# Patient Record
Sex: FEMALE | Race: WHITE | NOT HISPANIC OR LATINO | Employment: FULL TIME | URBAN - METROPOLITAN AREA
[De-identification: names, ages, dates, MRNs, and addresses within clinical notes are randomized per-mention and may not be internally consistent; named-entity substitution may affect disease eponyms.]

---

## 2018-03-26 ENCOUNTER — HISTORICAL (OUTPATIENT)
Dept: ADMINISTRATIVE | Facility: HOSPITAL | Age: 33
End: 2018-03-26

## 2018-03-28 LAB — FINAL CULTURE: NORMAL

## 2019-05-20 ENCOUNTER — HISTORICAL (OUTPATIENT)
Dept: ADMINISTRATIVE | Facility: HOSPITAL | Age: 34
End: 2019-05-20

## 2019-05-22 LAB — FINAL CULTURE: NORMAL

## 2019-05-23 LAB — FINAL CULTURE: NORMAL

## 2019-10-25 ENCOUNTER — HISTORICAL (OUTPATIENT)
Dept: ENDOSCOPY | Facility: HOSPITAL | Age: 34
End: 2019-10-25

## 2022-10-27 ENCOUNTER — HOSPITAL ENCOUNTER (INPATIENT)
Facility: HOSPITAL | Age: 37
LOS: 2 days | Discharge: HOME | DRG: 392 | End: 2022-10-29
Attending: STUDENT IN AN ORGANIZED HEALTH CARE EDUCATION/TRAINING PROGRAM | Admitting: INTERNAL MEDICINE
Payer: COMMERCIAL

## 2022-10-27 DIAGNOSIS — R11.10 INTRACTABLE VOMITING: Primary | ICD-10-CM

## 2022-10-27 DIAGNOSIS — K52.9 GASTROENTERITIS: ICD-10-CM

## 2022-10-27 DIAGNOSIS — R07.9 CHEST PAIN: ICD-10-CM

## 2022-10-27 DIAGNOSIS — K44.9 HIATAL HERNIA: ICD-10-CM

## 2022-10-27 DIAGNOSIS — Z98.84 HISTORY OF GASTRIC BYPASS: ICD-10-CM

## 2022-10-27 DIAGNOSIS — K29.70 GASTRITIS, PRESENCE OF BLEEDING UNSPECIFIED, UNSPECIFIED CHRONICITY, UNSPECIFIED GASTRITIS TYPE: ICD-10-CM

## 2022-10-27 DIAGNOSIS — R11.2 NAUSEA AND VOMITING, UNSPECIFIED VOMITING TYPE: ICD-10-CM

## 2022-10-27 DIAGNOSIS — R11.0 NAUSEA: ICD-10-CM

## 2022-10-27 DIAGNOSIS — Z98.84 H/O GASTRIC BYPASS: ICD-10-CM

## 2022-10-27 DIAGNOSIS — R10.9 ABDOMINAL PAIN, UNSPECIFIED ABDOMINAL LOCATION: ICD-10-CM

## 2022-10-27 DIAGNOSIS — Z90.3 H/O GASTRIC SLEEVE: ICD-10-CM

## 2022-10-27 LAB
ALBUMIN SERPL-MCNC: 2.9 GM/DL (ref 3.5–5)
ALBUMIN/GLOB SERPL: 1.3 RATIO (ref 1.1–2)
ALP SERPL-CCNC: 59 UNIT/L (ref 40–150)
ALT SERPL-CCNC: 24 UNIT/L (ref 0–55)
APPEARANCE UR: ABNORMAL
AST SERPL-CCNC: 23 UNIT/L (ref 5–34)
BACTERIA #/AREA URNS AUTO: ABNORMAL /HPF
BASOPHILS # BLD AUTO: 0.05 X10(3)/MCL (ref 0–0.2)
BASOPHILS NFR BLD AUTO: 0.6 %
BILIRUB UR QL STRIP.AUTO: ABNORMAL MG/DL
BILIRUBIN DIRECT+TOT PNL SERPL-MCNC: 0.9 MG/DL
BUN SERPL-MCNC: 6.4 MG/DL (ref 7–18.7)
CALCIUM SERPL-MCNC: 8.8 MG/DL (ref 8.4–10.2)
CAOX CRY URNS QL MICRO: ABNORMAL /HPF
CHLORIDE SERPL-SCNC: 105 MMOL/L (ref 98–107)
CO2 SERPL-SCNC: 25 MMOL/L (ref 22–29)
COLOR UR AUTO: ABNORMAL
CREAT SERPL-MCNC: 0.57 MG/DL (ref 0.55–1.02)
EOSINOPHIL # BLD AUTO: 0.08 X10(3)/MCL (ref 0–0.9)
EOSINOPHIL NFR BLD AUTO: 1 %
ERYTHROCYTE [DISTWIDTH] IN BLOOD BY AUTOMATED COUNT: 13.5 % (ref 11.5–17)
GFR SERPLBLD CREATININE-BSD FMLA CKD-EPI: >60 MLS/MIN/1.73/M2
GLOBULIN SER-MCNC: 2.2 GM/DL (ref 2.4–3.5)
GLUCOSE SERPL-MCNC: 92 MG/DL (ref 74–100)
GLUCOSE UR QL STRIP.AUTO: NEGATIVE MG/DL
HCT VFR BLD AUTO: 38.8 % (ref 37–47)
HGB BLD-MCNC: 12.4 GM/DL (ref 12–16)
IMM GRANULOCYTES # BLD AUTO: 0.06 X10(3)/MCL (ref 0–0.04)
IMM GRANULOCYTES NFR BLD AUTO: 0.7 %
KETONES UR QL STRIP.AUTO: ABNORMAL MG/DL
LEUKOCYTE ESTERASE UR QL STRIP.AUTO: ABNORMAL UNIT/L
LIPASE SERPL-CCNC: 22 U/L
LYMPHOCYTES # BLD AUTO: 1.53 X10(3)/MCL (ref 0.6–4.6)
LYMPHOCYTES NFR BLD AUTO: 19 %
MAGNESIUM SERPL-MCNC: 1.9 MG/DL (ref 1.6–2.6)
MCH RBC QN AUTO: 28.6 PG (ref 27–31)
MCHC RBC AUTO-ENTMCNC: 32 MG/DL (ref 33–36)
MCV RBC AUTO: 89.4 FL (ref 80–94)
MONOCYTES # BLD AUTO: 0.86 X10(3)/MCL (ref 0.1–1.3)
MONOCYTES NFR BLD AUTO: 10.7 %
MUCOUS THREADS URNS QL MICRO: ABNORMAL /LPF
NEUTROPHILS # BLD AUTO: 5.5 X10(3)/MCL (ref 2.1–9.2)
NEUTROPHILS NFR BLD AUTO: 68 %
NITRITE UR QL STRIP.AUTO: NEGATIVE
NRBC BLD AUTO-RTO: 0 %
PH UR STRIP.AUTO: 6 [PH]
PLATELET # BLD AUTO: 300 X10(3)/MCL (ref 130–400)
PMV BLD AUTO: 11.8 FL (ref 7.4–10.4)
POTASSIUM SERPL-SCNC: 3.6 MMOL/L (ref 3.5–5.1)
PROT SERPL-MCNC: 5.1 GM/DL (ref 6.4–8.3)
PROT UR QL STRIP.AUTO: ABNORMAL MG/DL
RBC # BLD AUTO: 4.34 X10(6)/MCL (ref 4.2–5.4)
RBC #/AREA URNS AUTO: ABNORMAL /HPF
RBC UR QL AUTO: NEGATIVE UNIT/L
SODIUM SERPL-SCNC: 138 MMOL/L (ref 136–145)
SP GR UR STRIP.AUTO: 1.03 (ref 1–1.03)
SQUAMOUS #/AREA URNS AUTO: ABNORMAL /HPF
UROBILINOGEN UR STRIP-ACNC: 1 MG/DL
WBC # SPEC AUTO: 8.1 X10(3)/MCL (ref 4.5–11.5)
WBC #/AREA URNS AUTO: ABNORMAL /HPF

## 2022-10-27 PROCEDURE — 81001 URINALYSIS AUTO W/SCOPE: CPT | Performed by: PHYSICIAN ASSISTANT

## 2022-10-27 PROCEDURE — 85025 COMPLETE CBC W/AUTO DIFF WBC: CPT | Performed by: PHYSICIAN ASSISTANT

## 2022-10-27 PROCEDURE — 96374 THER/PROPH/DIAG INJ IV PUSH: CPT

## 2022-10-27 PROCEDURE — 11000001 HC ACUTE MED/SURG PRIVATE ROOM

## 2022-10-27 PROCEDURE — 83690 ASSAY OF LIPASE: CPT | Performed by: PHYSICIAN ASSISTANT

## 2022-10-27 PROCEDURE — 63600175 PHARM REV CODE 636 W HCPCS: Performed by: PHYSICIAN ASSISTANT

## 2022-10-27 PROCEDURE — 96361 HYDRATE IV INFUSION ADD-ON: CPT

## 2022-10-27 PROCEDURE — 80053 COMPREHEN METABOLIC PANEL: CPT | Performed by: PHYSICIAN ASSISTANT

## 2022-10-27 PROCEDURE — 99285 EMERGENCY DEPT VISIT HI MDM: CPT | Mod: 25

## 2022-10-27 PROCEDURE — 83735 ASSAY OF MAGNESIUM: CPT | Performed by: PHYSICIAN ASSISTANT

## 2022-10-27 PROCEDURE — 25000003 PHARM REV CODE 250: Performed by: PHYSICIAN ASSISTANT

## 2022-10-27 PROCEDURE — 63600175 PHARM REV CODE 636 W HCPCS: Performed by: STUDENT IN AN ORGANIZED HEALTH CARE EDUCATION/TRAINING PROGRAM

## 2022-10-27 PROCEDURE — C9113 INJ PANTOPRAZOLE SODIUM, VIA: HCPCS | Performed by: STUDENT IN AN ORGANIZED HEALTH CARE EDUCATION/TRAINING PROGRAM

## 2022-10-27 PROCEDURE — C9113 INJ PANTOPRAZOLE SODIUM, VIA: HCPCS | Performed by: PHYSICIAN ASSISTANT

## 2022-10-27 PROCEDURE — 96376 TX/PRO/DX INJ SAME DRUG ADON: CPT

## 2022-10-27 PROCEDURE — 81003 URINALYSIS AUTO W/O SCOPE: CPT | Performed by: PHYSICIAN ASSISTANT

## 2022-10-27 PROCEDURE — 63600175 PHARM REV CODE 636 W HCPCS

## 2022-10-27 PROCEDURE — 25500020 PHARM REV CODE 255: Performed by: STUDENT IN AN ORGANIZED HEALTH CARE EDUCATION/TRAINING PROGRAM

## 2022-10-27 PROCEDURE — 36415 COLL VENOUS BLD VENIPUNCTURE: CPT | Performed by: PHYSICIAN ASSISTANT

## 2022-10-27 PROCEDURE — 63600175 PHARM REV CODE 636 W HCPCS: Performed by: NURSE PRACTITIONER

## 2022-10-27 PROCEDURE — 96375 TX/PRO/DX INJ NEW DRUG ADDON: CPT

## 2022-10-27 RX ORDER — ACETAMINOPHEN 325 MG/1
650 TABLET ORAL EVERY 6 HOURS PRN
Status: DISCONTINUED | OUTPATIENT
Start: 2022-10-27 | End: 2022-10-29 | Stop reason: HOSPADM

## 2022-10-27 RX ORDER — MAG HYDROX/ALUMINUM HYD/SIMETH 200-200-20
30 SUSPENSION, ORAL (FINAL DOSE FORM) ORAL 4 TIMES DAILY PRN
Status: DISCONTINUED | OUTPATIENT
Start: 2022-10-27 | End: 2022-10-29 | Stop reason: HOSPADM

## 2022-10-27 RX ORDER — POLYETHYLENE GLYCOL 3350 17 G/17G
17 POWDER, FOR SOLUTION ORAL 2 TIMES DAILY PRN
Status: DISCONTINUED | OUTPATIENT
Start: 2022-10-27 | End: 2022-10-29 | Stop reason: HOSPADM

## 2022-10-27 RX ORDER — BUPROPION HYDROCHLORIDE 150 MG/1
TABLET ORAL
COMMUNITY
End: 2023-10-05

## 2022-10-27 RX ORDER — TALC
6 POWDER (GRAM) TOPICAL NIGHTLY PRN
Status: DISCONTINUED | OUTPATIENT
Start: 2022-10-27 | End: 2022-10-29 | Stop reason: HOSPADM

## 2022-10-27 RX ORDER — ERGOCALCIFEROL 1.25 MG/1
50000 CAPSULE ORAL
COMMUNITY
Start: 2022-10-21

## 2022-10-27 RX ORDER — ONDANSETRON 2 MG/ML
INJECTION INTRAMUSCULAR; INTRAVENOUS
Status: COMPLETED
Start: 2022-10-27 | End: 2022-10-27

## 2022-10-27 RX ORDER — SIMETHICONE 80 MG
1 TABLET,CHEWABLE ORAL 4 TIMES DAILY PRN
Status: DISCONTINUED | OUTPATIENT
Start: 2022-10-27 | End: 2022-10-29 | Stop reason: HOSPADM

## 2022-10-27 RX ORDER — PANTOPRAZOLE SODIUM 40 MG/10ML
40 INJECTION, POWDER, LYOPHILIZED, FOR SOLUTION INTRAVENOUS
Status: COMPLETED | OUTPATIENT
Start: 2022-10-27 | End: 2022-10-27

## 2022-10-27 RX ORDER — ONDANSETRON 4 MG/1
4 TABLET, ORALLY DISINTEGRATING ORAL
Status: DISCONTINUED | OUTPATIENT
Start: 2022-10-27 | End: 2022-10-27

## 2022-10-27 RX ORDER — NALOXONE HCL 0.4 MG/ML
0.02 VIAL (ML) INJECTION
Status: DISCONTINUED | OUTPATIENT
Start: 2022-10-27 | End: 2022-10-29 | Stop reason: HOSPADM

## 2022-10-27 RX ORDER — ALPRAZOLAM 1 MG/1
1 TABLET ORAL 3 TIMES DAILY
COMMUNITY
Start: 2022-10-20 | End: 2023-10-05

## 2022-10-27 RX ORDER — SODIUM CHLORIDE, SODIUM LACTATE, POTASSIUM CHLORIDE, CALCIUM CHLORIDE 600; 310; 30; 20 MG/100ML; MG/100ML; MG/100ML; MG/100ML
INJECTION, SOLUTION INTRAVENOUS CONTINUOUS
Status: DISCONTINUED | OUTPATIENT
Start: 2022-10-27 | End: 2022-10-29 | Stop reason: HOSPADM

## 2022-10-27 RX ORDER — ONDANSETRON 2 MG/ML
4 INJECTION INTRAMUSCULAR; INTRAVENOUS
Status: COMPLETED | OUTPATIENT
Start: 2022-10-27 | End: 2022-10-27

## 2022-10-27 RX ORDER — PANTOPRAZOLE SODIUM 40 MG/10ML
40 INJECTION, POWDER, LYOPHILIZED, FOR SOLUTION INTRAVENOUS DAILY
Status: DISCONTINUED | OUTPATIENT
Start: 2022-10-28 | End: 2022-10-28

## 2022-10-27 RX ORDER — ONDANSETRON 2 MG/ML
4 INJECTION INTRAMUSCULAR; INTRAVENOUS EVERY 4 HOURS PRN
Status: DISCONTINUED | OUTPATIENT
Start: 2022-10-27 | End: 2022-10-29 | Stop reason: HOSPADM

## 2022-10-27 RX ORDER — MORPHINE SULFATE 4 MG/ML
4 INJECTION, SOLUTION INTRAMUSCULAR; INTRAVENOUS
Status: COMPLETED | OUTPATIENT
Start: 2022-10-27 | End: 2022-10-27

## 2022-10-27 RX ORDER — PROCHLORPERAZINE EDISYLATE 5 MG/ML
5 INJECTION INTRAMUSCULAR; INTRAVENOUS EVERY 6 HOURS PRN
Status: DISCONTINUED | OUTPATIENT
Start: 2022-10-27 | End: 2022-10-29 | Stop reason: HOSPADM

## 2022-10-27 RX ADMIN — IOPAMIDOL 100 ML: 755 INJECTION, SOLUTION INTRAVENOUS at 05:10

## 2022-10-27 RX ADMIN — SODIUM CHLORIDE, POTASSIUM CHLORIDE, SODIUM LACTATE AND CALCIUM CHLORIDE: 600; 310; 30; 20 INJECTION, SOLUTION INTRAVENOUS at 10:10

## 2022-10-27 RX ADMIN — SODIUM CHLORIDE 1000 ML: 9 INJECTION, SOLUTION INTRAVENOUS at 11:10

## 2022-10-27 RX ADMIN — PANTOPRAZOLE SODIUM 40 MG: 40 INJECTION, POWDER, FOR SOLUTION INTRAVENOUS at 11:10

## 2022-10-27 RX ADMIN — SODIUM CHLORIDE, POTASSIUM CHLORIDE, SODIUM LACTATE AND CALCIUM CHLORIDE 1000 ML: 600; 310; 30; 20 INJECTION, SOLUTION INTRAVENOUS at 06:10

## 2022-10-27 RX ADMIN — ONDANSETRON 4 MG: 2 INJECTION INTRAMUSCULAR; INTRAVENOUS at 04:10

## 2022-10-27 RX ADMIN — PANTOPRAZOLE SODIUM 40 MG: 40 INJECTION, POWDER, FOR SOLUTION INTRAVENOUS at 06:10

## 2022-10-27 RX ADMIN — SODIUM CHLORIDE, POTASSIUM CHLORIDE, SODIUM LACTATE AND CALCIUM CHLORIDE 1000 ML: 600; 310; 30; 20 INJECTION, SOLUTION INTRAVENOUS at 04:10

## 2022-10-27 RX ADMIN — MORPHINE SULFATE 4 MG: 4 INJECTION INTRAVENOUS at 04:10

## 2022-10-27 RX ADMIN — ONDANSETRON 4 MG: 2 INJECTION INTRAMUSCULAR; INTRAVENOUS at 11:10

## 2022-10-27 NOTE — ED PROVIDER NOTES
Encounter Date: 10/27/2022    SCRIBE #1 NOTE: I, Jose Saldivar, am scribing for, and in the presence of,  Joseluis Sultana IV, MD. I have scribed the following portions of the note - Other sections scribed: hpi, ros, pe.     History     Chief Complaint   Patient presents with    Abdominal Pain    Vomiting    Rectal Bleeding     Pt presents c/o abd pain with vomiting and rectal bleeding and fever.. Onset Wednesday.  Admit [facility] / discharged/ PMH gastric sleeve.       36 y.o female with pMHx gastric sleeve 9 years ago, gastric bypass 3 months ago - both in [place] presenting to the ED complaining of abdominal pain, nausea, NBNB vomiting. Patient states she was at work last week when she had to leave early due to constant vomiting. Admitted at OSH last week for bowel obstruction; she was discharged after her symptoms improved but 3 hours after discharge her symptoms returned. She also complains of intermittent brb per rectum. Reports no BM since 10/21.       Abdominal Pain  The current episode started several days ago. The problem has been rapidly worsening. The abdominal pain is generalized. The abdominal pain does not radiate. The abdominal pain is relieved by being still. The abdominal pain is exacerbated by movement. The other symptoms of the illness include nausea and vomiting. The other symptoms of the illness do not include fever, shortness of breath, dysuria, vaginal discharge or vaginal bleeding.   Nausea began more than 1 week ago.   The vomiting began more than 2 days ago. Vomiting occurs more than 10 times per day. The emesis contains stomach contents and undigested food.   The illness is associated with a recent illness. The patient states that she believes she is currently not pregnant. The patient has had a change in bowel habit. Risk factors for an acute abdominal problem include a history of abdominal surgery. Symptoms associated with the illness do not include chills or hematuria.   Review of patient's  allergies indicates:   Allergen Reactions    Penicillin Rash     History reviewed. No pertinent past medical history.  History reviewed. No pertinent surgical history.  History reviewed. No pertinent family history.     Review of Systems   Constitutional:  Positive for appetite change. Negative for chills and fever.   HENT:  Negative for congestion, rhinorrhea and sore throat.    Eyes:  Negative for visual disturbance.   Respiratory:  Negative for cough and shortness of breath.    Cardiovascular:  Negative for chest pain and leg swelling.   Gastrointestinal:  Positive for abdominal pain, blood in stool, nausea and vomiting.   Genitourinary:  Negative for dysuria, hematuria, vaginal bleeding and vaginal discharge.   Musculoskeletal:  Negative for joint swelling.   Skin:  Negative for rash.   Neurological:  Negative for weakness.   Psychiatric/Behavioral:  Negative for confusion.      Physical Exam     Initial Vitals   BP Pulse Resp Temp SpO2   10/27/22 1129 10/27/22 1129 10/27/22 1129 10/27/22 2153 10/27/22 1129   (!) 147/105 94 16 98.4 °F (36.9 °C) 98 %      MAP       --                Physical Exam    Nursing note and vitals reviewed.  Constitutional: She is not diaphoretic. No distress.   HENT:   Head: Normocephalic and atraumatic.   Mouth/Throat: Mucous membranes are dry.   Eyes: EOM are normal. Pupils are equal, round, and reactive to light.   Neck: Neck supple.   Normal range of motion.  Cardiovascular:  Normal rate and regular rhythm.           No murmur heard.  Pulmonary/Chest: Breath sounds normal. No respiratory distress. She has no wheezes. She has no rales.   Abdominal: Abdomen is soft. She exhibits no distension. There is no abdominal tenderness.   Well healed midline surgical scar  Laparoscopic surgical scars   Non tender, non distended abdomen    Musculoskeletal:         General: Normal range of motion.      Cervical back: Normal range of motion and neck supple.     Neurological: She is alert and  oriented to person, place, and time. She has normal strength.   Skin: Skin is warm. No rash noted.   Psychiatric: She has a normal mood and affect.       ED Course   Procedures  Labs Reviewed   COMPREHENSIVE METABOLIC PANEL - Abnormal; Notable for the following components:       Result Value    Blood Urea Nitrogen 6.4 (*)     Protein Total 5.1 (*)     Albumin Level 2.9 (*)     Globulin 2.2 (*)     All other components within normal limits   URINALYSIS, REFLEX TO URINE CULTURE - Abnormal; Notable for the following components:    Appearance, UA Cloudy (*)     Protein, UA Trace (*)     Ketones, UA 1+ (*)     Bilirubin, UA 1+ (*)     Leukocyte Esterase, UA Trace (*)     All other components within normal limits   CBC WITH DIFFERENTIAL - Abnormal; Notable for the following components:    MCHC 32.0 (*)     MPV 11.8 (*)     IG# 0.06 (*)     All other components within normal limits   URINALYSIS, MICROSCOPIC - Abnormal; Notable for the following components:    Mucous, UA Occ (*)     Calcium Oxalate Crystals, UA Many (*)     All other components within normal limits   MAGNESIUM - Normal   LIPASE - Normal   CBC W/ AUTO DIFFERENTIAL    Narrative:     The following orders were created for panel order CBC auto differential.  Procedure                               Abnormality         Status                     ---------                               -----------         ------                     CBC with Differential[[id number]]        Abnormal            Final result                 Please view results for these tests on the individual orders.          Imaging Results              CT Chest Abdomen Pelvis W W/O Contrast (XPD) (Final result)  Result time 10/27/22 17:52:49      Final result by Jaret Clarke MD (10/27/22 17:52:49)                   Narrative:    EXAMINATION  CT CHEST ABDOMEN PELVIS W W/O CONTRAST (XPD)    CLINICAL HISTORY  Weight loss, unintended;  vomiting, history of gastric bypass    TECHNIQUE  Pre- and  post-contrast helical-acquisition CT images were obtained and multiplanar reformats were accomplished by a CT technologist at a separate workstation, pushed to PACS for physician review.      Contrast:    *IV: ISOVUE-370, 100 mL  *Enteric: Gastroview, 10 mL diluted in water    COMPARISON  *Abdominopelvic CT dated 7 March 2014.  *No prior thoracic imaging is available.    FINDINGS  Images were reviewed in soft tissue, lung, and bone windows.    Exam quality: adequate for evaluation    Lines/tubes: none visualized    Cardiac chambers are normal volume.  There is no significant pericardial fluid.  The thoracic and abdominal aorta are normal course, caliber and contour.  The arch, upper abdominal, and pelvic branch vessels are unremarkable for exam protocol.    Central airway patency is widely maintained.  There is no organized airspace consolidation or suspicious focal lung lesion.  No pleural effusion or pneumothorax is identified.    The gallbladder is mildly distended, with layering intraluminal attenuation likely representing sludge.  No calcified stones, mural thickening, or pericholecystic inflammatory changes are evident.  There are no findings to suggest biliary obstruction.  Circumscribed area of decreased attenuation with focal peripheral enhancement at the anterior right hepatic lobe is slightly more prominent than the comparison, may represent of angioma but otherwise of limited characterization.  There is diffuse hypoattenuation of the liver parenchyma, consistent with fatty infiltration.  The liver is mildly enlarged, measuring 19.5 cm craniocaudal dimension at the right midclavicular line.    The remaining upper abdominal solid organs are without evidence of acute or suspicious focal abnormality.  Kidneys demonstrate temporally symmetric enhancement.  No radiodense urolithiasis or evidence of distal obstructive uropathy.  The urinary bladder is unremarkable.  No focal abnormality of the adnexal  structures.  Incidentally, there is a newly visualized linear metallic structure in the region of the posterior left adnexa consistent with tubal ligation clips.  A similar structure is noted along the lateral margin of the spleen (series 3, image 71).    There is faint contrast opacification of the esophageal lumen, with no abnormal dilatation or focal lesion.  Small hiatal hernia is similar to the prior study.  There are redemonstrated changes of gastric bypass, with unremarkable contrast transit across the anastomosis and into the afferent and efferent jejunal loops.  Circumferential thickened, edematous appearance of the distal gastric wall and immediately adjacent to Junel loop is evident, with the remaining small bowel loops demonstrating no evidence of abnormal mural thickening, focal lesion, or abnormal luminal dilatation or discrete transition point to suggest high-grade mechanical obstruction.  The appendix is unremarkable.  No definite mass-like mural irregularity is appreciated through the course of the colon.  Focal area of decreased luminal caliber at the left upper quadrant is favored artifactual due to inadequate luminal distention, otherwise limited assessment.    There is diffuse mesenteric haziness and inflammatory fat stranding adjacent to the above mentioned gastric and jejunal wall thickening.  Scattered reactive-appearing lymph nodes are also present through this region; representative node measures approximately 11 mm short axis (series 3, image 78).  The remaining mesentery is unremarkable and there are no findings to suggest pathologic lymph node enlargement or necrotic adenopathy.    The visualized thyroid gland is unremarkable.  No focal abnormality of the included lower neck.  The thoracic and abdominal wall subcutaneous tissues and regional muscular structures are without acute finding.  There is no acute osseous displacement or destructive skeletal lesion.    IMPRESSION  1. Edematous  mural thickening at the site of gastric bypass anastomosis, with inflammatory adjacent mesenteric fat stranding and reactive-appearing adenopathy.  Findings may represent sequela of gastroenteritis, otherwise limited characterization and endoscopy may prove beneficial.  2. No evidence of anastomotic leak.  3. Suspected anterior right hepatic lobe hemangioma, otherwise limited assessment but there are no CT findings to suggest that this represents an aggressive or acute etiology. Outpatient follow-up utilizing right upper quadrant ultrasound versus hepatic protocol MR would allow additional characterization.  4. Focal area of luminal narrowing at the proximal descending colon favored artifactual secondary to luminal underdistention.  Follow-up with colonoscopy is recommended if there are ongoing concerns for occult pathology.  5. Suspected displacement of right tubal ligation clip, visualized at the lateral left upper quadrant.  6. Additional nonacute secondary details discussed above.    RADIATION DOSE  Automated tube current modulation, weight-based exposure dosing, and/or iterative reconstruction technique utilized to reach lowest reasonably achievable exposure rate.    DLP: 1907 mGy*cm      Electronically signed by: Jaret Clarke  Date:    10/27/2022  Time:    17:52                                     Medications   diatrizoate meglumineand-diatrizoate sodium (GASTROVIEW) solution 10 mL (has no administration in time range)   melatonin tablet 6 mg (has no administration in time range)   ondansetron injection 4 mg (has no administration in time range)   prochlorperazine injection Soln 5 mg (has no administration in time range)   polyethylene glycol packet 17 g (has no administration in time range)   acetaminophen tablet 650 mg (has no administration in time range)   simethicone chewable tablet 80 mg (has no administration in time range)   aluminum-magnesium hydroxide-simethicone 200-200-20 mg/5 mL suspension 30 mL  (has no administration in time range)   naloxone 0.4 mg/mL injection 0.02 mg (has no administration in time range)   lactated ringers infusion ( Intravenous New Bag 10/27/22 2210)   pantoprazole injection 40 mg (has no administration in time range)   sodium chloride 0.9% bolus 1,000 mL (1,000 mLs Intravenous New Bag 10/27/22 1145)   ondansetron injection 4 mg (4 mg Intravenous Given 10/27/22 1145)   pantoprazole injection 40 mg (40 mg Intravenous Given 10/27/22 1145)   lactated ringers bolus 1,000 mL (0 mLs Intravenous Stopped 10/27/22 1730)   morphine injection 4 mg (4 mg Intravenous Given 10/27/22 1630)   ondansetron injection 4 mg (4 mg Intravenous Given 10/27/22 1630)   iopamidoL (ISOVUE-370) injection 100 mL (100 mLs Intravenous Given 10/27/22 1727)   pantoprazole injection 40 mg (40 mg Intravenous Given 10/27/22 1815)   lactated ringers bolus 1,000 mL (0 mLs Intravenous Stopped 10/27/22 1930)     Medical Decision Making:   History:   Old Medical Records: I decided to obtain old medical records.  Initial Assessment:   37 yo with hx multiple bariatric surgeries, reported recent admission for bowel obstruction at OSH - presenting with n/v/abd pain, inability to tolerate PO. Patietn slightly dry but overall well appearing on exam. Will obtain labs, hydrate, treat nausea, obtain CT bariatric protocol, reassess.   Differential Diagnosis:   SBO, gastroeneritis, post bariatric complications (aastomotic leak, stenosis/stricture, ulceration), dehydration, electrolyte derangement, pancreatitis   Clinical Tests:   Lab Tests: Reviewed and Ordered  Radiological Study: Ordered and Reviewed  ED Management:  IVF  IV protonix  IV antiemetics   Other:   I have discussed this case with another health care provider.        Scribe Attestation:   Scribe #1: I performed the above scribed service and the documentation accurately describes the services I performed. I attest to the accuracy of the note.    Attending Attestation:            Physician Attestation for Scribe:  Physician Attestation Statement for Scribe #1: I, reviewed documentation, as scribed by Jose Saldivar in my presence, and it is both accurate and complete.           ED Course as of 10/28/22 0038   Thu Oct 27, 2022   1830 CT with inflammation of gastric bypass anastamoses, no evidence of significant post bariatric complications, will treat with IV protonix, PO challenge.  [AC]   1924 Failed PO challenge. WIll admit  [AC]      ED Course User Index  [AC] Joseluis Sultana IV, MD                 Clinical Impression:   Final diagnoses:  [K29.70] Gastritis, presence of bleeding unspecified, unspecified chronicity, unspecified gastritis type (Primary)  [Z90.3] H/O gastric sleeve  [Z98.84] History of gastric bypass  [R10.9] Abdominal pain, unspecified abdominal location  [R11.2] Nausea and vomiting, unspecified vomiting type      ED Disposition Condition    Admit Stable        I, Joseluis Sultana MD personally performed the history, PE, MDM, and procedures as documented above and agree with the scribe's documentation.           Joseluis Sultana IV, MD  10/28/22 0040       Joseluis Sultana IV, MD  10/28/22 0041

## 2022-10-27 NOTE — FIRST PROVIDER EVALUATION
Medical screening examination initiated.  I have conducted a focused provider triage encounter, findings are as follows:    Chief Complaint   Patient presents with    Abdominal Pain    Vomiting    Rectal Bleeding     Pt presents c/o abd pain with vomiting and rectal bleeding and fever.. Onset Wednesday.  Admit [facility] / discharged/ PMH gastric sleeve.       Brief history of present illness:  36 y.o. female presents to the ED with n/v, lower abdominal pain, and bright red rectal bleeding onset Friday. [facility]. Notes recent gastric bypass surgery in [place] July 2022 with subsequent revision.     Vitals:    10/27/22 1129   BP: (!) 147/105   BP Location: Left arm   Patient Position: Sitting   Pulse: 94   Resp: 16   TempSrc: Oral   SpO2: 98%   Weight: 64.4 kg (142 lb)       Pertinent physical exam:  Awake, alert, ambulatory, non-labored respirations    Brief workup plan:  labs,  UA    Preliminary workup initiated; this workup will be continued and followed by the physician or advanced practice provider that is assigned to the patient when roomed.

## 2022-10-28 PROBLEM — R11.0 NAUSEA: Status: ACTIVE | Noted: 2022-10-28

## 2022-10-28 LAB
ALBUMIN SERPL-MCNC: 2.2 GM/DL (ref 3.5–5)
ALBUMIN/GLOB SERPL: 1.3 RATIO (ref 1.1–2)
ALP SERPL-CCNC: 44 UNIT/L (ref 40–150)
ALT SERPL-CCNC: 17 UNIT/L (ref 0–55)
AST SERPL-CCNC: 17 UNIT/L (ref 5–34)
BASOPHILS # BLD AUTO: 0.04 X10(3)/MCL (ref 0–0.2)
BASOPHILS NFR BLD AUTO: 0.8 %
BILIRUBIN DIRECT+TOT PNL SERPL-MCNC: 1 MG/DL
BUN SERPL-MCNC: 4.3 MG/DL (ref 7–18.7)
CALCIUM SERPL-MCNC: 8 MG/DL (ref 8.4–10.2)
CHLORIDE SERPL-SCNC: 104 MMOL/L (ref 98–107)
CO2 SERPL-SCNC: 26 MMOL/L (ref 22–29)
CREAT SERPL-MCNC: 0.52 MG/DL (ref 0.55–1.02)
EOSINOPHIL # BLD AUTO: 0.11 X10(3)/MCL (ref 0–0.9)
EOSINOPHIL NFR BLD AUTO: 2.2 %
ERYTHROCYTE [DISTWIDTH] IN BLOOD BY AUTOMATED COUNT: 13.7 % (ref 11.5–17)
GFR SERPLBLD CREATININE-BSD FMLA CKD-EPI: >60 MLS/MIN/1.73/M2
GLOBULIN SER-MCNC: 1.7 GM/DL (ref 2.4–3.5)
GLUCOSE SERPL-MCNC: 72 MG/DL (ref 74–100)
HCT VFR BLD AUTO: 32.4 % (ref 37–47)
HGB BLD-MCNC: 10.3 GM/DL (ref 12–16)
IMM GRANULOCYTES # BLD AUTO: 0.04 X10(3)/MCL (ref 0–0.04)
IMM GRANULOCYTES NFR BLD AUTO: 0.8 %
LYMPHOCYTES # BLD AUTO: 1.49 X10(3)/MCL (ref 0.6–4.6)
LYMPHOCYTES NFR BLD AUTO: 29.9 %
MAGNESIUM SERPL-MCNC: 1.7 MG/DL (ref 1.6–2.6)
MCH RBC QN AUTO: 29.4 PG (ref 27–31)
MCHC RBC AUTO-ENTMCNC: 31.8 MG/DL (ref 33–36)
MCV RBC AUTO: 92.6 FL (ref 80–94)
MONOCYTES # BLD AUTO: 0.75 X10(3)/MCL (ref 0.1–1.3)
MONOCYTES NFR BLD AUTO: 15.1 %
NEUTROPHILS # BLD AUTO: 2.6 X10(3)/MCL (ref 2.1–9.2)
NEUTROPHILS NFR BLD AUTO: 51.2 %
NRBC BLD AUTO-RTO: 0 %
PHOSPHATE SERPL-MCNC: 3.5 MG/DL (ref 2.3–4.7)
PLATELET # BLD AUTO: 219 X10(3)/MCL (ref 130–400)
PMV BLD AUTO: 12.1 FL (ref 7.4–10.4)
POTASSIUM SERPL-SCNC: 3.3 MMOL/L (ref 3.5–5.1)
PROT SERPL-MCNC: 3.9 GM/DL (ref 6.4–8.3)
RBC # BLD AUTO: 3.5 X10(6)/MCL (ref 4.2–5.4)
SODIUM SERPL-SCNC: 138 MMOL/L (ref 136–145)
WBC # SPEC AUTO: 5 X10(3)/MCL (ref 4.5–11.5)

## 2022-10-28 PROCEDURE — 25000003 PHARM REV CODE 250: Performed by: NURSE PRACTITIONER

## 2022-10-28 PROCEDURE — 63600175 PHARM REV CODE 636 W HCPCS

## 2022-10-28 PROCEDURE — 85025 COMPLETE CBC W/AUTO DIFF WBC: CPT | Performed by: NURSE PRACTITIONER

## 2022-10-28 PROCEDURE — 84100 ASSAY OF PHOSPHORUS: CPT | Performed by: NURSE PRACTITIONER

## 2022-10-28 PROCEDURE — 36415 COLL VENOUS BLD VENIPUNCTURE: CPT | Performed by: NURSE PRACTITIONER

## 2022-10-28 PROCEDURE — 11000001 HC ACUTE MED/SURG PRIVATE ROOM

## 2022-10-28 PROCEDURE — 80053 COMPREHEN METABOLIC PANEL: CPT | Performed by: NURSE PRACTITIONER

## 2022-10-28 PROCEDURE — C9113 INJ PANTOPRAZOLE SODIUM, VIA: HCPCS | Performed by: NURSE PRACTITIONER

## 2022-10-28 PROCEDURE — 25000003 PHARM REV CODE 250: Performed by: INTERNAL MEDICINE

## 2022-10-28 PROCEDURE — 83735 ASSAY OF MAGNESIUM: CPT | Performed by: NURSE PRACTITIONER

## 2022-10-28 PROCEDURE — 63600175 PHARM REV CODE 636 W HCPCS: Performed by: NURSE PRACTITIONER

## 2022-10-28 PROCEDURE — C9113 INJ PANTOPRAZOLE SODIUM, VIA: HCPCS

## 2022-10-28 RX ORDER — BUPROPION HYDROCHLORIDE 150 MG/1
150 TABLET ORAL DAILY
Status: DISCONTINUED | OUTPATIENT
Start: 2022-10-28 | End: 2022-10-29 | Stop reason: HOSPADM

## 2022-10-28 RX ORDER — ALPRAZOLAM 0.5 MG/1
1 TABLET ORAL 3 TIMES DAILY PRN
Status: DISCONTINUED | OUTPATIENT
Start: 2022-10-28 | End: 2022-10-29 | Stop reason: HOSPADM

## 2022-10-28 RX ORDER — PANTOPRAZOLE SODIUM 40 MG/10ML
40 INJECTION, POWDER, LYOPHILIZED, FOR SOLUTION INTRAVENOUS 2 TIMES DAILY
Status: DISCONTINUED | OUTPATIENT
Start: 2022-10-28 | End: 2022-10-29 | Stop reason: HOSPADM

## 2022-10-28 RX ADMIN — PANTOPRAZOLE SODIUM 40 MG: 40 INJECTION, POWDER, FOR SOLUTION INTRAVENOUS at 09:10

## 2022-10-28 RX ADMIN — POTASSIUM PHOSPHATE, MONOBASIC AND POTASSIUM PHOSPHATE, DIBASIC 30 MMOL: 224; 236 INJECTION, SOLUTION, CONCENTRATE INTRAVENOUS at 11:10

## 2022-10-28 RX ADMIN — BUPROPION HYDROCHLORIDE 150 MG: 150 TABLET, FILM COATED, EXTENDED RELEASE ORAL at 11:10

## 2022-10-28 NOTE — CONSULTS
Gastroenterology Consultation Note    Reason for Consult:  nausea and vomiting    PCP:   Galdino Caceres MD    Referring provider: Deneen Gunn, [facility]       History of Present Illness (HPI):    37 y/o female known to Dr. Stewart Blackwell who has a past medical history of gastric sleeve for weight loss 2013 in [place], gastric bypass for weight loss 3 months ago in [place], anxiety.      Patient presented to the ED 10/27/22 with complaints nausea, vomiting, and bilateral lower abdominal pain x 1 day. She also reported one episode of rectal bleeding, which resolved. Patient's children had a sleep over on Wednesday and everyone became sick. She was unable to keep anything down, so she [facility] and was admitted with a bowel obstruction. This was treated conservatively and she discharged home after tolerating fish. She then presented to the [facility] ED with recurrent nausea, vomiting, and inability to tolerated food (chicken). On arrival here, she was hemodynamically stable and afebrile. Labs were unrevealing. CT abdomen and pelvis with and without contrast suggested gastroenteritis of her gastric bypass without obstruction or leakage, PO contrast transit across the anastomosis into the efferent jejunal loops,  focal luminal narrowing at the proximal descending colon likely secondary to underdistention, and a likely hepatic hemangioma. She was admitted and GI was consulted.    Patient reports she was initially seen by Dr. Woods in 2019 as a consultation for a hiatal hernia repair. She reports needing an upper GI series and possibly any EGD for pre-op w/u. She never had this done due to the COVID pandemic. She did however find herself having difficulty losing weight after her third pregnancy and went to [place] 3 months ago to have her gastric sleeve modified to a gastric bypass. She states the doctors in [place] did not feel comfortable fixing her hiatal hernia. She has had loose oily stools since then, but did  "not have nausea and vomiting until now.    No prior EGDs    Previous records reviewed.    ROS:  Review of Systems   Constitutional:  Negative for fever, malaise/fatigue and weight loss.   Respiratory:  Negative for cough and shortness of breath.    Cardiovascular:  Negative for chest pain, palpitations and leg swelling.   Gastrointestinal:  Positive for abdominal pain, nausea and vomiting. Negative for blood in stool, constipation, diarrhea, heartburn and melena.   Musculoskeletal:  Negative for back pain and myalgias.   Skin:  Negative for rash.   Neurological:  Negative for speech change and focal weakness.   All other systems reviewed and are negative.    Medical History:   History reviewed. No pertinent past medical history.    Surgical History:   History reviewed. No pertinent surgical history.    Family History:   History reviewed. No pertinent family history..     Social History:   Social History     Tobacco Use    Smoking status: Never    Smokeless tobacco: Never   Substance Use Topics    Alcohol use: Not Currently       Allergies:  Review of patient's allergies indicates:   Allergen Reactions    Penicillin Rash       (Not in a hospital admission)        Objective Findings:    Vital Signs:  /76   Pulse 74   Temp 98.4 °F (36.9 °C) (Oral)   Resp 18   Ht 5' 4" (1.626 m)   Wt 64.4 kg (142 lb)   SpO2 96%   Breastfeeding No   BMI 24.37 kg/m²   Body mass index is 24.37 kg/m².    Physical Exam:  Physical Exam  Constitutional:       General: She is not in acute distress.  HENT:      Head: Normocephalic and atraumatic.   Eyes:      General: No scleral icterus.     Conjunctiva/sclera: Conjunctivae normal.   Cardiovascular:      Rate and Rhythm: Normal rate and regular rhythm.   Pulmonary:      Effort: Pulmonary effort is normal. No respiratory distress.   Abdominal:      General: Bowel sounds are normal. There is no distension.      Palpations: Abdomen is soft.      Tenderness: There is no abdominal " tenderness.   Musculoskeletal:         General: No tenderness. Normal range of motion.   Skin:     General: Skin is warm and dry.   Neurological:      Mental Status: She is alert and oriented to person, place, and time.   Psychiatric:         Mood and Affect: Mood normal.         Behavior: Behavior normal.       Labs:  Recent Results (from the past 48 hour(s))   Comprehensive metabolic panel    Collection Time: 10/27/22 11:45 AM   Result Value Ref Range    Sodium Level 138 136 - 145 mmol/L    Potassium Level 3.6 3.5 - 5.1 mmol/L    Chloride 105 98 - 107 mmol/L    Carbon Dioxide 25 22 - 29 mmol/L    Glucose Level 92 74 - 100 mg/dL    Blood Urea Nitrogen 6.4 (L) 7.0 - 18.7 mg/dL    Creatinine 0.57 0.55 - 1.02 mg/dL    Calcium Level Total 8.8 8.4 - 10.2 mg/dL    Protein Total 5.1 (L) 6.4 - 8.3 gm/dL    Albumin Level 2.9 (L) 3.5 - 5.0 gm/dL    Globulin 2.2 (L) 2.4 - 3.5 gm/dL    Albumin/Globulin Ratio 1.3 1.1 - 2.0 ratio    Bilirubin Total 0.9 <=1.5 mg/dL    Alkaline Phosphatase 59 40 - 150 unit/L    Alanine Aminotransferase 24 0 - 55 unit/L    Aspartate Aminotransferase 23 5 - 34 unit/L    eGFR >60 mls/min/1.73/m2   Magnesium    Collection Time: 10/27/22 11:45 AM   Result Value Ref Range    Magnesium Level 1.90 1.60 - 2.60 mg/dL   Lipase    Collection Time: 10/27/22 11:45 AM   Result Value Ref Range    Lipase Level 22 <=60 U/L   CBC with Differential    Collection Time: 10/27/22 11:45 AM   Result Value Ref Range    WBC 8.1 4.5 - 11.5 x10(3)/mcL    RBC 4.34 4.20 - 5.40 x10(6)/mcL    Hgb 12.4 12.0 - 16.0 gm/dL    Hct 38.8 37.0 - 47.0 %    MCV 89.4 80.0 - 94.0 fL    MCH 28.6 27.0 - 31.0 pg    MCHC 32.0 (L) 33.0 - 36.0 mg/dL    RDW 13.5 11.5 - 17.0 %    Platelet 300 130 - 400 x10(3)/mcL    MPV 11.8 (H) 7.4 - 10.4 fL    Neut % 68.0 %    Lymph % 19.0 %    Mono % 10.7 %    Eos % 1.0 %    Basophil % 0.6 %    Lymph # 1.53 0.6 - 4.6 x10(3)/mcL    Neut # 5.5 2.1 - 9.2 x10(3)/mcL    Mono # 0.86 0.1 - 1.3 x10(3)/mcL    Eos # 0.08 0  - 0.9 x10(3)/mcL    Baso # 0.05 0 - 0.2 x10(3)/mcL    IG# 0.06 (H) 0 - 0.04 x10(3)/mcL    IG% 0.7 %    NRBC% 0.0 %   Urinalysis, Reflex to Urine Culture Urine, Clean Catch    Collection Time: 10/27/22 11:53 AM    Specimen: Urine   Result Value Ref Range    Color, UA Dark Yellow Yellow, Light-Yellow, Dark Yellow, Mitzi, Straw    Appearance, UA Cloudy (A) Clear    Specific Gravity, UA 1.028 1.001 - 1.030    pH, UA 6.0 5.0 - 8.5    Protein, UA Trace (A) Negative mg/dL    Glucose, UA Negative Negative, Normal mg/dL    Ketones, UA 1+ (A) Negative mg/dL    Blood, UA Negative Negative unit/L    Bilirubin, UA 1+ (A) Negative mg/dL    Urobilinogen, UA 1.0 0.2, 1.0, Normal mg/dL    Nitrites, UA Negative Negative    Leukocyte Esterase, UA Trace (A) Negative unit/L   Urinalysis, Microscopic    Collection Time: 10/27/22 11:53 AM   Result Value Ref Range    RBC, UA 0-5 None Seen, 0-2, 3-5, 0-5, >100 /HPF    WBC, UA 0-5 None Seen, 0-2, 3-5, 0-5 /HPF    Squamous Epithelial Cells, UA 0-4 0-4, None Seen /HPF    Bacteria, UA None Seen None Seen, Rare, Occasional /HPF    Mucous, UA Occ (A) None Seen /LPF    Calcium Oxalate Crystals, UA Many (A) None Seen /HPF   Comprehensive Metabolic Panel (CMP)    Collection Time: 10/28/22  3:51 AM   Result Value Ref Range    Sodium Level 138 136 - 145 mmol/L    Potassium Level 3.3 (L) 3.5 - 5.1 mmol/L    Chloride 104 98 - 107 mmol/L    Carbon Dioxide 26 22 - 29 mmol/L    Glucose Level 72 (L) 74 - 100 mg/dL    Blood Urea Nitrogen 4.3 (L) 7.0 - 18.7 mg/dL    Creatinine 0.52 (L) 0.55 - 1.02 mg/dL    Calcium Level Total 8.0 (L) 8.4 - 10.2 mg/dL    Protein Total 3.9 (L) 6.4 - 8.3 gm/dL    Albumin Level 2.2 (L) 3.5 - 5.0 gm/dL    Globulin 1.7 (L) 2.4 - 3.5 gm/dL    Albumin/Globulin Ratio 1.3 1.1 - 2.0 ratio    Bilirubin Total 1.0 <=1.5 mg/dL    Alkaline Phosphatase 44 40 - 150 unit/L    Alanine Aminotransferase 17 0 - 55 unit/L    Aspartate Aminotransferase 17 5 - 34 unit/L    eGFR >60 mls/min/1.73/m2    Magnesium    Collection Time: 10/28/22  3:51 AM   Result Value Ref Range    Magnesium Level 1.70 1.60 - 2.60 mg/dL   Phosphorus    Collection Time: 10/28/22  3:51 AM   Result Value Ref Range    Phosphorus Level 3.5 2.3 - 4.7 mg/dL   CBC with Differential    Collection Time: 10/28/22  3:51 AM   Result Value Ref Range    WBC 5.0 4.5 - 11.5 x10(3)/mcL    RBC 3.50 (L) 4.20 - 5.40 x10(6)/mcL    Hgb 10.3 (L) 12.0 - 16.0 gm/dL    Hct 32.4 (L) 37.0 - 47.0 %    MCV 92.6 80.0 - 94.0 fL    MCH 29.4 27.0 - 31.0 pg    MCHC 31.8 (L) 33.0 - 36.0 mg/dL    RDW 13.7 11.5 - 17.0 %    Platelet 219 130 - 400 x10(3)/mcL    MPV 12.1 (H) 7.4 - 10.4 fL    Neut % 51.2 %    Lymph % 29.9 %    Mono % 15.1 %    Eos % 2.2 %    Basophil % 0.8 %    Lymph # 1.49 0.6 - 4.6 x10(3)/mcL    Neut # 2.6 2.1 - 9.2 x10(3)/mcL    Mono # 0.75 0.1 - 1.3 x10(3)/mcL    Eos # 0.11 0 - 0.9 x10(3)/mcL    Baso # 0.04 0 - 0.2 x10(3)/mcL    IG# 0.04 0 - 0.04 x10(3)/mcL    IG% 0.8 %    NRBC% 0.0 %       CT Chest Abdomen Pelvis W W/O Contrast (XPD)   Final Result          Imaging:  Procedure Component Value Units Date/Time   CT Chest Abdomen Pelvis W W/O Contrast (XPD) [[id number]] Resulted: 10/27/22 1752   Order Status: Completed Updated: 10/27/22 1755   Narrative:     EXAMINATION   CT CHEST ABDOMEN PELVIS W W/O CONTRAST (XPD)     CLINICAL HISTORY   Weight loss, unintended;  vomiting, history of gastric bypass     TECHNIQUE   Pre- and post-contrast helical-acquisition CT images were obtained and multiplanar reformats were accomplished by a CT technologist at a separate workstation, pushed to PACS for physician review.       Contrast:     *IV: ISOVUE-370, 100 mL   *Enteric: Gastroview, 10 mL diluted in water     COMPARISON   *Abdominopelvic CT dated 7 March 2014.   *No prior thoracic imaging is available.     FINDINGS   Images were reviewed in soft tissue, lung, and bone windows.     Exam quality: adequate for evaluation     Lines/tubes: none visualized     Cardiac  chambers are normal volume.  There is no significant pericardial fluid.  The thoracic and abdominal aorta are normal course, caliber and contour.  The arch, upper abdominal, and pelvic branch vessels are unremarkable for exam protocol.     Central airway patency is widely maintained.  There is no organized airspace consolidation or suspicious focal lung lesion.  No pleural effusion or pneumothorax is identified.     The gallbladder is mildly distended, with layering intraluminal attenuation likely representing sludge.  No calcified stones, mural thickening, or pericholecystic inflammatory changes are evident.  There are no findings to suggest biliary obstruction.  Circumscribed area of decreased attenuation with focal peripheral enhancement at the anterior right hepatic lobe is slightly more prominent than the comparison, may represent of angioma but otherwise of limited characterization.  There is diffuse hypoattenuation of the liver parenchyma, consistent with fatty infiltration.  The liver is mildly enlarged, measuring 19.5 cm craniocaudal dimension at the right midclavicular line.     The remaining upper abdominal solid organs are without evidence of acute or suspicious focal abnormality.  Kidneys demonstrate temporally symmetric enhancement.  No radiodense urolithiasis or evidence of distal obstructive uropathy.  The urinary bladder is unremarkable.  No focal abnormality of the adnexal structures.  Incidentally, there is a newly visualized linear metallic structure in the region of the posterior left adnexa consistent with tubal ligation clips.  A similar structure is noted along the lateral margin of the spleen (series 3, image 71).     There is faint contrast opacification of the esophageal lumen, with no abnormal dilatation or focal lesion.  Small hiatal hernia is similar to the prior study.  There are redemonstrated changes of gastric bypass, with unremarkable contrast transit across the anastomosis and  into the afferent and efferent jejunal loops.  Circumferential thickened, edematous appearance of the distal gastric wall and immediately adjacent to Junel loop is evident, with the remaining small bowel loops demonstrating no evidence of abnormal mural thickening, focal lesion, or abnormal luminal dilatation or discrete transition point to suggest high-grade mechanical obstruction.  The appendix is unremarkable.  No definite mass-like mural irregularity is appreciated through the course of the colon.  Focal area of decreased luminal caliber at the left upper quadrant is favored artifactual due to inadequate luminal distention, otherwise limited assessment.     There is diffuse mesenteric haziness and inflammatory fat stranding adjacent to the above mentioned gastric and jejunal wall thickening.  Scattered reactive-appearing lymph nodes are also present through this region; representative node measures approximately 11 mm short axis (series 3, image 78).  The remaining mesentery is unremarkable and there are no findings to suggest pathologic lymph node enlargement or necrotic adenopathy.     The visualized thyroid gland is unremarkable.  No focal abnormality of the included lower neck.  The thoracic and abdominal wall subcutaneous tissues and regional muscular structures are without acute finding.  There is no acute osseous displacement or destructive skeletal lesion.     IMPRESSION   1. Edematous mural thickening at the site of gastric bypass anastomosis, with inflammatory adjacent mesenteric fat stranding and reactive-appearing adenopathy.  Findings may represent sequela of gastroenteritis, otherwise limited characterization and endoscopy may prove beneficial.   2. No evidence of anastomotic leak.   3. Suspected anterior right hepatic lobe hemangioma, otherwise limited assessment but there are no CT findings to suggest that this represents an aggressive or acute etiology. Outpatient follow-up utilizing right upper  quadrant ultrasound versus hepatic protocol MR would allow additional characterization.   4. Focal area of luminal narrowing at the proximal descending colon favored artifactual secondary to luminal underdistention.  Follow-up with colonoscopy is recommended if there are ongoing concerns for occult pathology.   5. Suspected displacement of right tubal ligation clip, visualized at the lateral left upper quadrant.   6. Additional nonacute secondary details discussed above.     RADIATION DOSE   Automated tube current modulation, weight-based exposure dosing, and/or iterative reconstruction technique utilized to reach lowest reasonably achievable exposure rate.     DLP: 1907 mGy*cm       Electronically signed by: Jaret Clarke   Date: 10/27/2022   Time: 17:52       Assessment/Plan:  1. Gastritis, presence of bleeding unspecified, unspecified chronicity, unspecified gastritis type    2. H/O gastric sleeve    3. History of gastric bypass    4. Abdominal pain, unspecified abdominal location    5. Chest pain    6. Nausea and vomiting, unspecified vomiting type    7. Hiatal hernia       37 y/o female known to Dr. Stewart Blackwell who has a past medical history of gastric sleeve for weight loss 2013 in [place], gastric bypass for weight loss 3 months ago in [place], anxiety. Here with nausea, vomiting, and inability to keep solid food down.    Nausea and vomiting  Gastroenteritis  -Suspect viral etiology given her clinical history. She is no longer vomiting and states feels fine at rest. She is nervous that solid PO intake may cause recurrent symptoms  -PO PPI daily  -IV hydration  -Supportive care    Hiatal hernia  -Patient is asking if her pre-op w/u for the hiatal hernia repair can be done while inpatient and wants to schedule the surgery. We are going to consult Dr. Woods to clarify things  -Her CT w/ PO contrast may suffice   -IV fluids  -PPI BID  -Will discuss with Dr. Perea    4. Rectal bleeding  -Outpatient  colonoscopy    5. Hepatic hemangioma  -Outpatient US vs MRI, per radiology    Stewart Frank PA-C    Thank you for allowing us to participate in the care of Chelsie Lee.

## 2022-10-28 NOTE — H&P
[facility] Hospital Medicine History & Physical Examination       Patient Name: Chelsie Lee  MRN: [id number]  Patient Class: IP- Inpatient   Admission Date: 10/27/2022 11:32 AM  Length of Stay: 1  Admitting Service: Hospital Medicine   Attending Physician: Jewel Adame MD   Primary Care Provider: Galdino Caceres MD  History source: EMR, patient and/or patient's family    CHIEF COMPLAINT   Abdominal Pain, Vomiting, and Rectal Bleeding (Pt presents c/o abd pain with vomiting and rectal bleeding and fever.. Onset Wednesday.  Admit [facility] / discharged/ PMH gastric sleeve.  )      HISTORY OF PRESENT ILLNESS:   Ms. Lee is a 36-year-old female who presented to the ED with complaints of abdominal pain, nausea, and vomiting, since Wednesday, 10/19/22.  She states she got a gastric bypass in [place] about 3 months ago, and was doing well until then.  She went to [facility] ED and was admitted with a diagnosis of bowel obstruction.  Her symptoms improved and she was discharged because she was able to tolerate solid food.  However, after she returned home, her symptoms returned.  Of note, she tells me that she had a sleep-over for her children, and everyone ended up with a stomach virus, which she states is what started off her symptoms.    Today's ED lab work was unimpressive.  CT chest abdomen pelvis w w/o contrast showed edematous mural thickening at the site of the gastric bypass anastomosis, with inflammatory adjacent mesenteric fat stranding and reactive-appearing adenopathy.  May represent sequela of gastroenteritis.  No evidence of an anastomotic leak.  Suspected anterior right hepatic lobe hemangioma, otherwise limited assessment.  There are no CT findings to suggest that this represents an aggressive or acute etiology.  Focal area of luminal narrowing at the proximal descending colon favored artifactual secondary to luminal understood distension.  Follow-up with colonoscopy is  recommended if there are ongoing concerns for occult pathology.  Suspected displacement of right tubal ligation clip, visualized at the lateral left upper quadrant.  VSS in the ED. She was admitted to hospital medicine for management.    PAST MEDICAL HISTORY:   Anxiety    PAST SURGICAL HISTORY:    x3  Hysterectomy   Gastric sleeve   Gastric bypass   Septoplasty    ALLERGIES:   Penicillin    FAMILY HISTORY:   Reviewed and non-contributory     SOCIAL HISTORY:     Social History     Tobacco Use    Smoking status: Never    Smokeless tobacco: Never   Substance Use Topics    Alcohol use: Not Currently        HOME MEDICATIONS:     Prior to Admission medications    Not on File       REVIEW OF SYSTEMS:   Except as documented, all other systems reviewed and negative     PHYSICAL EXAM:   T 98.4 °F (36.9 °C)   /70   P 80   RR 17   O2 98 %  GENERAL: awake, alert, oriented and in no acute distress, non-toxic appearing   HEENT: normocephalic atraumatic   NECK: supple   LUNGS: Clear bilaterally, no wheezing or rales, no accessory muscle use   CVS: Regular rate and rhythm, normal peripheral perfusion  ABD: Soft, non-tender, non-distended, bowel sounds present  EXTREMITIES: no clubbing or cyanosis  SKIN: Warm, dry.   NEURO: alert and oriented, grossly without focal deficits   PSYCHIATRIC: Cooperative    LABS AND IMAGING:     Recent Labs     10/27/22  1145   WBC 8.1   RBC 4.34   HGB 12.4   HCT 38.8   MCV 89.4   MCH 28.6   MCHC 32.0*   RDW 13.5        No results for input(s): LACTIC in the last 72 hours.  No results for input(s): INR, APTT, D-DIMER in the last 72 hours.  No results for input(s): HGBA1C, CHOL, TRIG, LDL, VLDL, HDL in the last 72 hours.   Recent Labs     10/27/22  1145      K 3.6   CHLORIDE 105   CO2 25   BUN 6.4*   CREATININE 0.57   GLUCOSE 92   CALCIUM 8.8   MG 1.90   ALBUMIN 2.9*   GLOBULIN 2.2*   ALKPHOS 59   ALT 24   AST 23   BILITOT 0.9   LIPASE 22     No results for input(s): BNP, CPK,  TROPONINI in the last 72 hours.       CT Chest Abdomen Pelvis W W/O Contrast (XPD)  EXAMINATION  CT CHEST ABDOMEN PELVIS W W/O CONTRAST (XPD)    CLINICAL HISTORY  Weight loss, unintended;  vomiting, history of gastric bypass    TECHNIQUE  Pre- and post-contrast helical-acquisition CT images were obtained and multiplanar reformats were accomplished by a CT technologist at a separate workstation, pushed to PACS for physician review.    Contrast:    *IV: ISOVUE-370, 100 mL  *Enteric: Gastroview, 10 mL diluted in water    COMPARISON  *Abdominopelvic CT dated 7 March 2014.  *No prior thoracic imaging is available.    FINDINGS  Images were reviewed in soft tissue, lung, and bone windows.    Exam quality: adequate for evaluation    Lines/tubes: none visualized    Cardiac chambers are normal volume.  There is no significant pericardial fluid.  The thoracic and abdominal aorta are normal course, caliber and contour.  The arch, upper abdominal, and pelvic branch vessels are unremarkable for exam protocol.    Central airway patency is widely maintained.  There is no organized airspace consolidation or suspicious focal lung lesion.  No pleural effusion or pneumothorax is identified.    The gallbladder is mildly distended, with layering intraluminal attenuation likely representing sludge.  No calcified stones, mural thickening, or pericholecystic inflammatory changes are evident.  There are no findings to suggest biliary obstruction.  Circumscribed area of decreased attenuation with focal peripheral enhancement at the anterior right hepatic lobe is slightly more prominent than the comparison, may represent of angioma but otherwise of limited characterization.  There is diffuse hypoattenuation of the liver parenchyma, consistent with fatty infiltration.  The liver is mildly enlarged, measuring 19.5 cm craniocaudal dimension at the right midclavicular line.    The remaining upper abdominal solid organs are without evidence of  acute or suspicious focal abnormality.  Kidneys demonstrate temporally symmetric enhancement.  No radiodense urolithiasis or evidence of distal obstructive uropathy.  The urinary bladder is unremarkable.  No focal abnormality of the adnexal structures.  Incidentally, there is a newly visualized linear metallic structure in the region of the posterior left adnexa consistent with tubal ligation clips.  A similar structure is noted along the lateral margin of the spleen (series 3, image 71).    There is faint contrast opacification of the esophageal lumen, with no abnormal dilatation or focal lesion.  Small hiatal hernia is similar to the prior study.  There are redemonstrated changes of gastric bypass, with unremarkable contrast transit across the anastomosis and into the afferent and efferent jejunal loops.  Circumferential thickened, edematous appearance of the distal gastric wall and immediately adjacent to Junel loop is evident, with the remaining small bowel loops demonstrating no evidence of abnormal mural thickening, focal lesion, or abnormal luminal dilatation or discrete transition point to suggest high-grade mechanical obstruction.  The appendix is unremarkable.  No definite mass-like mural irregularity is appreciated through the course of the colon.  Focal area of decreased luminal caliber at the left upper quadrant is favored artifactual due to inadequate luminal distention, otherwise limited assessment.    There is diffuse mesenteric haziness and inflammatory fat stranding adjacent to the above mentioned gastric and jejunal wall thickening.  Scattered reactive-appearing lymph nodes are also present through this region; representative node measures approximately 11 mm short axis (series 3, image 78).  The remaining mesentery is unremarkable and there are no findings to suggest pathologic lymph node enlargement or necrotic adenopathy.    The visualized thyroid gland is unremarkable.  No focal abnormality of  the included lower neck.  The thoracic and abdominal wall subcutaneous tissues and regional muscular structures are without acute finding.  There is no acute osseous displacement or destructive skeletal lesion.    IMPRESSION  1. Edematous mural thickening at the site of gastric bypass anastomosis, with inflammatory adjacent mesenteric fat stranding and reactive-appearing adenopathy.  Findings may represent sequela of gastroenteritis, otherwise limited characterization and endoscopy may prove beneficial.  2. No evidence of anastomotic leak.  3. Suspected anterior right hepatic lobe hemangioma, otherwise limited assessment but there are no CT findings to suggest that this represents an aggressive or acute etiology. Outpatient follow-up utilizing right upper quadrant ultrasound versus hepatic protocol MR would allow additional characterization.  4. Focal area of luminal narrowing at the proximal descending colon favored artifactual secondary to luminal underdistention.  Follow-up with colonoscopy is recommended if there are ongoing concerns for occult pathology.  5. Suspected displacement of right tubal ligation clip, visualized at the lateral left upper quadrant.  6. Additional nonacute secondary details discussed above.    RADIATION DOSE  Automated tube current modulation, weight-based exposure dosing, and/or iterative reconstruction technique utilized to reach lowest reasonably achievable exposure rate.    DLP: 1907 mGy*cm    Electronically signed by: Jaret Clarke  Date:    10/27/2022  Time:    17:52      ASSESSMENT & PLAN:     1.  Gastroenteritis s/p Gastric bypass July 2022  2.  Intractable nausea/vomiting      PLAN:  -GI consult  -NPO for now  -Protonix daily  -LR @ 75 ml/hr  -Antiemetics as needed  -Resume home meds as appropriate  -Labs in AM      I, Deneen Gunn NP have reviewed and discussed this case with Dr. Adame.  Please see addendum for further assessment and plan from attending MD.    DVT  prophylaxis: SCDs  Code status: Full    __________________________________________________________________________  I, Dr. Jewel Adame assumed care of this patient.  For the patient encounter, I performed the substantive portion of the visit, I reviewed the NPPA documentation, treatment plan, and medical decision making.  I had face to face time with this patient.  I have personally reviewed the labs and test results that are presently available. I have reviewed or attempted to review medical records based upon their availability. If patient was admitted under observational status it is with my approval.      36-year-old female with recent gastric bypass surgery July 2022 presents with intractable nausea and vomiting for over a week.  CT of her abdomen tonight shows inflammation at the gastric bypass anastomosis.  Benign abdomen on exam.  Did intractable nausea and vomiting she is being admitted with consultation to GI.  Keep NPO right now in case of upper endoscopy however she will benefit from starting on Carafate, maybe twice daily PPI but will follow up GI recs.    Time seen: 11PM   Jewel Adame MD

## 2022-10-28 NOTE — CONSULTS
[facility] - Emergency Dept  General Surgery  Consult Note    Patient Name: Chelsie Lee  MRN: [id number]  Code Status: Full Code  Admission Date: 10/27/2022  Hospital Length of Stay: 1 days  Attending Physician: Janusz Toure MD  Primary Care Provider: Galdino Caceres MD    Patient information was obtained from patient and ER records.     Inpatient consult to General Surgery  Consult performed by: JOSELINE Daniel  Consult ordered by: SUDHIR Millard        Subjective:     Principal Problem: Nausea    History of Present Illness: 36-year-old female who approximately 9 years ago had a vertical sleeve gastrectomy in [place].  She done quite well after that surgery and had 3 kids in the interim.  She did see Dr. Adames in 2019 for workup of hiatal hernia.  Three months ago she went back to [place] to the same physician and had a mini gastric bypass however the surgeon at that time was not comfortable performing a hiatal hernia repair.  She is done quite well over the last 2 months and approximally 1-2 weeks ago began developing nausea and vomiting.  She did state that she had sick contacts with her children who had developed what sounds like a viral gastroenteritis.  She was seen at outside hospital and worked up and found to have an obstruction that was managed conservatively and she was able to tolerate some food although shortly after being discharged she returned to our hospital with similar symptoms.  The CT scan can be seen in the chart.  She is currently comfortable and not having any vomiting although not yet on a diet.  She is tolerating some ice chips.  Her abdomen is benign and soft and nontender.  She states she is been losing approximally 2 lb per week and taking all of her vitamins and exercising as prescribed.      No current facility-administered medications on file prior to encounter.     Current Outpatient Medications on File Prior to Encounter   Medication Sig     ALPRAZolam (XANAX) 1 MG tablet Take 1 mg by mouth 3 (three) times daily.    buPROPion (WELLBUTRIN XL) 150 MG TB24 tablet 1 tablet in the morning    ergocalciferol (ERGOCALCIFEROL) 50,000 unit Cap Take 50,000 Units by mouth every 7 days.       Review of patient's allergies indicates:   Allergen Reactions    Penicillin Rash       History reviewed. No pertinent past medical history.  History reviewed. No pertinent surgical history.  Family History    None       Tobacco Use    Smoking status: Never    Smokeless tobacco: Never   Substance and Sexual Activity    Alcohol use: Not Currently    Drug use: Never    Sexual activity: Not on file     Review of Systems   Constitutional:  Negative for chills, fatigue and fever.   HENT: Negative.     Eyes: Negative.    Respiratory: Negative.     Cardiovascular:  Negative for chest pain, palpitations and leg swelling.   Gastrointestinal:  Positive for constipation, nausea and vomiting. Negative for abdominal distention, abdominal pain and diarrhea.   Endocrine: Negative.    Genitourinary: Negative.    Musculoskeletal: Negative.    Skin: Negative.    Neurological: Negative.    Psychiatric/Behavioral: Negative.     All other systems reviewed and are negative.  Objective:     Vital Signs (Most Recent):  Temp: 98.4 °F (36.9 °C) (10/27/22 2153)  Pulse: 74 (10/28/22 0529)  Resp: 18 (10/28/22 0359)  BP: 115/76 (10/28/22 0529)  SpO2: 96 % (10/28/22 0529) Vital Signs (24h Range):  Temp:  [98.4 °F (36.9 °C)] 98.4 °F (36.9 °C)  Pulse:  [66-87] 74  Resp:  [17-20] 18  SpO2:  [95 %-100 %] 96 %  BP: (108-161)/() 115/76     Weight: 64.4 kg (142 lb)  Body mass index is 24.37 kg/m².    Physical Exam  Vitals and nursing note reviewed.   Constitutional:       Appearance: Normal appearance.   HENT:      Head: Normocephalic and atraumatic.      Nose: Nose normal.   Eyes:      Pupils: Pupils are equal, round, and reactive to light.   Cardiovascular:      Rate and Rhythm: Normal rate and  regular rhythm.      Pulses: Normal pulses.   Pulmonary:      Effort: Pulmonary effort is normal. No respiratory distress.   Chest:      Chest wall: No tenderness.   Abdominal:      General: Abdomen is flat. There is no distension.      Palpations: Abdomen is soft.      Tenderness: There is no abdominal tenderness.      Hernia: No hernia is present.   Musculoskeletal:         General: No deformity.      Cervical back: Normal range of motion.   Skin:     General: Skin is warm and dry.      Capillary Refill: Capillary refill takes less than 2 seconds.   Neurological:      General: No focal deficit present.      Mental Status: She is alert and oriented to person, place, and time. Mental status is at baseline.   Psychiatric:         Mood and Affect: Mood normal.         Thought Content: Thought content normal.         Judgment: Judgment normal.       Significant Labs:  I have reviewed all pertinent lab results within the past 24 hours.    Significant Diagnostics:  I have reviewed all pertinent imaging results/findings within the past 24 hours.      Assessment/Plan:     * Nausea  Please keep NPO for now.  Dr. Riddle will evaluate the patient later this afternoon and have a more formal plan.  Thank you for allowing us to participate in the care of this patient.  We will continue follow for now.      VTE Risk Mitigation (From admission, onward)         Ordered     IP VTE LOW RISK PATIENT  Once         10/27/22 2157     Place sequential compression device  Until discontinued         10/27/22 2157                Thank you for your consult. I will follow-up with patient. Please contact us if you have any additional questions.    Ron Guan, JOSELINE  General Surgery  [facility] - Emergency Dept

## 2022-10-28 NOTE — ASSESSMENT & PLAN NOTE
Please keep NPO for now.  Dr. Riddle will evaluate the patient later this afternoon and have a more formal plan.  Thank you for allowing us to participate in the care of this patient.  We will continue follow for now.

## 2022-10-28 NOTE — HPI
36-year-old female who approximately 9 years ago had a vertical sleeve gastrectomy in [place].  She done quite well after that surgery and had 3 kids in the interim.  She did see Dr. Adames in 2019 for workup of hiatal hernia.  Three months ago she went back to [place] to the same physician and had a mini gastric bypass however the surgeon at that time was not comfortable performing a hiatal hernia repair.  She is done quite well over the last 2 months and approximally 1-2 weeks ago began developing nausea and vomiting.  She did state that she had sick contacts with her children who had developed what sounds like a viral gastroenteritis.  She was seen at outside hospital and worked up and found to have an obstruction that was managed conservatively and she was able to tolerate some food although shortly after being discharged she returned to our hospital with similar symptoms.  The CT scan can be seen in the chart.  She is currently comfortable and not having any vomiting although not yet on a diet.  She is tolerating some ice chips.  Her abdomen is benign and soft and nontender.  She states she is been losing approximally 2 lb per week and taking all of her vitamins and exercising as prescribed.

## 2022-10-28 NOTE — PROGRESS NOTES
Met with patient to conduct initial cm dc planning assessment. Pt lives at [address] with her  and 3 children (currently with her [place] There is running water and electricity. There is one step to enter the home and no stairs within. PH [phone]. Emergency contact mother Melisa Lee [phone]. Pt is currently employed full time. Pt completes all ADLs independently including driving. Her mother will be transporting her home upon dc. PCP Galdino Caceres. No agency involvement. BP cuff at home. No glasses/  hearing aids. No  hx or VA benefits. She is able to afford food and meds and fills with [facility] in [place]. No living will or POA. She denied known needs prior to dc.

## 2022-10-28 NOTE — PROGRESS NOTES
[facility]  Hospital Medicine Progress Note        Chief Complaint: Inpatient Follow-up for abdominal pain, N/V    HPI: Ms. Lee is a 36-year-old female who presented to the ED with complaints of abdominal pain, nausea, and vomiting, since Wednesday, 10/19/22.  She states she got a gastric bypass in [place] about 3 months ago, and was doing well until then.  She went to [facility] [place] and was admitted with a diagnosis of bowel obstruction.  Her symptoms improved and she was discharged because she was able to tolerate solid food.  However, after she returned home, her symptoms returned.  Of note, she tells me that she had a sleep-over for her children, and everyone ended up with a stomach virus, which she states is what started off her symptoms.  Today's ED lab work was unimpressive.  CT chest abdomen pelvis w w/o contrast showed edematous mural thickening at the site of the gastric bypass anastomosis, with inflammatory adjacent mesenteric fat stranding and reactive-appearing adenopathy.  May represent sequela of gastroenteritis.  No evidence of an anastomotic leak.  Suspected anterior right hepatic lobe hemangioma, otherwise limited assessment.  There are no CT findings to suggest that this represents an aggressive or acute etiology.  Focal area of luminal narrowing at the proximal descending colon favored artifactual secondary to luminal understood distension.  Follow-up with colonoscopy is recommended if there are ongoing concerns for occult pathology. Suspected displacement of right tubal ligation clip, visualized at the lateral left upper quadrant.  VSS in the ED. She was admitted to hospital medicine for management.    Interval Hx:   Sitting in bed reports she feels much better.  No nausea, vomiting.  But she is scared to eat worrying she will start throwing up again.  Informed me she requested gastroenterology for EGD that she needs for her hiatal hernia evaluation and surgery.  She was  under care of Dr. Woods, and was plan to get correction for her hiatal hernia 2 years ago before the pandemic and got delayed ever since.  GI did consulted Dr. Woodsregarding patient's request as patient is scared to go home  Discussed with patient that it is a possibility that she might not get endoscopy when she had viral gastroenteritis with intractable nausea vomiting and diarrhea and they probably will want her to tolerate food before they can take her to surgery.  Verbalized understanding  Awaiting surgery rounds  No family at bedside        Objective/physical exam:  General: In no acute distress, afebrile  Chest: Clear to auscultation bilaterally  Heart: RRR, +S1, S2, no appreciable murmur  Abdomen: Soft, nontender, BS +  MSK: Warm, no lower extremity edema, no clubbing or cyanosis  Neurologic: Alert and oriented x4, Cranial nerve II-XII intact, Strength 5/5 in all 4 extremities    VITAL SIGNS: 24 HRS MIN & MAX LAST   Temp  Min: 98.4 °F (36.9 °C)  Max: 98.4 °F (36.9 °C) 98.4 °F (36.9 °C)   BP  Min: 108/81  Max: 161/110 115/76   Pulse  Min: 66  Max: 94  74   Resp  Min: 16  Max: 20 18   SpO2  Min: 95 %  Max: 100 % 96 %       Recent Labs   Lab 10/27/22  1145 10/28/22  0351   WBC 8.1 5.0   RBC 4.34 3.50*   HGB 12.4 10.3*   HCT 38.8 32.4*   MCV 89.4 92.6   MCH 28.6 29.4   MCHC 32.0* 31.8*   RDW 13.5 13.7    219   MPV 11.8* 12.1*       Recent Labs   Lab 10/27/22  1145 10/28/22  0351    138   K 3.6 3.3*   CO2 25 26   BUN 6.4* 4.3*   CREATININE 0.57 0.52*   CALCIUM 8.8 8.0*   MG 1.90 1.70   ALBUMIN 2.9* 2.2*   ALKPHOS 59 44   ALT 24 17   AST 23 17   BILITOT 0.9 1.0          Microbiology Results (last 7 days)       ** No results found for the last 168 hours. **             See below for Radiology    Scheduled Med:   buPROPion  150 mg Oral Daily    pantoprazole  40 mg Intravenous Daily        Continuous Infusions:   lactated ringers 75 mL/hr at 10/27/22 2210        PRN Meds:  acetaminophen,  ALPRAZolam, aluminum-magnesium hydroxide-simethicone, diatrizoate meglumineand-diatrizoate sodium, melatonin, naloxone, ondansetron, polyethylene glycol, prochlorperazine, simethicone       Assessment/Plan:  Gastroenteritis with intractable nausea vomiting and diarrhea- probably viral  History of gastric sleeve in 2013 followed by Gastric bypass July 2022- both in [place]  Intractable nausea/vomiting- resolved  Hypokalemia       GI consult--> appreciate recommendation, recommended to continue p.o. PPI daily, hydration and supportive care for now.  No plan for upper EGD yet given patient is known to Dr. Woods  General surgery Dr. Woods consulted per patient's request- Dr Riddle evaluated the patient, recommended to start her on bariatric clears and advanced to bariatric soft and discharge once patient is able to tolerate diet.  Discussed with the patient that they would like her to be able to tolerate food for 1 month and that will help with the inflammation to settle before hiatal hernia repair.  Recommended to follow with Dr. Woods post discharge to set surgical date  Continue Protonix po daily  LR @ 75 ml/hr till able to tolerate orals   K 3.3,ordered Kphos 30mmoles IV x 1 this am when pt was NPO  Antiemetics as needed  Resume home meds as appropriate  Morning BMP, Mag ordered    VTE prophylaxis: SCD    Patient condition:  Stable    Anticipated discharge and Disposition:   once able to tolerate orals     Critical care note:  Critical care diagnosis:  Hypokalemia and NPO patient requiring IV K-Phos  Critical care interventions: Hands-on evaluation, review of labs/radiographs/records and discussion with patient and family if present  Critical care time spent: 35 minutes        All diagnosis and differential diagnosis have been reviewed; assessment and plan has been documented; I have personally reviewed the labs and test results that are presently available; I have reviewed the patients medication list; I  have reviewed the consulting providers response and recommendations. I have reviewed or attempted to review medical records based upon their availability    All of the patient's questions have been  addressed and answered. Patient's is agreeable to the above stated plan. I will continue to monitor closely and make adjustments to medical management as needed.  _____________________________________________________________________    Nutrition Status:    Radiology:  CT Chest Abdomen Pelvis W W/O Contrast (XPD)  EXAMINATION  CT CHEST ABDOMEN PELVIS W W/O CONTRAST (XPD)    CLINICAL HISTORY  Weight loss, unintended;  vomiting, history of gastric bypass    TECHNIQUE  Pre- and post-contrast helical-acquisition CT images were obtained and multiplanar reformats were accomplished by a CT technologist at a separate workstation, pushed to PACS for physician review.    Contrast:    *IV: ISOVUE-370, 100 mL  *Enteric: Gastroview, 10 mL diluted in water    COMPARISON  *Abdominopelvic CT dated 7 March 2014.  *No prior thoracic imaging is available.    FINDINGS  Images were reviewed in soft tissue, lung, and bone windows.    Exam quality: adequate for evaluation    Lines/tubes: none visualized    Cardiac chambers are normal volume.  There is no significant pericardial fluid.  The thoracic and abdominal aorta are normal course, caliber and contour.  The arch, upper abdominal, and pelvic branch vessels are unremarkable for exam protocol.    Central airway patency is widely maintained.  There is no organized airspace consolidation or suspicious focal lung lesion.  No pleural effusion or pneumothorax is identified.    The gallbladder is mildly distended, with layering intraluminal attenuation likely representing sludge.  No calcified stones, mural thickening, or pericholecystic inflammatory changes are evident.  There are no findings to suggest biliary obstruction.  Circumscribed area of decreased attenuation with focal peripheral  enhancement at the anterior right hepatic lobe is slightly more prominent than the comparison, may represent of angioma but otherwise of limited characterization.  There is diffuse hypoattenuation of the liver parenchyma, consistent with fatty infiltration.  The liver is mildly enlarged, measuring 19.5 cm craniocaudal dimension at the right midclavicular line.    The remaining upper abdominal solid organs are without evidence of acute or suspicious focal abnormality.  Kidneys demonstrate temporally symmetric enhancement.  No radiodense urolithiasis or evidence of distal obstructive uropathy.  The urinary bladder is unremarkable.  No focal abnormality of the adnexal structures.  Incidentally, there is a newly visualized linear metallic structure in the region of the posterior left adnexa consistent with tubal ligation clips.  A similar structure is noted along the lateral margin of the spleen (series 3, image 71).    There is faint contrast opacification of the esophageal lumen, with no abnormal dilatation or focal lesion.  Small hiatal hernia is similar to the prior study.  There are redemonstrated changes of gastric bypass, with unremarkable contrast transit across the anastomosis and into the afferent and efferent jejunal loops.  Circumferential thickened, edematous appearance of the distal gastric wall and immediately adjacent to Junel loop is evident, with the remaining small bowel loops demonstrating no evidence of abnormal mural thickening, focal lesion, or abnormal luminal dilatation or discrete transition point to suggest high-grade mechanical obstruction.  The appendix is unremarkable.  No definite mass-like mural irregularity is appreciated through the course of the colon.  Focal area of decreased luminal caliber at the left upper quadrant is favored artifactual due to inadequate luminal distention, otherwise limited assessment.    There is diffuse mesenteric haziness and inflammatory fat stranding  adjacent to the above mentioned gastric and jejunal wall thickening.  Scattered reactive-appearing lymph nodes are also present through this region; representative node measures approximately 11 mm short axis (series 3, image 78).  The remaining mesentery is unremarkable and there are no findings to suggest pathologic lymph node enlargement or necrotic adenopathy.    The visualized thyroid gland is unremarkable.  No focal abnormality of the included lower neck.  The thoracic and abdominal wall subcutaneous tissues and regional muscular structures are without acute finding.  There is no acute osseous displacement or destructive skeletal lesion.    IMPRESSION  1. Edematous mural thickening at the site of gastric bypass anastomosis, with inflammatory adjacent mesenteric fat stranding and reactive-appearing adenopathy.  Findings may represent sequela of gastroenteritis, otherwise limited characterization and endoscopy may prove beneficial.  2. No evidence of anastomotic leak.  3. Suspected anterior right hepatic lobe hemangioma, otherwise limited assessment but there are no CT findings to suggest that this represents an aggressive or acute etiology. Outpatient follow-up utilizing right upper quadrant ultrasound versus hepatic protocol MR would allow additional characterization.  4. Focal area of luminal narrowing at the proximal descending colon favored artifactual secondary to luminal underdistention.  Follow-up with colonoscopy is recommended if there are ongoing concerns for occult pathology.  5. Suspected displacement of right tubal ligation clip, visualized at the lateral left upper quadrant.  6. Additional nonacute secondary details discussed above.    RADIATION DOSE  Automated tube current modulation, weight-based exposure dosing, and/or iterative reconstruction technique utilized to reach lowest reasonably achievable exposure rate.    DLP: 1907 mGy*cm    Electronically signed by: Jaret  Tricia  Date:    10/27/2022  Time:    17:52      Janusz Toure MD  Department of Hospital Medicine   [facility]   10/28/2022   7:59 AM

## 2022-10-28 NOTE — NURSING
Nurses Note -- 4 Eyes      10/28/2022   5:38 PM      Skin assessed during: Admit      [x] No Pressure Injuries Present    []Prevention Measures Documented      [] Yes- Altered Skin Integrity Present or Discovered   [] LDA Added if Not in Epic (Describe Wound)   [] New Altered Skin Integrity was Present on Admit and Documented in LDA   [] Wound Image Taken    Wound Care Consulted? No    Attending Nurse:  Verna Forte RN     Second RN/Staff Member:  Benny Hall RN

## 2022-10-28 NOTE — SUBJECTIVE & OBJECTIVE
No current facility-administered medications on file prior to encounter.     Current Outpatient Medications on File Prior to Encounter   Medication Sig    ALPRAZolam (XANAX) 1 MG tablet Take 1 mg by mouth 3 (three) times daily.    buPROPion (WELLBUTRIN XL) 150 MG TB24 tablet 1 tablet in the morning    ergocalciferol (ERGOCALCIFEROL) 50,000 unit Cap Take 50,000 Units by mouth every 7 days.       Review of patient's allergies indicates:   Allergen Reactions    Penicillin Rash       History reviewed. No pertinent past medical history.  History reviewed. No pertinent surgical history.  Family History    None       Tobacco Use    Smoking status: Never    Smokeless tobacco: Never   Substance and Sexual Activity    Alcohol use: Not Currently    Drug use: Never    Sexual activity: Not on file     Review of Systems   Constitutional:  Negative for chills, fatigue and fever.   HENT: Negative.     Eyes: Negative.    Respiratory: Negative.     Cardiovascular:  Negative for chest pain, palpitations and leg swelling.   Gastrointestinal:  Positive for constipation, nausea and vomiting. Negative for abdominal distention, abdominal pain and diarrhea.   Endocrine: Negative.    Genitourinary: Negative.    Musculoskeletal: Negative.    Skin: Negative.    Neurological: Negative.    Psychiatric/Behavioral: Negative.     All other systems reviewed and are negative.  Objective:     Vital Signs (Most Recent):  Temp: 98.4 °F (36.9 °C) (10/27/22 2153)  Pulse: 74 (10/28/22 0529)  Resp: 18 (10/28/22 0359)  BP: 115/76 (10/28/22 0529)  SpO2: 96 % (10/28/22 0529) Vital Signs (24h Range):  Temp:  [98.4 °F (36.9 °C)] 98.4 °F (36.9 °C)  Pulse:  [66-87] 74  Resp:  [17-20] 18  SpO2:  [95 %-100 %] 96 %  BP: (108-161)/() 115/76     Weight: 64.4 kg (142 lb)  Body mass index is 24.37 kg/m².    Physical Exam  Vitals and nursing note reviewed.   Constitutional:       Appearance: Normal appearance.   HENT:      Head: Normocephalic and atraumatic.       Nose: Nose normal.   Eyes:      Pupils: Pupils are equal, round, and reactive to light.   Cardiovascular:      Rate and Rhythm: Normal rate and regular rhythm.      Pulses: Normal pulses.   Pulmonary:      Effort: Pulmonary effort is normal. No respiratory distress.   Chest:      Chest wall: No tenderness.   Abdominal:      General: Abdomen is flat. There is no distension.      Palpations: Abdomen is soft.      Tenderness: There is no abdominal tenderness.      Hernia: No hernia is present.   Musculoskeletal:         General: No deformity.      Cervical back: Normal range of motion.   Skin:     General: Skin is warm and dry.      Capillary Refill: Capillary refill takes less than 2 seconds.   Neurological:      General: No focal deficit present.      Mental Status: She is alert and oriented to person, place, and time. Mental status is at baseline.   Psychiatric:         Mood and Affect: Mood normal.         Thought Content: Thought content normal.         Judgment: Judgment normal.       Significant Labs:  I have reviewed all pertinent lab results within the past 24 hours.    Significant Diagnostics:  I have reviewed all pertinent imaging results/findings within the past 24 hours.

## 2022-10-29 VITALS
DIASTOLIC BLOOD PRESSURE: 96 MMHG | RESPIRATION RATE: 18 BRPM | TEMPERATURE: 98 F | HEART RATE: 90 BPM | HEIGHT: 64 IN | BODY MASS INDEX: 24.24 KG/M2 | WEIGHT: 142 LBS | SYSTOLIC BLOOD PRESSURE: 150 MMHG | OXYGEN SATURATION: 97 %

## 2022-10-29 PROBLEM — Z90.3 H/O GASTRIC SLEEVE: Status: ACTIVE | Noted: 2022-10-29

## 2022-10-29 PROBLEM — R11.0 NAUSEA: Status: RESOLVED | Noted: 2022-10-28 | Resolved: 2022-10-29

## 2022-10-29 PROBLEM — Z98.84 H/O GASTRIC BYPASS: Status: ACTIVE | Noted: 2022-10-29

## 2022-10-29 PROBLEM — K52.9 GASTROENTERITIS: Status: RESOLVED | Noted: 2022-10-29 | Resolved: 2022-10-29

## 2022-10-29 PROBLEM — R11.10 INTRACTABLE VOMITING: Status: ACTIVE | Noted: 2022-10-29

## 2022-10-29 PROBLEM — K52.9 GASTROENTERITIS: Status: ACTIVE | Noted: 2022-10-29

## 2022-10-29 LAB
ANION GAP SERPL CALC-SCNC: 10 MEQ/L
BUN SERPL-MCNC: 3.3 MG/DL (ref 7–18.7)
CALCIUM SERPL-MCNC: 7.9 MG/DL (ref 8.4–10.2)
CHLORIDE SERPL-SCNC: 106 MMOL/L (ref 98–107)
CO2 SERPL-SCNC: 22 MMOL/L (ref 22–29)
CREAT SERPL-MCNC: 0.5 MG/DL (ref 0.55–1.02)
CREAT/UREA NIT SERPL: 7
GFR SERPLBLD CREATININE-BSD FMLA CKD-EPI: >60 MLS/MIN/1.73/M2
GLUCOSE SERPL-MCNC: 72 MG/DL (ref 74–100)
MAGNESIUM SERPL-MCNC: 1.7 MG/DL (ref 1.6–2.6)
POTASSIUM SERPL-SCNC: 3.9 MMOL/L (ref 3.5–5.1)
SODIUM SERPL-SCNC: 138 MMOL/L (ref 136–145)

## 2022-10-29 PROCEDURE — 83735 ASSAY OF MAGNESIUM: CPT | Performed by: INTERNAL MEDICINE

## 2022-10-29 PROCEDURE — 63600175 PHARM REV CODE 636 W HCPCS

## 2022-10-29 PROCEDURE — 25000003 PHARM REV CODE 250: Performed by: NURSE PRACTITIONER

## 2022-10-29 PROCEDURE — 80048 BASIC METABOLIC PNL TOTAL CA: CPT | Performed by: INTERNAL MEDICINE

## 2022-10-29 PROCEDURE — C9113 INJ PANTOPRAZOLE SODIUM, VIA: HCPCS

## 2022-10-29 PROCEDURE — 36415 COLL VENOUS BLD VENIPUNCTURE: CPT | Performed by: INTERNAL MEDICINE

## 2022-10-29 RX ORDER — ONDANSETRON 8 MG/1
8 TABLET, ORALLY DISINTEGRATING ORAL EVERY 8 HOURS PRN
Qty: 9 TABLET | Refills: 0 | Status: SHIPPED | OUTPATIENT
Start: 2022-10-29 | End: 2022-11-01

## 2022-10-29 RX ORDER — POLYETHYLENE GLYCOL 3350 17 G/17G
17 POWDER, FOR SOLUTION ORAL 2 TIMES DAILY PRN
Refills: 0 | COMMUNITY
Start: 2022-10-29 | End: 2023-10-05

## 2022-10-29 RX ORDER — PANTOPRAZOLE SODIUM 40 MG/1
40 TABLET, DELAYED RELEASE ORAL DAILY
Qty: 90 TABLET | Refills: 0 | Status: ON HOLD | OUTPATIENT
Start: 2022-10-29 | End: 2022-11-05 | Stop reason: SDUPTHER

## 2022-10-29 RX ADMIN — PANTOPRAZOLE SODIUM 40 MG: 40 INJECTION, POWDER, FOR SOLUTION INTRAVENOUS at 08:10

## 2022-10-29 RX ADMIN — BUPROPION HYDROCHLORIDE 150 MG: 150 TABLET, FILM COATED, EXTENDED RELEASE ORAL at 08:10

## 2022-10-29 RX ADMIN — ALPRAZOLAM 1 MG: 0.5 TABLET ORAL at 03:10

## 2022-10-29 RX ADMIN — DOCUSATE SODIUM 50 MG: 50 CAPSULE, LIQUID FILLED ORAL at 08:10

## 2022-10-29 NOTE — DISCHARGE SUMMARY
[facility] Hospital Medicine Discharge Summary    Admit Date: 10/27/2022  Discharge Date and Time: 10/29/28135:11 AM  Admitting Physician:  Team  Discharging Physician: Janusz Toure MD.  Primary Care Physician: Galdino Caceres MD  Consults: Gastroenterology and General Surgery    Discharge Diagnoses:  Gastroenteritis with intractable nausea vomiting and diarrhea- probably viral  History of gastric sleeve in 2013 followed by Gastric bypass July 2022- both in [place]  Intractable nausea/vomiting- resolved  Hypokalemia - resolved     Hospital Course:   Ms. Lee is a 36-year-old female who presented to the ED with complaints of abdominal pain, nausea, and vomiting, since Wednesday, 10/19/22.  She states she got a gastric bypass in [place] about 3 months ago, and was doing well until then.  [place] [facility] [place] with a diagnosis of bowel obstruction.  Her symptoms improved and she was discharged because she was able to tolerate solid food.  However, after she returned home, her symptoms returned.  Of note, she tells me that she had a sleep-over for her children, and everyone ended up with a stomach virus, which she states is what started off her symptoms.  Today's ED lab work was unimpressive.  CT chest abdomen pelvis w w/o contrast showed edematous mural thickening at the site of the gastric bypass anastomosis, with inflammatory adjacent mesenteric fat stranding and reactive-appearing adenopathy.  May represent sequela of gastroenteritis.  No evidence of an anastomotic leak.  Suspected anterior right hepatic lobe hemangioma, otherwise limited assessment.  There are no CT findings to suggest that this represents an aggressive or acute etiology.  Focal area of luminal narrowing at the proximal descending colon favored artifactual secondary to luminal understood distension. Follow-up for out pt colonoscopy is recommended if there are ongoing concerns for occult pathology.  Suspected displacement of right tubal ligation clip, visualized at the lateral left upper quadrant.  VSS in the ED. She was admitted to hospital medicine for management.  GI consult--> appreciate recommendation, recommended to continue p.o. PPI daily, hydration and supportive care for now.  No plan for upper EGD yet given patient is known to Dr. Woods  General surgery Dr. Woods consulted per patient's request- Dr Riddle evaluated the patient, recommended to start her on bariatric clears and advanced to bariatric soft and discharge once patient is able to tolerate diet.  Discussed with the patient that they would like her to be able to tolerate food for 1 month and that will help with the inflammation to settle before hiatal hernia repair.  Recommended to follow with Dr. Woods post discharge to set surgical date  Continue Protonix po daily  LR @ 75 ml/hr till able to tolerate orals   K normalized   Antiemetics as needed  Resume home meds as appropriate  Personally discussed with GI, the have cleared the oppt for discharge, this morning eating her breakfast and doing well     Pt was seen and examined on the day of discharge  Vitals:  VITAL SIGNS: 24 HRS MIN & MAX LAST   Temp  Min: 98.2 °F (36.8 °C)  Max: 98.4 °F (36.9 °C) 98.4 °F (36.9 °C)   BP  Min: 97/46  Max: 135/93 126/82   Pulse  Min: 69  Max: 84  82   Resp  Min: 16  Max: 18 16   SpO2  Min: 97 %  Max: 100 % 97 %       Physical Exam:  General: In no acute distress, afebrile  Chest: Clear to auscultation bilaterally  Heart: RRR, +S1, S2, no appreciable murmur  Abdomen: Soft, nontender, BS +  MSK: Warm, no lower extremity edema, no clubbing or cyanosis  Neurologic: Alert and oriented x4, Cranial nerve II-XII intact, Strength 5/5 in all 4 extremities    Procedures Performed: No admission procedures for hospital encounter.     Significant Diagnostic Studies: See Full reports for all details    Recent Labs   Lab 10/27/22  1145 10/28/22  0351   WBC 8.1 5.0    RBC 4.34 3.50*   HGB 12.4 10.3*   HCT 38.8 32.4*   MCV 89.4 92.6   MCH 28.6 29.4   MCHC 32.0* 31.8*   RDW 13.5 13.7    219   MPV 11.8* 12.1*       Recent Labs   Lab 10/27/22  1145 10/28/22  0351 10/29/22  0536    138 138   K 3.6 3.3* 3.9   CO2 25 26 22   BUN 6.4* 4.3* 3.3*   CREATININE 0.57 0.52* 0.50*   CALCIUM 8.8 8.0* 7.9*   MG 1.90 1.70 1.70   ALBUMIN 2.9* 2.2*  --    ALKPHOS 59 44  --    ALT 24 17  --    AST 23 17  --    BILITOT 0.9 1.0  --         Microbiology Results (last 7 days)       ** No results found for the last 168 hours. **             FL Esophagram Complete  Narrative: EXAMINATION:  FL ESOPHAGRAM COMPLETE    CLINICAL HISTORY:  evaluate hiatal hernia;Diaphragmatic hernia without obstruction or gangrene    TECHNIQUE:  Patient performed several barium swallows for this exam.  Multiple radiographs were obtained of the esophagus.    Fluoroscopy time 1 minute and 7 seconds.    Radiation dose 73 mGy    COMPARISON:  None available    FINDINGS:  Timing and emptying of barium from the hypopharynx is unremarkable.  Esophageal primary and secondary peristalsis is preserved and no tertiary waveforms identified.  Esophagus shows unremarkable distensibility without abnormal dilatation, stricture or diverticulum.  Stomach is of small volume consistent with gastric sleeve.  There is focal gastric outpouching from the lesser curvature.  Major portion of the stomach is herniated into the lower posterior mediastinum.  Impression: 1. Herniation of the small volume stomach into the lower posterior mediastinum    2. Status post gastric sleeve.    Electronically signed by: Edgardo Tyler  Date:    10/28/2022  Time:    16:46         Medication List        START taking these medications      docusate sodium 50 MG capsule  Commonly known as: COLACE  Take 1 capsule (50 mg total) by mouth once daily.     ondansetron 8 MG Tbdl  Commonly known as: ZOFRAN-ODT  Take 1 tablet (8 mg total) by mouth every 8 (eight) hours  as needed (nausea vomiting).     pantoprazole 40 MG tablet  Commonly known as: PROTONIX  Take 1 tablet (40 mg total) by mouth once daily.     polyethylene glycol 17 gram Pwpk  Commonly known as: GLYCOLAX  Take 17 g by mouth 2 (two) times daily as needed (constipation).            CONTINUE taking these medications      ALPRAZolam 1 MG tablet  Commonly known as: XANAX     buPROPion 150 MG TB24 tablet  Commonly known as: WELLBUTRIN XL     ergocalciferol 50,000 unit Cap  Commonly known as: ERGOCALCIFEROL               Where to Get Your Medications        These medications were sent to [facility] [id number] [place], [place] - [address] [facility]  [address] [facility] [address]      Phone: [phone]   ondansetron 8 MG Tbdl  pantoprazole 40 MG tablet       You can get these medications from any pharmacy    You don't need a prescription for these medications  docusate sodium 50 MG capsule  polyethylene glycol 17 gram Pwpk          Explained in detail to the patient about the discharge plan, medications, and follow-up visits. Pt understands and agrees with the treatment plan  Discharge Disposition:  home   Discharged Condition: stable  Diet-   Dietary Orders (From admission, onward)       Start     Ordered    10/29/22 0500  Diet Bariatric Soft  Diet effective now        Comments: If able to tolerate bariatric clears overnight    10/28/22 1707                   Medications Per TX med rec  Activities as tolerated   Follow-up Information       John Woods MD. Schedule an appointment as soon as possible for a visit in 2 week(s).    Specialty: General Surgery  Why: post hospital discharge for hiatal hernia  Contact information:  [address]  [facility] [[email or web address] [address]  [facility] [address]  [phone]               Jewel Bridges MD. Schedule an appointment as soon as possible for a visit in 3 week(s).    Specialty: Gastroenterology  Why: post discharge for out pt colonoscopy  Contact information:  [address]  [facility] [address]  [phone]               Galdino Caceres MD. Schedule an appointment as soon as possible for a visit in 2 week(s).    Specialty: Family Medicine  Why: post discharge  Contact information:  [address] [facility] [address]  [phone]                           For further questions contact hospitalist office    Discharge time 34 minutes    For worsening symptoms, chest pain, shortness of breath, increased abdominal pain, high grade fever, stroke or stroke like symptoms, immediately go to the nearest Emergency Room or call 911 as soon as possible.      Janusz Toure MD  Department of Hospital Medicine   [facility]   10/29/2022 8:11 AM

## 2022-10-29 NOTE — PROGRESS NOTES
"Gastroenterology Progress Note    Subjective/Interval History:  10-29-22  Pt with no N/V  Pt eating scrambled eggs  Surgical plan is for her to go home on liquids and have hernia repair o/p per pt statement    ROS:  Review of Systems   Constitutional:  Negative for fever, malaise/fatigue and weight loss.   Respiratory:  Negative for cough and shortness of breath.    Cardiovascular:  Negative for chest pain, palpitations and leg swelling.   Gastrointestinal: neg N/V. Negative for blood in stool, constipation, diarrhea, heartburn and melena.   Musculoskeletal:  Negative for back pain and myalgias.   Skin:  Negative for rash.   Neurological:  Negative for speech change and focal weakness.   All other systems reviewed and are negative.    Vital Signs:  /82   Pulse 82   Temp 98.4 °F (36.9 °C)   Resp 16   Ht 5' 4" (1.626 m)   Wt 64.4 kg (142 lb)   SpO2 97%   Breastfeeding No   BMI 24.37 kg/m²   Body mass index is 24.37 kg/m².    Physical Exam:  Constitutional:       General: She is not in acute distress.  HENT:      Head: Normocephalic and atraumatic.   Eyes:      General: No scleral icterus.     Conjunctiva/sclera: Conjunctivae normal.   Cardiovascular:      Rate and Rhythm: Normal rate and regular rhythm.   Pulmonary:      Effort: Pulmonary effort is normal. No respiratory distress.   Abdominal:      General: Bowel sounds are normal. There is no distension.      Palpations: Abdomen is soft.      Tenderness: There is no abdominal tenderness.   Musculoskeletal:         General: No tenderness. Normal range of motion.   Skin:     General: Skin is warm and dry.   Neurological:      Mental Status: She is alert and oriented to person, place, and time.   Psychiatric:         Mood and Affect: Mood normal.         Behavior: Behavior normal.    Labs:  Recent Results (from the past 48 hour(s))   Comprehensive metabolic panel    Collection Time: 10/27/22 11:45 AM   Result Value Ref Range    Sodium Level 138 136 - 145 " mmol/L    Potassium Level 3.6 3.5 - 5.1 mmol/L    Chloride 105 98 - 107 mmol/L    Carbon Dioxide 25 22 - 29 mmol/L    Glucose Level 92 74 - 100 mg/dL    Blood Urea Nitrogen 6.4 (L) 7.0 - 18.7 mg/dL    Creatinine 0.57 0.55 - 1.02 mg/dL    Calcium Level Total 8.8 8.4 - 10.2 mg/dL    Protein Total 5.1 (L) 6.4 - 8.3 gm/dL    Albumin Level 2.9 (L) 3.5 - 5.0 gm/dL    Globulin 2.2 (L) 2.4 - 3.5 gm/dL    Albumin/Globulin Ratio 1.3 1.1 - 2.0 ratio    Bilirubin Total 0.9 <=1.5 mg/dL    Alkaline Phosphatase 59 40 - 150 unit/L    Alanine Aminotransferase 24 0 - 55 unit/L    Aspartate Aminotransferase 23 5 - 34 unit/L    eGFR >60 mls/min/1.73/m2   Magnesium    Collection Time: 10/27/22 11:45 AM   Result Value Ref Range    Magnesium Level 1.90 1.60 - 2.60 mg/dL   Lipase    Collection Time: 10/27/22 11:45 AM   Result Value Ref Range    Lipase Level 22 <=60 U/L   CBC with Differential    Collection Time: 10/27/22 11:45 AM   Result Value Ref Range    WBC 8.1 4.5 - 11.5 x10(3)/mcL    RBC 4.34 4.20 - 5.40 x10(6)/mcL    Hgb 12.4 12.0 - 16.0 gm/dL    Hct 38.8 37.0 - 47.0 %    MCV 89.4 80.0 - 94.0 fL    MCH 28.6 27.0 - 31.0 pg    MCHC 32.0 (L) 33.0 - 36.0 mg/dL    RDW 13.5 11.5 - 17.0 %    Platelet 300 130 - 400 x10(3)/mcL    MPV 11.8 (H) 7.4 - 10.4 fL    Neut % 68.0 %    Lymph % 19.0 %    Mono % 10.7 %    Eos % 1.0 %    Basophil % 0.6 %    Lymph # 1.53 0.6 - 4.6 x10(3)/mcL    Neut # 5.5 2.1 - 9.2 x10(3)/mcL    Mono # 0.86 0.1 - 1.3 x10(3)/mcL    Eos # 0.08 0 - 0.9 x10(3)/mcL    Baso # 0.05 0 - 0.2 x10(3)/mcL    IG# 0.06 (H) 0 - 0.04 x10(3)/mcL    IG% 0.7 %    NRBC% 0.0 %   Urinalysis, Reflex to Urine Culture Urine, Clean Catch    Collection Time: 10/27/22 11:53 AM    Specimen: Urine   Result Value Ref Range    Color, UA Dark Yellow Yellow, Light-Yellow, Dark Yellow, Mitzi, Straw    Appearance, UA Cloudy (A) Clear    Specific Gravity, UA 1.028 1.001 - 1.030    pH, UA 6.0 5.0 - 8.5    Protein, UA Trace (A) Negative mg/dL    Glucose, UA  Negative Negative, Normal mg/dL    Ketones, UA 1+ (A) Negative mg/dL    Blood, UA Negative Negative unit/L    Bilirubin, UA 1+ (A) Negative mg/dL    Urobilinogen, UA 1.0 0.2, 1.0, Normal mg/dL    Nitrites, UA Negative Negative    Leukocyte Esterase, UA Trace (A) Negative unit/L   Urinalysis, Microscopic    Collection Time: 10/27/22 11:53 AM   Result Value Ref Range    RBC, UA 0-5 None Seen, 0-2, 3-5, 0-5, >100 /HPF    WBC, UA 0-5 None Seen, 0-2, 3-5, 0-5 /HPF    Squamous Epithelial Cells, UA 0-4 0-4, None Seen /HPF    Bacteria, UA None Seen None Seen, Rare, Occasional /HPF    Mucous, UA Occ (A) None Seen /LPF    Calcium Oxalate Crystals, UA Many (A) None Seen /HPF   Comprehensive Metabolic Panel (CMP)    Collection Time: 10/28/22  3:51 AM   Result Value Ref Range    Sodium Level 138 136 - 145 mmol/L    Potassium Level 3.3 (L) 3.5 - 5.1 mmol/L    Chloride 104 98 - 107 mmol/L    Carbon Dioxide 26 22 - 29 mmol/L    Glucose Level 72 (L) 74 - 100 mg/dL    Blood Urea Nitrogen 4.3 (L) 7.0 - 18.7 mg/dL    Creatinine 0.52 (L) 0.55 - 1.02 mg/dL    Calcium Level Total 8.0 (L) 8.4 - 10.2 mg/dL    Protein Total 3.9 (L) 6.4 - 8.3 gm/dL    Albumin Level 2.2 (L) 3.5 - 5.0 gm/dL    Globulin 1.7 (L) 2.4 - 3.5 gm/dL    Albumin/Globulin Ratio 1.3 1.1 - 2.0 ratio    Bilirubin Total 1.0 <=1.5 mg/dL    Alkaline Phosphatase 44 40 - 150 unit/L    Alanine Aminotransferase 17 0 - 55 unit/L    Aspartate Aminotransferase 17 5 - 34 unit/L    eGFR >60 mls/min/1.73/m2   Magnesium    Collection Time: 10/28/22  3:51 AM   Result Value Ref Range    Magnesium Level 1.70 1.60 - 2.60 mg/dL   Phosphorus    Collection Time: 10/28/22  3:51 AM   Result Value Ref Range    Phosphorus Level 3.5 2.3 - 4.7 mg/dL   CBC with Differential    Collection Time: 10/28/22  3:51 AM   Result Value Ref Range    WBC 5.0 4.5 - 11.5 x10(3)/mcL    RBC 3.50 (L) 4.20 - 5.40 x10(6)/mcL    Hgb 10.3 (L) 12.0 - 16.0 gm/dL    Hct 32.4 (L) 37.0 - 47.0 %    MCV 92.6 80.0 - 94.0 fL     MCH 29.4 27.0 - 31.0 pg    MCHC 31.8 (L) 33.0 - 36.0 mg/dL    RDW 13.7 11.5 - 17.0 %    Platelet 219 130 - 400 x10(3)/mcL    MPV 12.1 (H) 7.4 - 10.4 fL    Neut % 51.2 %    Lymph % 29.9 %    Mono % 15.1 %    Eos % 2.2 %    Basophil % 0.8 %    Lymph # 1.49 0.6 - 4.6 x10(3)/mcL    Neut # 2.6 2.1 - 9.2 x10(3)/mcL    Mono # 0.75 0.1 - 1.3 x10(3)/mcL    Eos # 0.11 0 - 0.9 x10(3)/mcL    Baso # 0.04 0 - 0.2 x10(3)/mcL    IG# 0.04 0 - 0.04 x10(3)/mcL    IG% 0.8 %    NRBC% 0.0 %   Basic Metabolic Panel    Collection Time: 10/29/22  5:36 AM   Result Value Ref Range    Sodium Level 138 136 - 145 mmol/L    Potassium Level 3.9 3.5 - 5.1 mmol/L    Chloride 106 98 - 107 mmol/L    Carbon Dioxide 22 22 - 29 mmol/L    Glucose Level 72 (L) 74 - 100 mg/dL    Blood Urea Nitrogen 3.3 (L) 7.0 - 18.7 mg/dL    Creatinine 0.50 (L) 0.55 - 1.02 mg/dL    BUN/Creatinine Ratio 7     Calcium Level Total 7.9 (L) 8.4 - 10.2 mg/dL    Anion Gap 10.0 mEq/L    eGFR >60 mls/min/1.73/m2   Magnesium    Collection Time: 10/29/22  5:36 AM   Result Value Ref Range    Magnesium Level 1.70 1.60 - 2.60 mg/dL         Assessment/Plan:  1. Gastritis, presence of bleeding unspecified, unspecified chronicity, unspecified gastritis type    2. H/O gastric sleeve    3. History of gastric bypass    4. Abdominal pain, unspecified abdominal location    5. Chest pain    6. Nausea and vomiting, unspecified vomiting type    7. Hiatal hernia        35 y/o female known to Dr. Stewart Blackwell who has a past medical history of gastric sleeve for weight loss 2013 in [place], gastric bypass for weight loss 3 months ago in [place], anxiety. Here with nausea, vomiting, and inability to keep solid food down.     Nausea and vomiting  Gastroenteritis  -Suspect viral etiology given her clinical history. She is no longer vomiting and states feels fine at rest. She is nervous that solid PO intake may cause recurrent symptoms  -PO PPI daily  -IV hydration  -Supportive care     Hiatal  hernia  -Patient is asking if her pre-op w/u for the hiatal hernia repair can be done while inpatient and wants to schedule the surgery. We are going to consult Dr. Woods to clarify things  -Her CT w/ PO contrast may suffice   -IV fluids  -PPI BID  -Will discuss with Dr. Perea     4. Rectal bleeding  -Outpatient colonoscopy     5. Hepatic hemangioma  -Outpatient US vs MRI, per radiology    No plans for inpatient endoscopy  Pt to follow up outpatient for HH with surgeons    No further GI recs - please call if needed          Pari Adame Np acting as scribe for Dr. DUARTE Bruce

## 2022-10-29 NOTE — PROGRESS NOTES
Acute Care Surgery  Daily Progress Note    Subjective:  NAOEN; VSS, AF.  Tolerating advancement in diet to soft today, no N/V.   No new complaints.     Objective:    Vitals:  Vitals:    10/29/22 0700   BP: 129/86   Pulse: 80   Resp: 16   Temp: 98.7 °F (37.1 °C)        Physical Exam:  Gen: NAD  Neuro: awake, alert, answering questions appropriately  CV: RRR  Resp: non-labored breathing, SKYLER  Abd: soft, ND, NT  Ext: moves all 4 spontaneously and purposefully  Skin: warm, well perfused      Labs:  K 3.9  BUN/Cr 3.3/0.5  BG 72      Imaging:  None new       Assessment/Plan:  37 yo F wwho presented on 10/27 complainign of N/V/D. Surgery consulted for hiatal hernia.      - HDS, AF  - Continue bariatric soft diet for 1 month  - Will follow up with surgery in outpatient clinic for hiatal hernia  - Remainder of care per primary       Bianca Bradford MD  [employer] General Surgery, PGY2  10/29/2022 10:32 AM

## 2022-10-31 NOTE — CLINICAL REVIEW
36-year-old female admitted on 10/27/2022 and discharged on 10/29/2022 for gastroenteritis.  She was hemodynamically stable, afebrile.  She was treated with overall supportive care.  There was no documentation of food intolerance, medication tolerance, renal failure, hemodynamic instability, acute intra-abdominal findings such as small bowel obstruction, ileus, abscess.  Remains appropriate for observation level of care    Juliocesar Gaspar MD  Utilization Management  Physician Advisor

## 2022-11-03 ENCOUNTER — HOSPITAL ENCOUNTER (OUTPATIENT)
Facility: HOSPITAL | Age: 37
Discharge: HOME | End: 2022-11-06
Attending: EMERGENCY MEDICINE | Admitting: INTERNAL MEDICINE
Payer: COMMERCIAL

## 2022-11-03 DIAGNOSIS — K92.2 UPPER GI BLEED: ICD-10-CM

## 2022-11-03 DIAGNOSIS — Z98.84 HISTORY OF GASTRIC BYPASS: ICD-10-CM

## 2022-11-03 DIAGNOSIS — R10.13 EPIGASTRIC ABDOMINAL PAIN: ICD-10-CM

## 2022-11-03 DIAGNOSIS — K25.9 MULTIPLE GASTRIC ULCERS: Primary | ICD-10-CM

## 2022-11-03 DIAGNOSIS — K92.0 HEMATEMESIS, UNSPECIFIED WHETHER NAUSEA PRESENT: ICD-10-CM

## 2022-11-03 LAB
ALBUMIN SERPL-MCNC: 3 GM/DL (ref 3.5–5)
ALBUMIN/GLOB SERPL: 1 RATIO (ref 1.1–2)
ALP SERPL-CCNC: 58 UNIT/L (ref 40–150)
ALT SERPL-CCNC: 21 UNIT/L (ref 0–55)
APPEARANCE UR: ABNORMAL
APTT PPP: 29.1 SECONDS (ref 23.2–33.7)
AST SERPL-CCNC: 32 UNIT/L (ref 5–34)
BACTERIA #/AREA URNS AUTO: ABNORMAL /HPF
BASOPHILS # BLD AUTO: 0.07 X10(3)/MCL (ref 0–0.2)
BASOPHILS NFR BLD AUTO: 0.6 %
BILIRUB UR QL STRIP.AUTO: NEGATIVE MG/DL
BILIRUBIN DIRECT+TOT PNL SERPL-MCNC: 0.9 MG/DL
BUN SERPL-MCNC: 6.2 MG/DL (ref 7–18.7)
CALCIUM SERPL-MCNC: 9 MG/DL (ref 8.4–10.2)
CAOX CRY URNS QL MICRO: ABNORMAL /HPF
CHLORIDE SERPL-SCNC: 107 MMOL/L (ref 98–107)
CO2 SERPL-SCNC: 24 MMOL/L (ref 22–29)
COLOR UR AUTO: ABNORMAL
CREAT SERPL-MCNC: 0.63 MG/DL (ref 0.55–1.02)
EOSINOPHIL # BLD AUTO: 0.08 X10(3)/MCL (ref 0–0.9)
EOSINOPHIL NFR BLD AUTO: 0.7 %
ERYTHROCYTE [DISTWIDTH] IN BLOOD BY AUTOMATED COUNT: 13.9 % (ref 11.5–17)
GFR SERPLBLD CREATININE-BSD FMLA CKD-EPI: >60 MLS/MIN/1.73/M2
GLOBULIN SER-MCNC: 3 GM/DL (ref 2.4–3.5)
GLUCOSE SERPL-MCNC: 89 MG/DL (ref 74–100)
GLUCOSE UR QL STRIP.AUTO: NEGATIVE MG/DL
HCT VFR BLD AUTO: 40.1 % (ref 37–47)
HGB BLD-MCNC: 12.7 GM/DL (ref 12–16)
IMM GRANULOCYTES # BLD AUTO: 0.05 X10(3)/MCL (ref 0–0.04)
IMM GRANULOCYTES NFR BLD AUTO: 0.5 %
INR BLD: 1.04 (ref 0–1.3)
KETONES UR QL STRIP.AUTO: ABNORMAL MG/DL
LEUKOCYTE ESTERASE UR QL STRIP.AUTO: NEGATIVE UNIT/L
LIPASE SERPL-CCNC: 11 U/L
LYMPHOCYTES # BLD AUTO: 2.08 X10(3)/MCL (ref 0.6–4.6)
LYMPHOCYTES NFR BLD AUTO: 19 %
MAGNESIUM SERPL-MCNC: 1.8 MG/DL (ref 1.6–2.6)
MCH RBC QN AUTO: 28.7 PG (ref 27–31)
MCHC RBC AUTO-ENTMCNC: 31.7 MG/DL (ref 33–36)
MCV RBC AUTO: 90.5 FL (ref 80–94)
MONOCYTES # BLD AUTO: 1.08 X10(3)/MCL (ref 0.1–1.3)
MONOCYTES NFR BLD AUTO: 9.9 %
MUCOUS THREADS URNS QL MICRO: ABNORMAL /LPF
NEUTROPHILS # BLD AUTO: 7.6 X10(3)/MCL (ref 2.1–9.2)
NEUTROPHILS NFR BLD AUTO: 69.3 %
NITRITE UR QL STRIP.AUTO: NEGATIVE
NRBC BLD AUTO-RTO: 0 %
PH UR STRIP.AUTO: 5.5 [PH]
PLATELET # BLD AUTO: 311 X10(3)/MCL (ref 130–400)
PMV BLD AUTO: 13 FL (ref 7.4–10.4)
POTASSIUM SERPL-SCNC: 4.6 MMOL/L (ref 3.5–5.1)
PROT SERPL-MCNC: 6 GM/DL (ref 6.4–8.3)
PROT UR QL STRIP.AUTO: ABNORMAL MG/DL
PROTHROMBIN TIME: 13.5 SECONDS (ref 12.5–14.5)
RBC # BLD AUTO: 4.43 X10(6)/MCL (ref 4.2–5.4)
RBC #/AREA URNS AUTO: ABNORMAL /HPF
RBC UR QL AUTO: NEGATIVE UNIT/L
SODIUM SERPL-SCNC: 138 MMOL/L (ref 136–145)
SP GR UR STRIP.AUTO: 1.03 (ref 1–1.03)
SQUAMOUS #/AREA URNS AUTO: ABNORMAL /HPF
UROBILINOGEN UR STRIP-ACNC: 1 MG/DL
WBC # SPEC AUTO: 10.9 X10(3)/MCL (ref 4.5–11.5)
WBC #/AREA URNS AUTO: 5 /HPF

## 2022-11-03 PROCEDURE — 81001 URINALYSIS AUTO W/SCOPE: CPT | Performed by: NURSE PRACTITIONER

## 2022-11-03 PROCEDURE — 63600175 PHARM REV CODE 636 W HCPCS: Performed by: INTERNAL MEDICINE

## 2022-11-03 PROCEDURE — 63600175 PHARM REV CODE 636 W HCPCS: Performed by: NURSE PRACTITIONER

## 2022-11-03 PROCEDURE — 25000003 PHARM REV CODE 250: Performed by: INTERNAL MEDICINE

## 2022-11-03 PROCEDURE — 85610 PROTHROMBIN TIME: CPT | Performed by: EMERGENCY MEDICINE

## 2022-11-03 PROCEDURE — 25000003 PHARM REV CODE 250: Performed by: NURSE PRACTITIONER

## 2022-11-03 PROCEDURE — 96376 TX/PRO/DX INJ SAME DRUG ADON: CPT

## 2022-11-03 PROCEDURE — 63600175 PHARM REV CODE 636 W HCPCS: Performed by: EMERGENCY MEDICINE

## 2022-11-03 PROCEDURE — 11000001 HC ACUTE MED/SURG PRIVATE ROOM

## 2022-11-03 PROCEDURE — G0378 HOSPITAL OBSERVATION PER HR: HCPCS

## 2022-11-03 PROCEDURE — 21400001 HC TELEMETRY ROOM

## 2022-11-03 PROCEDURE — 96375 TX/PRO/DX INJ NEW DRUG ADDON: CPT

## 2022-11-03 PROCEDURE — 80053 COMPREHEN METABOLIC PANEL: CPT | Performed by: NURSE PRACTITIONER

## 2022-11-03 PROCEDURE — C9113 INJ PANTOPRAZOLE SODIUM, VIA: HCPCS | Performed by: EMERGENCY MEDICINE

## 2022-11-03 PROCEDURE — 25000003 PHARM REV CODE 250: Performed by: EMERGENCY MEDICINE

## 2022-11-03 PROCEDURE — 83735 ASSAY OF MAGNESIUM: CPT | Performed by: NURSE PRACTITIONER

## 2022-11-03 PROCEDURE — 63600175 PHARM REV CODE 636 W HCPCS: Performed by: PHYSICIAN ASSISTANT

## 2022-11-03 PROCEDURE — C9113 INJ PANTOPRAZOLE SODIUM, VIA: HCPCS | Performed by: INTERNAL MEDICINE

## 2022-11-03 PROCEDURE — 85025 COMPLETE CBC W/AUTO DIFF WBC: CPT | Performed by: NURSE PRACTITIONER

## 2022-11-03 PROCEDURE — 83690 ASSAY OF LIPASE: CPT | Performed by: NURSE PRACTITIONER

## 2022-11-03 PROCEDURE — 25500020 PHARM REV CODE 255: Performed by: EMERGENCY MEDICINE

## 2022-11-03 PROCEDURE — 99285 EMERGENCY DEPT VISIT HI MDM: CPT | Mod: 25

## 2022-11-03 PROCEDURE — 85730 THROMBOPLASTIN TIME PARTIAL: CPT | Performed by: EMERGENCY MEDICINE

## 2022-11-03 RX ORDER — ALPRAZOLAM 0.5 MG/1
1 TABLET ORAL 3 TIMES DAILY PRN
Status: DISCONTINUED | OUTPATIENT
Start: 2022-11-03 | End: 2022-11-06 | Stop reason: HOSPADM

## 2022-11-03 RX ORDER — MORPHINE SULFATE 4 MG/ML
4 INJECTION, SOLUTION INTRAMUSCULAR; INTRAVENOUS
Status: COMPLETED | OUTPATIENT
Start: 2022-11-03 | End: 2022-11-03

## 2022-11-03 RX ORDER — ACETAMINOPHEN 325 MG/1
650 TABLET ORAL EVERY 8 HOURS PRN
Status: DISCONTINUED | OUTPATIENT
Start: 2022-11-03 | End: 2022-11-06 | Stop reason: HOSPADM

## 2022-11-03 RX ORDER — METOCLOPRAMIDE HYDROCHLORIDE 5 MG/ML
10 INJECTION INTRAMUSCULAR; INTRAVENOUS
Status: COMPLETED | OUTPATIENT
Start: 2022-11-03 | End: 2022-11-03

## 2022-11-03 RX ORDER — ACETAMINOPHEN 325 MG/1
650 TABLET ORAL EVERY 4 HOURS PRN
Status: DISCONTINUED | OUTPATIENT
Start: 2022-11-03 | End: 2022-11-06 | Stop reason: HOSPADM

## 2022-11-03 RX ORDER — ONDANSETRON 2 MG/ML
4 INJECTION INTRAMUSCULAR; INTRAVENOUS EVERY 8 HOURS PRN
Status: DISCONTINUED | OUTPATIENT
Start: 2022-11-03 | End: 2022-11-06 | Stop reason: HOSPADM

## 2022-11-03 RX ORDER — SODIUM CHLORIDE, SODIUM LACTATE, POTASSIUM CHLORIDE, CALCIUM CHLORIDE 600; 310; 30; 20 MG/100ML; MG/100ML; MG/100ML; MG/100ML
INJECTION, SOLUTION INTRAVENOUS CONTINUOUS
Status: DISCONTINUED | OUTPATIENT
Start: 2022-11-03 | End: 2022-11-06 | Stop reason: HOSPADM

## 2022-11-03 RX ORDER — PANTOPRAZOLE SODIUM 40 MG/10ML
80 INJECTION, POWDER, LYOPHILIZED, FOR SOLUTION INTRAVENOUS
Status: COMPLETED | OUTPATIENT
Start: 2022-11-03 | End: 2022-11-03

## 2022-11-03 RX ORDER — GLUCAGON 1 MG
1 KIT INJECTION
Status: DISCONTINUED | OUTPATIENT
Start: 2022-11-03 | End: 2022-11-06 | Stop reason: HOSPADM

## 2022-11-03 RX ORDER — DEXTROSE MONOHYDRATE 100 MG/ML
12.5 INJECTION, SOLUTION INTRAVENOUS
Status: DISCONTINUED | OUTPATIENT
Start: 2022-11-03 | End: 2022-11-06 | Stop reason: HOSPADM

## 2022-11-03 RX ORDER — SUCRALFATE 1 G/1
1 TABLET ORAL 3 TIMES DAILY
Status: DISCONTINUED | OUTPATIENT
Start: 2022-11-03 | End: 2022-11-06 | Stop reason: HOSPADM

## 2022-11-03 RX ORDER — DEXTROSE MONOHYDRATE 100 MG/ML
25 INJECTION, SOLUTION INTRAVENOUS
Status: DISCONTINUED | OUTPATIENT
Start: 2022-11-03 | End: 2022-11-06 | Stop reason: HOSPADM

## 2022-11-03 RX ORDER — IBUPROFEN 200 MG
16 TABLET ORAL
Status: DISCONTINUED | OUTPATIENT
Start: 2022-11-03 | End: 2022-11-06 | Stop reason: HOSPADM

## 2022-11-03 RX ORDER — PANTOPRAZOLE SODIUM 40 MG/10ML
40 INJECTION, POWDER, LYOPHILIZED, FOR SOLUTION INTRAVENOUS 2 TIMES DAILY
Status: DISCONTINUED | OUTPATIENT
Start: 2022-11-03 | End: 2022-11-03

## 2022-11-03 RX ORDER — IBUPROFEN 200 MG
24 TABLET ORAL
Status: DISCONTINUED | OUTPATIENT
Start: 2022-11-03 | End: 2022-11-06 | Stop reason: HOSPADM

## 2022-11-03 RX ADMIN — PANTOPRAZOLE SODIUM 80 MG: 40 INJECTION, POWDER, FOR SOLUTION INTRAVENOUS at 03:11

## 2022-11-03 RX ADMIN — METOCLOPRAMIDE 10 MG: 5 INJECTION, SOLUTION INTRAMUSCULAR; INTRAVENOUS at 01:11

## 2022-11-03 RX ADMIN — SODIUM CHLORIDE, POTASSIUM CHLORIDE, SODIUM LACTATE AND CALCIUM CHLORIDE: 600; 310; 30; 20 INJECTION, SOLUTION INTRAVENOUS at 09:11

## 2022-11-03 RX ADMIN — ALPRAZOLAM 1 MG: 0.5 TABLET ORAL at 09:11

## 2022-11-03 RX ADMIN — PANTOPRAZOLE SODIUM 8 MG/HR: 40 INJECTION, POWDER, FOR SOLUTION INTRAVENOUS at 03:11

## 2022-11-03 RX ADMIN — SUCRALFATE 1 G: 1 TABLET ORAL at 09:11

## 2022-11-03 RX ADMIN — MORPHINE SULFATE 4 MG: 4 INJECTION INTRAVENOUS at 02:11

## 2022-11-03 RX ADMIN — IOPAMIDOL 100 ML: 755 INJECTION, SOLUTION INTRAVENOUS at 09:11

## 2022-11-03 RX ADMIN — DIATRIZOATE MEGLUMINE AND DIATRIZOATE SODIUM 30 ML: 660; 100 LIQUID ORAL; RECTAL at 09:11

## 2022-11-03 RX ADMIN — PANTOPRAZOLE SODIUM 8 MG/HR: 40 INJECTION, POWDER, FOR SOLUTION INTRAVENOUS at 09:11

## 2022-11-03 RX ADMIN — SODIUM CHLORIDE, POTASSIUM CHLORIDE, SODIUM LACTATE AND CALCIUM CHLORIDE 1000 ML: 600; 310; 30; 20 INJECTION, SOLUTION INTRAVENOUS at 01:11

## 2022-11-03 NOTE — FIRST PROVIDER EVALUATION
Medical screening examination initiated.  I have conducted a focused provider triage encounter, findings are as follows:    Brief history of present illness:  37 y/o female who presents with abdominal pain, nausea/vomiting. Unable to hold anything down. She had gastric bypass in July in [place]. She had upper scope today with Dr. Blackwell - has lots of ulcers and hiatal hernia    There were no vitals filed for this visit.    Pertinent physical exam:  alert, nonlabored, ambulatory     Brief workup plan:  labs, urine    Preliminary workup initiated; this workup will be continued and followed by the physician or advanced practice provider that is assigned to the patient when roomed.

## 2022-11-03 NOTE — ED PROVIDER NOTES
Encounter Date: 11/3/2022    SCRIBE #1 NOTE: I, Brittney Beth, am scribing for, and in the presence of,  Mir Mclaughlin MD. I have scribed the following portions of the note - Other sections scribed: HPI, ROS, PE.     History     Chief Complaint   Patient presents with    Abdominal Pain    Hematemesis     Abdominal pain/hematemesis, unable to keep anything down, scope today in the endoscopy center by MD Blackwell and was told multiple ulcers from previous gastric bypass in [place] last July 2022.      36 year old female w/ hx of gastric bypass sx presents to ED with c/o abdominal pain and hematemesis. Pt states she had a gastric bypass('mini' gastric bypass) in [place] in July '22 and was asymptomatic until 3 weeks ago when she started having abdominal pain, vomiting, and inability to keep any food down. Pt reports she was scoped today by Dr. Stewart Blackwell and multiple gastric ulcers were found. She states post-procedure vomiting bright red blood ~1/2 cup and abdominal pain worsening. She denies any fever.     Her PCP is Dr. Galdino Caceres.    The history is provided by the patient. No  was used.   Abdominal Pain  Illness onset: 3 weeks ago. The problem has been gradually worsening. The abdominal pain is located in the epigastric region. The other symptoms of the illness include nausea, vomiting and hematemesis. The other symptoms of the illness do not include fever, shortness of breath or dysuria.   The initial episode of hematemesis occurred today. The hematemesis is not associated with weakness or dizziness.   Onset: 3 weeks ago. The emesis contains stomach contents.   Risk factors for an acute abdominal problem include a history of abdominal surgery. Symptoms associated with the illness do not include chills, frequency or back pain.   Review of patient's allergies indicates:   Allergen Reactions    Penicillin Rash     No past medical history on file.  No past surgical history on file.  No  family history on file.  Social History     Tobacco Use    Smoking status: Never    Smokeless tobacco: Never   Substance Use Topics    Alcohol use: Not Currently    Drug use: Never     Review of Systems   Constitutional:  Negative for chills and fever.   HENT:  Negative for congestion and ear pain.    Eyes:  Negative for discharge.   Respiratory:  Negative for cough, shortness of breath and wheezing.    Cardiovascular:  Negative for chest pain and leg swelling.   Gastrointestinal:  Positive for abdominal pain, hematemesis, nausea and vomiting.   Genitourinary:  Negative for dysuria, flank pain and frequency.   Musculoskeletal:  Negative for back pain and joint swelling.   Skin:  Negative for rash.   Neurological:  Negative for dizziness, weakness and headaches.   Psychiatric/Behavioral:  Negative for agitation, confusion and hallucinations.      Physical Exam     Initial Vitals [11/03/22 1335]   BP Pulse Resp Temp SpO2   (!) 150/97 101 18 98.7 °F (37.1 °C) 98 %      MAP       --         Physical Exam    Nursing note and vitals reviewed.  Constitutional: She appears well-developed and well-nourished. No distress.   HENT:   Head: Normocephalic and atraumatic.   Eyes: Conjunctivae and EOM are normal. Pupils are equal, round, and reactive to light. Right eye exhibits no discharge. Left eye exhibits no discharge. No scleral icterus.   Neck: No tracheal deviation present.   Cardiovascular:  Normal rate, regular rhythm, normal heart sounds and intact distal pulses.           No murmur heard.  Pulmonary/Chest: Breath sounds normal. No stridor. No respiratory distress. She has no wheezes. She has no rales.   Abdominal: She exhibits no distension. There is abdominal tenderness in the epigastric area.   Mild epigastric tenderness to palpation with voluntary guarding. There is guarding. There is no rebound.   Musculoskeletal:         General: No tenderness or edema. Normal range of motion.     Neurological: She is alert and  oriented to person, place, and time. She has normal strength and normal reflexes. No cranial nerve deficit.   Responds appropriately to questions.   Skin: Skin is warm and dry.   Psychiatric: Her behavior is normal. Judgment and thought content normal. Her mood appears anxious.       ED Course   Procedures  Labs Reviewed   CBC WITH DIFFERENTIAL - Abnormal; Notable for the following components:       Result Value    MCHC 31.7 (*)     MPV 13.0 (*)     IG# 0.05 (*)     All other components within normal limits   COMPREHENSIVE METABOLIC PANEL - Abnormal; Notable for the following components:    Blood Urea Nitrogen 6.2 (*)     Protein Total 6.0 (*)     Albumin Level 3.0 (*)     Albumin/Globulin Ratio 1.0 (*)     All other components within normal limits   URINALYSIS, REFLEX TO URINE CULTURE - Abnormal; Notable for the following components:    Appearance, UA Cloudy (*)     Protein, UA Trace (*)     Ketones, UA 2+ (*)     All other components within normal limits   URINALYSIS, MICROSCOPIC - Abnormal; Notable for the following components:    Squamous Epithelial Cells, UA 5-10 (*)     Bacteria, UA 1+ (*)     Mucous, UA Small (*)     Calcium Oxalate Crystals, UA Moderate (*)     All other components within normal limits   MAGNESIUM - Normal   LIPASE - Normal   APTT - Normal   PROTIME-INR - Normal          Imaging Results    None          Medications   sucralfate tablet 1 g (has no administration in time range)   lactated ringers bolus 1,000 mL (1,000 mLs Intravenous New Bag 11/3/22 1345)   metoclopramide HCl injection 10 mg (10 mg Intravenous Given 11/3/22 1345)   morphine injection 4 mg (4 mg Intravenous Given 11/3/22 1451)   pantoprazole injection 80 mg (80 mg Intravenous Given 11/3/22 1527)   pantoprazole (PROTONIX) 40 mg in sodium chloride 0.9 % 100 mL IVPB (MB+) (8 mg/hr Intravenous New Bag 11/3/22 1528)     Medical Decision Making:   Clinical Tests:   Lab Tests: Ordered and Reviewed  Radiological Study: Ordered and  Reviewed        Scribe Attestation:   Scribe #1: I performed the above scribed service and the documentation accurately describes the services I performed. I attest to the accuracy of the note.    Attending Attestation:           Physician Attestation for Scribe:  Physician Attestation Statement for Scribe #1: I, Mir Mclaughlin MD, reviewed documentation, as scribed by Brittney Beth in my presence, and it is both accurate and complete.           ED Course as of 11/03/22 1706   Thu Nov 03, 2022   1609 Patient is in no apparent distress.  Patient states she is feeling some better.  She denies any abdominal pain at this time.  CT of the abdomen pelvis bariatric protocol is pending. [KG]   1610 Ludy GIORDANO, OK to admit, consult GI [KG]   1621 Dr. Stewart Blackwell paged [KG]   1706 Dr. Stewart Blackwell, will follow in consult [KG]      ED Course User Index  [KG] Mir Mclaughlin MD                 Clinical Impression:   Final diagnoses:  [K25.9] Multiple gastric ulcers (Primary)  [K92.0] Hematemesis, unspecified whether nausea present  [R10.13] Epigastric abdominal pain  [Z98.84] History of gastric bypass  [K92.2] Upper GI bleed      ED Disposition Condition    Admit Stable                Mir Mclaughlin MD  11/03/22 1624       Mir Mclaughlin MD  11/03/22 1706

## 2022-11-03 NOTE — H&P
[facility]  Hospital Medicine History & Physical Examination       Patient Name: Chelsie Lee  MRN: [id number]  Patient Class: IP- Inpatient   Admission Date: 11/3/2022   Admitting Physician: Rebeca Lawrence MD  Length of Stay: 0  Attending Physician: Rebeca Lawrence MD  Primary Care Provider: Galdino Caceres MD  Face-to-Face encounter date: 2022  Code Status: Full code   Chief Complaint: Abdominal Pain and Hematemesis (Abdominal pain/hematemesis, unable to keep anything down, scope today in the endoscopy center by MD Blackwell and was told multiple ulcers from previous gastric bypass in [place] last 2022. )        Patient information was obtained from patient, patient's family, past medical records and ER records.     HISTORY OF PRESENT ILLNESS:   Chelsie Lee is a 36 y.o. female with history of anxiety  presented to [facility] on 11/3/2022 for abdominal pain, nausea, and vomiting.  Patient reports symptoms began 3 weeks ago.  Of note patient reports she had gastric bypass surgery in 2022 in [place].  Patient states she had upper GI scope performed by Dr. Blackwell today with multiple gastric ulcers found.  Patient reports hematemesis after procedure and worsening abdominal pain.  Patient reports subjective fever yesterday.  On exam patient reports generalized abdominal cramping.  Initial vital signs in ED revealed /97, pulse 101, respirations 18, temperature 37.1° C, and SpO2 98%.  Labs revealed RBC 4.43, hemoglobin 12.7, and hematocrit 40.1.  Patient was given LR bolus, carafate 1 g tablet, IV Reglan 10 mg, IV morphine 4 mg, and IV Protonix 80 mg.  Dr. Blackwell was consulted.  Bariatric surgeon was consulted. Patient was admitted to hospital medicine service for further medical management.   PAST MEDICAL HISTORY:   Anxiety     PAST SURGICAL HISTORY:   Gastric sleeve   section x3    ALLERGIES:   Penicillin    FAMILY HISTORY:   Reviewed and negative    SOCIAL  HISTORY:   Denies alcohol, tobacco, and drug use     HOME MEDICATIONS:     Prior to Admission medications    Medication Sig Start Date End Date Taking? Authorizing Provider   ALPRAZolam (XANAX) 1 MG tablet Take 1 mg by mouth 3 (three) times daily. 10/20/22   Historical Provider   buPROPion (WELLBUTRIN XL) 150 MG TB24 tablet 1 tablet in the morning    Historical Provider   docusate sodium (COLACE) 50 MG capsule Take 1 capsule (50 mg total) by mouth once daily. 10/29/22   Janusz Toure MD   ergocalciferol (ERGOCALCIFEROL) 50,000 unit Cap Take 50,000 Units by mouth every 7 days. 10/21/22   Historical Provider   pantoprazole (PROTONIX) 40 MG tablet Take 1 tablet (40 mg total) by mouth once daily. 10/29/22   Janusz Toure MD   polyethylene glycol (GLYCOLAX) 17 gram PwPk Take 17 g by mouth 2 (two) times daily as needed (constipation). 10/29/22   Janusz Toure MD       REVIEW OF SYSTEMS:   Except as documented, all other systems reviewed and negative     PHYSICAL EXAM:     VITAL SIGNS: 24 HRS MIN & MAX LAST   Temp  Min: 98.7 °F (37.1 °C)  Max: 98.7 °F (37.1 °C) 98.7 °F (37.1 °C)   BP  Min: 140/95  Max: 150/97 (!) 140/95     Pulse  Min: 89  Max: 101  89   Resp  Min: 18  Max: 20 18   SpO2  Min: 98 %  Max: 99 % 99 %       General appearance:  Female in no apparent distress.  HEENNT: Atraumatic head. Moist mucous membranes of oral cavity.   Lungs: Clear to auscultation bilaterally.   Heart: Regular rate and rhythm.    Abdomen: Soft, mild upper abdominal tenderness. Bowel sounds are normal.   Extremities: No cyanosis, clubbing, or edema. No deformities.   Skin: No Rash. Warm and dry.   Neuro: Awake, alert, and oriented. Motor and sensory exams grossly intact.   Psych/mental status: Appropriate mood and affect. Responds appropriately to questions.     LABS AND IMAGING:     Recent Labs   Lab 10/28/22  0351 11/03/22  1349   WBC 5.0 10.9   RBC 3.50* 4.43   HGB 10.3* 12.7   HCT 32.4* 40.1   MCV 92.6 90.5   MCH 29.4 28.7   MCHC 31.8* 31.7*    RDW 13.7 13.9    311   MPV 12.1* 13.0*       Recent Labs   Lab 10/28/22  0351 10/29/22  0536 11/03/22  1349    138 138   K 3.3* 3.9 4.6   CO2 26 22 24   BUN 4.3* 3.3* 6.2*   CREATININE 0.52* 0.50* 0.63   CALCIUM 8.0* 7.9* 9.0   MG 1.70 1.70 1.80   ALBUMIN 2.2*  --  3.0*   ALKPHOS 44  --  58   ALT 17  --  21   AST 17  --  32   BILITOT 1.0  --  0.9       Microbiology Results (last 7 days)       ** No results found for the last 168 hours. **             FL Esophagram Complete  Narrative: EXAMINATION:  FL ESOPHAGRAM COMPLETE    CLINICAL HISTORY:  evaluate hiatal hernia;Diaphragmatic hernia without obstruction or gangrene    TECHNIQUE:  Patient performed several barium swallows for this exam.  Multiple radiographs were obtained of the esophagus.    Fluoroscopy time 1 minute and 7 seconds.    Radiation dose 73 mGy    COMPARISON:  None available    FINDINGS:  Timing and emptying of barium from the hypopharynx is unremarkable.  Esophageal primary and secondary peristalsis is preserved and no tertiary waveforms identified.  Esophagus shows unremarkable distensibility without abnormal dilatation, stricture or diverticulum.  Stomach is of small volume consistent with gastric sleeve.  There is focal gastric outpouching from the lesser curvature.  Major portion of the stomach is herniated into the lower posterior mediastinum.  Impression: 1. Herniation of the small volume stomach into the lower posterior mediastinum    2. Status post gastric sleeve.    Electronically signed by: Edgardo Tyler  Date:    10/28/2022  Time:    16:46        ASSESSMENT & PLAN:   Assessment:  Hematemesis   Gastric ulcers  Status post gastric bypass July 2022 in [place]       Plan:  IV fluids  IV Protonix  NPO  PRN antiemetics  GI consulted, appreciate recommendations  Bariatric surgeon consulted,  appreciate recommendations   Continue appropriate home medications   Labs in a.m.        VTE Prophylaxis: will be placed on SCD for DVT  prophylaxis and will be advised to be as mobile as possible and sit in a chair as tolerated      __________________________________________________________________________  INPATIENT LIST OF MEDICATIONS     Scheduled Meds:  Continuous Infusions:  PRN Meds:.      Skip SUAREZ PA-C, have reviewed and discussed the case with Dr. Rebeca Lawrence MD  Please see the following addendum for further assessment and plan from there attending MD.    11/03/2022    ________________________________________________________________________________    MD Addendum:  I, Dr. Rebeca Lawrence MD assumed care of this patient today For the patient encounter, I performed the substantive portion of the visit, I reviewed the PA documentation, treatment plan, and medical decision making.  I had face to face time with this patient     35 y/o female with hist of gastric bypass in July 2022 had ekd done today that showed multiple gatric ulcers presented to ER eith vomiiting almost half a cup of blood after the procedure along with genralised abdominal cramping lab work showed stablle h/h she  s been admitted to hospitalist [facility] for futher management and care     Gen aaox3  Chest cta     Gi consulted  Will start on protonix drip and Carafate  Npo   Bariatric surgeon consulted  LR @100CC/HR   I was not able to find EGD results.  Will check with GI in a.m.   Repeat blood work   Will keep a close watch on patient's H&H for the H&H is stable            Discharge Planning and Disposition: No mobility needs. Ambulating well. Good social support system.   Anticipated discharge    All diagnosis and differential diagnosis have been reviewed; assessment and plan has been documented; I have personally reviewed the labs and test results that are presently available; I have reviewed the patients medication list; I have reviewed the consulting providers response and recommendations. I have reviewed or attempted to review medical records based upon their  availability.    All of the patient and family questions have been addressed and answered. Patient's is agreeable to the above stated plan. I will continue to monitor closely and make adjustments to medical management as needed.      Rebeca Lawrence MD  11/03/2022

## 2022-11-04 LAB
ALBUMIN SERPL-MCNC: 2.3 GM/DL (ref 3.5–5)
ALBUMIN/GLOB SERPL: 1.1 RATIO (ref 1.1–2)
ALP SERPL-CCNC: 44 UNIT/L (ref 40–150)
ALT SERPL-CCNC: 15 UNIT/L (ref 0–55)
AST SERPL-CCNC: 14 UNIT/L (ref 5–34)
BASOPHILS # BLD AUTO: 0.06 X10(3)/MCL (ref 0–0.2)
BASOPHILS NFR BLD AUTO: 1.1 %
BILIRUBIN DIRECT+TOT PNL SERPL-MCNC: 0.9 MG/DL
BUN SERPL-MCNC: 4.8 MG/DL (ref 7–18.7)
CALCIUM SERPL-MCNC: 8.3 MG/DL (ref 8.4–10.2)
CHLORIDE SERPL-SCNC: 108 MMOL/L (ref 98–107)
CO2 SERPL-SCNC: 22 MMOL/L (ref 22–29)
CREAT SERPL-MCNC: 0.61 MG/DL (ref 0.55–1.02)
EOSINOPHIL # BLD AUTO: 0.12 X10(3)/MCL (ref 0–0.9)
EOSINOPHIL NFR BLD AUTO: 2.2 %
ERYTHROCYTE [DISTWIDTH] IN BLOOD BY AUTOMATED COUNT: 13.9 % (ref 11.5–17)
GFR SERPLBLD CREATININE-BSD FMLA CKD-EPI: >60 MLS/MIN/1.73/M2
GLOBULIN SER-MCNC: 2.1 GM/DL (ref 2.4–3.5)
GLUCOSE SERPL-MCNC: 84 MG/DL (ref 74–100)
HCT VFR BLD AUTO: 32.8 % (ref 37–47)
HGB BLD-MCNC: 10.5 GM/DL (ref 12–16)
IMM GRANULOCYTES # BLD AUTO: 0.02 X10(3)/MCL (ref 0–0.04)
IMM GRANULOCYTES NFR BLD AUTO: 0.4 %
LYMPHOCYTES # BLD AUTO: 1.66 X10(3)/MCL (ref 0.6–4.6)
LYMPHOCYTES NFR BLD AUTO: 30.3 %
MCH RBC QN AUTO: 28.8 PG (ref 27–31)
MCHC RBC AUTO-ENTMCNC: 32 MG/DL (ref 33–36)
MCV RBC AUTO: 90.1 FL (ref 80–94)
MONOCYTES # BLD AUTO: 0.77 X10(3)/MCL (ref 0.1–1.3)
MONOCYTES NFR BLD AUTO: 14.1 %
NEUTROPHILS # BLD AUTO: 2.9 X10(3)/MCL (ref 2.1–9.2)
NEUTROPHILS NFR BLD AUTO: 51.9 %
NRBC BLD AUTO-RTO: 0 %
PLATELET # BLD AUTO: 232 X10(3)/MCL (ref 130–400)
PMV BLD AUTO: 12.8 FL (ref 7.4–10.4)
POTASSIUM SERPL-SCNC: 4 MMOL/L (ref 3.5–5.1)
PROT SERPL-MCNC: 4.4 GM/DL (ref 6.4–8.3)
RBC # BLD AUTO: 3.64 X10(6)/MCL (ref 4.2–5.4)
SODIUM SERPL-SCNC: 135 MMOL/L (ref 136–145)
WBC # SPEC AUTO: 5.5 X10(3)/MCL (ref 4.5–11.5)

## 2022-11-04 PROCEDURE — G0378 HOSPITAL OBSERVATION PER HR: HCPCS

## 2022-11-04 PROCEDURE — 36415 COLL VENOUS BLD VENIPUNCTURE: CPT | Performed by: PHYSICIAN ASSISTANT

## 2022-11-04 PROCEDURE — 96375 TX/PRO/DX INJ NEW DRUG ADDON: CPT

## 2022-11-04 PROCEDURE — 96366 THER/PROPH/DIAG IV INF ADDON: CPT

## 2022-11-04 PROCEDURE — 25000003 PHARM REV CODE 250: Performed by: EMERGENCY MEDICINE

## 2022-11-04 PROCEDURE — 25000003 PHARM REV CODE 250: Performed by: PHYSICIAN ASSISTANT

## 2022-11-04 PROCEDURE — 25000003 PHARM REV CODE 250: Performed by: INTERNAL MEDICINE

## 2022-11-04 PROCEDURE — 25000003 PHARM REV CODE 250: Performed by: NURSE PRACTITIONER

## 2022-11-04 PROCEDURE — 85025 COMPLETE CBC W/AUTO DIFF WBC: CPT | Performed by: PHYSICIAN ASSISTANT

## 2022-11-04 PROCEDURE — C9113 INJ PANTOPRAZOLE SODIUM, VIA: HCPCS | Performed by: INTERNAL MEDICINE

## 2022-11-04 PROCEDURE — 80053 COMPREHEN METABOLIC PANEL: CPT | Performed by: PHYSICIAN ASSISTANT

## 2022-11-04 PROCEDURE — 63600175 PHARM REV CODE 636 W HCPCS: Performed by: INTERNAL MEDICINE

## 2022-11-04 PROCEDURE — 63600175 PHARM REV CODE 636 W HCPCS: Performed by: PHYSICIAN ASSISTANT

## 2022-11-04 PROCEDURE — 96365 THER/PROPH/DIAG IV INF INIT: CPT

## 2022-11-04 PROCEDURE — 96361 HYDRATE IV INFUSION ADD-ON: CPT

## 2022-11-04 RX ORDER — HYDROCODONE BITARTRATE AND ACETAMINOPHEN 5; 325 MG/1; MG/1
1 TABLET ORAL EVERY 6 HOURS PRN
Status: DISCONTINUED | OUTPATIENT
Start: 2022-11-04 | End: 2022-11-06 | Stop reason: HOSPADM

## 2022-11-04 RX ADMIN — PANTOPRAZOLE SODIUM 8 MG/HR: 40 INJECTION, POWDER, FOR SOLUTION INTRAVENOUS at 03:11

## 2022-11-04 RX ADMIN — ONDANSETRON 4 MG: 2 INJECTION INTRAMUSCULAR; INTRAVENOUS at 04:11

## 2022-11-04 RX ADMIN — ONDANSETRON 4 MG: 2 INJECTION INTRAMUSCULAR; INTRAVENOUS at 03:11

## 2022-11-04 RX ADMIN — SUCRALFATE 1 G: 1 TABLET ORAL at 08:11

## 2022-11-04 RX ADMIN — HYDROCODONE BITARTRATE AND ACETAMINOPHEN 1 TABLET: 5; 325 TABLET ORAL at 05:11

## 2022-11-04 RX ADMIN — HYDROCODONE BITARTRATE AND ACETAMINOPHEN 1 TABLET: 5; 325 TABLET ORAL at 12:11

## 2022-11-04 RX ADMIN — PANTOPRAZOLE SODIUM 8 MG/HR: 40 INJECTION, POWDER, FOR SOLUTION INTRAVENOUS at 08:11

## 2022-11-04 RX ADMIN — SODIUM CHLORIDE, POTASSIUM CHLORIDE, SODIUM LACTATE AND CALCIUM CHLORIDE: 600; 310; 30; 20 INJECTION, SOLUTION INTRAVENOUS at 05:11

## 2022-11-04 RX ADMIN — SUCRALFATE 1 G: 1 TABLET ORAL at 04:11

## 2022-11-04 RX ADMIN — ACETAMINOPHEN 650 MG: 325 TABLET ORAL at 03:11

## 2022-11-04 RX ADMIN — ALPRAZOLAM 1 MG: 0.5 TABLET ORAL at 08:11

## 2022-11-04 NOTE — PLAN OF CARE
11/04/22 1403   Discharge Assessment   Assessment Type Discharge Planning Assessment   Confirmed/corrected address, phone number and insurance Yes   Confirmed Demographics Correct on Facesheet   Source of Information patient   Communicated [place] with patient/caregiver Date not available/Unable to determine   Lives With child(mahamed), dependent   Do you expect to return to your current living situation? Yes   Do you have help at home or someone to help you manage your care at home? Yes   Prior to hospitilization cognitive status: Alert/Oriented   Current cognitive status: Alert/Oriented   Walking or Climbing Stairs Difficulty none   Dressing/Bathing Difficulty none   Equipment Currently Used at Home none   Readmission within 30 days? No   Patient currently being followed by outpatient case management? No   Do you currently have service(s) that help you manage your care at home? No   Do you take prescription medications? Yes   Do you have prescription coverage? Yes   Do you have any problems affording any of your prescribed medications? No   Is the patient taking medications as prescribed? yes   How do you get to doctors appointments? car, drives self   Are you on dialysis? No   Do you take coumadin? No   Discharge Plan A Home   Discharge Plan B Home with family   DME Needed Upon Discharge  none   Discharge Barriers Identified None

## 2022-11-04 NOTE — PLAN OF CARE
36-year-old female admitted on 11/03 for abdominal pain, hematemesis.  Recent gastric bypass in [place].  Recent hospitalization for the same.  Initially hemodynamically stable, afebrile.  The patient was noted to have a stable H&H.  The patient was started on overall supportive care including isotonic fluids, Carafate, Reglan, morphine, Protonix.  Bariatric surgery as well as GI were consulted.  Previously, per documentation, patient was not a surgical candidate.  GI note states EGD done on 11/03 22 was unremarkable.  Patient remains hemodynamically stable, afebrile.  H&H is decreased, although still above the threshold for transfusion.  Electrolytes relatively stable, no evidence of acute kidney injury.  No evidence of the need for surgical intervention/procedures.  The patient remains appropriate for observation LOC    Juliocesar Gaspar MD  Utilization Management  Physician Advisor

## 2022-11-04 NOTE — CONSULTS
Gastroenterology Consultation Note    Reason for Consult:  hematemesis, gastric ulcers    PCP:   Galdino Caceres MD    Referring MD:  Rebeca Lawrence MD    Hospital Day: 1     Initial History of Present Illness (HPI):  This is a 36 y.o. female known to Dr. Stewart Blackwell who approximately 9 years ago had a vertical sleeve gastrectomy in [place].  She done quite well after that surgery and had 3 kids in the interim.  After the birth of her third child she had trouble losing weight. Three months ago she went back to [place] to the same physician and had a mini gastric bypass however the surgeon at that time was not comfortable performing a hiatal hernia repair. Following this surgery she had loose oily stools since, but no n/v until recently. She has done quite well for the first 2 months and approximally 3 weeks ago began developing nausea and vomiting, believed to be viral gastroenteritis initially due to sick contacts. She was hospitalized at [facility] 10/27/22 and discharged 10/29/22. She was prescribed Protonix PO daily, antiemetics. She was cleared to resume home meds. She was to follow up with Dr. Woods for surgical correction.    Patient had EGD 11/03/22 with Dr. Stewart Blackwell: esophageal HH, grade D esophagitis compatible with ulcerative esophagitis, severe ulceration and inflammation of small bowel, severe gastritis, path pending. Recommended proceeding with surgical evaluation for correction of gastric surgeries.    After her EGD, patient began vomiting bright red blood, approx 1/2 cup, with worsening epigastric pain. She presented to the [facility] ED, H&H upon presentation was 12.7/40.1. She was admitted and surgery was consulted. GI consulted for hematemesis.    Most recent H&H 10.5/32.8    Patient denies vomiting since prior to ED presentation. She has not had a BM since 10/30/22, and she states it was very dark at this time. She states her epigastric pain is better. She is worried and wants to be treated  adequately because she wants to return to her children and her job as a .    Of note, patient has previously seen Dr. Woods in 2019 as a consultation for HH repair.     Last hospitalization: recommended outpatient colonoscopy for rectal bleeding, and outpatient US vs. MRI for hepatic hemangioma    ROS:  Review of Systems   Respiratory:  Negative for cough and shortness of breath.    Cardiovascular:  Negative for chest pain.   Gastrointestinal:  Positive for abdominal pain and constipation. Negative for diarrhea, nausea and vomiting.   Psychiatric/Behavioral:  The patient is nervous/anxious.      Medical History:   No past medical history on file.    Surgical History:   No past surgical history on file.    Family History:   No family history on file..     Social History:   Social History     Tobacco Use    Smoking status: Never    Smokeless tobacco: Never   Substance Use Topics    Alcohol use: Not Currently       Allergies:  Review of patient's allergies indicates:   Allergen Reactions    Penicillin Rash       Medications Prior to Admission   Medication Sig Dispense Refill Last Dose    ALPRAZolam (XANAX) 1 MG tablet Take 1 mg by mouth 3 (three) times daily.   11/2/2022    buPROPion (WELLBUTRIN XL) 150 MG TB24 tablet 1 tablet in the morning   11/2/2022    docusate sodium (COLACE) 50 MG capsule Take 1 capsule (50 mg total) by mouth once daily.  0 11/2/2022    ergocalciferol (ERGOCALCIFEROL) 50,000 unit Cap Take 50,000 Units by mouth every 7 days.   11/2/2022    pantoprazole (PROTONIX) 40 MG tablet Take 1 tablet (40 mg total) by mouth once daily. 90 tablet 0 11/2/2022    polyethylene glycol (GLYCOLAX) 17 gram PwPk Take 17 g by mouth 2 (two) times daily as needed (constipation).  0 11/2/2022         Objective Findings:    Vital Signs:  /78   Pulse 73   Temp 98.2 °F (36.8 °C) (Oral)   Resp 18   SpO2 96%   There is no height or weight on file to calculate BMI.    Physical Exam:  Physical  Exam  Constitutional:       General: She is not in acute distress.     Appearance: Normal appearance. She is normal weight. She is not ill-appearing.   HENT:      Head: Normocephalic and atraumatic.      Right Ear: External ear normal.      Left Ear: External ear normal.      Nose: Nose normal.      Mouth/Throat:      Pharynx: Oropharynx is clear.   Eyes:      Conjunctiva/sclera: Conjunctivae normal.   Cardiovascular:      Rate and Rhythm: Normal rate.   Pulmonary:      Effort: Pulmonary effort is normal. No respiratory distress.   Abdominal:      General: A surgical scar is present. There is no distension.      Palpations: Abdomen is soft.      Tenderness: There is abdominal tenderness in the epigastric area and left upper quadrant. There is guarding.      Comments:  scars, laparascopic scars   Musculoskeletal:         General: Normal range of motion.      Cervical back: Normal range of motion and neck supple.   Skin:     General: Skin is warm and dry.   Neurological:      Mental Status: She is alert and oriented to person, place, and time. Mental status is at baseline.   Psychiatric:         Mood and Affect: Mood normal.         Behavior: Behavior normal.         Thought Content: Thought content normal.       Labs:  Recent Results (from the past 24 hour(s))   Urinalysis, Reflex to Urine Culture Urine, Clean Catch    Collection Time: 22  1:45 PM    Specimen: Urine   Result Value Ref Range    Color, UA Dark Yellow Yellow, Light-Yellow, Dark Yellow, Mitzi, Straw    Appearance, UA Cloudy (A) Clear    Specific Gravity, UA 1.029 1.001 - 1.030    pH, UA 5.5 5.0 - 8.5    Protein, UA Trace (A) Negative mg/dL    Glucose, UA Negative Negative, Normal mg/dL    Ketones, UA 2+ (A) Negative mg/dL    Blood, UA Negative Negative unit/L    Bilirubin, UA Negative Negative mg/dL    Urobilinogen, UA 1.0 0.2, 1.0, Normal mg/dL    Nitrites, UA Negative Negative    Leukocyte Esterase, UA Negative Negative unit/L    Urinalysis, Microscopic    Collection Time: 11/03/22  1:45 PM   Result Value Ref Range    RBC, UA 0-5 None Seen, 0-2, 3-5, 0-5, >100 /HPF    WBC, UA 5 <=5 /HPF    Squamous Epithelial Cells, UA 5-10 (A) 0-4, None Seen /HPF    Bacteria, UA 1+ (A) None Seen, Rare, Occasional /HPF    Mucous, UA Small (A) None Seen /LPF    Calcium Oxalate Crystals, UA Moderate (A) None Seen /HPF   CBC with Differential    Collection Time: 11/03/22  1:49 PM   Result Value Ref Range    WBC 10.9 4.5 - 11.5 x10(3)/mcL    RBC 4.43 4.20 - 5.40 x10(6)/mcL    Hgb 12.7 12.0 - 16.0 gm/dL    Hct 40.1 37.0 - 47.0 %    MCV 90.5 80.0 - 94.0 fL    MCH 28.7 27.0 - 31.0 pg    MCHC 31.7 (L) 33.0 - 36.0 mg/dL    RDW 13.9 11.5 - 17.0 %    Platelet 311 130 - 400 x10(3)/mcL    MPV 13.0 (H) 7.4 - 10.4 fL    Neut % 69.3 %    Lymph % 19.0 %    Mono % 9.9 %    Eos % 0.7 %    Basophil % 0.6 %    Lymph # 2.08 0.6 - 4.6 x10(3)/mcL    Neut # 7.6 2.1 - 9.2 x10(3)/mcL    Mono # 1.08 0.1 - 1.3 x10(3)/mcL    Eos # 0.08 0 - 0.9 x10(3)/mcL    Baso # 0.07 0 - 0.2 x10(3)/mcL    IG# 0.05 (H) 0 - 0.04 x10(3)/mcL    IG% 0.5 %    NRBC% 0.0 %   Comprehensive Metabolic Panel    Collection Time: 11/03/22  1:49 PM   Result Value Ref Range    Sodium Level 138 136 - 145 mmol/L    Potassium Level 4.6 3.5 - 5.1 mmol/L    Chloride 107 98 - 107 mmol/L    Carbon Dioxide 24 22 - 29 mmol/L    Glucose Level 89 74 - 100 mg/dL    Blood Urea Nitrogen 6.2 (L) 7.0 - 18.7 mg/dL    Creatinine 0.63 0.55 - 1.02 mg/dL    Calcium Level Total 9.0 8.4 - 10.2 mg/dL    Protein Total 6.0 (L) 6.4 - 8.3 gm/dL    Albumin Level 3.0 (L) 3.5 - 5.0 gm/dL    Globulin 3.0 2.4 - 3.5 gm/dL    Albumin/Globulin Ratio 1.0 (L) 1.1 - 2.0 ratio    Bilirubin Total 0.9 <=1.5 mg/dL    Alkaline Phosphatase 58 40 - 150 unit/L    Alanine Aminotransferase 21 0 - 55 unit/L    Aspartate Aminotransferase 32 5 - 34 unit/L    eGFR >60 mls/min/1.73/m2   Magnesium    Collection Time: 11/03/22  1:49 PM   Result Value Ref Range     Magnesium Level 1.80 1.60 - 2.60 mg/dL   Lipase    Collection Time: 11/03/22  1:49 PM   Result Value Ref Range    Lipase Level 11 <=60 U/L   APTT    Collection Time: 11/03/22  3:07 PM   Result Value Ref Range    PTT 29.1 23.2 - 33.7 seconds   Protime-INR    Collection Time: 11/03/22  3:07 PM   Result Value Ref Range    PT 13.5 12.5 - 14.5 seconds    INR 1.04 0.00 - 1.30   Comprehensive Metabolic Panel (CMP)    Collection Time: 11/04/22  4:20 AM   Result Value Ref Range    Sodium Level 135 (L) 136 - 145 mmol/L    Potassium Level 4.0 3.5 - 5.1 mmol/L    Chloride 108 (H) 98 - 107 mmol/L    Carbon Dioxide 22 22 - 29 mmol/L    Glucose Level 84 74 - 100 mg/dL    Blood Urea Nitrogen 4.8 (L) 7.0 - 18.7 mg/dL    Creatinine 0.61 0.55 - 1.02 mg/dL    Calcium Level Total 8.3 (L) 8.4 - 10.2 mg/dL    Protein Total 4.4 (L) 6.4 - 8.3 gm/dL    Albumin Level 2.3 (L) 3.5 - 5.0 gm/dL    Globulin 2.1 (L) 2.4 - 3.5 gm/dL    Albumin/Globulin Ratio 1.1 1.1 - 2.0 ratio    Bilirubin Total 0.9 <=1.5 mg/dL    Alkaline Phosphatase 44 40 - 150 unit/L    Alanine Aminotransferase 15 0 - 55 unit/L    Aspartate Aminotransferase 14 5 - 34 unit/L    eGFR >60 mls/min/1.73/m2   CBC with Differential    Collection Time: 11/04/22  4:20 AM   Result Value Ref Range    WBC 5.5 4.5 - 11.5 x10(3)/mcL    RBC 3.64 (L) 4.20 - 5.40 x10(6)/mcL    Hgb 10.5 (L) 12.0 - 16.0 gm/dL    Hct 32.8 (L) 37.0 - 47.0 %    MCV 90.1 80.0 - 94.0 fL    MCH 28.8 27.0 - 31.0 pg    MCHC 32.0 (L) 33.0 - 36.0 mg/dL    RDW 13.9 11.5 - 17.0 %    Platelet 232 130 - 400 x10(3)/mcL    MPV 12.8 (H) 7.4 - 10.4 fL    Neut % 51.9 %    Lymph % 30.3 %    Mono % 14.1 %    Eos % 2.2 %    Basophil % 1.1 %    Lymph # 1.66 0.6 - 4.6 x10(3)/mcL    Neut # 2.9 2.1 - 9.2 x10(3)/mcL    Mono # 0.77 0.1 - 1.3 x10(3)/mcL    Eos # 0.12 0 - 0.9 x10(3)/mcL    Baso # 0.06 0 - 0.2 x10(3)/mcL    IG# 0.02 0 - 0.04 x10(3)/mcL    IG% 0.4 %    NRBC% 0.0 %       CT Abdomen Pelvis With Contrast   Final Result      1. Wall  thickening in the gastric remnant and proximal small bowel anastomosis with surrounding inflammation suggestive of gastroenteritis.  No evidence of gastric leak.         Electronically signed by: Benjamín Bailey MD   Date:    11/03/2022   Time:    21:29          Assessment/Plan:   35 y/o female known to Dr. Stewart Blackwell who has a past medical history of gastric sleeve for weight loss 2013 in [place], gastric bypass for weight loss 3 months ago in [place], anxiety. EGD 11/03/22 with Dr. Blackwell revealed esophageal HH, grade D esophagitis compatible with ulcerative esophagitis, severe ulceration and inflammation of small bowel, severe gastritis, path pending. Recommended proceeding with surgical evaluation for correction of gastric surgeries with Dr. Woods. Patient began experiencing hematemesis and worsening epigastric pain following EGD and was admitted.     Nausea and vomiting  Hematemesis  Hiatal hernia  Grade D esophagitis  Gastritis  H/o gastric sleeve 2013, converted to gastric bypass in July 2022  - PPI drip  - carafate QID  - monitor H&H daily and transfuse as needed to keep Hgb >7  - supportive care  - per patient, deemed not a surgical candidate by both Dr. Woods and Dr. Giron  - will discuss patient and reccs further with Dr. Bruce    - Last hospitalization: recommended outpatient colonoscopy for rectal bleeding, and outpatient US vs. MRI for hepatic hemangioma    Thank you for allowing us to participate in the care of Chelsie Leyla Lee.    Corinna Chen PA-C acting as scribe for Poncho Bruce MD  Gastroenterology  [facility]    Agree with above.  Patient with EGD done yesterday that was uneventful.  Treat symptomatically with PPI, carafate, antiemetics as needed. Ok for diet as tolerated. Avoid Opioids

## 2022-11-04 NOTE — PROGRESS NOTES
[facility]  Hospital Medicine Progress Note        Chief Complaint: Inpatient Follow-up for     HPI:  36 y.o. female with history of anxiety  presented to [facility] on 11/3/2022 for abdominal pain, nausea, and vomiting.  Patient reports symptoms began 3 weeks ago.  Of note patient reports she had gastric bypass surgery in July 2022 in [place].  Patient states she had upper GI scope performed by Dr. Blackwell today with multiple gastric ulcers found.  Patient reports hematemesis after procedure and worsening abdominal pain.  Patient reports subjective fever yesterday.  On exam patient reports generalized abdominal cramping.  Initial vital signs in ED revealed /97, pulse 101, respirations 18, temperature 37.1° C, and SpO2 98%.  Labs revealed RBC 4.43, hemoglobin 12.7, and hematocrit 40.1.  Patient was given LR bolus, carafate 1 g tablet, IV Reglan 10 mg, IV morphine 4 mg, and IV Protonix 80 mg.  Dr. Blackwell was consulted.  Bariatric surgeon was consulted. Patient was admitted to hospital medicine service for further medical management.    Interval Hx:     Patient seen and examined no more episode of vomiting of blood H&H did go down slightly but otherwise symptomatically patient reports she is feeling better  Objective/physical exam:  General: In no acute distress, afebrile  Chest: Clear to auscultation bilaterally  Heart: RRR, +S1, S2, no appreciable murmur  Abdomen: Soft, nontender, BS +  MSK: Warm, no lower extremity edema, no clubbing or cyanosis  Neurologic: Alert and oriented x4,   VITAL SIGNS: 24 HRS MIN & MAX LAST   Temp  Min: 98 °F (36.7 °C)  Max: 98.7 °F (37.1 °C) 98 °F (36.7 °C)   BP  Min: 110/75  Max: 150/97 (!) 136/92     Pulse  Min: 73  Max: 101  75   Resp  Min: 16  Max: 20 18   SpO2  Min: 96 %  Max: 99 % 99 %       Recent Labs   Lab 11/03/22  1349 11/04/22  0420   WBC 10.9 5.5   RBC 4.43 3.64*   HGB 12.7 10.5*   HCT 40.1 32.8*   MCV 90.5 90.1   MCH 28.7 28.8   MCHC 31.7* 32.0*    RDW 13.9 13.9    232   MPV 13.0* 12.8*       Recent Labs   Lab 10/29/22  0536 11/03/22  1349 11/04/22  0420    138 135*   K 3.9 4.6 4.0   CO2 22 24 22   BUN 3.3* 6.2* 4.8*   CREATININE 0.50* 0.63 0.61   CALCIUM 7.9* 9.0 8.3*   MG 1.70 1.80  --    ALBUMIN  --  3.0* 2.3*   ALKPHOS  --  58 44   ALT  --  21 15   AST  --  32 14   BILITOT  --  0.9 0.9          Microbiology Results (last 7 days)       ** No results found for the last 168 hours. **             See below for Radiology    Scheduled Med:   sucralfate  1 g Oral TID        Continuous Infusions:   lactated ringers 100 mL/hr at 11/04/22 0511    pantoprazole (PROTONIX) IV infusion 8 mg/hr (11/04/22 0311)        PRN Meds:  acetaminophen, acetaminophen, ALPRAZolam, dextrose 10 % in water (D10W), dextrose 10 % in water (D10W), glucagon (human recombinant), glucose, glucose, HYDROcodone-acetaminophen, ondansetron       Assessment/Plan:  Hematemesis   Gastric ulcers  status post gastric bypass done in July of this year in [place]  Nausea and vomiting   hiatal hernia  grade D esophagitis          We are continuing with Protonix drip and Carafate b.i.d.  Hemoglobin this morning is 10 will transfuse if hemoglobin is less than 7   Dr. Woods and Dr. Giron apparently patient is not a surgical candidate?  Repeat blood work in a.m.  continue with supportive care  Continue with LR at 100 cc an hour    VTE prophylaxis:     Patient condition:  Stable/Fair/Guarded/ Serious/ Critical    Anticipated discharge and Disposition:         All diagnosis and differential diagnosis have been reviewed; assessment and plan has been documented; I have personally reviewed the labs and test results that are presently available; I have reviewed the patients medication list; I have reviewed the consulting providers response and recommendations. I have reviewed or attempted to review medical records based upon their availability    All of the patient's questions have been   addressed and answered. Patient's is agreeable to the above stated plan. I will continue to monitor closely and make adjustments to medical management as needed.  _____________________________________________________________________    Nutrition Status:    Radiology:  CT Abdomen Pelvis With Contrast  Narrative: EXAMINATION:  CT ABDOMEN AND PELVIS    CLINICAL HISTORY:  Abdominal pain, hematemesis, gastric ulcer, recent gastric bypass    TECHNIQUE:  Axial CT images were obtained through the abdomen and pelvis following IV administration of 100 cc contrast.  Oral contrast also utilized.  Coronal and sagittal reconstructions submitted and interpreted.  Total .  Automated exposure control utilized.    COMPARISON:  None.    FINDINGS:  Visualized lung bases are clear.    Focal fatty infiltration is in the liver near the falciform ligament.  Gallbladder, kidneys, spleen, adrenals and pancreas are normal.    A hiatal hernia is present.  Operative changes are in the stomach.  There is wall thickening in the gastric remnant and in the proximal small bowel anastomosis with surrounding inflammation.  No extravasated contrast is present.  Appendix and colon are normal.  No free fluid or pneumoperitoneum.    No enlarged lymph nodes.  Abdominal aorta is normal in caliber.    Urinary bladder is normal.  Uterus is absent.    No acute osseous findings.  Impression: 1. Wall thickening in the gastric remnant and proximal small bowel anastomosis with surrounding inflammation suggestive of gastroenteritis.  No evidence of gastric leak.    Electronically signed by: Benjamín Bailey MD  Date:    11/03/2022  Time:    21:29      Rebeca Lawrence MD   11/04/2022

## 2022-11-05 PROBLEM — K25.9 MULTIPLE GASTRIC ULCERS: Status: ACTIVE | Noted: 2022-11-05

## 2022-11-05 LAB
ANION GAP SERPL CALC-SCNC: 7 MEQ/L
BASOPHILS # BLD AUTO: 0.04 X10(3)/MCL (ref 0–0.2)
BASOPHILS NFR BLD AUTO: 0.9 %
BUN SERPL-MCNC: 6.5 MG/DL (ref 7–18.7)
CALCIUM SERPL-MCNC: 8 MG/DL (ref 8.4–10.2)
CHLORIDE SERPL-SCNC: 112 MMOL/L (ref 98–107)
CO2 SERPL-SCNC: 19 MMOL/L (ref 22–29)
CREAT SERPL-MCNC: 0.67 MG/DL (ref 0.55–1.02)
CREAT/UREA NIT SERPL: 10
EOSINOPHIL # BLD AUTO: 0.08 X10(3)/MCL (ref 0–0.9)
EOSINOPHIL NFR BLD AUTO: 1.8 %
ERYTHROCYTE [DISTWIDTH] IN BLOOD BY AUTOMATED COUNT: 13.9 % (ref 11.5–17)
GFR SERPLBLD CREATININE-BSD FMLA CKD-EPI: >60 MLS/MIN/1.73/M2
GLUCOSE SERPL-MCNC: 85 MG/DL (ref 74–100)
HCT VFR BLD AUTO: 31.6 % (ref 37–47)
HGB BLD-MCNC: 9.9 GM/DL (ref 12–16)
IMM GRANULOCYTES # BLD AUTO: 0.02 X10(3)/MCL (ref 0–0.04)
IMM GRANULOCYTES NFR BLD AUTO: 0.5 %
LYMPHOCYTES # BLD AUTO: 1.63 X10(3)/MCL (ref 0.6–4.6)
LYMPHOCYTES NFR BLD AUTO: 37 %
MCH RBC QN AUTO: 28.5 PG (ref 27–31)
MCHC RBC AUTO-ENTMCNC: 31.3 MG/DL (ref 33–36)
MCV RBC AUTO: 91.1 FL (ref 80–94)
MONOCYTES # BLD AUTO: 0.56 X10(3)/MCL (ref 0.1–1.3)
MONOCYTES NFR BLD AUTO: 12.7 %
NEUTROPHILS # BLD AUTO: 2.1 X10(3)/MCL (ref 2.1–9.2)
NEUTROPHILS NFR BLD AUTO: 47.1 %
NRBC BLD AUTO-RTO: 0 %
PLATELET # BLD AUTO: 196 X10(3)/MCL (ref 130–400)
PMV BLD AUTO: 12.9 FL (ref 7.4–10.4)
POTASSIUM SERPL-SCNC: 4 MMOL/L (ref 3.5–5.1)
RBC # BLD AUTO: 3.47 X10(6)/MCL (ref 4.2–5.4)
SODIUM SERPL-SCNC: 138 MMOL/L (ref 136–145)
WBC # SPEC AUTO: 4.4 X10(3)/MCL (ref 4.5–11.5)

## 2022-11-05 PROCEDURE — G0378 HOSPITAL OBSERVATION PER HR: HCPCS

## 2022-11-05 PROCEDURE — 36415 COLL VENOUS BLD VENIPUNCTURE: CPT | Performed by: INTERNAL MEDICINE

## 2022-11-05 PROCEDURE — 25000003 PHARM REV CODE 250: Performed by: NURSE PRACTITIONER

## 2022-11-05 PROCEDURE — 80048 BASIC METABOLIC PNL TOTAL CA: CPT | Performed by: INTERNAL MEDICINE

## 2022-11-05 PROCEDURE — 96361 HYDRATE IV INFUSION ADD-ON: CPT

## 2022-11-05 PROCEDURE — 63600175 PHARM REV CODE 636 W HCPCS: Performed by: PHYSICIAN ASSISTANT

## 2022-11-05 PROCEDURE — 85025 COMPLETE CBC W/AUTO DIFF WBC: CPT | Performed by: INTERNAL MEDICINE

## 2022-11-05 PROCEDURE — 96366 THER/PROPH/DIAG IV INF ADDON: CPT

## 2022-11-05 PROCEDURE — 25000003 PHARM REV CODE 250: Performed by: EMERGENCY MEDICINE

## 2022-11-05 RX ORDER — PANTOPRAZOLE SODIUM 40 MG/1
40 TABLET, DELAYED RELEASE ORAL
Status: DISCONTINUED | OUTPATIENT
Start: 2022-11-05 | End: 2022-11-06 | Stop reason: HOSPADM

## 2022-11-05 RX ORDER — SUCRALFATE 1 G/1
1 TABLET ORAL
Qty: 56 TABLET | Refills: 0 | Status: SHIPPED | OUTPATIENT
Start: 2022-11-05 | End: 2022-11-19

## 2022-11-05 RX ORDER — PANTOPRAZOLE SODIUM 40 MG/1
40 TABLET, DELAYED RELEASE ORAL 2 TIMES DAILY
Qty: 60 TABLET | Refills: 0 | Status: SHIPPED | OUTPATIENT
Start: 2022-11-05 | End: 2022-12-05

## 2022-11-05 RX ADMIN — ALPRAZOLAM 1 MG: 0.5 TABLET ORAL at 10:11

## 2022-11-05 RX ADMIN — SODIUM CHLORIDE, POTASSIUM CHLORIDE, SODIUM LACTATE AND CALCIUM CHLORIDE: 600; 310; 30; 20 INJECTION, SOLUTION INTRAVENOUS at 03:11

## 2022-11-05 RX ADMIN — PANTOPRAZOLE SODIUM 40 MG: 40 TABLET, DELAYED RELEASE ORAL at 08:11

## 2022-11-05 RX ADMIN — SUCRALFATE 1 G: 1 TABLET ORAL at 08:11

## 2022-11-05 RX ADMIN — SUCRALFATE 1 G: 1 TABLET ORAL at 03:11

## 2022-11-05 RX ADMIN — PANTOPRAZOLE SODIUM 40 MG: 40 TABLET, DELAYED RELEASE ORAL at 03:11

## 2022-11-05 RX ADMIN — ALPRAZOLAM 1 MG: 0.5 TABLET ORAL at 08:11

## 2022-11-05 NOTE — CONSULTS
[facility]                       [address] [id number]    PATIENT NAME:       MINI DUARTE  YOB: 1985  CSN:                [id number]   MRN:                [id number]  ADMIT DATE:         2022 13:37:00  PHYSICIAN:          Kenneth Grey MD                            CONSULTATION    DATE OF CONSULT:  2022 00:00:00    REASON FOR CONSULTATION:  Revision of bariatric surgery.    HISTORY OF PRESENT ILLNESS:  This is a 36-year-old female, who had a vertical   sleeve gastrectomy approximately 10 years ago, and eventually developed a hiatal   hernia and was told that she needed a revision gastric bypass.  She elected to   go to [place] for the procedure.  She had what is called a mini bypass in that   area, where they bring a small-bowel loop up to the stomach and perform an   anastomosis.  The patient initially did well from surgery; however, developed a   virus 3 weeks ago and with all the nausea and vomiting, developed significant   abdominal pain.  She eventually had a scope yesterday, which showed severe   esophagitis and a severe ulceration at the marginal ulcer site.  She had a   biopsy, which postoperatively caused some bleeding and some pain.  She was   admitted to the hospital for treatment.  At this point, she is not having any   active hematemesis.  Her hematocrit is 32, which has shown some drop, but her   vitals are stable at this time.    PAST MEDICAL HISTORY:  Anxiety and morbid obesity.    PAST SURGICAL HISTORY:  Sleeve gastrectomy, mini gastric bypass, which is   essentially a gastrojejunostomy;  sections x3.    ALLERGIES:  PENICILLIN.     FAMILY HISTORY:  Noncontributory.    SOCIAL HISTORY:  No alcohol, tobacco, or drug use.    MEDICATIONS:  See chart.    REVIEW OF SYSTEMS:  Positive hematemesis.  Positive abdominal pain.    PHYSICAL EXAMINATION:  VITAL SIGNS:   Temperature 98 degrees, blood pressure 136/92, pulse 75, O2   saturation 99% on room air.  NEUROLOGIC:  Alert and oriented x3.  Cranial nerves 2 through 12 intact.  PSYCHIATRIC:  Cooperative, friendly patient, slightly anxious.    HEENT:  Anicteric.    NECK:  Supple.  CHEST:  Clear.  HEART:  Regular rate and rhythm.  ABDOMEN:  Soft, minimally tender in the upper abdomen.  No peritoneal signs.    Healed incisions that were laparoscopic.  No obvious hernias.    EXTREMITIES:  No edema.    SKIN:  Warm, dry.    VASCULAR:  Intact.    LABORATORY DATA:  White count is 5.5, hematocrit 32.  Creatinine 0.61.    IMAGING STUDIES:  CT scan:  Wall thickening of the gastric remnant and proximal   small-bowel anastomosis with surrounding inflammation, suggestive of   gastroenteritis.  No evidence of a gastric leak.    ASSESSMENT AND PLAN:  This is a 36-year-old female, who had a revision of her   gastric sleeve to a mini gastric bypass approximately 4 months ago.  She was   doing well up until recently, where she developed a viral gastroenteritis and   had significant inflammation of her stomach and intestinal area.  She had a   scope yesterday which confirmed this and now she had a little bit of bleeding   that seems to have resolved.  The question is now what is the future treatment.    At this point, she does not need any urgent procedures.  She is probably best   suited to have a revision to a Kel-en-Y gastrojejunostomy.  She would have to   find a bariatric surgeon to perform this.  At this point, no bariatric surgeons   are available for that and it would be an elective procedure.  She can be   followed up as an outpatient, where she could be set up for that.  I had a long   discussion with her about this and she is interested in performing this.  I also   described to her that maybe with medical treatment, some of these areas can   heal and she would not have further issues.  Hopefully that will be what happens   with this  patient as she is having a difficult time finding a surgeon to   perform any future procedures due to financial reasons.  We will be available as   needed for any urgent or emergent issues or questions.  Please call as needed,   but otherwise the patient can be discharged and follow up as long as she has no   further bleeding issues.        ______________________________  MD ROLANDO Garcia/REYNOLD  DD:  11/04/2022  Time:  04:04PM  DT:  11/04/2022  Time:  08:11PM  Job #:  [id number]      CONSULTATION

## 2022-11-05 NOTE — PROGRESS NOTES
"Gastroenterology Progress Note    Subjective/Interval History:  This is a 36 y.o. female known to Dr. Stewart Blackwell who approximately 9 years ago had a vertical sleeve gastrectomy in [place].  She done quite well after that surgery and had 3 kids in the interim.  After the birth of her third child she had trouble losing weight. Three months ago she went back to [place] to the same physician and had a mini gastric bypass however the surgeon at that time was not comfortable performing a hiatal hernia repair. Following this surgery she had loose oily stools since, but no n/v until recently. She has done quite well for the first 2 months and approximally 3 weeks ago began developing nausea and vomiting, believed to be viral gastroenteritis initially due to sick contacts. She was hospitalized at [facility] 10/27/22 and discharged 10/29/22. She was prescribed Protonix PO daily, antiemetics. She was cleared to resume home meds. She was to follow up with Dr. Woods for surgical correction.     Patient had EGD 11/03/22 with Dr. Stewart Blackwell: esophageal HH, grade D esophagitis compatible with ulcerative esophagitis, severe ulceration and inflammation of small bowel, severe gastritis, path pending. Recommended proceeding with surgical evaluation for correction of gastric surgeries.     After her EGD, patient began vomiting bright red blood, approx 1/2 cup, with worsening epigastric pain. She presented to the [facility] ED, H&H upon presentation was 12.7/40.1. She was admitted and surgery was consulted. GI consulted for hematemesis.     Most recent H&H 10.5/32.8     Patient denies vomiting since prior to ED presentation    11-5-22  No episodes of N/V  Wants diet  Discussed o/p revision of Kel  en y as op with a bariatric surgeon once she can find one.     ROS:  12 point system is reviewed and negative except as noted in HPI    Vital Signs:  /80   Pulse 85   Temp 98.9 °F (37.2 °C) (Oral)   Resp 17   Ht 5' 4" (1.626 m)   Wt " I called pt no answer, LVM    64.4 kg (141 lb 15.6 oz)   SpO2 97%   Breastfeeding No   BMI 24.37 kg/m²   Body mass index is 24.37 kg/m².    Physical Exam:  Constitutional:       General: She is not in acute distress.     Appearance: Normal appearance. She is normal weight. She is not ill-appearing.   HENT:      Head: Normocephalic and atraumatic.      Right Ear: External ear normal.      Left Ear: External ear normal.      Nose: Nose normal.      Mouth/Throat:      Pharynx: Oropharynx is clear.   Eyes:      Conjunctiva/sclera: Conjunctivae normal.   Cardiovascular:      Rate and Rhythm: Normal rate.   Pulmonary:      Effort: Pulmonary effort is normal. No respiratory distress.   Abdominal:      General: A surgical scar is present. There is no distension.      Palpations: Abdomen is soft.      Tenderness: soft, [place] Non distended      Comments:  scars, laparascopic scars   Musculoskeletal:         General: Normal range of motion.      Cervical back: Normal range of motion and neck supple.   Skin:     General: Skin is warm and dry.   Neurological:      Mental Status: She is alert and oriented to person, place, and time. Mental status is at baseline.   Psychiatric:         Mood and Affect: Mood normal.         Behavior: Behavior normal.         Thought Content: Thought content normal    Labs:  Recent Results (from the past 48 hour(s))   Urinalysis, Reflex to Urine Culture Urine, Clean Catch    Collection Time: 22  1:45 PM    Specimen: Urine   Result Value Ref Range    Color, UA Dark Yellow Yellow, Light-Yellow, Dark Yellow, Mitzi, Straw    Appearance, UA Cloudy (A) Clear    Specific Gravity, UA 1.029 1.001 - 1.030    pH, UA 5.5 5.0 - 8.5    Protein, UA Trace (A) Negative mg/dL    Glucose, UA Negative Negative, Normal mg/dL    Ketones, UA 2+ (A) Negative mg/dL    Blood, UA Negative Negative unit/L    Bilirubin, UA Negative Negative mg/dL    Urobilinogen, UA 1.0 0.2, 1.0, Normal mg/dL    Nitrites, UA Negative Negative    Leukocyte  Esterase, UA Negative Negative unit/L   Urinalysis, Microscopic    Collection Time: 11/03/22  1:45 PM   Result Value Ref Range    RBC, UA 0-5 None Seen, 0-2, 3-5, 0-5, >100 /HPF    WBC, UA 5 <=5 /HPF    Squamous Epithelial Cells, UA 5-10 (A) 0-4, None Seen /HPF    Bacteria, UA 1+ (A) None Seen, Rare, Occasional /HPF    Mucous, UA Small (A) None Seen /LPF    Calcium Oxalate Crystals, UA Moderate (A) None Seen /HPF   CBC with Differential    Collection Time: 11/03/22  1:49 PM   Result Value Ref Range    WBC 10.9 4.5 - 11.5 x10(3)/mcL    RBC 4.43 4.20 - 5.40 x10(6)/mcL    Hgb 12.7 12.0 - 16.0 gm/dL    Hct 40.1 37.0 - 47.0 %    MCV 90.5 80.0 - 94.0 fL    MCH 28.7 27.0 - 31.0 pg    MCHC 31.7 (L) 33.0 - 36.0 mg/dL    RDW 13.9 11.5 - 17.0 %    Platelet 311 130 - 400 x10(3)/mcL    MPV 13.0 (H) 7.4 - 10.4 fL    Neut % 69.3 %    Lymph % 19.0 %    Mono % 9.9 %    Eos % 0.7 %    Basophil % 0.6 %    Lymph # 2.08 0.6 - 4.6 x10(3)/mcL    Neut # 7.6 2.1 - 9.2 x10(3)/mcL    Mono # 1.08 0.1 - 1.3 x10(3)/mcL    Eos # 0.08 0 - 0.9 x10(3)/mcL    Baso # 0.07 0 - 0.2 x10(3)/mcL    IG# 0.05 (H) 0 - 0.04 x10(3)/mcL    IG% 0.5 %    NRBC% 0.0 %   Comprehensive Metabolic Panel    Collection Time: 11/03/22  1:49 PM   Result Value Ref Range    Sodium Level 138 136 - 145 mmol/L    Potassium Level 4.6 3.5 - 5.1 mmol/L    Chloride 107 98 - 107 mmol/L    Carbon Dioxide 24 22 - 29 mmol/L    Glucose Level 89 74 - 100 mg/dL    Blood Urea Nitrogen 6.2 (L) 7.0 - 18.7 mg/dL    Creatinine 0.63 0.55 - 1.02 mg/dL    Calcium Level Total 9.0 8.4 - 10.2 mg/dL    Protein Total 6.0 (L) 6.4 - 8.3 gm/dL    Albumin Level 3.0 (L) 3.5 - 5.0 gm/dL    Globulin 3.0 2.4 - 3.5 gm/dL    Albumin/Globulin Ratio 1.0 (L) 1.1 - 2.0 ratio    Bilirubin Total 0.9 <=1.5 mg/dL    Alkaline Phosphatase 58 40 - 150 unit/L    Alanine Aminotransferase 21 0 - 55 unit/L    Aspartate Aminotransferase 32 5 - 34 unit/L    eGFR >60 mls/min/1.73/m2   Magnesium    Collection Time: 11/03/22  1:49  PM   Result Value Ref Range    Magnesium Level 1.80 1.60 - 2.60 mg/dL   Lipase    Collection Time: 11/03/22  1:49 PM   Result Value Ref Range    Lipase Level 11 <=60 U/L   APTT    Collection Time: 11/03/22  3:07 PM   Result Value Ref Range    PTT 29.1 23.2 - 33.7 seconds   Protime-INR    Collection Time: 11/03/22  3:07 PM   Result Value Ref Range    PT 13.5 12.5 - 14.5 seconds    INR 1.04 0.00 - 1.30   Comprehensive Metabolic Panel (CMP)    Collection Time: 11/04/22  4:20 AM   Result Value Ref Range    Sodium Level 135 (L) 136 - 145 mmol/L    Potassium Level 4.0 3.5 - 5.1 mmol/L    Chloride 108 (H) 98 - 107 mmol/L    Carbon Dioxide 22 22 - 29 mmol/L    Glucose Level 84 74 - 100 mg/dL    Blood Urea Nitrogen 4.8 (L) 7.0 - 18.7 mg/dL    Creatinine 0.61 0.55 - 1.02 mg/dL    Calcium Level Total 8.3 (L) 8.4 - 10.2 mg/dL    Protein Total 4.4 (L) 6.4 - 8.3 gm/dL    Albumin Level 2.3 (L) 3.5 - 5.0 gm/dL    Globulin 2.1 (L) 2.4 - 3.5 gm/dL    Albumin/Globulin Ratio 1.1 1.1 - 2.0 ratio    Bilirubin Total 0.9 <=1.5 mg/dL    Alkaline Phosphatase 44 40 - 150 unit/L    Alanine Aminotransferase 15 0 - 55 unit/L    Aspartate Aminotransferase 14 5 - 34 unit/L    eGFR >60 mls/min/1.73/m2   CBC with Differential    Collection Time: 11/04/22  4:20 AM   Result Value Ref Range    WBC 5.5 4.5 - 11.5 x10(3)/mcL    RBC 3.64 (L) 4.20 - 5.40 x10(6)/mcL    Hgb 10.5 (L) 12.0 - 16.0 gm/dL    Hct 32.8 (L) 37.0 - 47.0 %    MCV 90.1 80.0 - 94.0 fL    MCH 28.8 27.0 - 31.0 pg    MCHC 32.0 (L) 33.0 - 36.0 mg/dL    RDW 13.9 11.5 - 17.0 %    Platelet 232 130 - 400 x10(3)/mcL    MPV 12.8 (H) 7.4 - 10.4 fL    Neut % 51.9 %    Lymph % 30.3 %    Mono % 14.1 %    Eos % 2.2 %    Basophil % 1.1 %    Lymph # 1.66 0.6 - 4.6 x10(3)/mcL    Neut # 2.9 2.1 - 9.2 x10(3)/mcL    Mono # 0.77 0.1 - 1.3 x10(3)/mcL    Eos # 0.12 0 - 0.9 x10(3)/mcL    Baso # 0.06 0 - 0.2 x10(3)/mcL    IG# 0.02 0 - 0.04 x10(3)/mcL    IG% 0.4 %    NRBC% 0.0 %   CBC with Differential     Collection Time: 11/05/22  3:50 AM   Result Value Ref Range    WBC 4.4 (L) 4.5 - 11.5 x10(3)/mcL    RBC 3.47 (L) 4.20 - 5.40 x10(6)/mcL    Hgb 9.9 (L) 12.0 - 16.0 gm/dL    Hct 31.6 (L) 37.0 - 47.0 %    MCV 91.1 80.0 - 94.0 fL    MCH 28.5 27.0 - 31.0 pg    MCHC 31.3 (L) 33.0 - 36.0 mg/dL    RDW 13.9 11.5 - 17.0 %    Platelet 196 130 - 400 x10(3)/mcL    MPV 12.9 (H) 7.4 - 10.4 fL    Neut % 47.1 %    Lymph % 37.0 %    Mono % 12.7 %    Eos % 1.8 %    Basophil % 0.9 %    Lymph # 1.63 0.6 - 4.6 x10(3)/mcL    Neut # 2.1 2.1 - 9.2 x10(3)/mcL    Mono # 0.56 0.1 - 1.3 x10(3)/mcL    Eos # 0.08 0 - 0.9 x10(3)/mcL    Baso # 0.04 0 - 0.2 x10(3)/mcL    IG# 0.02 0 - 0.04 x10(3)/mcL    IG% 0.5 %    NRBC% 0.0 %         Assessment/Plan:  35 y/o female known to Dr. Stewart Blackwell who has a past medical history of gastric sleeve for weight loss 2013 in [place], gastric bypass for weight loss 3 months ago in [place], anxiety. EGD 11/03/22 with Dr. Blackwell revealed esophageal HH, grade D esophagitis compatible with ulcerative esophagitis, severe ulceration and inflammation of small bowel, severe gastritis, path pending. Recommended proceeding with surgical evaluation for correction of gastric surgeries with Dr. Woods. Patient began experiencing hematemesis and worsening epigastric pain following EGD and was admitted.     Nausea and vomiting  Hematemesis  Hiatal hernia  Grade D esophagitis  Gastritis  H/o gastric sleeve 2013, converted to gastric bypass in July 2022    - PPI po BID   - carafate QID  Diet as tolerated    No further Gi recs, please call if needed        JIMMY Newman as scribe for Dr. Jace Perea

## 2022-11-05 NOTE — PROGRESS NOTES
[facility]  Hospital Medicine Progress Note        Chief Complaint: Inpatient Follow-up for     HPI:  36 y.o. female with history of anxiety  presented to [facility] on 11/3/2022 for abdominal pain, nausea, and vomiting.  Patient reports symptoms began 3 weeks ago.  Of note patient reports she had gastric bypass surgery in July 2022 in [place].  Patient states she had upper GI scope performed by Dr. Blackwell today with multiple gastric ulcers found.  Patient reports hematemesis after procedure and worsening abdominal pain.  Patient reports subjective fever yesterday.  On exam patient reports generalized abdominal cramping.  Initial vital signs in ED revealed /97, pulse 101, respirations 18, temperature 37.1° C, and SpO2 98%.  Labs revealed RBC 4.43, hemoglobin 12.7, and hematocrit 40.1.  Patient was given LR bolus, carafate 1 g tablet, IV Reglan 10 mg, IV morphine 4 mg, and IV Protonix 80 mg.  Dr. Blackwell was consulted.  Bariatric surgeon was consulted. Patient was admitted to hospital medicine service for further medical management.    Interval Hx:     Patient seen and examined no more episode of vomiting of blood .  Patient tried eating low residue diet but after 1 bite she threw up        Objective/physical exam:  General: In no acute distress, afebrile  Chest: Clear to auscultation bilaterally  Heart: RRR, +S1, S2, no appreciable murmur  Abdomen: Soft, nontender, BS +  MSK: Warm, no lower extremity edema, no clubbing or cyanosis  Neurologic: Alert and oriented x4,   VITAL SIGNS: 24 HRS MIN & MAX LAST   Temp  Min: 97.9 °F (36.6 °C)  Max: 98.9 °F (37.2 °C) 98 °F (36.7 °C)   BP  Min: 117/73  Max: 121/80 121/80     Pulse  Min: 78  Max: 85  82   Resp  Min: 17  Max: 18 17   SpO2  Min: 97 %  Max: 98 % 98 %       Recent Labs   Lab 11/03/22  1349 11/04/22  0420 11/05/22  0350   WBC 10.9 5.5 4.4*   RBC 4.43 3.64* 3.47*   HGB 12.7 10.5* 9.9*   HCT 40.1 32.8* 31.6*   MCV 90.5 90.1 91.1   MCH 28.7  28.8 28.5   MCHC 31.7* 32.0* 31.3*   RDW 13.9 13.9 13.9    232 196   MPV 13.0* 12.8* 12.9*       Recent Labs   Lab 11/03/22  1349 11/04/22  0420 11/05/22  0350    135* 138   K 4.6 4.0 4.0   CO2 24 22 19*   BUN 6.2* 4.8* 6.5*   CREATININE 0.63 0.61 0.67   CALCIUM 9.0 8.3* 8.0*   MG 1.80  --   --    ALBUMIN 3.0* 2.3*  --    ALKPHOS 58 44  --    ALT 21 15  --    AST 32 14  --    BILITOT 0.9 0.9  --           Microbiology Results (last 7 days)       ** No results found for the last 168 hours. **             See below for Radiology    Scheduled Med:   pantoprazole  40 mg Oral BID AC    sucralfate  1 g Oral TID        Continuous Infusions:   lactated ringers 100 mL/hr at 11/04/22 0511        PRN Meds:  acetaminophen, acetaminophen, ALPRAZolam, dextrose 10 % in water (D10W), dextrose 10 % in water (D10W), glucagon (human recombinant), glucose, glucose, HYDROcodone-acetaminophen, ondansetron       Assessment/Plan:  Hematemesis   Gastric ulcers  status post gastric bypass done in July of this year in [place]  Nausea and vomiting   hiatal hernia  grade D esophagitis      We will switch her back to full liquid diet see how she does  Discontinue Protonix drip continue with Protonix b.i.d. and Carafate 4 times a day   Hemoglobin is 9.5   We will slowly advance the diet and if tolerating diet then might discharge either later today or tomorrow   Discussed with her that she needs to follow up with [facility] for a bariatric surgeon  Dr. Woods and Dr. Giron apparently patient is not a surgical candidate  Repeat blood work in a.m.  continue with supportive care  Continue with LR at 100 cc an hour    VTE prophylaxis:  Ambulatory    Patient condition:  Stable/Fair/Guarded/ Serious/ Critical    Anticipated discharge and Disposition:         All diagnosis and differential diagnosis have been reviewed; assessment and plan has been documented; I have personally reviewed the labs and test results that are presently  available; I have reviewed the patients medication list; I have reviewed the consulting providers response and recommendations. I have reviewed or attempted to review medical records based upon their availability    All of the patient's questions have been  addressed and answered. Patient's is agreeable to the above stated plan. I will continue to monitor closely and make adjustments to medical management as needed.  _____________________________________________________________________    Nutrition Status:    Radiology:  CT Abdomen Pelvis With Contrast  Narrative: EXAMINATION:  CT ABDOMEN AND PELVIS    CLINICAL HISTORY:  Abdominal pain, hematemesis, gastric ulcer, recent gastric bypass    TECHNIQUE:  Axial CT images were obtained through the abdomen and pelvis following IV administration of 100 cc contrast.  Oral contrast also utilized.  Coronal and sagittal reconstructions submitted and interpreted.  Total .  Automated exposure control utilized.    COMPARISON:  None.    FINDINGS:  Visualized lung bases are clear.    Focal fatty infiltration is in the liver near the falciform ligament.  Gallbladder, kidneys, spleen, adrenals and pancreas are normal.    A hiatal hernia is present.  Operative changes are in the stomach.  There is wall thickening in the gastric remnant and in the proximal small bowel anastomosis with surrounding inflammation.  No extravasated contrast is present.  Appendix and colon are normal.  No free fluid or pneumoperitoneum.    No enlarged lymph nodes.  Abdominal aorta is normal in caliber.    Urinary bladder is normal.  Uterus is absent.    No acute osseous findings.  Impression: 1. Wall thickening in the gastric remnant and proximal small bowel anastomosis with surrounding inflammation suggestive of gastroenteritis.  No evidence of gastric leak.    Electronically signed by: Benjamín Bailey MD  Date:    11/03/2022  Time:    21:29      Rebeca Lawrence MD   11/05/2022

## 2022-11-05 NOTE — DISCHARGE SUMMARY
[facility] Hospital Medicine Discharge Summary    Admit Date: 11/3/2022  Discharge Date and Time: 11/5/20228:14 AM  Admitting Physician:  Team  Discharging Physician: Rebeca Lawrence MD.  Primary Care Physician: Galdino Caceres MD  Consults: Gastroenterology    Discharge Diagnoses:  Hematemesis   Gastric ulcers  status post gastric bypass done in July of this year in [place]  Nausea and vomiting   hiatal hernia  grade D esophagitis    Hospital Course:   36 y.o. female with history of anxiety  presented to [facility] on 11/3/2022 for abdominal pain, nausea, and vomiting.  Patient reports symptoms began 3 weeks ago.  Of note patient reports she had gastric bypass surgery in July 2022 in [place].  Patient states she had upper GI scope performed by Dr. Blackwell today with multiple gastric ulcers found.  Patient reports hematemesis after procedure and worsening abdominal pain.  Patient reports subjective fever yesterday.  On exam patient reports generalized abdominal cramping.  Initial vital signs in ED revealed /97, pulse 101, respirations 18, temperature 37.1° C, and SpO2 98%.  Labs revealed RBC 4.43, hemoglobin 12.7, and hematocrit 40.1.  Patient was given LR bolus, carafate 1 g tablet, IV Reglan 10 mg, IV morphine 4 mg, and IV Protonix 80 mg.  Dr. Blackwell was consulted.  Bariatric surgeon was consulted. Patient was admitted to hospital medicine service for further medical management.  Patient was started on Protonix drip H&H stayed stable we gradually advance the diet.  GI was following we had consulted bariatric surgeon but as per them patient was not a surgical candidate so discussed with the patient to follow-up at [place] patient verbalized understanding we switched her from Protonix drip to p.o. Protonix and Carafate on the day of discharge patient was tolerating diet without any problems so patient was discharged home in stable condition  Pt was seen and examined on the day of  discharge  Vitals:  VITAL SIGNS: 24 HRS MIN & MAX LAST   Temp  Min: 97.9 °F (36.6 °C)  Max: 98.9 °F (37.2 °C) 97.9 °F (36.6 °C)   BP  Min: 117/73  Max: 136/92 117/73   Pulse  Min: 75  Max: 85  78   Resp  Min: 17  Max: 18 17   SpO2  Min: 97 %  Max: 99 % 97 %       Physical Exam:  General: In no acute distress, afebrile  Chest: Clear to auscultation bilaterally  Heart: RRR, +S1, S2, no appreciable murmur  Abdomen: Soft, nontender, BS +  MSK: Warm, no lower extremity edema, no clubbing or cyanosis  Neurologic: Alert and oriented x4    Procedures Performed: No admission procedures for hospital encounter.     Significant Diagnostic Studies: See Full reports for all details    Recent Labs   Lab 11/03/22  1349 11/04/22  0420 11/05/22  0350   WBC 10.9 5.5 4.4*   RBC 4.43 3.64* 3.47*   HGB 12.7 10.5* 9.9*   HCT 40.1 32.8* 31.6*   MCV 90.5 90.1 91.1   MCH 28.7 28.8 28.5   MCHC 31.7* 32.0* 31.3*   RDW 13.9 13.9 13.9    232 196   MPV 13.0* 12.8* 12.9*       Recent Labs   Lab 11/03/22  1349 11/04/22  0420    135*   K 4.6 4.0   CO2 24 22   BUN 6.2* 4.8*   CREATININE 0.63 0.61   CALCIUM 9.0 8.3*   MG 1.80  --    ALBUMIN 3.0* 2.3*   ALKPHOS 58 44   ALT 21 15   AST 32 14   BILITOT 0.9 0.9        Microbiology Results (last 7 days)       ** No results found for the last 168 hours. **             CT Abdomen Pelvis With Contrast  Narrative: EXAMINATION:  CT ABDOMEN AND PELVIS    CLINICAL HISTORY:  Abdominal pain, hematemesis, gastric ulcer, recent gastric bypass    TECHNIQUE:  Axial CT images were obtained through the abdomen and pelvis following IV administration of 100 cc contrast.  Oral contrast also utilized.  Coronal and sagittal reconstructions submitted and interpreted.  Total .  Automated exposure control utilized.    COMPARISON:  None.    FINDINGS:  Visualized lung bases are clear.    Focal fatty infiltration is in the liver near the falciform ligament.  Gallbladder, kidneys, spleen, adrenals and pancreas  are normal.    A hiatal hernia is present.  Operative changes are in the stomach.  There is wall thickening in the gastric remnant and in the proximal small bowel anastomosis with surrounding inflammation.  No extravasated contrast is present.  Appendix and colon are normal.  No free fluid or pneumoperitoneum.    No enlarged lymph nodes.  Abdominal aorta is normal in caliber.    Urinary bladder is normal.  Uterus is absent.    No acute osseous findings.  Impression: 1. Wall thickening in the gastric remnant and proximal small bowel anastomosis with surrounding inflammation suggestive of gastroenteritis.  No evidence of gastric leak.    Electronically signed by: Benjamín Bailey MD  Date:    11/03/2022  Time:    21:29         Medication List        START taking these medications      sucralfate 1 gram tablet  Commonly known as: CARAFATE  Take 1 tablet (1 g total) by mouth 4 (four) times daily before meals and nightly. for 14 days            CHANGE how you take these medications      pantoprazole 40 MG tablet  Commonly known as: PROTONIX  Take 1 tablet (40 mg total) by mouth 2 (two) times daily.  What changed: when to take this            CONTINUE taking these medications      ALPRAZolam 1 MG tablet  Commonly known as: XANAX     buPROPion 150 MG TB24 tablet  Commonly known as: WELLBUTRIN XL     docusate sodium 50 MG capsule  Commonly known as: COLACE  Take 1 capsule (50 mg total) by mouth once daily.     ergocalciferol 50,000 unit Cap  Commonly known as: ERGOCALCIFEROL     polyethylene glycol 17 gram Pwpk  Commonly known as: GLYCOLAX  Take 17 g by mouth 2 (two) times daily as needed (constipation).               Where to Get Your Medications        [facility] [address] [facility] [place] [facility] [address]  [address] [facility] [address]      Phone: [phone]   pantoprazole 40 MG tablet  sucralfate 1 gram tablet          Explained in detail to the patient about the  discharge plan, medications, and follow-up visits. Pt understands and agrees with the treatment plan  Discharge Disposition: Home or Self Care   Discharged Condition: stable  Diet-   Dietary Orders (From admission, onward)       Start     Ordered    11/04/22 1228  Diet Bariatric Soft  Diet effective now         11/04/22 1227                   Medications Per DC med rec  Activities as tolerated   Follow-up Information       Galdino Caceres MD Follow up in 1 week(s).    Specialty: Family Medicine  Contact information:  [address] [facility] [address] [phone]               Stewart Blackwell MD Follow up in 2 week(s).    Specialty: Gastroenterology  Contact information:  [address] [id number] [phone]               [facility] up in 2 week(s).    Contact information:  in [place]                         For further questions contact hospitalist office    Discharge time 33 minutes    For worsening symptoms, chest pain, shortness of breath, increased abdominal pain, high grade fever, stroke or stroke like symptoms, immediately go to the nearest Emergency Room or call 911 as soon as possible.      Rebeca Hardy M.D, on 11/5/2022. at 8:14 AM.

## 2022-11-06 VITALS
OXYGEN SATURATION: 98 % | HEART RATE: 80 BPM | DIASTOLIC BLOOD PRESSURE: 70 MMHG | TEMPERATURE: 98 F | BODY MASS INDEX: 24.24 KG/M2 | SYSTOLIC BLOOD PRESSURE: 109 MMHG | HEIGHT: 64 IN | WEIGHT: 142 LBS | RESPIRATION RATE: 18 BRPM

## 2022-11-06 LAB
BASOPHILS # BLD AUTO: 0.04 X10(3)/MCL (ref 0–0.2)
BASOPHILS NFR BLD AUTO: 0.8 %
EOSINOPHIL # BLD AUTO: 0.11 X10(3)/MCL (ref 0–0.9)
EOSINOPHIL NFR BLD AUTO: 2.2 %
ERYTHROCYTE [DISTWIDTH] IN BLOOD BY AUTOMATED COUNT: 13.7 % (ref 11.5–17)
HCT VFR BLD AUTO: 32.3 % (ref 37–47)
HGB BLD-MCNC: 10.2 GM/DL (ref 12–16)
IMM GRANULOCYTES # BLD AUTO: 0.02 X10(3)/MCL (ref 0–0.04)
IMM GRANULOCYTES NFR BLD AUTO: 0.4 %
LYMPHOCYTES # BLD AUTO: 1.62 X10(3)/MCL (ref 0.6–4.6)
LYMPHOCYTES NFR BLD AUTO: 33.1 %
MCH RBC QN AUTO: 28.5 PG (ref 27–31)
MCHC RBC AUTO-ENTMCNC: 31.6 MG/DL (ref 33–36)
MCV RBC AUTO: 90.2 FL (ref 80–94)
MONOCYTES # BLD AUTO: 0.72 X10(3)/MCL (ref 0.1–1.3)
MONOCYTES NFR BLD AUTO: 14.7 %
NEUTROPHILS # BLD AUTO: 2.4 X10(3)/MCL (ref 2.1–9.2)
NEUTROPHILS NFR BLD AUTO: 48.8 %
NRBC BLD AUTO-RTO: 0 %
PLATELET # BLD AUTO: 213 X10(3)/MCL (ref 130–400)
PMV BLD AUTO: 12.4 FL (ref 7.4–10.4)
RBC # BLD AUTO: 3.58 X10(6)/MCL (ref 4.2–5.4)
WBC # SPEC AUTO: 4.9 X10(3)/MCL (ref 4.5–11.5)

## 2022-11-06 PROCEDURE — 36415 COLL VENOUS BLD VENIPUNCTURE: CPT | Performed by: INTERNAL MEDICINE

## 2022-11-06 PROCEDURE — G0378 HOSPITAL OBSERVATION PER HR: HCPCS

## 2022-11-06 PROCEDURE — 25000003 PHARM REV CODE 250: Performed by: EMERGENCY MEDICINE

## 2022-11-06 PROCEDURE — 25000003 PHARM REV CODE 250: Performed by: NURSE PRACTITIONER

## 2022-11-06 PROCEDURE — 85025 COMPLETE CBC W/AUTO DIFF WBC: CPT | Performed by: INTERNAL MEDICINE

## 2022-11-06 RX ADMIN — SUCRALFATE 1 G: 1 TABLET ORAL at 08:11

## 2022-11-06 RX ADMIN — PANTOPRAZOLE SODIUM 40 MG: 40 TABLET, DELAYED RELEASE ORAL at 06:11

## 2022-12-29 ENCOUNTER — HOSPITAL ENCOUNTER (EMERGENCY)
Facility: HOSPITAL | Age: 37
Discharge: HOME | End: 2022-12-29
Attending: STUDENT IN AN ORGANIZED HEALTH CARE EDUCATION/TRAINING PROGRAM
Payer: COMMERCIAL

## 2022-12-29 VITALS
SYSTOLIC BLOOD PRESSURE: 162 MMHG | RESPIRATION RATE: 16 BRPM | BODY MASS INDEX: 22.31 KG/M2 | DIASTOLIC BLOOD PRESSURE: 108 MMHG | WEIGHT: 130 LBS | OXYGEN SATURATION: 98 % | TEMPERATURE: 98 F | HEART RATE: 84 BPM

## 2022-12-29 DIAGNOSIS — R10.13 EPIGASTRIC PAIN: ICD-10-CM

## 2022-12-29 DIAGNOSIS — R11.2 NAUSEA AND VOMITING, UNSPECIFIED VOMITING TYPE: ICD-10-CM

## 2022-12-29 DIAGNOSIS — K29.70 GASTRITIS, PRESENCE OF BLEEDING UNSPECIFIED, UNSPECIFIED CHRONICITY, UNSPECIFIED GASTRITIS TYPE: Primary | ICD-10-CM

## 2022-12-29 LAB
ALBUMIN SERPL-MCNC: 3.1 G/DL (ref 3.5–5)
ALBUMIN/GLOB SERPL: 1.1 RATIO (ref 1.1–2)
ALP SERPL-CCNC: 89 UNIT/L (ref 40–150)
ALT SERPL-CCNC: 31 UNIT/L (ref 0–55)
APPEARANCE UR: CLEAR
AST SERPL-CCNC: 40 UNIT/L (ref 5–34)
B-HCG SERPL QL: NEGATIVE
BACTERIA #/AREA URNS AUTO: ABNORMAL /HPF
BASOPHILS # BLD AUTO: 0.04 X10(3)/MCL (ref 0–0.2)
BASOPHILS NFR BLD AUTO: 0.5 %
BILIRUB UR QL STRIP.AUTO: ABNORMAL MG/DL
BILIRUBIN DIRECT+TOT PNL SERPL-MCNC: 1.8 MG/DL
BUN SERPL-MCNC: 8.4 MG/DL (ref 7–18.7)
CALCIUM SERPL-MCNC: 8.9 MG/DL (ref 8.4–10.2)
CAOX CRY URNS QL MICRO: ABNORMAL /HPF
CHLORIDE SERPL-SCNC: 106 MMOL/L (ref 98–107)
CO2 SERPL-SCNC: 25 MMOL/L (ref 22–29)
COLOR UR AUTO: ABNORMAL
CREAT SERPL-MCNC: 0.75 MG/DL (ref 0.55–1.02)
EOSINOPHIL # BLD AUTO: 0.1 X10(3)/MCL (ref 0–0.9)
EOSINOPHIL NFR BLD AUTO: 1.2 %
ERYTHROCYTE [DISTWIDTH] IN BLOOD BY AUTOMATED COUNT: 15 % (ref 11–14.5)
GFR SERPLBLD CREATININE-BSD FMLA CKD-EPI: >60 MLS/MIN/1.73/M2
GLOBULIN SER-MCNC: 2.7 GM/DL (ref 2.4–3.5)
GLUCOSE SERPL-MCNC: 102 MG/DL (ref 74–100)
GLUCOSE UR QL STRIP.AUTO: NEGATIVE MG/DL
HCT VFR BLD AUTO: 41.4 % (ref 37–47)
HGB BLD-MCNC: 12.8 GM/DL (ref 12–16)
IMM GRANULOCYTES # BLD AUTO: 0.02 X10(3)/MCL (ref 0–0.04)
IMM GRANULOCYTES NFR BLD AUTO: 0.2 %
KETONES UR QL STRIP.AUTO: ABNORMAL MG/DL
LEUKOCYTE ESTERASE UR QL STRIP.AUTO: ABNORMAL UNIT/L
LIPASE SERPL-CCNC: 25 U/L
LYMPHOCYTES # BLD AUTO: 1.75 X10(3)/MCL (ref 0.6–4.6)
LYMPHOCYTES NFR BLD AUTO: 21.5 %
MAGNESIUM SERPL-MCNC: 1.9 MG/DL (ref 1.6–2.6)
MCH RBC QN AUTO: 26.4 PG
MCHC RBC AUTO-ENTMCNC: 30.9 MG/DL (ref 33–36)
MCV RBC AUTO: 85.4 FL (ref 80–94)
MONOCYTES # BLD AUTO: 0.84 X10(3)/MCL (ref 0.1–1.3)
MONOCYTES NFR BLD AUTO: 10.3 %
MUCOUS THREADS URNS QL MICRO: ABNORMAL /LPF
NEUTROPHILS # BLD AUTO: 5.38 X10(3)/MCL (ref 2.1–9.2)
NEUTROPHILS NFR BLD AUTO: 66.3 %
NITRITE UR QL STRIP.AUTO: POSITIVE
NRBC BLD AUTO-RTO: 0 % (ref 0–1)
PH UR STRIP.AUTO: 5.5 [PH]
PLATELET # BLD AUTO: 245 X10(3)/MCL (ref 140–371)
PMV BLD AUTO: 11.2 FL (ref 9.4–12.4)
POTASSIUM SERPL-SCNC: 4.2 MMOL/L (ref 3.5–5.1)
PROT SERPL-MCNC: 5.8 GM/DL (ref 6.4–8.3)
PROT UR QL STRIP.AUTO: ABNORMAL MG/DL
RBC # BLD AUTO: 4.85 X10(6)/MCL (ref 4.2–5.4)
RBC #/AREA URNS AUTO: <5 /HPF
RBC UR QL AUTO: NEGATIVE UNIT/L
SODIUM SERPL-SCNC: 136 MMOL/L (ref 136–145)
SP GR UR STRIP.AUTO: 1.03 (ref 1–1.03)
SQUAMOUS #/AREA URNS AUTO: <5 /HPF
UROBILINOGEN UR STRIP-ACNC: 1 MG/DL
WBC # SPEC AUTO: 8.1 X10(3)/MCL (ref 4.5–11.5)
WBC #/AREA URNS AUTO: <5 /HPF

## 2022-12-29 PROCEDURE — 80053 COMPREHEN METABOLIC PANEL: CPT | Performed by: PHYSICIAN ASSISTANT

## 2022-12-29 PROCEDURE — 63600175 PHARM REV CODE 636 W HCPCS: Performed by: STUDENT IN AN ORGANIZED HEALTH CARE EDUCATION/TRAINING PROGRAM

## 2022-12-29 PROCEDURE — 81001 URINALYSIS AUTO W/SCOPE: CPT | Performed by: PHYSICIAN ASSISTANT

## 2022-12-29 PROCEDURE — 96374 THER/PROPH/DIAG INJ IV PUSH: CPT | Mod: 59

## 2022-12-29 PROCEDURE — 99285 EMERGENCY DEPT VISIT HI MDM: CPT | Mod: 25

## 2022-12-29 PROCEDURE — 25500020 PHARM REV CODE 255: Performed by: STUDENT IN AN ORGANIZED HEALTH CARE EDUCATION/TRAINING PROGRAM

## 2022-12-29 PROCEDURE — 63600175 PHARM REV CODE 636 W HCPCS: Performed by: PHYSICIAN ASSISTANT

## 2022-12-29 PROCEDURE — 25000003 PHARM REV CODE 250: Performed by: STUDENT IN AN ORGANIZED HEALTH CARE EDUCATION/TRAINING PROGRAM

## 2022-12-29 PROCEDURE — 83690 ASSAY OF LIPASE: CPT | Performed by: PHYSICIAN ASSISTANT

## 2022-12-29 PROCEDURE — 96372 THER/PROPH/DIAG INJ SC/IM: CPT | Performed by: STUDENT IN AN ORGANIZED HEALTH CARE EDUCATION/TRAINING PROGRAM

## 2022-12-29 PROCEDURE — 83735 ASSAY OF MAGNESIUM: CPT | Performed by: PHYSICIAN ASSISTANT

## 2022-12-29 PROCEDURE — 85025 COMPLETE CBC W/AUTO DIFF WBC: CPT | Performed by: PHYSICIAN ASSISTANT

## 2022-12-29 PROCEDURE — 81025 URINE PREGNANCY TEST: CPT | Performed by: PHYSICIAN ASSISTANT

## 2022-12-29 PROCEDURE — 96361 HYDRATE IV INFUSION ADD-ON: CPT

## 2022-12-29 PROCEDURE — 25000003 PHARM REV CODE 250: Performed by: PHYSICIAN ASSISTANT

## 2022-12-29 RX ORDER — ONDANSETRON 4 MG/1
4 TABLET, ORALLY DISINTEGRATING ORAL EVERY 8 HOURS PRN
Qty: 21 TABLET | Refills: 0 | Status: SHIPPED | OUTPATIENT
Start: 2022-12-29 | End: 2023-01-19

## 2022-12-29 RX ORDER — PROMETHAZINE HYDROCHLORIDE 12.5 MG/1
12.5 TABLET ORAL EVERY 12 HOURS PRN
Qty: 14 TABLET | Refills: 0 | Status: SHIPPED | OUTPATIENT
Start: 2022-12-29 | End: 2023-01-05

## 2022-12-29 RX ORDER — ONDANSETRON 2 MG/ML
4 INJECTION INTRAMUSCULAR; INTRAVENOUS
Status: COMPLETED | OUTPATIENT
Start: 2022-12-29 | End: 2022-12-29

## 2022-12-29 RX ORDER — MAG HYDROX/ALUMINUM HYD/SIMETH 200-200-20
30 SUSPENSION, ORAL (FINAL DOSE FORM) ORAL ONCE
Status: COMPLETED | OUTPATIENT
Start: 2022-12-29 | End: 2022-12-29

## 2022-12-29 RX ORDER — PROMETHAZINE HYDROCHLORIDE 25 MG/ML
25 INJECTION, SOLUTION INTRAMUSCULAR; INTRAVENOUS
Status: COMPLETED | OUTPATIENT
Start: 2022-12-29 | End: 2022-12-29

## 2022-12-29 RX ORDER — LIDOCAINE HYDROCHLORIDE 20 MG/ML
15 SOLUTION OROPHARYNGEAL ONCE
Status: COMPLETED | OUTPATIENT
Start: 2022-12-29 | End: 2022-12-29

## 2022-12-29 RX ORDER — HYDROCODONE BITARTRATE AND ACETAMINOPHEN 5; 325 MG/1; MG/1
1 TABLET ORAL EVERY 12 HOURS PRN
Qty: 14 TABLET | Refills: 0 | Status: SHIPPED | OUTPATIENT
Start: 2022-12-29 | End: 2023-01-05

## 2022-12-29 RX ADMIN — PROMETHAZINE HYDROCHLORIDE 25 MG: 25 INJECTION INTRAMUSCULAR; INTRAVENOUS at 04:12

## 2022-12-29 RX ADMIN — SODIUM CHLORIDE 1000 ML: 9 INJECTION, SOLUTION INTRAVENOUS at 12:12

## 2022-12-29 RX ADMIN — SODIUM CHLORIDE, POTASSIUM CHLORIDE, SODIUM LACTATE AND CALCIUM CHLORIDE 1000 ML: 600; 310; 30; 20 INJECTION, SOLUTION INTRAVENOUS at 04:12

## 2022-12-29 RX ADMIN — LIDOCAINE HYDROCHLORIDE 15 ML: 20 SOLUTION ORAL; TOPICAL at 05:12

## 2022-12-29 RX ADMIN — ONDANSETRON 4 MG: 2 INJECTION INTRAMUSCULAR; INTRAVENOUS at 12:12

## 2022-12-29 RX ADMIN — ALUMINUM HYDROXIDE, MAGNESIUM HYDROXIDE, AND SIMETHICONE 30 ML: 200; 200; 20 SUSPENSION ORAL at 05:12

## 2022-12-29 RX ADMIN — IOPAMIDOL 100 ML: 755 INJECTION, SOLUTION INTRAVENOUS at 03:12

## 2022-12-29 NOTE — DISCHARGE INSTRUCTIONS
Follow-up with your primary care physician.      Follow-up with Gastroenterology.      Follow-up with bariatric surgeon.      Return to the emergency department if you have any worsening symptoms, nausea, vomiting, difficulty breathing, fever, headache, or any other symptoms.

## 2022-12-29 NOTE — ED PROVIDER NOTES
Encounter Date: 12/29/2022    SCRIBE #1 NOTE: I, Angel Ram, am scribing for, and in the presence of,  Judd Pineda MD. I have scribed the entire note.     History     Chief Complaint   Patient presents with    Abdominal Pain    Diarrhea    Vomiting    Nausea     Pt presents c/o generalized abd pain, n/v/d. Onset x 2 days.  Denies fever.  PMH gastric bypass in [place] this summer.       37 year old female presents to the ED for abdominal pain. Pt had a gastric bypass done in [place] in July of 2022. Pt states she has been dealing with abdominal pain since then, but it has been manageable. Pt states over the past 24 hours she has experienced worsened abdominal pain and episodes of nausea/vomiting. Pt states she typically uses Zofran for nausea, but it has not helped her today.  Pt had a scope done that shows she has multiple gastric ulcers. Pt is currently prescribed Carafate and Protonix.     The history is provided by the patient. No  was used.   Abdominal Pain  The current episode started yesterday. The onset of the illness was abrupt. The problem has not changed since onset.The abdominal pain is located in the epigastric region. The abdominal pain does not radiate. The abdominal pain is relieved by nothing. The abdominal pain is exacerbated by vomiting. The other symptoms of the illness include nausea and vomiting.   Risk factors for an acute abdominal problem include a history of abdominal surgery.   Review of patient's allergies indicates:   Allergen Reactions    Penicillin Rash     History reviewed. No pertinent past medical history.  History reviewed. No pertinent surgical history.  History reviewed. No pertinent family history.  Social History     Tobacco Use    Smoking status: Never    Smokeless tobacco: Never   Substance Use Topics    Alcohol use: Not Currently    Drug use: Never     Review of Systems   Constitutional: Negative.    HENT: Negative.     Eyes: Negative.    Respiratory:  Negative.     Cardiovascular: Negative.    Gastrointestinal:  Positive for abdominal pain, nausea and vomiting.   Endocrine: Negative.    Genitourinary: Negative.    Musculoskeletal: Negative.    Skin: Negative.    Allergic/Immunologic: Negative.    Neurological: Negative.    Hematological: Negative.    Psychiatric/Behavioral: Negative.     All other systems reviewed and are negative.    Physical Exam     Initial Vitals   BP Pulse Resp Temp SpO2   12/29/22 1151 12/29/22 1149 12/29/22 1149 12/29/22 1149 12/29/22 1149   (!) 145/98 90 16 98.2 °F (36.8 °C) 99 %      MAP       --                Physical Exam    Nursing note and vitals reviewed.  Constitutional: She appears well-developed. She does not appear ill. No distress.   HENT:   Head: Normocephalic and atraumatic.   Mouth/Throat: Oropharynx is clear and moist.   Eyes: Conjunctivae and EOM are normal.   Neck: Neck supple.   Normal range of motion.  Cardiovascular:  Normal rate and regular rhythm.           No murmur heard.  Pulmonary/Chest: Breath sounds normal. No respiratory distress. She exhibits no tenderness.   Abdominal: Abdomen is soft. Bowel sounds are normal. She exhibits no distension. There is abdominal tenderness in the epigastric area.   Abdominal surgical incision scars C/D/I.   No right CVA tenderness.  No left CVA tenderness.   Musculoskeletal:         General: Normal range of motion.      Cervical back: Normal range of motion and neck supple.      Lumbar back: Normal. Normal range of motion.     Neurological: She is alert and oriented to person, place, and time. She has normal strength. No cranial nerve deficit or sensory deficit.   Psychiatric: She has a normal mood and affect. Her mood appears not anxious.       ED Course   Procedures  Labs Reviewed   COMPREHENSIVE METABOLIC PANEL - Abnormal; Notable for the following components:       Result Value    Glucose Level 102 (*)     Protein Total 5.8 (*)     Albumin Level 3.1 (*)     Bilirubin Total  1.8 (*)     Aspartate Aminotransferase 40 (*)     All other components within normal limits   URINALYSIS, REFLEX TO URINE CULTURE - Abnormal; Notable for the following components:    Protein, UA Trace (*)     Ketones, UA Trace (*)     Bilirubin, UA 1+ (*)     Nitrites, UA Positive (*)     Leukocyte Esterase, UA Trace (*)     All other components within normal limits   CBC WITH DIFFERENTIAL - Abnormal; Notable for the following components:    MCHC 30.9 (*)     RDW 15.0 (*)     All other components within normal limits   URINALYSIS, MICROSCOPIC - Abnormal; Notable for the following components:    Mucous, UA Large (*)     Calcium Oxalate Crystals, UA Few (*)     All other components within normal limits   MAGNESIUM - Normal   LIPASE - Normal   PREGNANCY TEST, URINE RAPID - Normal   CBC W/ AUTO DIFFERENTIAL    Narrative:     The following orders were created for panel order CBC auto differential.  Procedure                               Abnormality         Status                     ---------                               -----------         ------                     CBC with Differential[[id number]]        Abnormal            Final result                 Please view results for these tests on the individual orders.          Imaging Results              CT Abdomen Pelvis With Contrast (Final result)  Result time 12/29/22 16:07:58      Final result by Wilmer Willis MD (12/29/22 16:07:58)                   Impression:      Suspect gastritis primarily involving the bypassed distal portion of the stomach.  Otherwise no acute abdominopelvic findings.  Chronic findings above.      Electronically signed by: Wilmer Willis  Date:    12/29/2022  Time:    16:07               Narrative:    EXAMINATION:  CT ABDOMEN PELVIS WITH CONTRAST    CLINICAL HISTORY:  Abdominal pain, acute, nonlocalized;post bariatric surgery in July;    TECHNIQUE:  Helical acquisition through the abdomen and pelvis with IV contrast.  Enteric contrast also  administered three plane reconstructions were provided for review.  mGycm. Automatic exposure control, adjustment of mA/kV or iterative reconstruction technique was used to reduce radiation.    COMPARISON:  3 November 2022    FINDINGS:  The limited imaged lung bases are clear.    There is heterogeneous hepatic steatosis.  Patent portal vein.  No significant abnormality of the gallbladder, spleen, pancreas or adrenals.  No hydronephrosis or suspicious renal lesion.    No bowel obstruction.  There are changes of gastric bypass.  There are some inflammatory changes of the bypassed portion of the distal stomach with wall thickening seen on image 17 series 2.  no enteric contrast is seen within the bypassed stomach.  No significant inflammatory changes of the bowel elsewhere.  No free air.    Urinary bladder is unremarkable. No free fluid. Aorta normal in caliber.    No acute osseous findings.                                       Medications   sodium chloride 0.9% bolus 1,000 mL 1,000 mL (0 mLs Intravenous Stopped 12/29/22 1300)   ondansetron injection 4 mg (4 mg Intravenous Given 12/29/22 1200)   iopamidoL (ISOVUE-370) injection 100 mL (100 mLs Intravenous Given 12/29/22 1559)   lactated ringers bolus 1,000 mL (0 mLs Intravenous Stopped 12/29/22 1745)   promethazine injection 25 mg (25 mg Intramuscular Given 12/29/22 1645)   aluminum-magnesium hydroxide-simethicone 200-200-20 mg/5 mL suspension 30 mL (30 mLs Oral Given 12/29/22 1745)     And   LIDOcaine HCl 2% oral solution 15 mL (15 mLs Oral Given 12/29/22 1745)     Medical Decision Making:   Differential Diagnosis:   AAA, ACS, esophogeal rupture, mesenteric ischemia, intraabdominal abcess, retroperitoneal abcess, appendicitis, biliary disease, diverticulitis, gastritis, gastroenteritis, hepatitis, hernia, pancreatitis, inflammatory bowel disease, PUD, SBP, nephrolithiasis, DKA, consitpation, GERD, IBS    Clinical Tests:   Lab Tests: Ordered and  Reviewed  Radiological Study: Ordered and Reviewed  ED Management:  Patient is a 36 y/o presents for epigastric abdominal pain, had a gastric bypass done, seen previously for similar, was told would need revision.  Has not been able to make follow up appointment with surgery.  Labs/imaging obtained.  Given GI cocktail, antiemetics with improvement.  Given list of gastric surgeons to patient.  Reassessed patient.  Patient is resting comfortably.  Discussed all results.  Discussed need for follow-up.  Discussed return precautions.  Answered all questions at this time.  Hemodynamically stable for continued outpatient management with strict return precautions.  Patient verbalized understanding agreed to plan.          Scribe Attestation:   Scribe #1: I performed the above scribed service and the documentation accurately describes the services I performed. I attest to the accuracy of the note.    Attending Attestation:           Physician Attestation for Scribe:  Physician Attestation Statement for Scribe #1: I, Judd Pineda MD, reviewed documentation, as scribed by Angel Ram in my presence, and it is both accurate and complete.                        Clinical Impression:   Final diagnoses:  [K29.70] Gastritis, presence of bleeding unspecified, unspecified chronicity, unspecified gastritis type (Primary)  [R10.13] Epigastric pain  [R11.2] Nausea and vomiting, unspecified vomiting type        ED Disposition Condition    Discharge Stable          ED Prescriptions       Medication Sig Dispense Start Date End Date Auth. Provider    ondansetron (ZOFRAN-ODT) 4 MG TbDL Take 1 tablet (4 mg total) by mouth every 8 (eight) hours as needed. 21 tablet 2022 Judd Pineda MD    HYDROcodone-acetaminophen (NORCO) 5-325 mg per tablet () Take 1 tablet by mouth every 12 (twelve) hours as needed for Pain. 14 tablet 2022 Judd Pineda MD    promethazine (PHENERGAN) 12.5 MG Tab () Take 1  tablet (12.5 mg total) by mouth every 12 (twelve) hours as needed. 14 tablet 12/29/2022 1/5/2023 Judd Pineda MD          Follow-up Information       Follow up With Specialties Details Why Contact Info    Galdino Caceres MD Family Medicine   [address] [address]  [phone]      [facility]   [address] [facility] [address]  [phone]        [facility]  5.0  (1) · Bariatric surgeon  [address] [facility] [address] · [phone]  Closed · Opens 8AM Fri    Guanako Bradford Jr., MD General Surgery   [address]  [address]  [facility] [address]  [phone]      Craig Andrew MD General Surgery   [address] [facility] [address]  [phone]      John Harrington MD Surgery   [address] [facility] [address]  [phone]               Judd Pineda MD  01/06/23 0807

## 2022-12-29 NOTE — FIRST PROVIDER EVALUATION
Medical screening examination initiated.  I have conducted a focused provider triage encounter, findings are as follows:    Brief history of present illness:  37-year-old female presents to ED for evaluation of generalized abdominal pain nausea vomiting and diarrhea for the past 2 days.  Denies any fever.  History of gastric bypass in [place] in July 2022    Vitals:    12/29/22 1149   Pulse: 90   Resp: 16   Temp: 98.2 °F (36.8 °C)   TempSrc: Oral   SpO2: 99%   Weight: 59 kg (130 lb)       Pertinent physical exam:  Patient is awake and alert and oriented.  Ambulatory to triage.  In no acute distress.      Brief workup plan:  labs, UA, UPT, IVF, Zofran    Preliminary workup initiated; this workup will be continued and followed by the physician or advanced practice provider that is assigned to the patient when roomed.

## 2023-08-08 ENCOUNTER — HOSPITAL ENCOUNTER (INPATIENT)
Facility: HOSPITAL | Age: 38
LOS: 10 days | Discharge: HOME HEALTH | DRG: 377 | End: 2023-08-20
Attending: STUDENT IN AN ORGANIZED HEALTH CARE EDUCATION/TRAINING PROGRAM | Admitting: INTERNAL MEDICINE
Payer: COMMERCIAL

## 2023-08-08 DIAGNOSIS — K52.9 COLITIS: Primary | ICD-10-CM

## 2023-08-08 DIAGNOSIS — R11.2 INTRACTABLE NAUSEA AND VOMITING: ICD-10-CM

## 2023-08-08 DIAGNOSIS — R07.9 CHEST PAIN: ICD-10-CM

## 2023-08-08 LAB
ALBUMIN SERPL-MCNC: 2.2 G/DL (ref 3.5–5)
ALBUMIN/GLOB SERPL: 0.7 RATIO (ref 1.1–2)
ALP SERPL-CCNC: 87 UNIT/L (ref 40–150)
ALT SERPL-CCNC: 14 UNIT/L (ref 0–55)
APPEARANCE UR: ABNORMAL
AST SERPL-CCNC: 13 UNIT/L (ref 5–34)
BACTERIA #/AREA URNS AUTO: ABNORMAL /HPF
BASOPHILS # BLD AUTO: 0.07 X10(3)/MCL
BASOPHILS NFR BLD AUTO: 0.5 %
BILIRUB SERPL-MCNC: 1.5 MG/DL
BILIRUB UR QL STRIP.AUTO: ABNORMAL
BUN SERPL-MCNC: 13.4 MG/DL (ref 7–18.7)
CALCIUM SERPL-MCNC: 8.1 MG/DL (ref 8.4–10.2)
CAOX CRY URNS QL MICRO: ABNORMAL /HPF
CHLORIDE SERPL-SCNC: 104 MMOL/L (ref 98–107)
CO2 SERPL-SCNC: 22 MMOL/L (ref 22–29)
COLOR UR: ABNORMAL
CREAT SERPL-MCNC: 0.69 MG/DL (ref 0.55–1.02)
EOSINOPHIL # BLD AUTO: 0.04 X10(3)/MCL (ref 0–0.9)
EOSINOPHIL NFR BLD AUTO: 0.3 %
ERYTHROCYTE [DISTWIDTH] IN BLOOD BY AUTOMATED COUNT: 16.6 % (ref 11.5–17)
GFR SERPLBLD CREATININE-BSD FMLA CKD-EPI: >60 MLS/MIN/1.73/M2
GLOBULIN SER-MCNC: 3.2 GM/DL (ref 2.4–3.5)
GLUCOSE SERPL-MCNC: 102 MG/DL (ref 74–100)
GLUCOSE UR QL STRIP.AUTO: NEGATIVE
HCT VFR BLD AUTO: 33.6 % (ref 37–47)
HGB BLD-MCNC: 11.3 G/DL (ref 12–16)
IMM GRANULOCYTES # BLD AUTO: 0.12 X10(3)/MCL (ref 0–0.04)
IMM GRANULOCYTES NFR BLD AUTO: 0.9 %
KETONES UR QL STRIP.AUTO: ABNORMAL
LACTATE SERPL-SCNC: 1 MMOL/L (ref 0.5–2.2)
LACTATE SERPL-SCNC: 2.1 MMOL/L (ref 0.5–2.2)
LEUKOCYTE ESTERASE UR QL STRIP.AUTO: NEGATIVE
LIPASE SERPL-CCNC: 4 U/L
LYMPHOCYTES # BLD AUTO: 1.69 X10(3)/MCL (ref 0.6–4.6)
LYMPHOCYTES NFR BLD AUTO: 12.2 %
MAGNESIUM SERPL-MCNC: 1.8 MG/DL (ref 1.6–2.6)
MCH RBC QN AUTO: 29.4 PG (ref 27–31)
MCHC RBC AUTO-ENTMCNC: 33.6 G/DL (ref 33–36)
MCV RBC AUTO: 87.5 FL (ref 80–94)
MONOCYTES # BLD AUTO: 1.09 X10(3)/MCL (ref 0.1–1.3)
MONOCYTES NFR BLD AUTO: 7.9 %
MUCOUS THREADS URNS QL MICRO: ABNORMAL /LPF
NEUTROPHILS # BLD AUTO: 10.81 X10(3)/MCL (ref 2.1–9.2)
NEUTROPHILS NFR BLD AUTO: 78.2 %
NITRITE UR QL STRIP.AUTO: NEGATIVE
NRBC BLD AUTO-RTO: 0 %
PH UR STRIP.AUTO: 5.5 [PH]
PLATELET # BLD AUTO: 422 X10(3)/MCL (ref 130–400)
PMV BLD AUTO: 11 FL (ref 7.4–10.4)
POCT GLUCOSE: 101 MG/DL (ref 70–110)
POTASSIUM SERPL-SCNC: 3.8 MMOL/L (ref 3.5–5.1)
PROT SERPL-MCNC: 5.4 GM/DL (ref 6.4–8.3)
PROT UR QL STRIP.AUTO: NEGATIVE
RBC # BLD AUTO: 3.84 X10(6)/MCL (ref 4.2–5.4)
RBC #/AREA URNS AUTO: ABNORMAL /HPF
RBC UR QL AUTO: NEGATIVE
SODIUM SERPL-SCNC: 132 MMOL/L (ref 136–145)
SP GR UR STRIP.AUTO: >=1.04 (ref 1–1.03)
SQUAMOUS #/AREA URNS AUTO: ABNORMAL /HPF
UROBILINOGEN UR STRIP-ACNC: 1
WBC # SPEC AUTO: 13.82 X10(3)/MCL (ref 4.5–11.5)
WBC #/AREA URNS AUTO: ABNORMAL /HPF

## 2023-08-08 PROCEDURE — 99291 CRITICAL CARE FIRST HOUR: CPT

## 2023-08-08 PROCEDURE — 96375 TX/PRO/DX INJ NEW DRUG ADDON: CPT

## 2023-08-08 PROCEDURE — 80053 COMPREHEN METABOLIC PANEL: CPT | Performed by: PHYSICIAN ASSISTANT

## 2023-08-08 PROCEDURE — 85025 COMPLETE CBC W/AUTO DIFF WBC: CPT | Performed by: PHYSICIAN ASSISTANT

## 2023-08-08 PROCEDURE — G0378 HOSPITAL OBSERVATION PER HR: HCPCS

## 2023-08-08 PROCEDURE — 25000003 PHARM REV CODE 250: Performed by: PHYSICIAN ASSISTANT

## 2023-08-08 PROCEDURE — 81001 URINALYSIS AUTO W/SCOPE: CPT | Performed by: PHYSICIAN ASSISTANT

## 2023-08-08 PROCEDURE — 87040 BLOOD CULTURE FOR BACTERIA: CPT | Performed by: STUDENT IN AN ORGANIZED HEALTH CARE EDUCATION/TRAINING PROGRAM

## 2023-08-08 PROCEDURE — 96374 THER/PROPH/DIAG INJ IV PUSH: CPT

## 2023-08-08 PROCEDURE — 96376 TX/PRO/DX INJ SAME DRUG ADON: CPT

## 2023-08-08 PROCEDURE — 63600175 PHARM REV CODE 636 W HCPCS: Performed by: PHYSICIAN ASSISTANT

## 2023-08-08 PROCEDURE — 96361 HYDRATE IV INFUSION ADD-ON: CPT

## 2023-08-08 PROCEDURE — 25500020 PHARM REV CODE 255: Performed by: STUDENT IN AN ORGANIZED HEALTH CARE EDUCATION/TRAINING PROGRAM

## 2023-08-08 PROCEDURE — 63600175 PHARM REV CODE 636 W HCPCS: Performed by: STUDENT IN AN ORGANIZED HEALTH CARE EDUCATION/TRAINING PROGRAM

## 2023-08-08 PROCEDURE — 83605 ASSAY OF LACTIC ACID: CPT | Mod: 91 | Performed by: STUDENT IN AN ORGANIZED HEALTH CARE EDUCATION/TRAINING PROGRAM

## 2023-08-08 PROCEDURE — 83690 ASSAY OF LIPASE: CPT | Performed by: PHYSICIAN ASSISTANT

## 2023-08-08 PROCEDURE — 82962 GLUCOSE BLOOD TEST: CPT

## 2023-08-08 PROCEDURE — 83735 ASSAY OF MAGNESIUM: CPT | Performed by: PHYSICIAN ASSISTANT

## 2023-08-08 RX ORDER — METOCLOPRAMIDE HYDROCHLORIDE 5 MG/ML
10 INJECTION INTRAMUSCULAR; INTRAVENOUS
Status: COMPLETED | OUTPATIENT
Start: 2023-08-08 | End: 2023-08-08

## 2023-08-08 RX ORDER — CIPROFLOXACIN 2 MG/ML
400 INJECTION, SOLUTION INTRAVENOUS
Status: COMPLETED | OUTPATIENT
Start: 2023-08-08 | End: 2023-08-08

## 2023-08-08 RX ORDER — CYANOCOBALAMIN 1000 UG/ML
1000 INJECTION, SOLUTION INTRAMUSCULAR; SUBCUTANEOUS
COMMUNITY
Start: 2023-08-06

## 2023-08-08 RX ORDER — HYOSCYAMINE SULFATE 0.12 MG/1
TABLET SUBLINGUAL
COMMUNITY
Start: 2023-08-03 | End: 2023-10-26

## 2023-08-08 RX ORDER — ONDANSETRON 2 MG/ML
4 INJECTION INTRAMUSCULAR; INTRAVENOUS
Status: COMPLETED | OUTPATIENT
Start: 2023-08-08 | End: 2023-08-08

## 2023-08-08 RX ORDER — ALPRAZOLAM 1 MG/1
1 TABLET ORAL 3 TIMES DAILY
Status: ON HOLD | COMMUNITY
Start: 2023-04-28 | End: 2023-08-20 | Stop reason: SDUPTHER

## 2023-08-08 RX ORDER — PROMETHAZINE HYDROCHLORIDE 25 MG/ML
INJECTION, SOLUTION INTRAMUSCULAR; INTRAVENOUS
Status: ON HOLD | COMMUNITY
Start: 2023-08-07 | End: 2023-08-20 | Stop reason: HOSPADM

## 2023-08-08 RX ORDER — MEGESTROL ACETATE 125 MG/ML
625 SUSPENSION ORAL
Status: ON HOLD | COMMUNITY
Start: 2023-08-05 | End: 2023-10-17 | Stop reason: HOSPADM

## 2023-08-08 RX ORDER — METRONIDAZOLE 500 MG/100ML
500 INJECTION, SOLUTION INTRAVENOUS
Status: COMPLETED | OUTPATIENT
Start: 2023-08-08 | End: 2023-08-08

## 2023-08-08 RX ORDER — DICYCLOMINE HYDROCHLORIDE 10 MG/1
10 CAPSULE ORAL EVERY 6 HOURS PRN
COMMUNITY
Start: 2023-08-03 | End: 2023-10-26

## 2023-08-08 RX ORDER — OXYCODONE AND ACETAMINOPHEN 7.5; 325 MG/1; MG/1
TABLET ORAL
Status: ON HOLD | COMMUNITY
Start: 2023-08-03 | End: 2023-08-20 | Stop reason: HOSPADM

## 2023-08-08 RX ORDER — HYDROMORPHONE HYDROCHLORIDE 2 MG/ML
1 INJECTION, SOLUTION INTRAMUSCULAR; INTRAVENOUS; SUBCUTANEOUS
Status: COMPLETED | OUTPATIENT
Start: 2023-08-08 | End: 2023-08-08

## 2023-08-08 RX ORDER — MORPHINE SULFATE 4 MG/ML
4 INJECTION, SOLUTION INTRAMUSCULAR; INTRAVENOUS
Status: COMPLETED | OUTPATIENT
Start: 2023-08-08 | End: 2023-08-08

## 2023-08-08 RX ADMIN — SODIUM CHLORIDE 1000 ML: 9 INJECTION, SOLUTION INTRAVENOUS at 04:08

## 2023-08-08 RX ADMIN — IOPAMIDOL 100 ML: 755 INJECTION, SOLUTION INTRAVENOUS at 07:08

## 2023-08-08 RX ADMIN — ONDANSETRON 4 MG: 2 INJECTION INTRAMUSCULAR; INTRAVENOUS at 07:08

## 2023-08-08 RX ADMIN — ONDANSETRON 4 MG: 2 INJECTION INTRAMUSCULAR; INTRAVENOUS at 09:08

## 2023-08-08 RX ADMIN — HYDROMORPHONE HYDROCHLORIDE 1 MG: 2 INJECTION INTRAMUSCULAR; INTRAVENOUS; SUBCUTANEOUS at 05:08

## 2023-08-08 RX ADMIN — METOCLOPRAMIDE 10 MG: 5 INJECTION, SOLUTION INTRAMUSCULAR; INTRAVENOUS at 04:08

## 2023-08-08 RX ADMIN — ONDANSETRON 4 MG: 2 INJECTION INTRAMUSCULAR; INTRAVENOUS at 05:08

## 2023-08-08 RX ADMIN — CIPROFLOXACIN 400 MG: 400 INJECTION, SOLUTION INTRAVENOUS at 09:08

## 2023-08-08 RX ADMIN — MORPHINE SULFATE 4 MG: 4 INJECTION, SOLUTION INTRAMUSCULAR; INTRAVENOUS at 09:08

## 2023-08-08 RX ADMIN — METRONIDAZOLE 500 MG: 500 INJECTION, SOLUTION INTRAVENOUS at 09:08

## 2023-08-08 RX ADMIN — SODIUM CHLORIDE, POTASSIUM CHLORIDE, SODIUM LACTATE AND CALCIUM CHLORIDE 1000 ML: 600; 310; 30; 20 INJECTION, SOLUTION INTRAVENOUS at 07:08

## 2023-08-08 NOTE — ED PROVIDER NOTES
Encounter Date: 8/8/2023    SCRIBE #1 NOTE: I, Ron Hoffmann, am scribing for, and in the presence of,  Phillip Nieves MD. I have scribed the following portions of the note - Other sections scribed: HPI, ROS, PE.       History     Chief Complaint   Patient presents with    Emesis     Pt had gastric sleeve revision in febrauary. States perf bowel and migrated mesh during surgery. Pt severe weight loss, intractable vomiting. Tender abd near j tube. Seizure activity Friday and seen at [facility] and sent home. Last BM yesterday and normal. Cbg 101     36 y/o female presents to the ED with nausea and vomiting. Pt states this has been an ongoing issue for her and reports that she cannot keep solids or liquids down. She reports improvement when given fluids but states the symptoms return when she is discharged home. She is having bowel movements and is able to eat orally. Reports a 60 lb weight loss since February and generalized body aches. She notes that she had a gastric sleeve revision and hernia repair in February, followed by J tube placement after mesh migrated to her chest. She then had an appendectomy and states that she has had the symptoms since then.     The history is provided by the patient. No  was used.     Review of patient's allergies indicates:   Allergen Reactions    Penicillin Rash     History reviewed. No pertinent past medical history.  Past Surgical History:   Procedure Laterality Date    EGD, WITH CLOSED BIOPSY  8/9/2023    Procedure: EGD, WITH CLOSED BIOPSY;  Surgeon: Wilmer Rossi MD;  Location: [facility] ENDOSCOPY;  Service: Gastroenterology;;    ESOPHAGOGASTRODUODENOSCOPY N/A 8/9/2023    Procedure: EGD;  Surgeon: Wilmer Rossi MD;  Location: [facility] ENDOSCOPY;  Service: Gastroenterology;  Laterality: N/A;    HERNIA REPAIR      LAPAROSCOPIC GASTRIC BANDING       History reviewed. No pertinent family history.  Social History     Tobacco Use    Smoking status: Never     Smokeless tobacco: Never   Substance Use Topics    Alcohol use: Never    Drug use: Never     Review of Systems   Constitutional:  Positive for unexpected weight change. Negative for chills and fever.   HENT:  Negative for congestion, drooling and sore throat.    Eyes:  Negative for pain and visual disturbance.   Respiratory:  Negative for chest tightness, shortness of breath and wheezing.    Cardiovascular:  Negative for chest pain, palpitations and leg swelling.   Gastrointestinal:  Positive for nausea and vomiting. Negative for abdominal pain.   Genitourinary:  Negative for dysuria and hematuria.   Musculoskeletal:  Negative for back pain, neck pain and neck stiffness.        Body aches   Skin:  Negative for pallor and rash.   Neurological:  Negative for weakness and numbness.   Hematological:  Does not bruise/bleed easily.       Physical Exam     Initial Vitals [08/08/23 1547]   BP Pulse Resp Temp SpO2   (!) 162/117 106 16 98.2 °F (36.8 °C) (!) 93 %      MAP       --         Physical Exam    Nursing note and vitals reviewed.  Constitutional: She appears well-developed and well-nourished. She is not diaphoretic. She appears ill.   HENT:   Head: Normocephalic and atraumatic.   Nose: Nose normal.   Mouth/Throat: Oropharynx is clear and moist. Mucous membranes are dry.   Eyes: EOM are normal. Pupils are equal, round, and reactive to light.   Neck: Neck supple.   Normal range of motion.  Cardiovascular:  Regular rhythm.   Tachycardia present.         No murmur heard.  Pulmonary/Chest: Breath sounds normal. No respiratory distress. She has no wheezes. She has no rales.   Abdominal: Abdomen is soft. She exhibits no distension. There is no abdominal tenderness.   Multiple abdominal incisions that are well-healed  Gastrostomy tube to LUQ with no bleeding or drainage   Musculoskeletal:      Cervical back: Normal range of motion and neck supple.     Neurological: She is alert and oriented to person, place, and time. She has  normal strength. No cranial nerve deficit or sensory deficit.   Skin: Skin is warm. Capillary refill takes less than 2 seconds. No rash noted. There is pallor.         ED Course   Critical Care    Date/Time: 8/8/2023 7:00 PM    Performed by: Phillip Nieves MD  Authorized by: Phillip Nieves MD  Direct patient critical care time: 35 minutes  Total critical care time (exclusive of procedural time) : 35 minutes  Critical care time was exclusive of separately billable procedures and treating other patients.  Critical care was necessary to treat or prevent imminent or life-threatening deterioration of the following conditions: sepsis.  Critical care was time spent personally by me on the following activities: blood draw for specimens, development of treatment plan with patient or surrogate, discussions with consultants, examination of patient, evaluation of patient's response to treatment, ordering and performing treatments and interventions, pulse oximetry, re-evaluation of patient's condition, review of old charts, obtaining history from patient or surrogate, ordering and review of laboratory studies and ordering and review of radiographic studies.        Labs Reviewed   COMPREHENSIVE METABOLIC PANEL - Abnormal; Notable for the following components:       Result Value    Sodium Level 132 (*)     Glucose Level 102 (*)     Calcium Level Total 8.1 (*)     Protein Total 5.4 (*)     Albumin Level 2.2 (*)     Albumin/Globulin Ratio 0.7 (*)     All other components within normal limits   URINALYSIS, REFLEX TO URINE CULTURE - Abnormal; Notable for the following components:    Appearance, UA Cloudy (*)     Specific Gravity, UA >=1.040 (*)     Ketones, UA 1+ (*)     Bilirubin, UA 1+ (*)     All other components within normal limits   CBC WITH DIFFERENTIAL - Abnormal; Notable for the following components:    WBC 13.82 (*)     RBC 3.84 (*)     Hgb 11.3 (*)     Hct 33.6 (*)     Platelet 422 (*)     MPV 11.0 (*)      Neut # 10.81 (*)     IG# 0.12 (*)     All other components within normal limits   URINALYSIS, MICROSCOPIC - Abnormal; Notable for the following components:    Mucous, UA Moderate (*)     Calcium Oxalate Crystals, UA Moderate (*)     All other components within normal limits   MAGNESIUM - Normal   LIPASE - Normal   LACTIC ACID, PLASMA - Normal   LACTIC ACID, PLASMA - Normal   CBC W/ AUTO DIFFERENTIAL    Narrative:     The following orders were created for panel order CBC auto differential.  Procedure                               Abnormality         Status                     ---------                               -----------         ------                     CBC with Differential[[id number]]        Abnormal            Final result                 Please view results for these tests on the individual orders.   POCT GLUCOSE          Imaging Results              US Pelvis Comp with Transvag NON-OB (xpd (Final result)  Result time 08/09/23 09:03:16   Procedure changed from US Pelvis Complete Non OB     Final result by Thiago Glynn MD (08/09/23 09:03:16)                   Impression:      Large cyst with echogenic material identified in the right ovary with measurements as above.    Status post partial hysterectomy.    No other abnormalities      Electronically signed by: Thiago Gylnn  Date:    08/09/2023  Time:    09:03               Narrative:    EXAMINATION:  US PELVIS COMP WITH TRANSVAG NON-OB (XPD)    CLINICAL HISTORY:  bilateral adnexal cysts, abd pain;    TECHNIQUE:  Transabdominal sonography of the pelvis was performed, followed by transvaginal sonography to better evaluate the uterus and ovaries.    COMPARISON:  None.    FINDINGS:  Uterus:    Patient is status post partial hysterectomy    Right ovary:    Size: 4.5 x 3.6 x 4.9 cm    Appearance: Cyst with echogenic material measures 5.1 by 5 4.6 x 5.1    Vascular flow: Normal.    Left ovary:    Size: 4 x 2.7 x 2.4 cm    Appearance: Normal    Vascular  Flow: Normal.    Free Fluid:    None.                                       CT Abdomen Pelvis W Wo Contrast (Final result)  Result time 08/08/23 20:24:34      Final result by Edgardo Tyler MD (08/08/23 20:24:34)                   Impression:      1. Hiatal hernia and operative changes.    2. Cecum and ascending colon to the hepatic flexure is remarkable for irregular mural thickening which suggest nonspecific colitis.  Please correlate clinically.  Attention to follow-up exams    3. Bilateral adnexa/ovarian cystic structures which is large on the right..      Electronically signed by: Edgardo Tyler  Date:    08/08/2023  Time:    20:24               Narrative:    EXAMINATION:  CT ABDOMEN PELVIS W WO CONTRAST    CLINICAL HISTORY:  Nausea/vomiting;Bowel obstruction suspected;Abdominal pain, acute, nonlocalized;Hx gastric sleeve with revision gastrectomy;    TECHNIQUE:  Multidetector axial images were obtained of the abdomen and pelvis before following the administration of IV contrast. Oral contrast was not administered.    Dose length product of 815 mGycm. Automated exposure control was utilized to minimize radiation dose.    COMPARISON:  None available    FINDINGS:  Included portion of the lungs are without suspicious nodularity, acute air space infiltrates or fluid within the pleural spaces.    Liver is remarkable steatosis without focal space occupying lesion.  There is no intraperitoneal free fluid indicative of ascites.  Gallbladder wall is not thickened and there is no intra luminal calcified calculus. No biliary dilation identified. Pancreatic unremarkable attenuation without acute peripancreatic phlegmons. Main pancreatic duct is not dilated. Spleen is of normal size without focal lesion.    The adrenal glands appear within normal limits. The kidneys are unremarkable in size and contour. No solid or cystic renal lesion identified. There is no nephrolithiasis hydronephrosis.  No perinephric fluid strandings  or collections identified.  Retroperitoneal periaortic lymph nodes are not enlarged in size.  Abdominal aorta is without aneurysmal dilatation or dissection.    There are gastric operative changes and hiatal hernia.  Stomach is decompressed.  There is percutaneous placed enteric catheter.  No significant dilatation of loops of small bowel with left mid abdomen operative changes with sutures.  Appendix is not visualized.  There is irregular mural thickening of the cecum and the ascending colon to the level of the hepatic flexure best seen on image 40 series 7.  The distal colon is decompressed and not adequately assessed.  Moderate fecal content expanding the rectosigmoid.  There is no suggestion of bowel obstruction.  No free air or free fluid.    There is right adnexal large cystic structure which measures 5.8 x 4.8 cm on image 110 series 3.  There is also small left adnexa cystic structure on image 116 series 3.  No pelvic free fluid.  Uterus is not visualized.  Urinary bladder appears within normal limits.    No acute or otherwise osseous abnormality identified.                                       Medications   sodium chloride 0.9% flush 10 mL (has no administration in time range)   ondansetron injection 4 mg (4 mg Intravenous Given 8/11/23 1546)   prochlorperazine injection Soln 5 mg (5 mg Intravenous Given 8/10/23 2101)   acetaminophen tablet 650 mg (0 mg Oral Return to Cabinet 8/9/23 0956)   acetaminophen tablet 1,000 mg (1,000 mg Oral Given 8/12/23 0238)   naloxone 0.4 mg/mL injection 0.02 mg (has no administration in time range)   hydrALAZINE injection 10 mg (10 mg Intravenous Given 8/9/23 2039)   HYDROcodone-acetaminophen  mg per tablet 1 tablet (1 tablet Oral Given 8/10/23 1111)   pantoprazole 40 mg in  mL infusion (ready to mix) (8 mg/hr Intravenous New Bag 8/12/23 0332)   dicyclomine injection 10 mg (10 mg Intramuscular Given 8/11/23 9571)   0.9%  NaCl infusion (for blood administration) (  Intravenous New Bag 8/10/23 2314)   morphine injection 2 mg (2 mg Intravenous Given 8/11/23 1546)   ALPRAZolam tablet 1 mg (1 mg Oral Given 8/11/23 2047)   sodium chloride 0.9% bolus 1,000 mL 1,000 mL (0 mLs Intravenous Stopped 8/8/23 1709)   metoclopramide HCl injection 10 mg (10 mg Intravenous Given 8/8/23 1608)   HYDROmorphone (PF) injection 1 mg (1 mg Intravenous Given 8/8/23 1712)   ondansetron injection 4 mg (4 mg Intravenous Given 8/8/23 1712)   lactated ringers bolus 1,000 mL (0 mLs Intravenous Stopped 8/8/23 2047)   ondansetron injection 4 mg (4 mg Intravenous Given 8/8/23 1947)   iopamidoL (ISOVUE-370) injection 100 mL (100 mLs Intravenous Given 8/8/23 1958)   ciprofloxacin (CIPRO)400mg/200ml D5W IVPB 400 mg (0 mg Intravenous Stopped 8/8/23 2213)   metronidazole IVPB 500 mg (0 mg Intravenous Stopped 8/8/23 2212)   ondansetron injection 4 mg (4 mg Intravenous Given 8/8/23 2113)   morphine injection 4 mg (4 mg Intravenous Given 8/8/23 2113)   pantoprazole injection 80 mg (80 mg Intravenous Given by Other 8/9/23 1848)   furosemide injection 20 mg (20 mg Intravenous Given 8/10/23 1854)   sodium,potassium,mag sulfates 17.5-3.13-1.6 gram SolR 354 mL (354 mLs Oral Given 8/10/23 1904)   propofol (DIPRIVAN) 10 mg/mL IVP injection (  Override pull for Anesthesia 8/11/23 0915)   LIDOcaine (PF) 20 mg/mL (2%) 20 mg/mL (2 %) injection (  Override pull for Anesthesia 8/11/23 0915)   propofol (DIPRIVAN) 10 mg/mL IVP injection (  Override pull for Anesthesia 8/11/23 0930)              Scribe Attestation:   Scribe #1: I performed the above scribed service and the documentation accurately describes the services I performed. I attest to the accuracy of the note.    Attending Attestation:           Physician Attestation for Scribe:  Physician Attestation Statement for Scribe #1: I, Phillip Nieves MD, reviewed documentation, as scribed by Ron Hoffmann in my presence, and it is both accurate and complete.         Medical  "Decision Making  Amount and/or Complexity of Data Reviewed  Labs: ordered.  Radiology: ordered.    Risk  Prescription drug management.        Differential diagnosis (includes but is not limited to):   SBO, ileus, colitis, gastroenteritis, dehydration, kidney injury, protein calorie malnutrition, electrolyte abnormalities    MDM Narrative  37-year-old female presents for intractable nausea and vomiting at home with severe weight loss and persistent pain.  Labs are pending.  IV fluid resuscitation ordered.  Pain and nausea control as needed.  Blood cultures and lactic acid pending.  CT abdomen and pelvis pending.  30 cc/kilos IV fluid bolus given.    Update:  Labs reviewed.  Leukocytosis noted.  CT with evidence of colitis.  Continue antibiotic therapy.  Patient continues to be unable to tolerate oral intake.  Will proceed with hospital admission for further evaluation and management of her symptoms.  Patient agrees with plan for admission.  Hospital Medicine consulted and has accepted the patient.    Dispo:  Admit    My independent radiology interpretation:  As above  Point of care US (independently performed and interpreted):   Decision rules/clinical scoring:     Sepsis Perfusion Assessment: "I attest a sepsis perfusion exam was performed within 6 hours of sepsis, severe sepsis, or septic shock presentation, following fluid resuscitation."    Amount and/or Complexity of Data Reviewed  Independent historian: friend    Summary of history:  History corroborated by family friend who is present at the bedside  External data reviewed: notes from previous admissions, notes from previous ED visits, and notes from clinic visits  Summary of data reviewed:  Prior notes reviewed in the electronic medical record  Risk and benefits of testing: discussed   Labs: ordered and reviewed  Radiology: ordered and independent interpretation performed (see above or ED course)  ECG/medicine tests: ordered and independent interpretation " performed (see above or ED course)  Discussion of management or test interpretation with external provider(s): discussed with hospitalist physician   Summary of discussion:  As above    Risk  Parenteral controlled substances   Drug therapy requiring intense monitoring for toxicity   Decision regarding hospitalization  Shared decision making     Critical Care  30-74 minutes     Data Reviewed/Counseling: I have personally reviewed the patient's vital signs, nursing notes, and other relevant tests, information, and imaging. I had a detailed discussion regarding the historical points, exam findings, and any diagnostic results supporting the discharge diagnosis. I personally performed the history, PE, MDM and procedures as documented above and agree with the scribe's documentation.    Portions of this note were dictated using voice recognition software. Although it was reviewed for accuracy, some inherent voice recognition errors may have occurred and may be present in this document.                    Clinical Impression:   Final diagnoses:  [K52.9] Colitis (Primary)  [R11.2] Intractable nausea and vomiting        ED Disposition Condition    Observation                 Pihllip Nieves MD  08/12/23 1827

## 2023-08-08 NOTE — FIRST PROVIDER EVALUATION
"Medical screening examination initiated.  I have conducted a focused provider triage encounter, findings are as follows:    Chief Complaint   Patient presents with    Emesis     Pt had gastric sleeve revision in febrauary. States perf bowel and migrated mesh during surgery. Pt severe weight loss, intractable vomiting. Tender abd near j tube. Seizure activity Friday and seen at [facility] and sent home. Last BM yesterday and normal. Cbg 101     Brief history of present illness:  37 y.o. female presents to the ED with n/v and abdominal pain near J-tube worsening since procedure in February. Seen on Friday at [facility] for seizure; has not had one since. Hx of gastric bypass revision as well as hernia repair with revision.     Vitals:    08/08/23 1547 08/08/23 1549   BP: (!) 162/117    Pulse: 106    Resp: 16    Temp: 98.2 °F (36.8 °C)    SpO2: (!) 93%    Weight: 40.8 kg (90 lb) 43.8 kg (96 lb 9 oz)   Height: 5' 4" (1.626 m)      Pertinent physical exam:  Awake, alert, ambulatory, non-labored respirations, pallor    Brief workup plan:  labs, UA    Preliminary workup initiated; this workup will be continued and followed by the physician or advanced practice provider that is assigned to the patient when roomed.  "

## 2023-08-09 ENCOUNTER — ANESTHESIA (OUTPATIENT)
Dept: ENDOSCOPY | Facility: HOSPITAL | Age: 38
DRG: 377 | End: 2023-08-09
Payer: COMMERCIAL

## 2023-08-09 ENCOUNTER — ANESTHESIA EVENT (OUTPATIENT)
Dept: ENDOSCOPY | Facility: HOSPITAL | Age: 38
DRG: 377 | End: 2023-08-09
Payer: COMMERCIAL

## 2023-08-09 LAB
ALBUMIN SERPL-MCNC: 1.9 G/DL (ref 3.5–5)
ALBUMIN/GLOB SERPL: 0.7 RATIO (ref 1.1–2)
ALP SERPL-CCNC: 71 UNIT/L (ref 40–150)
ALT SERPL-CCNC: 11 UNIT/L (ref 0–55)
ANION GAP SERPL CALC-SCNC: 6 MEQ/L
AST SERPL-CCNC: 11 UNIT/L (ref 5–34)
BASOPHILS # BLD AUTO: 0.05 X10(3)/MCL
BASOPHILS NFR BLD AUTO: 0.3 %
BILIRUB SERPL-MCNC: 1.1 MG/DL
BUN SERPL-MCNC: 10.6 MG/DL (ref 7–18.7)
BUN SERPL-MCNC: 6.7 MG/DL (ref 7–18.7)
CALCIUM SERPL-MCNC: 7.6 MG/DL (ref 8.4–10.2)
CALCIUM SERPL-MCNC: 7.7 MG/DL (ref 8.4–10.2)
CHLORIDE SERPL-SCNC: 103 MMOL/L (ref 98–107)
CHLORIDE SERPL-SCNC: 103 MMOL/L (ref 98–107)
CO2 SERPL-SCNC: 19 MMOL/L (ref 22–29)
CO2 SERPL-SCNC: 21 MMOL/L (ref 22–29)
CREAT SERPL-MCNC: 0.56 MG/DL (ref 0.55–1.02)
CREAT SERPL-MCNC: 0.63 MG/DL (ref 0.55–1.02)
CREAT/UREA NIT SERPL: 12
EOSINOPHIL # BLD AUTO: 0.08 X10(3)/MCL (ref 0–0.9)
EOSINOPHIL NFR BLD AUTO: 0.6 %
ERYTHROCYTE [DISTWIDTH] IN BLOOD BY AUTOMATED COUNT: 17.2 % (ref 11.5–17)
GFR SERPLBLD CREATININE-BSD FMLA CKD-EPI: >60 MLS/MIN/1.73/M2
GFR SERPLBLD CREATININE-BSD FMLA CKD-EPI: >60 MLS/MIN/1.73/M2
GLOBULIN SER-MCNC: 2.6 GM/DL (ref 2.4–3.5)
GLUCOSE SERPL-MCNC: 78 MG/DL (ref 74–100)
GLUCOSE SERPL-MCNC: 99 MG/DL (ref 74–100)
HCT VFR BLD AUTO: 31.1 % (ref 37–47)
HGB BLD-MCNC: 10.1 G/DL (ref 12–16)
IMM GRANULOCYTES # BLD AUTO: 0.14 X10(3)/MCL (ref 0–0.04)
IMM GRANULOCYTES NFR BLD AUTO: 1 %
LYMPHOCYTES # BLD AUTO: 3.71 X10(3)/MCL (ref 0.6–4.6)
LYMPHOCYTES NFR BLD AUTO: 25.8 %
MCH RBC QN AUTO: 29.1 PG (ref 27–31)
MCHC RBC AUTO-ENTMCNC: 32.5 G/DL (ref 33–36)
MCV RBC AUTO: 89.6 FL (ref 80–94)
MONOCYTES # BLD AUTO: 1.81 X10(3)/MCL (ref 0.1–1.3)
MONOCYTES NFR BLD AUTO: 12.6 %
NEUTROPHILS # BLD AUTO: 8.59 X10(3)/MCL (ref 2.1–9.2)
NEUTROPHILS NFR BLD AUTO: 59.7 %
NRBC BLD AUTO-RTO: 0 %
OSMOLALITY SERPL: 275 MOSM/KG (ref 280–300)
PLATELET # BLD AUTO: 412 X10(3)/MCL (ref 130–400)
PMV BLD AUTO: 11.5 FL (ref 7.4–10.4)
POTASSIUM SERPL-SCNC: 3.8 MMOL/L (ref 3.5–5.1)
POTASSIUM SERPL-SCNC: 4.2 MMOL/L (ref 3.5–5.1)
PROT SERPL-MCNC: 4.5 GM/DL (ref 6.4–8.3)
RBC # BLD AUTO: 3.47 X10(6)/MCL (ref 4.2–5.4)
SODIUM SERPL-SCNC: 130 MMOL/L (ref 136–145)
SODIUM SERPL-SCNC: 130 MMOL/L (ref 136–145)
WBC # SPEC AUTO: 14.38 X10(3)/MCL (ref 4.5–11.5)

## 2023-08-09 PROCEDURE — G0378 HOSPITAL OBSERVATION PER HR: HCPCS

## 2023-08-09 PROCEDURE — 25000003 PHARM REV CODE 250: Performed by: INTERNAL MEDICINE

## 2023-08-09 PROCEDURE — 83930 ASSAY OF BLOOD OSMOLALITY: CPT | Performed by: INTERNAL MEDICINE

## 2023-08-09 PROCEDURE — D9220A PRA ANESTHESIA: Mod: ANES,,, | Performed by: ANESTHESIOLOGY

## 2023-08-09 PROCEDURE — 85025 COMPLETE CBC W/AUTO DIFF WBC: CPT | Performed by: NURSE PRACTITIONER

## 2023-08-09 PROCEDURE — 63600175 PHARM REV CODE 636 W HCPCS: Performed by: PHYSICIAN ASSISTANT

## 2023-08-09 PROCEDURE — 27201423 OPTIME MED/SURG SUP & DEVICES STERILE SUPPLY: Performed by: STUDENT IN AN ORGANIZED HEALTH CARE EDUCATION/TRAINING PROGRAM

## 2023-08-09 PROCEDURE — 37000009 HC ANESTHESIA EA ADD 15 MINS: Performed by: STUDENT IN AN ORGANIZED HEALTH CARE EDUCATION/TRAINING PROGRAM

## 2023-08-09 PROCEDURE — 63600175 PHARM REV CODE 636 W HCPCS: Performed by: REGISTERED NURSE

## 2023-08-09 PROCEDURE — 37000008 HC ANESTHESIA 1ST 15 MINUTES: Performed by: STUDENT IN AN ORGANIZED HEALTH CARE EDUCATION/TRAINING PROGRAM

## 2023-08-09 PROCEDURE — D9220A PRA ANESTHESIA: Mod: CRNA,,, | Performed by: REGISTERED NURSE

## 2023-08-09 PROCEDURE — D9220A PRA ANESTHESIA: ICD-10-PCS | Mod: CRNA,,, | Performed by: REGISTERED NURSE

## 2023-08-09 PROCEDURE — 25000003 PHARM REV CODE 250: Performed by: ANESTHESIOLOGY

## 2023-08-09 PROCEDURE — 80053 COMPREHEN METABOLIC PANEL: CPT | Performed by: NURSE PRACTITIONER

## 2023-08-09 PROCEDURE — C9113 INJ PANTOPRAZOLE SODIUM, VIA: HCPCS | Performed by: PHYSICIAN ASSISTANT

## 2023-08-09 PROCEDURE — 25000003 PHARM REV CODE 250: Performed by: REGISTERED NURSE

## 2023-08-09 PROCEDURE — 25000003 PHARM REV CODE 250: Performed by: PHYSICIAN ASSISTANT

## 2023-08-09 PROCEDURE — 43239 EGD BIOPSY SINGLE/MULTIPLE: CPT | Performed by: STUDENT IN AN ORGANIZED HEALTH CARE EDUCATION/TRAINING PROGRAM

## 2023-08-09 PROCEDURE — 63600175 PHARM REV CODE 636 W HCPCS: Performed by: NURSE PRACTITIONER

## 2023-08-09 PROCEDURE — D9220A PRA ANESTHESIA: ICD-10-PCS | Mod: ANES,,, | Performed by: ANESTHESIOLOGY

## 2023-08-09 RX ORDER — AMLODIPINE BESYLATE 2.5 MG/1
2.5 TABLET ORAL DAILY
Status: DISCONTINUED | OUTPATIENT
Start: 2023-08-09 | End: 2023-08-10

## 2023-08-09 RX ORDER — HYDRALAZINE HYDROCHLORIDE 20 MG/ML
10 INJECTION INTRAMUSCULAR; INTRAVENOUS
Status: DISCONTINUED | OUTPATIENT
Start: 2023-08-09 | End: 2023-08-20 | Stop reason: HOSPADM

## 2023-08-09 RX ORDER — SODIUM CHLORIDE, SODIUM GLUCONATE, SODIUM ACETATE, POTASSIUM CHLORIDE AND MAGNESIUM CHLORIDE 30; 37; 368; 526; 502 MG/100ML; MG/100ML; MG/100ML; MG/100ML; MG/100ML
INJECTION, SOLUTION INTRAVENOUS CONTINUOUS
Status: DISCONTINUED | OUTPATIENT
Start: 2023-08-09 | End: 2023-08-11

## 2023-08-09 RX ORDER — FENTANYL CITRATE 50 UG/ML
INJECTION, SOLUTION INTRAMUSCULAR; INTRAVENOUS
Status: DISCONTINUED | OUTPATIENT
Start: 2023-08-09 | End: 2023-08-09

## 2023-08-09 RX ORDER — SODIUM CHLORIDE, SODIUM GLUCONATE, SODIUM ACETATE, POTASSIUM CHLORIDE AND MAGNESIUM CHLORIDE 30; 37; 368; 526; 502 MG/100ML; MG/100ML; MG/100ML; MG/100ML; MG/100ML
INJECTION, SOLUTION INTRAVENOUS CONTINUOUS
Status: CANCELLED | OUTPATIENT
Start: 2023-08-09 | End: 2023-09-08

## 2023-08-09 RX ORDER — NALOXONE HCL 0.4 MG/ML
0.02 VIAL (ML) INJECTION
Status: DISCONTINUED | OUTPATIENT
Start: 2023-08-09 | End: 2023-08-20 | Stop reason: HOSPADM

## 2023-08-09 RX ORDER — ACETAMINOPHEN 500 MG
1000 TABLET ORAL EVERY 6 HOURS PRN
Status: DISCONTINUED | OUTPATIENT
Start: 2023-08-09 | End: 2023-08-20 | Stop reason: HOSPADM

## 2023-08-09 RX ORDER — PANTOPRAZOLE SODIUM 40 MG/10ML
80 INJECTION, POWDER, LYOPHILIZED, FOR SOLUTION INTRAVENOUS ONCE
Status: COMPLETED | OUTPATIENT
Start: 2023-08-09 | End: 2023-08-09

## 2023-08-09 RX ORDER — SODIUM CHLORIDE 0.9 % (FLUSH) 0.9 %
10 SYRINGE (ML) INJECTION
Status: DISCONTINUED | OUTPATIENT
Start: 2023-08-09 | End: 2023-08-20 | Stop reason: HOSPADM

## 2023-08-09 RX ORDER — SODIUM CHLORIDE, SODIUM LACTATE, POTASSIUM CHLORIDE, CALCIUM CHLORIDE 600; 310; 30; 20 MG/100ML; MG/100ML; MG/100ML; MG/100ML
INJECTION, SOLUTION INTRAVENOUS CONTINUOUS
Status: CANCELLED | OUTPATIENT
Start: 2023-08-09

## 2023-08-09 RX ORDER — ONDANSETRON 2 MG/ML
4 INJECTION INTRAMUSCULAR; INTRAVENOUS DAILY PRN
Status: CANCELLED | OUTPATIENT
Start: 2023-08-09

## 2023-08-09 RX ORDER — PROCHLORPERAZINE EDISYLATE 5 MG/ML
5 INJECTION INTRAMUSCULAR; INTRAVENOUS EVERY 6 HOURS PRN
Status: DISCONTINUED | OUTPATIENT
Start: 2023-08-09 | End: 2023-08-20 | Stop reason: HOSPADM

## 2023-08-09 RX ORDER — ACETAMINOPHEN 325 MG/1
650 TABLET ORAL EVERY 4 HOURS PRN
Status: DISCONTINUED | OUTPATIENT
Start: 2023-08-09 | End: 2023-08-20 | Stop reason: HOSPADM

## 2023-08-09 RX ORDER — LIDOCAINE HYDROCHLORIDE 20 MG/ML
INJECTION, SOLUTION EPIDURAL; INFILTRATION; INTRACAUDAL; PERINEURAL
Status: DISCONTINUED | OUTPATIENT
Start: 2023-08-09 | End: 2023-08-09

## 2023-08-09 RX ORDER — ONDANSETRON HYDROCHLORIDE 2 MG/ML
INJECTION, SOLUTION INTRAMUSCULAR; INTRAVENOUS
Status: DISCONTINUED | OUTPATIENT
Start: 2023-08-09 | End: 2023-08-09

## 2023-08-09 RX ORDER — HYDROCODONE BITARTRATE AND ACETAMINOPHEN 10; 325 MG/1; MG/1
1 TABLET ORAL EVERY 6 HOURS PRN
Status: DISCONTINUED | OUTPATIENT
Start: 2023-08-09 | End: 2023-08-20 | Stop reason: HOSPADM

## 2023-08-09 RX ORDER — PROPOFOL 10 MG/ML
VIAL (ML) INTRAVENOUS
Status: DISCONTINUED | OUTPATIENT
Start: 2023-08-09 | End: 2023-08-09

## 2023-08-09 RX ORDER — ROCURONIUM BROMIDE 10 MG/ML
INJECTION, SOLUTION INTRAVENOUS
Status: DISCONTINUED | OUTPATIENT
Start: 2023-08-09 | End: 2023-08-09

## 2023-08-09 RX ORDER — METRONIDAZOLE 500 MG/100ML
500 INJECTION, SOLUTION INTRAVENOUS
Status: DISCONTINUED | OUTPATIENT
Start: 2023-08-09 | End: 2023-08-09

## 2023-08-09 RX ORDER — ALPRAZOLAM 0.25 MG/1
0.25 TABLET ORAL 2 TIMES DAILY PRN
Status: DISCONTINUED | OUTPATIENT
Start: 2023-08-09 | End: 2023-08-10

## 2023-08-09 RX ORDER — DEXAMETHASONE SODIUM PHOSPHATE 4 MG/ML
INJECTION, SOLUTION INTRA-ARTICULAR; INTRALESIONAL; INTRAMUSCULAR; INTRAVENOUS; SOFT TISSUE
Status: DISCONTINUED | OUTPATIENT
Start: 2023-08-09 | End: 2023-08-09

## 2023-08-09 RX ORDER — ONDANSETRON 2 MG/ML
4 INJECTION INTRAMUSCULAR; INTRAVENOUS EVERY 4 HOURS PRN
Status: DISCONTINUED | OUTPATIENT
Start: 2023-08-09 | End: 2023-08-19

## 2023-08-09 RX ORDER — CIPROFLOXACIN 2 MG/ML
400 INJECTION, SOLUTION INTRAVENOUS
Status: DISCONTINUED | OUTPATIENT
Start: 2023-08-09 | End: 2023-08-09

## 2023-08-09 RX ORDER — SODIUM CHLORIDE, SODIUM LACTATE, POTASSIUM CHLORIDE, CALCIUM CHLORIDE 600; 310; 30; 20 MG/100ML; MG/100ML; MG/100ML; MG/100ML
INJECTION, SOLUTION INTRAVENOUS CONTINUOUS
Status: DISCONTINUED | OUTPATIENT
Start: 2023-08-09 | End: 2023-08-09

## 2023-08-09 RX ORDER — SUCCINYLCHOLINE CHLORIDE 20 MG/ML
INJECTION INTRAMUSCULAR; INTRAVENOUS
Status: DISCONTINUED | OUTPATIENT
Start: 2023-08-09 | End: 2023-08-09

## 2023-08-09 RX ORDER — MORPHINE SULFATE 4 MG/ML
2 INJECTION, SOLUTION INTRAMUSCULAR; INTRAVENOUS EVERY 4 HOURS PRN
Status: DISCONTINUED | OUTPATIENT
Start: 2023-08-09 | End: 2023-08-10

## 2023-08-09 RX ADMIN — AMLODIPINE BESYLATE 2.5 MG: 2.5 TABLET ORAL at 06:08

## 2023-08-09 RX ADMIN — MORPHINE SULFATE 2 MG: 4 INJECTION INTRAVENOUS at 08:08

## 2023-08-09 RX ADMIN — FENTANYL CITRATE 25 MCG: 50 INJECTION, SOLUTION INTRAMUSCULAR; INTRAVENOUS at 12:08

## 2023-08-09 RX ADMIN — PROPOFOL 50 MG: 10 INJECTION, EMULSION INTRAVENOUS at 11:08

## 2023-08-09 RX ADMIN — PANTOPRAZOLE SODIUM 80 MG: 40 INJECTION, POWDER, LYOPHILIZED, FOR SOLUTION INTRAVENOUS at 06:08

## 2023-08-09 RX ADMIN — PROPOFOL 50 MG: 10 INJECTION, EMULSION INTRAVENOUS at 12:08

## 2023-08-09 RX ADMIN — ONDANSETRON 4 MG: 2 INJECTION INTRAMUSCULAR; INTRAVENOUS at 08:08

## 2023-08-09 RX ADMIN — PANTOPRAZOLE SODIUM 8 MG/HR: 40 INJECTION, POWDER, FOR SOLUTION INTRAVENOUS at 06:08

## 2023-08-09 RX ADMIN — ONDANSETRON 4 MG: 2 INJECTION INTRAMUSCULAR; INTRAVENOUS at 01:08

## 2023-08-09 RX ADMIN — SODIUM CHLORIDE, SODIUM GLUCONATE, SODIUM ACETATE, POTASSIUM CHLORIDE AND MAGNESIUM CHLORIDE 450 ML: 526; 502; 368; 37; 30 INJECTION, SOLUTION INTRAVENOUS at 12:08

## 2023-08-09 RX ADMIN — ONDANSETRON 4 MG: 2 INJECTION INTRAMUSCULAR; INTRAVENOUS at 03:08

## 2023-08-09 RX ADMIN — ROCURONIUM BROMIDE 10 MG: 10 SOLUTION INTRAVENOUS at 11:08

## 2023-08-09 RX ADMIN — FENTANYL CITRATE 50 MCG: 50 INJECTION, SOLUTION INTRAMUSCULAR; INTRAVENOUS at 11:08

## 2023-08-09 RX ADMIN — SODIUM CHLORIDE, POTASSIUM CHLORIDE, SODIUM LACTATE AND CALCIUM CHLORIDE: 600; 310; 30; 20 INJECTION, SOLUTION INTRAVENOUS at 01:08

## 2023-08-09 RX ADMIN — LIDOCAINE HYDROCHLORIDE 100 MG: 20 INJECTION, SOLUTION INTRAVENOUS at 11:08

## 2023-08-09 RX ADMIN — DEXAMETHASONE SODIUM PHOSPHATE 4 MG: 4 INJECTION, SOLUTION INTRA-ARTICULAR; INTRALESIONAL; INTRAMUSCULAR; INTRAVENOUS; SOFT TISSUE at 11:08

## 2023-08-09 RX ADMIN — MORPHINE SULFATE 2 MG: 4 INJECTION INTRAVENOUS at 01:08

## 2023-08-09 RX ADMIN — PROCHLORPERAZINE EDISYLATE 5 MG: 5 INJECTION INTRAMUSCULAR; INTRAVENOUS at 05:08

## 2023-08-09 RX ADMIN — ALPRAZOLAM 0.25 MG: 0.25 TABLET ORAL at 08:08

## 2023-08-09 RX ADMIN — PROPOFOL 20 MG: 10 INJECTION, EMULSION INTRAVENOUS at 12:08

## 2023-08-09 RX ADMIN — PROPOFOL 100 MG: 10 INJECTION, EMULSION INTRAVENOUS at 11:08

## 2023-08-09 RX ADMIN — HYDRALAZINE HYDROCHLORIDE 10 MG: 20 INJECTION INTRAMUSCULAR; INTRAVENOUS at 08:08

## 2023-08-09 RX ADMIN — HYDRALAZINE HYDROCHLORIDE 10 MG: 20 INJECTION INTRAMUSCULAR; INTRAVENOUS at 01:08

## 2023-08-09 RX ADMIN — ONDANSETRON 4 MG: 2 INJECTION INTRAMUSCULAR; INTRAVENOUS at 11:08

## 2023-08-09 RX ADMIN — MORPHINE SULFATE 2 MG: 4 INJECTION INTRAVENOUS at 03:08

## 2023-08-09 RX ADMIN — SUCCINYLCHOLINE CHLORIDE 100 MG: 20 INJECTION, SOLUTION INTRAMUSCULAR; INTRAVENOUS at 11:08

## 2023-08-09 RX ADMIN — SODIUM CHLORIDE, SODIUM GLUCONATE, SODIUM ACETATE, POTASSIUM CHLORIDE AND MAGNESIUM CHLORIDE: 526; 502; 368; 37; 30 INJECTION, SOLUTION INTRAVENOUS at 11:08

## 2023-08-09 NOTE — CONSULTS
Called pt to notify that recent lab result is still pending, that I was calling to update him, and that he will be notified when results are available.  Verbalized understanding.   Consult Note    Reason for Consult:      We were consulted by Dr. Adame to evaluate this patient for nausea and vomiting.    HPI:     37-year-old  female known to Dr. Stewart Blackwell who had a vertical sleeve gastrectomy for weight loss in 2013.  After the birth of her 3rd child she would trouble losing weight and went back to [place] and had a mini gastric bypass in summer 2022.  She had a known hiatal hernia, but the surgeon was not comfortable performing the repair.  She developed intractable nausea and vomiting with hematemesis.  EGD 11/03/2022 found esophageal hiatal hernia, grade D esophagitis compatible with ulcerative esophagitis, severe ulceration and inflammation of the small bowel, severe gastritis.  Patient underwent incarcerated hiatal hernia repair with mesh with Dr. Lopez 01/17/2023.  Postoperatively she developed sudden onset abdominal pain and CT was concerning for recurrence of hiatal hernia with questionable leak in the diaphragm.  Patient was taken back to surgery on 01/27/2023 for exploration and placement of gastrostomy tube.  There was no stigmata of leak found in the hernia remained intact but the stomach did slide up into the hiatus.  Patient had persistent abdominal pain and started leaking ingested blue dye from Penrose drain which suggested a gastric fistula.  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  Due to the size of the fistula, patient was taken back for lap on 02/07/2023 in order to repair the fistula.  She was found to have ischemic changes to the stomach resulting in a large 2 cm area of necrosis in the stomach.  Gastrojejunostomy was performed and gastrostomy tube was placed in the antrum.  Patient presented back to the ED was an acute abdomen in March.  CT abdomen did not reveal any obvious etiology.  She was taken back to surgery for ex lap on 03/23/2023 findings:  Thin yellowish exudate fluid in  "the lower abdomen, minimal contamination in the upper abdomen, G-tube site adherent to abdominal wall, colon looked normal, appendix involved in the pelvic complex and appeared irritated.  Appendectomy was performed and jejunostomy tube was placed.    These procedures have been complicated by severe malnutrition and weakness due to persistent abdominal pain, nausea, vomiting, inability to tolerate p.o. intake or J-tube feedings.  Patient states that at times she is good days where she has minimal pain in his able to eat without vomiting and tolerate her J-tube feedings continuously at 50 cc/hour, however other days she is unable to tolerate p.o. intake without nausea or vomiting and is unable to even tolerate her J-tube feedings as they make her severely nauseated and dry heave.  Most day she vomits at least once daily.  She has abdominal pain around the J-tube site; it was not constant but it is severe when it occurs.  She has lost 60 lb in the last 7 months and currently weighs around 90 lbs.  She is hungry and dreams about food but just cannot tolerate it. Due to the weakness, she feels she is unable to care for her children or complete certain ADLs  (such as driving or grocery shopping) in fear that she will "pass out."  Having good BMs, once daily.     Presented to ED here last night with above complaints and wanting a "second opinion." In ED, patient afebrile and VSS.  Labs notable for leukocytosis 13,800, normocytic anemia with hemoglobin 11.3, thrombocytosis 422k, normal potassium and renal indices, albumin 2.2, lactic acid normal.  CT abdomen/pelvis with IV contrast noted irregular mural thickening of the cecum and the ascending colon to the level of the hepatic flexure and Bilateral adnexa/ovarian cystic structures which is large on the right.  Pelvic US noted large cyst with echogenic material identified in the right ovary.  Patient was admitted an GI consulted.  She states she feels better today after " getting IVFs -- that typically makes her feel much better.     Previous records reviewed in detail.     PCP:  No, Primary Doctor    Review of patient's allergies indicates:   Allergen Reactions    Penicillin Rash        Current Facility-Administered Medications   Medication Dose Route Frequency Provider Last Rate Last Admin    acetaminophen tablet 1,000 mg  1,000 mg Oral Q6H PRN Zaida Polk FNP        acetaminophen tablet 650 mg  650 mg Oral Q4H PRN Zaida Polk FNP        hydrALAZINE injection 10 mg  10 mg Intravenous Q2H PRN Zaida Polk FNP   10 mg at 08/09/23 0108    HYDROcodone-acetaminophen  mg per tablet 1 tablet  1 tablet Oral Q6H PRN Zaida Polk FNP        lactated ringers infusion   Intravenous Continuous Zaida Polk  mL/hr at 08/09/23 0130 New Bag at 08/09/23 0130    morphine injection 2 mg  2 mg Intravenous Q4H PRN Zaida Polk FNP   2 mg at 08/09/23 0813    naloxone 0.4 mg/mL injection 0.02 mg  0.02 mg Intravenous PRN Zaida Polk FNP        ondansetron injection 4 mg  4 mg Intravenous Q4H PRN Zaida Polk FNP   4 mg at 08/09/23 0813    prochlorperazine injection Soln 5 mg  5 mg Intravenous Q6H PRN Zaida Polk FNP   5 mg at 08/09/23 0502    sodium chloride 0.9% flush 10 mL  10 mL Intravenous PRN Zaida Polk FNP         Medications Prior to Admission   Medication Sig Dispense Refill Last Dose    ALPRAZolam (XANAX) 1 MG tablet Take 1 mg by mouth 3 (three) times daily.   8/8/2023    cyanocobalamin 1,000 mcg/mL injection Inject 1,000 mcg into the muscle every 30 days.   Past Month    dicyclomine (BENTYL) 10 MG capsule Take 10 mg by mouth every 6 (six) hours as needed.   Past Week    hyoscyamine (LEVSIN/SL) 0.125 mg Subl Place under the tongue.   Past Week    megestroL (MEGACE ES) 625 mg/5 mL (125 mg/mL) Susp Take 625 mg by mouth.   8/8/2023    oxyCODONE-acetaminophen (PERCOCET)  7.5-325 mg per tablet Take by mouth.   Past Week    promethazine (PHENERGAN) 25 mg/mL injection SMARTSI Milliliter(s) IM Every 6 Hours PRN   2023       Past Medical History:  No past medical history on file.   Past Surgical History:  No past surgical history on file.   Family History:  No family history on file.  Social History:  Social History     Tobacco Use    Smoking status: Not on file    Smokeless tobacco: Not on file   Substance Use Topics    Alcohol use: Not on file       Review of Systems:     Review of Systems   Constitutional:  Positive for unexpected weight change. Negative for appetite change, chills, diaphoresis, fatigue and fever.   HENT:  Negative for trouble swallowing.    Respiratory:  Negative for cough, chest tightness and shortness of breath.    Cardiovascular:  Negative for chest pain, palpitations and leg swelling.   Gastrointestinal:  Positive for abdominal pain, nausea and vomiting. Negative for abdominal distention, blood in stool, constipation, diarrhea and rectal pain.   Skin:  Negative for color change and pallor.   Neurological:  Positive for dizziness, weakness and light-headedness.   Psychiatric/Behavioral:  Negative for confusion.        Objective:     VITAL SIGNS: 24 HR MIN & MAX LAST    Temp  Min: 98 °F (36.7 °C)  Max: 98.8 °F (37.1 °C)  98.4 °F (36.9 °C)        BP  Min: 146/99  Max: 165/112  (!) 159/109     Pulse  Min: 94  Max: 120  109     Resp  Min: 16  Max: 19  16    SpO2  Min: 93 %  Max: 100 %  96 %        Intake/Output Summary (Last 24 hours) at 2023 1009  Last data filed at 2023 0400  Gross per 24 hour   Intake --   Output 2 ml   Net -2 ml       Physical Exam  Constitutional:       General: She is not in acute distress.     Appearance: She is cachectic. She is ill-appearing (chornically).   HENT:      Head: Normocephalic and atraumatic.      Comments: Temporal wasting  Eyes:      General: No scleral icterus.     Extraocular Movements: Extraocular movements  intact.   Cardiovascular:      Rate and Rhythm: Normal rate and regular rhythm.   Pulmonary:      Effort: Pulmonary effort is normal. No respiratory distress.   Abdominal:      General: Bowel sounds are normal. There is no distension.      Palpations: Abdomen is soft. There is no mass.      Tenderness: There is abdominal tenderness (mild in left abdomen). There is no guarding or rebound.      Comments: Several abdominal surgical scars noted, well healed.  J tube in left abdomen.    Musculoskeletal:         General: Normal range of motion.      Right lower leg: No edema.      Left lower leg: No edema.   Skin:     General: Skin is warm and dry.      Coloration: Skin is pale.   Neurological:      Mental Status: She is alert and oriented to person, place, and time.   Psychiatric:         Mood and Affect: Mood and affect normal.           Recent Results (from the past 48 hour(s))   POCT glucose    Collection Time: 08/08/23  3:51 PM   Result Value Ref Range    POCT Glucose 101 70 - 110 mg/dL   Comprehensive metabolic panel    Collection Time: 08/08/23  3:58 PM   Result Value Ref Range    Sodium Level 132 (L) 136 - 145 mmol/L    Potassium Level 3.8 3.5 - 5.1 mmol/L    Chloride 104 98 - 107 mmol/L    Carbon Dioxide 22 22 - 29 mmol/L    Glucose Level 102 (H) 74 - 100 mg/dL    Blood Urea Nitrogen 13.4 7.0 - 18.7 mg/dL    Creatinine 0.69 0.55 - 1.02 mg/dL    Calcium Level Total 8.1 (L) 8.4 - 10.2 mg/dL    Protein Total 5.4 (L) 6.4 - 8.3 gm/dL    Albumin Level 2.2 (L) 3.5 - 5.0 g/dL    Globulin 3.2 2.4 - 3.5 gm/dL    Albumin/Globulin Ratio 0.7 (L) 1.1 - 2.0 ratio    Bilirubin Total 1.5 <=1.5 mg/dL    Alkaline Phosphatase 87 40 - 150 unit/L    Alanine Aminotransferase 14 0 - 55 unit/L    Aspartate Aminotransferase 13 5 - 34 unit/L    eGFR >60 mls/min/1.73/m2   Magnesium    Collection Time: 08/08/23  3:58 PM   Result Value Ref Range    Magnesium Level 1.80 1.60 - 2.60 mg/dL   Lipase    Collection Time: 08/08/23  3:58 PM   Result  Value Ref Range    Lipase Level 4 <=60 U/L   CBC with Differential    Collection Time: 08/08/23  3:58 PM   Result Value Ref Range    WBC 13.82 (H) 4.50 - 11.50 x10(3)/mcL    RBC 3.84 (L) 4.20 - 5.40 x10(6)/mcL    Hgb 11.3 (L) 12.0 - 16.0 g/dL    Hct 33.6 (L) 37.0 - 47.0 %    MCV 87.5 80.0 - 94.0 fL    MCH 29.4 27.0 - 31.0 pg    MCHC 33.6 33.0 - 36.0 g/dL    RDW 16.6 11.5 - 17.0 %    Platelet 422 (H) 130 - 400 x10(3)/mcL    MPV 11.0 (H) 7.4 - 10.4 fL    Neut % 78.2 %    Lymph % 12.2 %    Mono % 7.9 %    Eos % 0.3 %    Basophil % 0.5 %    Lymph # 1.69 0.6 - 4.6 x10(3)/mcL    Neut # 10.81 (H) 2.1 - 9.2 x10(3)/mcL    Mono # 1.09 0.1 - 1.3 x10(3)/mcL    Eos # 0.04 0 - 0.9 x10(3)/mcL    Baso # 0.07 <=0.2 x10(3)/mcL    IG# 0.12 (H) 0 - 0.04 x10(3)/mcL    IG% 0.9 %    NRBC% 0.0 %   Lactic acid, plasma    Collection Time: 08/08/23  5:33 PM   Result Value Ref Range    Lactic Acid Level 2.1 0.5 - 2.2 mmol/L   Lactic Acid, Plasma    Collection Time: 08/08/23  7:36 PM   Result Value Ref Range    Lactic Acid Level 1.0 0.5 - 2.2 mmol/L   Urinalysis, Reflex to Urine Culture    Collection Time: 08/08/23  8:20 PM    Specimen: Urine   Result Value Ref Range    Color, UA Dark Yellow Yellow, Light-Yellow, Dark Yellow, Mitzi, Straw    Appearance, UA Cloudy (A) Clear    Specific Gravity, UA >=1.040 (H) 1.005 - 1.030    pH, UA 5.5 5.0 - 8.5    Protein, UA Negative Negative    Glucose, UA Negative Negative, Normal    Ketones, UA 1+ (A) Negative    Blood, UA Negative Negative    Bilirubin, UA 1+ (A) Negative    Urobilinogen, UA 1.0 0.2, 1.0, Normal    Nitrites, UA Negative Negative    Leukocyte Esterase, UA Negative Negative   Urinalysis, Microscopic    Collection Time: 08/08/23  8:20 PM   Result Value Ref Range    RBC, UA 0-2 None Seen, 0-2, 3-5, 0-5 /HPF    WBC, UA 0-2 None Seen, 0-2, 3-5, 0-5 /HPF    Squamous Epithelial Cells, UA 0-4 0-4, None Seen /HPF    Bacteria, UA Rare None Seen, Rare, Occasional /HPF    Mucous, UA Moderate (A) None  Seen /LPF    Calcium Oxalate Crystals, UA Moderate (A) None Seen /HPF   Comprehensive Metabolic Panel (CMP)    Collection Time: 08/09/23  3:58 AM   Result Value Ref Range    Sodium Level 130 (L) 136 - 145 mmol/L    Potassium Level 3.8 3.5 - 5.1 mmol/L    Chloride 103 98 - 107 mmol/L    Carbon Dioxide 19 (L) 22 - 29 mmol/L    Glucose Level 78 74 - 100 mg/dL    Blood Urea Nitrogen 10.6 7.0 - 18.7 mg/dL    Creatinine 0.63 0.55 - 1.02 mg/dL    Calcium Level Total 7.7 (L) 8.4 - 10.2 mg/dL    Protein Total 4.5 (L) 6.4 - 8.3 gm/dL    Albumin Level 1.9 (L) 3.5 - 5.0 g/dL    Globulin 2.6 2.4 - 3.5 gm/dL    Albumin/Globulin Ratio 0.7 (L) 1.1 - 2.0 ratio    Bilirubin Total 1.1 <=1.5 mg/dL    Alkaline Phosphatase 71 40 - 150 unit/L    Alanine Aminotransferase 11 0 - 55 unit/L    Aspartate Aminotransferase 11 5 - 34 unit/L    eGFR >60 mls/min/1.73/m2   CBC with Differential    Collection Time: 08/09/23  3:58 AM   Result Value Ref Range    WBC 14.38 (H) 4.50 - 11.50 x10(3)/mcL    RBC 3.47 (L) 4.20 - 5.40 x10(6)/mcL    Hgb 10.1 (L) 12.0 - 16.0 g/dL    Hct 31.1 (L) 37.0 - 47.0 %    MCV 89.6 80.0 - 94.0 fL    MCH 29.1 27.0 - 31.0 pg    MCHC 32.5 (L) 33.0 - 36.0 g/dL    RDW 17.2 (H) 11.5 - 17.0 %    Platelet 412 (H) 130 - 400 x10(3)/mcL    MPV 11.5 (H) 7.4 - 10.4 fL    Neut % 59.7 %    Lymph % 25.8 %    Mono % 12.6 %    Eos % 0.6 %    Basophil % 0.3 %    Lymph # 3.71 0.6 - 4.6 x10(3)/mcL    Neut # 8.59 2.1 - 9.2 x10(3)/mcL    Mono # 1.81 (H) 0.1 - 1.3 x10(3)/mcL    Eos # 0.08 0 - 0.9 x10(3)/mcL    Baso # 0.05 <=0.2 x10(3)/mcL    IG# 0.14 (H) 0 - 0.04 x10(3)/mcL    IG% 1.0 %    NRBC% 0.0 %       US Pelvis Comp with Transvag NON-OB (xpd    Result Date: 8/9/2023  EXAMINATION: US PELVIS COMP WITH TRANSVAG NON-OB (XPD) CLINICAL HISTORY: bilateral adnexal cysts, abd pain; TECHNIQUE: Transabdominal sonography of the pelvis was performed, followed by transvaginal sonography to better evaluate the uterus and ovaries. COMPARISON: None.  FINDINGS: Uterus: Patient is status post partial hysterectomy Right ovary: Size: 4.5 x 3.6 x 4.9 cm Appearance: Cyst with echogenic material measures 5.1 by 5 4.6 x 5.1 Vascular flow: Normal. Left ovary: Size: 4 x 2.7 x 2.4 cm Appearance: Normal Vascular Flow: Normal. Free Fluid: None.     Large cyst with echogenic material identified in the right ovary with measurements as above. Status post partial hysterectomy. No other abnormalities Electronically signed by: Thiago Glynn Date:    08/09/2023 Time:    09:03    CT Abdomen Pelvis W Wo Contrast    Result Date: 8/8/2023  EXAMINATION: CT ABDOMEN PELVIS W WO CONTRAST CLINICAL HISTORY: Nausea/vomiting;Bowel obstruction suspected;Abdominal pain, acute, nonlocalized;Hx gastric sleeve with revision gastrectomy; TECHNIQUE: Multidetector axial images were obtained of the abdomen and pelvis before following the administration of IV contrast. Oral contrast was not administered. Dose length product of 815 mGycm. Automated exposure control was utilized to minimize radiation dose. COMPARISON: None available FINDINGS: Included portion of the lungs are without suspicious nodularity, acute air space infiltrates or fluid within the pleural spaces. Liver is remarkable steatosis without focal space occupying lesion.  There is no intraperitoneal free fluid indicative of ascites.  Gallbladder wall is not thickened and there is no intra luminal calcified calculus. No biliary dilation identified. Pancreatic unremarkable attenuation without acute peripancreatic phlegmons. Main pancreatic duct is not dilated. Spleen is of normal size without focal lesion. The adrenal glands appear within normal limits. The kidneys are unremarkable in size and contour. No solid or cystic renal lesion identified. There is no nephrolithiasis hydronephrosis.  No perinephric fluid strandings or collections identified.  Retroperitoneal periaortic lymph nodes are not enlarged in size.  Abdominal aorta is  without aneurysmal dilatation or dissection. There are gastric operative changes and hiatal hernia.  Stomach is decompressed.  There is percutaneous placed enteric catheter.  No significant dilatation of loops of small bowel with left mid abdomen operative changes with sutures.  Appendix is not visualized.  There is irregular mural thickening of the cecum and the ascending colon to the level of the hepatic flexure best seen on image 40 series 7.  The distal colon is decompressed and not adequately assessed.  Moderate fecal content expanding the rectosigmoid.  There is no suggestion of bowel obstruction.  No free air or free fluid. There is right adnexal large cystic structure which measures 5.8 x 4.8 cm on image 110 series 3.  There is also small left adnexa cystic structure on image 116 series 3.  No pelvic free fluid.  Uterus is not visualized.  Urinary bladder appears within normal limits. No acute or otherwise osseous abnormality identified.     1. Hiatal hernia and operative changes. 2. Cecum and ascending colon to the hepatic flexure is remarkable for irregular mural thickening which suggest nonspecific colitis.  Please correlate clinically.  Attention to follow-up exams 3. Bilateral adnexa/ovarian cystic structures which is large on the right.. Electronically signed by: Edgardo Tyler Date:    08/08/2023 Time:    20:24    Assessment / Plan:     38 yo CF known to Dr. 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  Complicated by severe malnutrition and persistent abdominal pain, n/v.    - keep NPO for EGD today with further recs to follow.     Thank you for allowing us to participate in this  patient's care.

## 2023-08-09 NOTE — NURSING
Nurses Note -- 4 Eyes      8/9/2023   5:45 AM      Skin assessed during: Admit      [] No Altered Skin Integrity Present    []Prevention Measures Documented      [x] Yes- Altered Skin Integrity Present or Discovered   [x] LDA Added if Not in Epic (Describe Wound)   [] New Altered Skin Integrity was Present on Admit and Documented in LDA   [] Wound Image Taken    Wound Care Consulted? No    Attending Nurse:  Loli Birmingham RN/Staff Member:   Malcolm

## 2023-08-09 NOTE — ANESTHESIA POSTPROCEDURE EVALUATION
Anesthesia Post Evaluation    Patient: Chelsie Lee    Procedure(s) Performed: Procedure(s) (LRB):  EGD (N/A)  EGD, WITH CLOSED BIOPSY    Final Anesthesia Type: general      Patient location during evaluation: PACU  Patient participation: Yes- Able to Participate  Level of consciousness: awake and alert  Post-procedure vital signs: reviewed and stable  Pain management: adequate  Airway patency: patent    PONV status at discharge: No PONV  Anesthetic complications: no      Cardiovascular status: hemodynamically stable  Respiratory status: unassisted  Hydration status: euvolemic  Follow-up not needed.          Vitals Value Taken Time   /106 08/09/23 1255   Temp 36.5 °C (97.7 °F) 08/09/23 1229   Pulse 93 08/09/23 1255   Resp 17 08/09/23 1255   SpO2 100 % 08/09/23 1255         Event Time   Out of Recovery 08/09/2023 12:32:02         Pain/Eran Score: Pain Rating Prior to Med Admin: 10 (8/9/2023  8:13 AM)  Pain Rating Post Med Admin: 0 (8/9/2023  8:43 AM)  Eran Score: 10 (8/9/2023 12:56 PM)

## 2023-08-09 NOTE — ANESTHESIA PROCEDURE NOTES
Intubation    Date/Time: 8/9/2023 11:50 AM    Performed by: Brenda Delgado CRNA  Authorized by: Alyse Bañuelos MD    Intubation:     Induction:  Intravenous    Intubated:  Postinduction    Mask Ventilation:  Not attempted    Attempts:  1    Attempted By:  CRNA    Method of Intubation:  Direct    Blade:  [place] 3    Laryngeal View Grade: Grade I - full view of cords      Difficult Airway Encountered?: No      Complications:  None    Airway Device:  Oral endotracheal tube    Airway Device Size:  7.0    Style/Cuff Inflation:  Cuffed (inflated to minimal occlusive pressure)    Inflation Amount (mL):  7    Tube secured:  21    Secured at:  The lips    Placement Verified By:  Capnometry    Complicating Factors:  None    Findings Post-Intubation:  BS equal bilateral and atraumatic/condition of teeth unchanged

## 2023-08-09 NOTE — TRANSFER OF CARE
"Anesthesia Transfer of Care Note    Patient: Chelsie Lee    Procedure(s) Performed: Procedure(s) (LRB):  EGD (N/A)  EGD, WITH CLOSED BIOPSY    Patient location: GI    Anesthesia Type: general    Transport from OR: Transported from OR on room air with adequate spontaneous ventilation    Post pain: adequate analgesia    Post assessment: no apparent anesthetic complications and tolerated procedure well    Post vital signs: stable    Level of consciousness: awake, alert, oriented and responds to stimulation    Nausea/Vomiting: no nausea/vomiting    Complications: none    Transfer of care protocol was followed      Last vitals:   Visit Vitals  BP (!) 137/96 (BP Location: Left arm, Patient Position: Sitting)   Pulse (!) 115   Temp 36.5 °C (97.7 °F) (Skin)   Resp 19   Ht 5' 4" (1.626 m)   Wt 44 kg (97 lb)   SpO2 99%   Breastfeeding No   BMI 16.65 kg/m²     "

## 2023-08-09 NOTE — ANESTHESIA PREPROCEDURE EVALUATION
08/09/2023  Chelsie Lee is a 37 y.o., female with intractable nausea and vomiting all s/p complications from lap sleeve gastrectomy in distant past    Pre-op Assessment    I have reviewed the NPO Status.      Review of Systems     Latest Reference Range & Units 08/09/23 03:58   WBC 4.50 - 11.50 x10(3)/mcL 14.38 (H)   Hemoglobin 12.0 - 16.0 g/dL 10.1 (L)   Hematocrit 37.0 - 47.0 % 31.1 (L)   Platelets 130 - 400 x10(3)/mcL 412 (H)   Sodium 136 - 145 mmol/L 130 (L)   Potassium 3.5 - 5.1 mmol/L 3.8   Chloride 98 - 107 mmol/L 103   CO2 22 - 29 mmol/L 19 (L)   BUN 7.0 - 18.7 mg/dL 10.6   Creatinine 0.55 - 1.02 mg/dL 0.63   eGFR mls/min/1.73/m2 >60   Glucose 74 - 100 mg/dL 78   (H): Data is abnormally high  (L): Data is abnormally low    Physical Exam  General: Cooperative, Alert, Oriented and Cachexia  Appears thin and acutely ill  Airway:  Mallampati: II   Mouth Opening: Normal  TM Distance: Normal  Tongue: Normal  Neck ROM: Normal ROM    Dental:  Intact    Chest/Lungs:  Clear to auscultation, Normal Respiratory Rate    Heart:  Rate: Normal  Rhythm: Regular Rhythm       Latest Reference Range & Units 08/09/23 03:58   WBC 4.50 - 11.50 x10(3)/mcL 14.38 (H)   Hemoglobin 12.0 - 16.0 g/dL 10.1 (L)   Hematocrit 37.0 - 47.0 % 31.1 (L)   Platelets 130 - 400 x10(3)/mcL 412 (H)   Sodium 136 - 145 mmol/L 130 (L)   Potassium 3.5 - 5.1 mmol/L 3.8   Chloride 98 - 107 mmol/L 103   CO2 22 - 29 mmol/L 19 (L)   BUN 7.0 - 18.7 mg/dL 10.6   Creatinine 0.55 - 1.02 mg/dL 0.63   eGFR mls/min/1.73/m2 >60   Glucose 74 - 100 mg/dL 78   (H): Data is abnormally high  (L): Data is abnormally low    Anesthesia Plan  Type of Anesthesia, risks & benefits discussed:    Anesthesia Type: Gen ETT  Intra-op Monitoring Plan: Standard ASA Monitors  Post Op Pain Control Plan: IV/PO Opioids PRN  Induction:  IV and rapid sequence  Airway Plan:  Direct  Informed Consent: Informed consent signed with the Patient and all parties understand the risks and agree with anesthesia plan.  All questions answered. Patient consented to blood products? No  ASA Score: 3  Day of Surgery Review of History & Physical: H&P Update referred to the surgeon/provider.  Anesthesia Plan Notes: Nasal cannula vs facemask supplemental oxygenation   For patients with RAMIN/obesity, may consider SuperNoval Nasal CPAP      Ready For Surgery From Anesthesia Perspective.     .

## 2023-08-09 NOTE — PROVATION PATIENT INSTRUCTIONS
Discharge Summary/Instructions after an Endoscopic Procedure  Patient Name: Chelsie Lee  Patient MRN: [id number]  Patient YOB: 1985 Wednesday, August 9, 2023  Wilmer Rossi MD  Dear patient,  As a result of recent federal legislation (The Federal Cures Act), you may   receive lab or pathology results from your procedure in your [facility]   account before your physician is able to contact you. Your physician or   their representative will relay the results to you with their   recommendations at their soonest availability.  Thank you,  RESTRICTIONS:  During your procedure today, you received medications for sedation.  These   medications may affect your judgment, balance and coordination.  Therefore,   for 24 hours, you have the following restrictions:   - DO NOT drive a car, operate machinery, make legal/financial decisions,   sign important papers or drink alcohol.    ACTIVITY:  Today: no heavy lifting, straining or running due to procedural   sedation/anesthesia.  The following day: return to full activity including work.  DIET:  Eat and drink normally unless instructed otherwise.     TREATMENT FOR COMMON SIDE EFFECTS:  - Mild abdominal pain, nausea, belching, bloating or excessive gas:  rest,   eat lightly and use a heating pad.  - Sore Throat: treat with throat lozenges and/or gargle with warm salt   water.  - Because air was used during the procedure, expelling large amounts of air   from your rectum or belching is normal.  - If a bowel prep was taken, you may not have a bowel movement for 1-3 days.    This is normal.  SYMPTOMS TO WATCH FOR AND REPORT TO YOUR PHYSICIAN:  1. Abdominal pain or bloating, other than gas cramps.  2. Chest pain.  3. Back pain.  4. Signs of infection such as: chills or fever occurring within 24 hours   after the procedure.  5. Rectal bleeding, which would show as bright red, maroon, or black stools.   (A tablespoon of blood from the rectum is not serious, especially if    hemorrhoids are present.)  6. Vomiting.  7. Weakness or dizziness.  GO DIRECTLY TO THE NEAREST EMERGENCY ROOM IF YOU HAVE ANY OF THE FOLLOWING:      Difficulty breathing              Chills and/or fever over 101 F   Persistent vomiting and/or vomiting blood   Severe abdominal pain   Severe chest pain   Black, tarry stools   Bleeding- more than one tablespoon   Any other symptom or condition that you feel may need urgent attention  Your doctor recommends these additional instructions:  If any biopsies were taken, your doctors clinic will contact you in 1 to 2   weeks with any results.  - Return patient to hospital [facility] for ongoing care.   - Resume previous diet.   - Continue present medications.   - Await pathology results.   - Give Protonix (pantoprazole): initiate therapy with 80 mg IV bolus, then 8   mg/hr IV by continuous infusion for 3 days.  For questions, problems or results please call your physician - Wilmer Rossi MD at Work:  [phone].  [facility], EMERGENCY ROOM PHONE NUMBER: [phone]  IF A COMPLICATION OR EMERGENCY SITUATION ARISES AND YOU ARE UNABLE TO REACH   YOUR PHYSICIAN - GO DIRECTLY TO THE EMERGENCY ROOM.  MD Wilmer Bird MD  8/9/2023 12:46:57 PM  This report has been verified and signed electronically.  Dear patient,  As a result of recent federal legislation (The Federal Cures Act), you may   receive lab or pathology results from your procedure in your [facility]   account before your physician is able to contact you. Your physician or   their representative will relay the results to you with their   recommendations at their soonest availability.  Thank you,  PROVATION

## 2023-08-09 NOTE — H&P
[facility] Hospital Medicine History & Physical Examination       Patient Name: hCelsie Lee  MRN: [id number]  Patient Class: OP- Observation   Admission Date: 8/8/2023   Admitting Physician: ABIMAEL Service   Length of Stay: 0  Attending Physician: Dr. Gustavo Adame  Primary Care Provider: non-staff  Face-to-Face encounter date: 08/09/2023  Code Status:Full code  Chief Complaint: Emesis (Pt had gastric sleeve revision in febrauary. States perf bowel and migrated mesh during surgery. Pt severe weight loss, intractable vomiting. Tender abd near j tube. Seizure activity Friday and seen at [facility] and sent home. Last BM yesterday and normal. Cbg 101)        Patient information was obtained from patient, patient's family, past medical records and ER records.     HISTORY OF PRESENT ILLNESS:   Chelsie Lee is a 37 y.o. female who PMH includes gastric sleeve revision s/p bowel perforation; anxiety, GERD; presents to the ED at [facility] on 8/8/2023 with a primary complaint of nausea and vomiting with associated abdominal pain.  Patient reports she has had a gastric sleeve revision in February 2023 secondary to bowel perforation and migrated mesh.  She also had a J-tube placed during that time for nutritional support.  Patient reports decreased appetite since then with significant weight loss.  She denies any new foods or medications.  Patient reports she was seen in the ED at [facility] for seizure like activity; she denies any history of seizures in the past.  Patient denies any seizure activity since then.  Lab work done revealed WBCs 13.82, H&H 11.3/33.6, sodium 132, glucose 102, other indices unremarkable.  CT of the abdomen and pelvis with and without contrast impression reviewed demonstrated hiatal hernia and operative changes, cecum and ascending colon to the hepatic flexure is remarkable for irregular mural thickening suggests nonspecific colitis, bilateral adnexa ovarian cystic structures which is  large on the right.  Initial vital signs reviewed BP 2/117 pulse 106 respirations 16 temperature 98.2° F O2 saturation 93% on room air.  Patient received hydration antiemetic therapy and pain medication in the ED, her blood pressure trended down and saturations improved.  Patient is admitted to hospital medicine services for further management.    PAST MEDICAL HISTORY:   Gastric sleeve revision   Anxiety  Nausea with vomiting    PAST SURGICAL HISTORY:   Gastric sleeve  Gastric sleeve revision  Hernia repair    ALLERGIES:   Penicillin    FAMILY HISTORY:   Reviewed and negative    SOCIAL HISTORY:   Denies any tobacco use  Denies any illicit drug use  Denies any alcohol use  Lives with family     HOME MEDICATIONS:   As documented  Prior to Admission medications    Medication Sig Start Date End Date Taking? Authorizing Provider   ALPRAZolam (XANAX) 1 MG tablet Take 1 mg by mouth 3 (three) times daily. 23  Yes Provider, Historical   cyanocobalamin 1,000 mcg/mL injection Inject 1,000 mcg into the muscle every 30 days. 23  Yes Provider, Historical   dicyclomine (BENTYL) 10 MG capsule Take 10 mg by mouth every 6 (six) hours as needed. 8/3/23  Yes Provider, Historical   hyoscyamine (LEVSIN/SL) 0.125 mg Subl Place under the tongue. 8/3/23  Yes Provider, Historical   megestroL (MEGACE ES) 625 mg/5 mL (125 mg/mL) Susp Take 625 mg by mouth. 23  Yes Provider, Historical   oxyCODONE-acetaminophen (PERCOCET) 7.5-325 mg per tablet Take by mouth. 8/3/23  Yes Provider, Historical   promethazine (PHENERGAN) 25 mg/mL injection SMARTSI Milliliter(s) IM Every 6 Hours PRN 23  Yes Provider, Historical       REVIEW OF SYSTEMS:   Except as documented, all other systems reviewed and negative     PHYSICAL EXAM:     VITAL SIGNS: 24 HRS MIN & MAX LAST   Temp  Min: 98 °F (36.7 °C)  Max: 98.2 °F (36.8 °C) 98 °F (36.7 °C)   BP  Min: 156/102  Max: 165/112 (!) 165/112   Pulse  Min: 98  Max: 106  101   Resp  Min: 16  Max: 19 18    SpO2  Min: 93 %  Max: 100 % 100 %       General appearance: Well-developed, well-nourished female complaints of abdominal pain, non-toxic, in no apparent distress.  HENT: Atraumatic head. Moist mucous membranes of oral cavity.  Eyes: PERRL  Neck: Supple.   Lungs: Clear to auscultation bilaterally. No wheezing present.   Heart: Regular rate and rhythm. S1 and S2 present cap refill brisk  Abdomen: Soft, non-distended, generalized TTP; Bowel sounds are normal, J tube in place   Extremities: No cyanosis, clubbing,GALEAS  Neuro: Oriented x 3, no acute focal deficits  Psych/mental status: Appropriate mood and affect. Responds appropriately to questions.     LABS AND IMAGING:     Recent Labs   Lab 08/08/23  1558   WBC 13.82*   RBC 3.84*   HGB 11.3*   HCT 33.6*   MCV 87.5   MCH 29.4   MCHC 33.6   RDW 16.6   *   MPV 11.0*       Recent Labs   Lab 08/08/23  1558   *   K 3.8   CO2 22   BUN 13.4   CREATININE 0.69   CALCIUM 8.1*   MG 1.80   ALBUMIN 2.2*   ALKPHOS 87   ALT 14   AST 13   BILITOT 1.5       Microbiology Results (last 7 days)       Procedure Component Value Units Date/Time    Blood Culture #2 **CANNOT BE ORDERED STAT** [[id number]] Collected: 08/08/23 1733    Order Status: Resulted Specimen: Blood from Arm, Left Updated: 08/08/23 1924    Blood Culture #1 **CANNOT BE ORDERED STAT** [[id number]] Collected: 08/08/23 1733    Order Status: Resulted Specimen: Blood from Arm, Right Updated: 08/08/23 1923             CT Abdomen Pelvis W Wo Contrast  Narrative: EXAMINATION:  CT ABDOMEN PELVIS W WO CONTRAST    CLINICAL HISTORY:  Nausea/vomiting;Bowel obstruction suspected;Abdominal pain, acute, nonlocalized;Hx gastric sleeve with revision gastrectomy;    TECHNIQUE:  Multidetector axial images were obtained of the abdomen and pelvis before following the administration of IV contrast. Oral contrast was not administered.    Dose length product of 815 mGycm. Automated exposure control was utilized to minimize radiation  dose.    COMPARISON:  None available    FINDINGS:  Included portion of the lungs are without suspicious nodularity, acute air space infiltrates or fluid within the pleural spaces.    Liver is remarkable steatosis without focal space occupying lesion.  There is no intraperitoneal free fluid indicative of ascites.  Gallbladder wall is not thickened and there is no intra luminal calcified calculus. No biliary dilation identified. Pancreatic unremarkable attenuation without acute peripancreatic phlegmons. Main pancreatic duct is not dilated. Spleen is of normal size without focal lesion.    The adrenal glands appear within normal limits. The kidneys are unremarkable in size and contour. No solid or cystic renal lesion identified. There is no nephrolithiasis hydronephrosis.  No perinephric fluid strandings or collections identified.  Retroperitoneal periaortic lymph nodes are not enlarged in size.  Abdominal aorta is without aneurysmal dilatation or dissection.    There are gastric operative changes and hiatal hernia.  Stomach is decompressed.  There is percutaneous placed enteric catheter.  No significant dilatation of loops of small bowel with left mid abdomen operative changes with sutures.  Appendix is not visualized.  There is irregular mural thickening of the cecum and the ascending colon to the level of the hepatic flexure best seen on image 40 series 7.  The distal colon is decompressed and not adequately assessed.  Moderate fecal content expanding the rectosigmoid.  There is no suggestion of bowel obstruction.  No free air or free fluid.    There is right adnexal large cystic structure which measures 5.8 x 4.8 cm on image 110 series 3.  There is also small left adnexa cystic structure on image 116 series 3.  No pelvic free fluid.  Uterus is not visualized.  Urinary bladder appears within normal limits.    No acute or otherwise osseous abnormality identified.  Impression: 1. Hiatal hernia and operative  changes.    2. Cecum and ascending colon to the hepatic flexure is remarkable for irregular mural thickening which suggest nonspecific colitis.  Please correlate clinically.  Attention to follow-up exams    3. Bilateral adnexa/ovarian cystic structures which is large on the right..    Electronically signed by: Edgardo Tyler  Date:    08/08/2023  Time:    20:24        ASSESSMENT & PLAN:   ASSESSMENT:  Acute cecum and ascending colon colitis-POA   Nausea with vomiting-  intractable- POA  Abdominal pain- generalized- POA  HTN- urgency- POA  Leukocytosis-suspect secondary to colitis-POA  Adnexal cyst- right- POA  S/P gastric sleeve with revision- secondary to mesh migration- POA  Weakness- POA  Seizures- recent- POA    PLAN:  Consult GI Services appreciate assistance and recommendations   Transvaginal ultrasound  IV hydration  Bowel rest  PRN antihypertensive per parameters   PRN pain management  Resume home medication as deemed necessary  Repeat lab work in a.m.       VTE Prophylaxis:  SCD for DVT prophylaxis and will be advised to be as mobile as possible     Patient condition:  Stable    I, JOSELINE Casper have reviewed and discussed the case with Dr. Gustavo Adame. Please see the following addendum for further assessment and plan from there attending MD.  JOSELINE Jolly   08/09/2023      ________________________________________________________________________  I, Dr. Jewel Adame assumed care of this patient.  For the patient encounter, I performed the substantive portion of the visit, I reviewed the NPPA documentation, treatment plan, and medical decision making.  I had face to face time with this patient.  I have personally reviewed the labs and test results that are presently available. I have reviewed or attempted to review medical records based upon their availability. If patient was admitted under observational status it is with my approval.      Pleasant 37-year-old female with prior  gastric sleeve surgery reportedly in February with a doctor John Cristina which was complicated by gastric wall injury requiring a near total gastrectomy and placement of a J-tube.  She eats both orally and supplements feedings through J-tube.  She is been having persistent nausea and vomiting and was even admitted as recently as a week ago to Dr. Lopez for intractable nausea and vomiting.   She has also a scheduled appointment at the end of this month with Dr. Hola Blackwell for a 2nd opinion of her ongoing GI issues.  She presents tonight with intractable nausea and vomiting, at the time of my assessment states she has had remarkable improvement with IV fluids and IV nausea medicine.  She states this will not last very long however.  On exam she is nontoxic appearing, thin, abdomen benign.  Recommend GI consultation for ongoing nausea and vomiting.  Can consider consulting surgery as well although she has an appointment scheduled in late August, there is actually no acute surgical issue currently.    Time seen: 11PM 8/9/23  Jewel Adame MD

## 2023-08-09 NOTE — PROGRESS NOTES
Procedure results reported to floor nurse. Informed of medications given, IVF administered, diet, vital signs, and patient's stability. Nurse verbalized understanding.

## 2023-08-09 NOTE — PLAN OF CARE
Attempted to get discharge planning  assessment on patient but patient is off unit having procedure done,

## 2023-08-10 LAB
ABO + RH BLD: NORMAL
ABO + RH BLD: NORMAL
ABORH RETYPE: NORMAL
ANION GAP SERPL CALC-SCNC: 4 MEQ/L
BASOPHILS # BLD AUTO: 0.03 X10(3)/MCL
BASOPHILS NFR BLD AUTO: 0.4 %
BLD PROD TYP BPU: NORMAL
BLD PROD TYP BPU: NORMAL
BLOOD UNIT EXPIRATION DATE: NORMAL
BLOOD UNIT EXPIRATION DATE: NORMAL
BLOOD UNIT TYPE CODE: 5100
BLOOD UNIT TYPE CODE: 9500
BUN SERPL-MCNC: 9 MG/DL (ref 7–18.7)
CALCIUM SERPL-MCNC: 7.3 MG/DL (ref 8.4–10.2)
CHLORIDE SERPL-SCNC: 106 MMOL/L (ref 98–107)
CO2 SERPL-SCNC: 22 MMOL/L (ref 22–29)
CREAT SERPL-MCNC: 0.61 MG/DL (ref 0.55–1.02)
CREAT/UREA NIT SERPL: 15
CROSSMATCH INTERPRETATION: NORMAL
CROSSMATCH INTERPRETATION: NORMAL
DISPENSE STATUS: NORMAL
DISPENSE STATUS: NORMAL
EOSINOPHIL # BLD AUTO: 0.1 X10(3)/MCL (ref 0–0.9)
EOSINOPHIL NFR BLD AUTO: 1.2 %
ERYTHROCYTE [DISTWIDTH] IN BLOOD BY AUTOMATED COUNT: 17.2 % (ref 11.5–17)
GFR SERPLBLD CREATININE-BSD FMLA CKD-EPI: >60 MLS/MIN/1.73/M2
GLUCOSE SERPL-MCNC: 87 MG/DL (ref 74–100)
GROUP & RH: NORMAL
HCT VFR BLD AUTO: 22.1 % (ref 37–47)
HGB BLD-MCNC: 7.2 G/DL (ref 12–16)
IMM GRANULOCYTES # BLD AUTO: 0.07 X10(3)/MCL (ref 0–0.04)
IMM GRANULOCYTES NFR BLD AUTO: 0.9 %
INDIRECT COOMBS GEL: NORMAL
LYMPHOCYTES # BLD AUTO: 2.64 X10(3)/MCL (ref 0.6–4.6)
LYMPHOCYTES NFR BLD AUTO: 32.9 %
MCH RBC QN AUTO: 29 PG (ref 27–31)
MCHC RBC AUTO-ENTMCNC: 32.6 G/DL (ref 33–36)
MCV RBC AUTO: 89.1 FL (ref 80–94)
MONOCYTES # BLD AUTO: 1.05 X10(3)/MCL (ref 0.1–1.3)
MONOCYTES NFR BLD AUTO: 13.1 %
NEUTROPHILS # BLD AUTO: 4.13 X10(3)/MCL (ref 2.1–9.2)
NEUTROPHILS NFR BLD AUTO: 51.5 %
NRBC BLD AUTO-RTO: 0 %
PLATELET # BLD AUTO: 322 X10(3)/MCL (ref 130–400)
PMV BLD AUTO: 10.7 FL (ref 7.4–10.4)
POTASSIUM SERPL-SCNC: 4.4 MMOL/L (ref 3.5–5.1)
PSYCHE PATHOLOGY RESULT: NORMAL
RBC # BLD AUTO: 2.48 X10(6)/MCL (ref 4.2–5.4)
SODIUM SERPL-SCNC: 132 MMOL/L (ref 136–145)
SPECIMEN OUTDATE: NORMAL
UNIT NUMBER: NORMAL
UNIT NUMBER: NORMAL
WBC # SPEC AUTO: 8.02 X10(3)/MCL (ref 4.5–11.5)

## 2023-08-10 PROCEDURE — 63600175 PHARM REV CODE 636 W HCPCS: Performed by: INTERNAL MEDICINE

## 2023-08-10 PROCEDURE — 25000003 PHARM REV CODE 250: Performed by: PHYSICIAN ASSISTANT

## 2023-08-10 PROCEDURE — 85025 COMPLETE CBC W/AUTO DIFF WBC: CPT | Performed by: INTERNAL MEDICINE

## 2023-08-10 PROCEDURE — 25000003 PHARM REV CODE 250: Performed by: NURSE PRACTITIONER

## 2023-08-10 PROCEDURE — C9113 INJ PANTOPRAZOLE SODIUM, VIA: HCPCS | Performed by: PHYSICIAN ASSISTANT

## 2023-08-10 PROCEDURE — 84300 ASSAY OF URINE SODIUM: CPT | Performed by: INTERNAL MEDICINE

## 2023-08-10 PROCEDURE — P9016 RBC LEUKOCYTES REDUCED: HCPCS | Performed by: INTERNAL MEDICINE

## 2023-08-10 PROCEDURE — 63600175 PHARM REV CODE 636 W HCPCS: Performed by: PHYSICIAN ASSISTANT

## 2023-08-10 PROCEDURE — 82570 ASSAY OF URINE CREATININE: CPT | Performed by: INTERNAL MEDICINE

## 2023-08-10 PROCEDURE — 83935 ASSAY OF URINE OSMOLALITY: CPT | Performed by: INTERNAL MEDICINE

## 2023-08-10 PROCEDURE — 36430 TRANSFUSION BLD/BLD COMPNT: CPT

## 2023-08-10 PROCEDURE — 80048 BASIC METABOLIC PNL TOTAL CA: CPT | Performed by: INTERNAL MEDICINE

## 2023-08-10 PROCEDURE — 86901 BLOOD TYPING SEROLOGIC RH(D): CPT | Performed by: INTERNAL MEDICINE

## 2023-08-10 PROCEDURE — 96372 THER/PROPH/DIAG INJ SC/IM: CPT | Performed by: INTERNAL MEDICINE

## 2023-08-10 PROCEDURE — 21400001 HC TELEMETRY ROOM

## 2023-08-10 PROCEDURE — 25000003 PHARM REV CODE 250: Performed by: INTERNAL MEDICINE

## 2023-08-10 PROCEDURE — 63600175 PHARM REV CODE 636 W HCPCS: Performed by: NURSE PRACTITIONER

## 2023-08-10 PROCEDURE — 86923 COMPATIBILITY TEST ELECTRIC: CPT | Mod: 91 | Performed by: INTERNAL MEDICINE

## 2023-08-10 RX ORDER — DICYCLOMINE HYDROCHLORIDE 10 MG/ML
10 INJECTION INTRAMUSCULAR 4 TIMES DAILY PRN
Status: DISCONTINUED | OUTPATIENT
Start: 2023-08-10 | End: 2023-08-20 | Stop reason: HOSPADM

## 2023-08-10 RX ORDER — SODIUM, POTASSIUM,MAG SULFATES 17.5-3.13G
1 SOLUTION, RECONSTITUTED, ORAL ORAL ONCE
Status: COMPLETED | OUTPATIENT
Start: 2023-08-10 | End: 2023-08-10

## 2023-08-10 RX ORDER — MORPHINE SULFATE 4 MG/ML
2 INJECTION, SOLUTION INTRAMUSCULAR; INTRAVENOUS EVERY 6 HOURS PRN
Status: DISCONTINUED | OUTPATIENT
Start: 2023-08-10 | End: 2023-08-13

## 2023-08-10 RX ORDER — ALPRAZOLAM 0.5 MG/1
1 TABLET ORAL 3 TIMES DAILY PRN
Status: DISCONTINUED | OUTPATIENT
Start: 2023-08-10 | End: 2023-08-20 | Stop reason: HOSPADM

## 2023-08-10 RX ORDER — FUROSEMIDE 10 MG/ML
20 INJECTION INTRAMUSCULAR; INTRAVENOUS ONCE
Status: COMPLETED | OUTPATIENT
Start: 2023-08-10 | End: 2023-08-10

## 2023-08-10 RX ORDER — MORPHINE SULFATE 4 MG/ML
4 INJECTION, SOLUTION INTRAMUSCULAR; INTRAVENOUS EVERY 6 HOURS PRN
Status: DISCONTINUED | OUTPATIENT
Start: 2023-08-10 | End: 2023-08-10

## 2023-08-10 RX ORDER — HYDROCODONE BITARTRATE AND ACETAMINOPHEN 500; 5 MG/1; MG/1
TABLET ORAL
Status: DISCONTINUED | OUTPATIENT
Start: 2023-08-10 | End: 2023-08-20 | Stop reason: HOSPADM

## 2023-08-10 RX ADMIN — DICYCLOMINE HYDROCHLORIDE 10 MG: 20 INJECTION, SOLUTION INTRAMUSCULAR at 11:08

## 2023-08-10 RX ADMIN — ALPRAZOLAM 0.25 MG: 0.25 TABLET ORAL at 09:08

## 2023-08-10 RX ADMIN — SODIUM CHLORIDE: 9 INJECTION, SOLUTION INTRAVENOUS at 11:08

## 2023-08-10 RX ADMIN — ONDANSETRON 4 MG: 2 INJECTION INTRAMUSCULAR; INTRAVENOUS at 02:08

## 2023-08-10 RX ADMIN — PANTOPRAZOLE SODIUM 8 MG/HR: 40 INJECTION, POWDER, FOR SOLUTION INTRAVENOUS at 07:08

## 2023-08-10 RX ADMIN — HYDROCODONE BITARTRATE AND ACETAMINOPHEN 1 TABLET: 10; 325 TABLET ORAL at 11:08

## 2023-08-10 RX ADMIN — ONDANSETRON 4 MG: 2 INJECTION INTRAMUSCULAR; INTRAVENOUS at 07:08

## 2023-08-10 RX ADMIN — FUROSEMIDE 20 MG: 10 INJECTION, SOLUTION INTRAMUSCULAR; INTRAVENOUS at 06:08

## 2023-08-10 RX ADMIN — ALPRAZOLAM 1 MG: 0.5 TABLET ORAL at 09:08

## 2023-08-10 RX ADMIN — SODIUM SULFATE, POTASSIUM SULFATE, MAGNESIUM SULFATE 354 ML: 17.5; 3.13; 1.6 SOLUTION, CONCENTRATE ORAL at 07:08

## 2023-08-10 RX ADMIN — AMLODIPINE BESYLATE 2.5 MG: 2.5 TABLET ORAL at 09:08

## 2023-08-10 RX ADMIN — MORPHINE SULFATE 2 MG: 4 INJECTION, SOLUTION INTRAMUSCULAR; INTRAVENOUS at 02:08

## 2023-08-10 RX ADMIN — PANTOPRAZOLE SODIUM 8 MG/HR: 40 INJECTION, POWDER, FOR SOLUTION INTRAVENOUS at 11:08

## 2023-08-10 RX ADMIN — PROCHLORPERAZINE EDISYLATE 5 MG: 5 INJECTION INTRAMUSCULAR; INTRAVENOUS at 09:08

## 2023-08-10 RX ADMIN — MORPHINE SULFATE 2 MG: 4 INJECTION, SOLUTION INTRAMUSCULAR; INTRAVENOUS at 07:08

## 2023-08-10 NOTE — AI DETERIORATION ALERT
Artificial Intelligence Notification  [facility]  [address]  [place] [address]  Phone: [phone]    This documentation was triggered by an Artificial Intelligence Notification:    Admit Date: 2023   LOS: 0  Code Status: Full Code  : 1985  Age: 37 y.o.  Weight:   Wt Readings from Last 1 Encounters:   23 44 kg (97 lb)        Sex: female  Bed: 546/546 A  MRN: [id number]  Attending Physician: Loly Tejeda MD     Date of Alert: 08/10/2023  Time AI Alert Received: 815            Vitals:    08/10/23 1428   BP:    Pulse:    Resp: (P) 15   Temp:      SpO2: 100 %      Artificial Intelligence alert discussed with Provider:     Name: NA      Date/Time of Provider Notification: NA      Patient Condition: AI triggered this Am and chart was reviewed with no concern for ICU intervention. Follow up of the patient's chart and 2 PRBCs being transfused for low h/h. No ICU interventions at this time.

## 2023-08-10 NOTE — PROGRESS NOTES
[facility]  Hospital Medicine Progress Note        Chief Complaint: Emesis (Pt had gastric sleeve revision in febrauary. States perf bowel and migrated mesh during surgery. Pt severe weight loss, intractable vomiting. Tender abd near j tube. Seizure activity Friday and seen at [facility] and sent home. Last BM yesterday and normal. Cbg 101)         Patient information was obtained from patient, patient's family, past medical records and ER records.      HISTORY OF PRESENT ILLNESS:   Chelsie Lee is a 37 y.o. female who PMH includes gastric sleeve revision s/p bowel perforation; anxiety, GERD; presents to the ED at [facility] on 8/8/2023 with a primary complaint of nausea and vomiting with associated abdominal pain.  Patient reports she has had a gastric sleeve revision in February 2023 secondary to bowel perforation and migrated mesh.  She also had a J-tube placed during that time for nutritional support.  Patient reports decreased appetite since then with significant weight loss.  She denies any new foods or medications.  Patient reports she was seen in the ED at [facility] for seizure like activity; she denies any history of seizures in the past.  Patient denies any seizure activity since then.  Lab work done revealed WBCs 13.82, H&H 11.3/33.6, sodium 132, glucose 102, other indices unremarkable.  CT of the abdomen and pelvis with and without contrast impression reviewed demonstrated hiatal hernia and operative changes, cecum and ascending colon to the hepatic flexure is remarkable for irregular mural thickening suggests nonspecific colitis, bilateral adnexa ovarian cystic structures which is large on the right.  Initial vital signs reviewed BP 2/117 pulse 106 respirations 16 temperature 98.2° F O2 saturation 93% on room air.  Patient received hydration antiemetic therapy and pain medication in the ED, her blood pressure trended down and saturations improved.  Patient is admitted to hospital  medicine services for further management.       Patient was admitted for intractable nausea and vomiting.  GI was consulted and she underwent an upper GI endoscopy with findings of esophagitis and gastrojejunal erythema and friable lesions.  GI recommended to begin pantoprazole drip for 3 days.      Today's information  Patient seen and examined at bedside patient reports nausea and vomiting has improved she feels way better.  However she did report black tarry stool last night.  Hemoglobin levels trended downward to 7.2 grams/dL today.  White cell count trending down to normal   EGD reports noted with GI recommendations.        Exam  General appearance: Well-developed, well-nourished female complaints of abdominal pain, non-toxic, in no apparent distress.  HENT: Atraumatic head. Moist mucous membranes of oral cavity.  Eyes: PERRL  Neck: Supple.   Lungs: Clear to auscultation bilaterally. No wheezing present.   Heart: Regular rate and rhythm. S1 and S2 present cap refill brisk  Abdomen: Soft, non-distended, generalized TTP; Bowel sounds are normal, J tube in place   Extremities: No cyanosis, clubbing,GALEAS  Neuro: Oriented x 3, no acute focal deficits  Psych/mental status: Appropriate mood and affect. Responds appropriately to questions.          ASSESSMENT:  Intractable nausea and vomiting   Acute esophagitis   Acute GI bleed   Acute on chronic anemia   Leukocytosis, resolved   Severe malnutrition   Right ovarian cysts, stable  History of partial hysterectomy   Abdominal pain- generalized-   HTN- controlled   S/P gastric sleeve with revision- secondary to mesh migration- POA  Weakness- POA  Seizures- recent- POA     PLAN:  Appreciate GI input to continue pantoprazole drip   Transfuse with 2 units of PRBCs, give IV Lasix 20 x 1 in between units   Inform GI of anemia and reports of dark tarry stool  May require colonoscopy   Discontinue amlodipine blood pressure is too low   Consult nutritionist to optimize nutrition    Pain control as needed   Resume home medication as deemed necessary       VTE Prophylaxis:  SCD for DVT prophylaxis and will be advised to be as mobile as possible      Patient condition:  Stable       Critical Care diagnoses acute on chronic anemia requiring transfusion  Critical care time greater than 35 minutes     VITAL SIGNS: 24 HRS MIN & MAX LAST   Temp  Min: 98.1 °F (36.7 °C)  Max: 99.6 °F (37.6 °C) 98.3 °F (36.8 °C)   BP  Min: 75/52  Max: 167/102 136/87   Pulse  Min: 96  Max: 116  100   Resp  Min: 18  Max: 18 18   SpO2  Min: 96 %  Max: 100 % 100 %     I have reviewed the following labs:    Recent Labs   Lab 08/08/23  1558 08/09/23  0358 08/10/23  0348   WBC 13.82* 14.38* 8.02   RBC 3.84* 3.47* 2.48*   HGB 11.3* 10.1* 7.2*   HCT 33.6* 31.1* 22.1*   MCV 87.5 89.6 89.1   MCH 29.4 29.1 29.0   MCHC 33.6 32.5* 32.6*   RDW 16.6 17.2* 17.2*   * 412* 322   MPV 11.0* 11.5* 10.7*       Recent Labs   Lab 08/08/23  1558 08/09/23  0358 08/09/23  1849 08/10/23  0348   * 130* 130* 132*   K 3.8 3.8 4.2 4.4   CO2 22 19* 21* 22   BUN 13.4 10.6 6.7* 9.0   CREATININE 0.69 0.63 0.56 0.61   CALCIUM 8.1* 7.7* 7.6* 7.3*   MG 1.80  --   --   --    ALBUMIN 2.2* 1.9*  --   --    ALKPHOS 87 71  --   --    ALT 14 11  --   --    AST 13 11  --   --    BILITOT 1.5 1.1  --   --           Microbiology Results (last 7 days)       Procedure Component Value Units Date/Time    Blood Culture #1 **CANNOT BE ORDERED STAT** [[id number]]  (Normal) Collected: 08/08/23 6801    Order Status: Completed Specimen: Blood from Arm, Right Updated: 08/09/23 2100     CULTURE, BLOOD ([facility]) No Growth At 24 Hours    Blood Culture #2 **CANNOT BE ORDERED STAT** [[id number]]  (Normal) Collected: 08/08/23 1733    Order Status: Completed Specimen: Blood from Arm, Left Updated: 08/09/23 2100     CULTURE, BLOOD ([facility]) No Growth At 24 Hours             See below for Radiology    Scheduled Med:   furosemide (LASIX) injection  20 mg Intravenous Once     sodium,potassium,mag sulfates  1 kit Oral Once        Continuous Infusions:   electrolyte-A 10 mL/hr at 08/09/23 1119    pantoprazole (PROTONIX) IV infusion 8 mg/hr (08/10/23 1111)        PRN Meds:  0.9%  NaCl infusion (for blood administration), acetaminophen, acetaminophen, ALPRAZolam, dicyclomine, hydrALAZINE, HYDROcodone-acetaminophen, morphine, naloxone, ondansetron, prochlorperazine, sodium chloride 0.9%       Assessment/Plan:      VTE prophylaxis:     Patient condition:  Stable/Fair/Guarded/ Serious/ Critical    Anticipated discharge and Disposition:         All diagnosis and differential diagnosis have been reviewed; assessment and plan has been documented; I have personally reviewed the labs and test results that are presently available; I have reviewed the patients medication list; I have reviewed the consulting providers response and recommendations. I have reviewed or attempted to review medical records based upon their availability    All of the patient's questions have been  addressed and answered. Patient's is agreeable to the above stated plan. I will continue to monitor closely and make adjustments to medical management as needed.  _____________________________________________________________________    Nutrition Status:    Radiology:  I have personally reviewed the following imaging and agree with the radiologist.     US Pelvis Comp with Transvag NON-OB (xpd  Narrative: EXAMINATION:  US PELVIS COMP WITH TRANSVAG NON-OB (XPD)    CLINICAL HISTORY:  bilateral adnexal cysts, abd pain;    TECHNIQUE:  Transabdominal sonography of the pelvis was performed, followed by transvaginal sonography to better evaluate the uterus and ovaries.    COMPARISON:  None.    FINDINGS:  Uterus:    Patient is status post partial hysterectomy    Right ovary:    Size: 4.5 x 3.6 x 4.9 cm    Appearance: Cyst with echogenic material measures 5.1 by 5 4.6 x 5.1    Vascular flow: Normal.    Left ovary:    Size: 4 x 2.7 x 2.4  cm    Appearance: Normal    Vascular Flow: Normal.    Free Fluid:    None.  Impression: Large cyst with echogenic material identified in the right ovary with measurements as above.    Status post partial hysterectomy.    No other abnormalities    Electronically signed by: Thiago Glynn  Date:    08/09/2023  Time:    09:03      Loly Tejeda MD   08/10/2023

## 2023-08-10 NOTE — PLAN OF CARE
08/10/23 1038   Discharge Assessment   Assessment Type Discharge Planning Assessment   Confirmed/corrected address, phone number and insurance Yes   Confirmed Demographics Correct on Facesheet   Source of Information patient   When was your last doctors appointment?   (PCP: Dr Galdino Caceres last seen 1 month)   Reason For Admission Colitis (primary Dx) Intractable Nausea & vomiting, Chest pain   People in Home child(mahamed), dependent;spouse   Do you expect to return to your current living situation? Yes   Do you have help at home or someone to help you manage your care at home?   (family helps her. Her Mother is helping her .)   Prior to hospitilization cognitive status: Alert/Oriented   Current cognitive status: Alert/Oriented   Walking or Climbing Stairs   (ambulates independently)   Dressing/Bathing   (Can perform ADLS)   Do you have any problems with:   (Mother has been helping her)   Home Layout Able to live on 1st floor   Equipment Currently Used at Home none   Readmission within 30 days? No   Patient currently being followed by outpatient case management? No   Do you currently have service(s) that help you manage your care at home?   (patient just started with Home Health Agency but cannot rememeber name)   Do you take prescription medications? Yes   Do you have prescription coverage? Yes   Coverage [facility]   Do you have any problems affording any of your prescribed medications? No   Is the patient taking medications as prescribed? yes   Who is going to help you get home at discharge? family probaby mother  Melisa Lee [phone]   How do you get to doctors appointments? family or friend will provide   Are you on dialysis? No   Do you take coumadin? No   Discharge Plan A Home with family;Home Health   Discharge Plan B Home with family;Home Health   DME Needed Upon Discharge  none   Discharge Plan discussed with: Patient   Transition of Care Barriers None    Physical Activity   On average, how many days per week do you engage in moderate to strenuous exercise (like a brisk walk)? 0 days   On average, how many minutes do you engage in exercise at this level? 0 min   Financial Resource Strain   How hard is it for you to pay for the very basics like food, housing, medical care, and heating? Not hard   Housing Stability   In the last 12 months, was there a time when you were not able to pay the mortgage or rent on time? N   In the last 12 months, how many places have you lived? 1   Transportation Needs   In the past 12 months, has lack of transportation kept you from medical appointments or from getting medications? no   In the past 12 months, has lack of transportation kept you from meetings, work, or from getting things needed for daily living? No   Food Insecurity   Within the past 12 months, you worried that your food would run out before you got the money to buy more. Never true   Within the past 12 months, the food you bought just didn't last and you didn't have money to get more. Never true   Stress   Do you feel stress - tense, restless, nervous, or anxious, or unable to sleep at night because your mind is troubled all the time - these days? Only a littl   Social Connections   In a typical week, how many times do you talk on the phone with family, friends, or neighbors? More than 3   How often do you get together with friends or relatives? More than 3   How often do you attend Yarsani or Episcopal services? Never   Do you belong to any clubs or organizations such as Yarsani groups, unions, fraternal or athletic groups, or school groups? No   How often do you attend meetings of the clubs or organizations you belong to? Never   Are you , , , , never , or living with a partner?    Alcohol Use   Q1: How often do you have a drink containing alcohol? Never   Q2: How many drinks containing alcohol do you have on a typical day  when you are drinking? None   Q3: How often do you have six or more drinks on one occasion? Never   OTHER   Name(s) of People in Home Jewel Mata and 3 children

## 2023-08-10 NOTE — CONSULTS
Inpatient Nutrition Assessment    Admit Date: 8/8/2023   Total duration of encounter: 2 days     Nutrition Recommendation/Prescription     -Advance diet as tolerated per MD. Goal Diet: GI Soft.   -If unable to advance diet in the next 1-2 days, consider PPN. PPN recommendations:  Clinimix E 4.25/5 @ 75 mL/hr with IVPB IL 20% 250 mL 3x/week. This will provide:  830 kcal (54% est needs)  77 gm protein (117% est needs)  1800 mL fluid (136% est needs)  -Monitor K, phos, and Mag with PN initiation as pt may be at risk for refeeding.     Communication of Recommendations: reviewed with nurse    Nutrition Assessment     Malnutrition Assessment/Nutrition-Focused Physical Exam       Malnutrition Level: other (see comments) (unable to evaluate)  Energy Intake (Malnutrition): other (see comments) (unable to evaluate)  Weight Loss (Malnutrition): other (see comments) (unable to evaluate)  Subcutaneous Fat (Malnutrition): other (see comments) (unable to evaluate)           Muscle Mass (Malnutrition): other (see comments) (unable to evaluate)                                   A minimum of two characteristics is recommended for diagnosis of either severe or non-severe malnutrition.    Chart Review    Reason Seen: physician consult for malnutrition, severe wt loss    Malnutrition Screening Tool Results   Have you recently lost weight without trying?: Yes: 34 lbs or more  Have you been eating poorly because of a decreased appetite?: Yes   MST Score: 5     Diagnosis:  Acute cecum and ascending colon colitis  Nausea with vomiting-  intractable  Abdominal pain- generalized  HTN- urgency  Leukocytosis-suspect secondary to colitis  Adnexal cyst- right  S/P gastric sleeve with revision- secondary to mesh migration  Weakness  Seizures- recent    Relevant Medical History:   Gastric sleeve revision   Anxiety  Nausea with vomiting    Nutrition-Related Medications: protonix, electrolyte A infusion   Calorie Containing IV Medications: no  "significant kcals from medications at this time    Nutrition-Related Labs:  8/10/23: Na 132, GFR>60    Diet/PN Order: Diet clear liquid  Oral Supplement Order: none  Tube Feeding Order: none  Appetite/Oral Intake: not applicable/not applicable  Factors Affecting Nutritional Intake: clear liquid diet  Food/Orthodox/Cultural Preferences: none reported  Food Allergies: no known food allergies       Wound(s):   none noted    Comments    8/10/23: Attempted to see pt x 2 and pt was not in the room; noted that pt is on clear liquids; consult for malnutrition and severe wt loss. Will conduct NFPE on f/u.     Anthropometrics    Height: 5' 4" (162.6 cm)    Last Weight: 44 kg (97 lb) (23 0800) Weight Method: Standard Scale  BMI (Calculated): 16.6  BMI Classification: underweight (BMI less than 18.5)     Ideal Body Weight (IBW), Female: 120 lb     % Ideal Body Weight, Female (lb): 80.83 %                             Usual Weight Provided By: unable to obtain usual weight    Wt Readings from Last 5 Encounters:   23 44 kg (97 lb)     Weight Change(s) Since Admission:  Admit Weight: 40.8 kg (90 lb) (23 1547)      Estimated Needs    Weight Used For Calorie Calculations: 44 kg (97 lb)  Energy Calorie Requirements (kcal): 1540 kcal (MSJ x 1.4 SF)  Energy Need Method: McCulloch-St Jeor  Weight Used For Protein Calculations: 44 kg (97 lb)  Protein Requirements: 66 gm (1.5g/kg)  Fluid Requirements (mL): 1320 mL (30mL/kg)  Temp (24hrs), Av.4 °F (36.9 °C), Min:98.1 °F (36.7 °C), Max:99.6 °F (37.6 °C)       Enteral Nutrition    Patient not receiving enteral nutrition at this time.    Parenteral Nutrition    Patient not receiving parenteral nutrition support at this time.    Evaluation of Received Nutrient Intake    Calories: not meeting estimated needs  Protein: not meeting estimated needs    Patient Education    Not applicable.    Nutrition Diagnosis     PES: Inadequate oral intake related to altered GI function as " evidenced by NPO/clear liquid diet since admit. (new)    Interventions/Goals     Intervention(s): modified composition of parenteral nutrition, modified rate of parenteral nutrition, and collaboration with other providers  Goal: Meet greater than 75% of nutritional needs by follow-up. (new)    Monitoring & Evaluation     Dietitian will monitor energy intake and weight.  Nutrition Risk/Follow-Up: high (follow-up in 1-4 days)   Please consult if re-assessment needed sooner.

## 2023-08-10 NOTE — PROGRESS NOTES
"Gastroenterology Progress Note    Subjective/Interval History:  Patient reports that she began having melena after her EGD yesterday.  Black, tarry stool hard to clean. About 4 episodes in total and the last had BRB.  Hgb down from 10.1 yesterday to 7.2 this am. Primary has ordered 2u prbcs.  BP was documented at 75/52 around 7:30 am, but was immediately rechecked and was 138/72.  Stable since.  She feels ok, denies weakness, dizziness, lightheadedness, etc.  Just got the shower.  A bit tachycardiac.  No n/v.  Has odynophagia but is tolerating clear liquids and pudding.  Slept very well last night; woke up to j-tube leaking around the stoma which has been an issue for some time.      Admits to chronic anemia.  Reports compliance with bariatric MVI with iron.  Denies prior melena of h/o GIB.  Had to get transfusion and iron infusion about a year ago.    Reports she is "on the brink" of von willebrands.  She states after her  she got ddavp, but they retested her for it after her 3rd child and she was "clear of it."  States that she did not get it perioperatively with her recent several surgeries.      Vital Signs:  /87   Pulse 100   Temp 98.3 °F (36.8 °C) (Oral)   Resp 18   Ht 5' 4" (1.626 m)   Wt 44 kg (97 lb)   SpO2 100%   Breastfeeding No   BMI 16.65 kg/m²   Body mass index is 16.65 kg/m².    Physical Exam:  Physical Exam  Constitutional:       General: She is not in acute distress.     Appearance: She is cachectic. She is ill-appearing (chronically).   HENT:      Head: Normocephalic and atraumatic.      Comments: prominent cheekbones and sunken eyes  Eyes:      General: No scleral icterus.     Extraocular Movements: Extraocular movements intact.   Cardiovascular:      Rate and Rhythm: Normal rate and regular rhythm.   Pulmonary:      Effort: Pulmonary effort is normal. No respiratory distress.   Abdominal:      General: Bowel sounds are normal. There is no distension.      Palpations: " Abdomen is soft. There is no mass.      Tenderness: There is abdominal tenderness (mild in left abdomen). There is no guarding or rebound.      Comments: Several abdominal surgical scars noted, well healed.  J tube in left abdomen.    Musculoskeletal:         General: Normal range of motion.      Right lower leg: No edema.      Left lower leg: No edema.   Skin:     General: Skin is warm and dry.      Coloration: Skin is pale.   Neurological:      Mental Status: She is alert and oriented to person, place, and time.   Psychiatric:         Mood and Affect: Mood and affect normal.      Labs:  Recent Results (from the past 48 hour(s))   POCT glucose    Collection Time: 08/08/23  3:51 PM   Result Value Ref Range    POCT Glucose 101 70 - 110 mg/dL   Comprehensive metabolic panel    Collection Time: 08/08/23  3:58 PM   Result Value Ref Range    Sodium Level 132 (L) 136 - 145 mmol/L    Potassium Level 3.8 3.5 - 5.1 mmol/L    Chloride 104 98 - 107 mmol/L    Carbon Dioxide 22 22 - 29 mmol/L    Glucose Level 102 (H) 74 - 100 mg/dL    Blood Urea Nitrogen 13.4 7.0 - 18.7 mg/dL    Creatinine 0.69 0.55 - 1.02 mg/dL    Calcium Level Total 8.1 (L) 8.4 - 10.2 mg/dL    Protein Total 5.4 (L) 6.4 - 8.3 gm/dL    Albumin Level 2.2 (L) 3.5 - 5.0 g/dL    Globulin 3.2 2.4 - 3.5 gm/dL    Albumin/Globulin Ratio 0.7 (L) 1.1 - 2.0 ratio    Bilirubin Total 1.5 <=1.5 mg/dL    Alkaline Phosphatase 87 40 - 150 unit/L    Alanine Aminotransferase 14 0 - 55 unit/L    Aspartate Aminotransferase 13 5 - 34 unit/L    eGFR >60 mls/min/1.73/m2   Magnesium    Collection Time: 08/08/23  3:58 PM   Result Value Ref Range    Magnesium Level 1.80 1.60 - 2.60 mg/dL   Lipase    Collection Time: 08/08/23  3:58 PM   Result Value Ref Range    Lipase Level 4 <=60 U/L   CBC with Differential    Collection Time: 08/08/23  3:58 PM   Result Value Ref Range    WBC 13.82 (H) 4.50 - 11.50 x10(3)/mcL    RBC 3.84 (L) 4.20 - 5.40 x10(6)/mcL    Hgb 11.3 (L) 12.0 - 16.0 g/dL    Hct  33.6 (L) 37.0 - 47.0 %    MCV 87.5 80.0 - 94.0 fL    MCH 29.4 27.0 - 31.0 pg    MCHC 33.6 33.0 - 36.0 g/dL    RDW 16.6 11.5 - 17.0 %    Platelet 422 (H) 130 - 400 x10(3)/mcL    MPV 11.0 (H) 7.4 - 10.4 fL    Neut % 78.2 %    Lymph % 12.2 %    Mono % 7.9 %    Eos % 0.3 %    Basophil % 0.5 %    Lymph # 1.69 0.6 - 4.6 x10(3)/mcL    Neut # 10.81 (H) 2.1 - 9.2 x10(3)/mcL    Mono # 1.09 0.1 - 1.3 x10(3)/mcL    Eos # 0.04 0 - 0.9 x10(3)/mcL    Baso # 0.07 <=0.2 x10(3)/mcL    IG# 0.12 (H) 0 - 0.04 x10(3)/mcL    IG% 0.9 %    NRBC% 0.0 %   Blood Culture #1 **CANNOT BE ORDERED STAT**    Collection Time: 08/08/23  5:33 PM    Specimen: Arm, Right; Blood   Result Value Ref Range    CULTURE, BLOOD ([facility]) No Growth At 24 Hours    Blood Culture #2 **CANNOT BE ORDERED STAT**    Collection Time: 08/08/23  5:33 PM    Specimen: Arm, Left; Blood   Result Value Ref Range    CULTURE, BLOOD ([facility]) No Growth At 24 Hours    Lactic acid, plasma    Collection Time: 08/08/23  5:33 PM   Result Value Ref Range    Lactic Acid Level 2.1 0.5 - 2.2 mmol/L   Lactic Acid, Plasma    Collection Time: 08/08/23  7:36 PM   Result Value Ref Range    Lactic Acid Level 1.0 0.5 - 2.2 mmol/L   Urinalysis, Reflex to Urine Culture    Collection Time: 08/08/23  8:20 PM    Specimen: Urine   Result Value Ref Range    Color, UA Dark Yellow Yellow, Light-Yellow, Dark Yellow, Mitzi, Straw    Appearance, UA Cloudy (A) Clear    Specific Gravity, UA >=1.040 (H) 1.005 - 1.030    pH, UA 5.5 5.0 - 8.5    Protein, UA Negative Negative    Glucose, UA Negative Negative, Normal    Ketones, UA 1+ (A) Negative    Blood, UA Negative Negative    Bilirubin, UA 1+ (A) Negative    Urobilinogen, UA 1.0 0.2, 1.0, Normal    Nitrites, UA Negative Negative    Leukocyte Esterase, UA Negative Negative   Urinalysis, Microscopic    Collection Time: 08/08/23  8:20 PM   Result Value Ref Range    RBC, UA 0-2 None Seen, 0-2, 3-5, 0-5 /HPF    WBC, UA 0-2 None Seen, 0-2, 3-5, 0-5 /HPF    Squamous  Epithelial Cells, UA 0-4 0-4, None Seen /HPF    Bacteria, UA Rare None Seen, Rare, Occasional /HPF    Mucous, UA Moderate (A) None Seen /LPF    Calcium Oxalate Crystals, UA Moderate (A) None Seen /HPF   Comprehensive Metabolic Panel (CMP)    Collection Time: 08/09/23  3:58 AM   Result Value Ref Range    Sodium Level 130 (L) 136 - 145 mmol/L    Potassium Level 3.8 3.5 - 5.1 mmol/L    Chloride 103 98 - 107 mmol/L    Carbon Dioxide 19 (L) 22 - 29 mmol/L    Glucose Level 78 74 - 100 mg/dL    Blood Urea Nitrogen 10.6 7.0 - 18.7 mg/dL    Creatinine 0.63 0.55 - 1.02 mg/dL    Calcium Level Total 7.7 (L) 8.4 - 10.2 mg/dL    Protein Total 4.5 (L) 6.4 - 8.3 gm/dL    Albumin Level 1.9 (L) 3.5 - 5.0 g/dL    Globulin 2.6 2.4 - 3.5 gm/dL    Albumin/Globulin Ratio 0.7 (L) 1.1 - 2.0 ratio    Bilirubin Total 1.1 <=1.5 mg/dL    Alkaline Phosphatase 71 40 - 150 unit/L    Alanine Aminotransferase 11 0 - 55 unit/L    Aspartate Aminotransferase 11 5 - 34 unit/L    eGFR >60 mls/min/1.73/m2   CBC with Differential    Collection Time: 08/09/23  3:58 AM   Result Value Ref Range    WBC 14.38 (H) 4.50 - 11.50 x10(3)/mcL    RBC 3.47 (L) 4.20 - 5.40 x10(6)/mcL    Hgb 10.1 (L) 12.0 - 16.0 g/dL    Hct 31.1 (L) 37.0 - 47.0 %    MCV 89.6 80.0 - 94.0 fL    MCH 29.1 27.0 - 31.0 pg    MCHC 32.5 (L) 33.0 - 36.0 g/dL    RDW 17.2 (H) 11.5 - 17.0 %    Platelet 412 (H) 130 - 400 x10(3)/mcL    MPV 11.5 (H) 7.4 - 10.4 fL    Neut % 59.7 %    Lymph % 25.8 %    Mono % 12.6 %    Eos % 0.6 %    Basophil % 0.3 %    Lymph # 3.71 0.6 - 4.6 x10(3)/mcL    Neut # 8.59 2.1 - 9.2 x10(3)/mcL    Mono # 1.81 (H) 0.1 - 1.3 x10(3)/mcL    Eos # 0.08 0 - 0.9 x10(3)/mcL    Baso # 0.05 <=0.2 x10(3)/mcL    IG# 0.14 (H) 0 - 0.04 x10(3)/mcL    IG% 1.0 %    NRBC% 0.0 %   Osmolality, Serum    Collection Time: 08/09/23  6:49 PM   Result Value Ref Range    Osmolality 275 (L) 280 - 300 mOsm/kg   Basic Metabolic Panel    Collection Time: 08/09/23  6:49 PM   Result Value Ref Range    Sodium  Level 130 (L) 136 - 145 mmol/L    Potassium Level 4.2 3.5 - 5.1 mmol/L    Chloride 103 98 - 107 mmol/L    Carbon Dioxide 21 (L) 22 - 29 mmol/L    Glucose Level 99 74 - 100 mg/dL    Blood Urea Nitrogen 6.7 (L) 7.0 - 18.7 mg/dL    Creatinine 0.56 0.55 - 1.02 mg/dL    BUN/Creatinine Ratio 12     Calcium Level Total 7.6 (L) 8.4 - 10.2 mg/dL    Anion Gap 6.0 mEq/L    eGFR >60 mls/min/1.73/m2   Basic Metabolic Panel    Collection Time: 08/10/23  3:48 AM   Result Value Ref Range    Sodium Level 132 (L) 136 - 145 mmol/L    Potassium Level 4.4 3.5 - 5.1 mmol/L    Chloride 106 98 - 107 mmol/L    Carbon Dioxide 22 22 - 29 mmol/L    Glucose Level 87 74 - 100 mg/dL    Blood Urea Nitrogen 9.0 7.0 - 18.7 mg/dL    Creatinine 0.61 0.55 - 1.02 mg/dL    BUN/Creatinine Ratio 15     Calcium Level Total 7.3 (L) 8.4 - 10.2 mg/dL    Anion Gap 4.0 mEq/L    eGFR >60 mls/min/1.73/m2   CBC with Differential    Collection Time: 08/10/23  3:48 AM   Result Value Ref Range    WBC 8.02 4.50 - 11.50 x10(3)/mcL    RBC 2.48 (L) 4.20 - 5.40 x10(6)/mcL    Hgb 7.2 (L) 12.0 - 16.0 g/dL    Hct 22.1 (L) 37.0 - 47.0 %    MCV 89.1 80.0 - 94.0 fL    MCH 29.0 27.0 - 31.0 pg    MCHC 32.6 (L) 33.0 - 36.0 g/dL    RDW 17.2 (H) 11.5 - 17.0 %    Platelet 322 130 - 400 x10(3)/mcL    MPV 10.7 (H) 7.4 - 10.4 fL    Neut % 51.5 %    Lymph % 32.9 %    Mono % 13.1 %    Eos % 1.2 %    Basophil % 0.4 %    Lymph # 2.64 0.6 - 4.6 x10(3)/mcL    Neut # 4.13 2.1 - 9.2 x10(3)/mcL    Mono # 1.05 0.1 - 1.3 x10(3)/mcL    Eos # 0.10 0 - 0.9 x10(3)/mcL    Baso # 0.03 <=0.2 x10(3)/mcL    IG# 0.07 (H) 0 - 0.04 x10(3)/mcL    IG% 0.9 %    NRBC% 0.0 %   Type & Screen    Collection Time: 08/10/23 10:02 AM   Result Value Ref Range    Group & Rh O POS     Indirect Lalit GEL NEG     Specimen Outdate 08/13/2023 23:59    ABORH RETYPE    Collection Time: 08/10/23 10:55 AM   Result Value Ref Range    ABORH Retype O POS        Assessment/Plan:  36 yo CF known to Dr. 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  Complicated by severe malnutrition, weight loss of 60 lbs, and persistent abdominal pain, n/v.    Abdominal pain, N/V - secondary to severe diffuse ulceration  S/p EGD 8/9/23:  LA grade B reflux esophagitis, Kel-en-Y gastrojejunostomy with gastrojejunal anastomosis characterized by congestion, erythema, and ulceration. proximal pouch showed edema and friability with mild stenosis around the prior sleeve site.  Upon entering the distal pouch past the stenosis, 90% of the gastric mucosa is ulcerated, including the GJ anastamosis. At 55cm, the jejunal limb branches into 2 areas of anastamosis, all healthy in apperance. Bxed taken for h.pylori  - PPI gtt x72 hours given severity of ulceration and now melena  - prn antiemetics  - clear liquids   - TFs held, continue to hold    2. GI bleeding: melena with some BRB  Could be upper GI or lower.  CT on admit with right colonic mural thickening   3. Acute on chronic anemia   Hgb 11.3 -- 10.1 -- 7.2   - stat NM GIB scan  - agree with transfusion  - prep for colonoscopy unless NM bleed scan shows another source.  - montior stools      CALVIN DasC

## 2023-08-11 ENCOUNTER — ANESTHESIA (OUTPATIENT)
Dept: ENDOSCOPY | Facility: HOSPITAL | Age: 38
DRG: 377 | End: 2023-08-11
Payer: COMMERCIAL

## 2023-08-11 ENCOUNTER — ANESTHESIA EVENT (OUTPATIENT)
Dept: ENDOSCOPY | Facility: HOSPITAL | Age: 38
DRG: 377 | End: 2023-08-11
Payer: COMMERCIAL

## 2023-08-11 LAB
ANION GAP SERPL CALC-SCNC: 9 MEQ/L
BASOPHILS # BLD AUTO: 0.04 X10(3)/MCL
BASOPHILS NFR BLD AUTO: 0.4 %
BUN SERPL-MCNC: 6.4 MG/DL (ref 7–18.7)
CALCIUM SERPL-MCNC: 7.3 MG/DL (ref 8.4–10.2)
CHLORIDE SERPL-SCNC: 108 MMOL/L (ref 98–107)
CO2 SERPL-SCNC: 20 MMOL/L (ref 22–29)
CREAT SERPL-MCNC: 0.68 MG/DL (ref 0.55–1.02)
CREAT UR-MCNC: 30.3 MG/DL (ref 45–106)
CREAT/UREA NIT SERPL: 9
EOSINOPHIL # BLD AUTO: 0.22 X10(3)/MCL (ref 0–0.9)
EOSINOPHIL NFR BLD AUTO: 2.3 %
ERYTHROCYTE [DISTWIDTH] IN BLOOD BY AUTOMATED COUNT: 17.6 % (ref 11.5–17)
GFR SERPLBLD CREATININE-BSD FMLA CKD-EPI: >60 MLS/MIN/1.73/M2
GLUCOSE SERPL-MCNC: 80 MG/DL (ref 74–100)
HCT VFR BLD AUTO: 33.5 % (ref 37–47)
HGB BLD-MCNC: 11.5 G/DL (ref 12–16)
IMM GRANULOCYTES # BLD AUTO: 0.08 X10(3)/MCL (ref 0–0.04)
IMM GRANULOCYTES NFR BLD AUTO: 0.8 %
LYMPHOCYTES # BLD AUTO: 2.64 X10(3)/MCL (ref 0.6–4.6)
LYMPHOCYTES NFR BLD AUTO: 27 %
MCH RBC QN AUTO: 29.5 PG (ref 27–31)
MCHC RBC AUTO-ENTMCNC: 34.3 G/DL (ref 33–36)
MCV RBC AUTO: 85.9 FL (ref 80–94)
MONOCYTES # BLD AUTO: 1.1 X10(3)/MCL (ref 0.1–1.3)
MONOCYTES NFR BLD AUTO: 11.3 %
NEUTROPHILS # BLD AUTO: 5.68 X10(3)/MCL (ref 2.1–9.2)
NEUTROPHILS NFR BLD AUTO: 58.2 %
NRBC BLD AUTO-RTO: 0 %
OSMOLALITY UR: 384 MOSM/KG (ref 300–1300)
PLATELET # BLD AUTO: 311 X10(3)/MCL (ref 130–400)
PMV BLD AUTO: 10.9 FL (ref 7.4–10.4)
POTASSIUM SERPL-SCNC: 4.3 MMOL/L (ref 3.5–5.1)
RBC # BLD AUTO: 3.9 X10(6)/MCL (ref 4.2–5.4)
SODIUM SERPL-SCNC: 137 MMOL/L (ref 136–145)
SODIUM UR-SCNC: 137 MMOL/L
WBC # SPEC AUTO: 9.76 X10(3)/MCL (ref 4.5–11.5)

## 2023-08-11 PROCEDURE — 25000003 PHARM REV CODE 250: Performed by: NURSE PRACTITIONER

## 2023-08-11 PROCEDURE — 37000008 HC ANESTHESIA 1ST 15 MINUTES: Performed by: STUDENT IN AN ORGANIZED HEALTH CARE EDUCATION/TRAINING PROGRAM

## 2023-08-11 PROCEDURE — D9220A PRA ANESTHESIA: ICD-10-PCS | Mod: CRNA,,, | Performed by: NURSE ANESTHETIST, CERTIFIED REGISTERED

## 2023-08-11 PROCEDURE — 63600175 PHARM REV CODE 636 W HCPCS: Performed by: PHYSICIAN ASSISTANT

## 2023-08-11 PROCEDURE — 63600175 PHARM REV CODE 636 W HCPCS: Performed by: NURSE PRACTITIONER

## 2023-08-11 PROCEDURE — 63600175 PHARM REV CODE 636 W HCPCS: Performed by: NURSE ANESTHETIST, CERTIFIED REGISTERED

## 2023-08-11 PROCEDURE — 45378 DIAGNOSTIC COLONOSCOPY: CPT | Performed by: STUDENT IN AN ORGANIZED HEALTH CARE EDUCATION/TRAINING PROGRAM

## 2023-08-11 PROCEDURE — 37000009 HC ANESTHESIA EA ADD 15 MINS: Performed by: STUDENT IN AN ORGANIZED HEALTH CARE EDUCATION/TRAINING PROGRAM

## 2023-08-11 PROCEDURE — C9113 INJ PANTOPRAZOLE SODIUM, VIA: HCPCS | Performed by: PHYSICIAN ASSISTANT

## 2023-08-11 PROCEDURE — 25000003 PHARM REV CODE 250: Performed by: PHYSICIAN ASSISTANT

## 2023-08-11 PROCEDURE — 80048 BASIC METABOLIC PNL TOTAL CA: CPT | Performed by: INTERNAL MEDICINE

## 2023-08-11 PROCEDURE — 63600175 PHARM REV CODE 636 W HCPCS: Performed by: INTERNAL MEDICINE

## 2023-08-11 PROCEDURE — 21400001 HC TELEMETRY ROOM

## 2023-08-11 PROCEDURE — D9220A PRA ANESTHESIA: Mod: CRNA,,, | Performed by: NURSE ANESTHETIST, CERTIFIED REGISTERED

## 2023-08-11 PROCEDURE — D9220A PRA ANESTHESIA: Mod: ANES,,, | Performed by: ANESTHESIOLOGY

## 2023-08-11 PROCEDURE — 25000003 PHARM REV CODE 250: Performed by: NURSE ANESTHETIST, CERTIFIED REGISTERED

## 2023-08-11 PROCEDURE — 85025 COMPLETE CBC W/AUTO DIFF WBC: CPT | Performed by: INTERNAL MEDICINE

## 2023-08-11 PROCEDURE — D9220A PRA ANESTHESIA: ICD-10-PCS | Mod: ANES,,, | Performed by: ANESTHESIOLOGY

## 2023-08-11 RX ORDER — PROPOFOL 10 MG/ML
VIAL (ML) INTRAVENOUS
Status: COMPLETED
Start: 2023-08-11 | End: 2023-08-11

## 2023-08-11 RX ORDER — LIDOCAINE HYDROCHLORIDE 10 MG/ML
1 INJECTION, SOLUTION EPIDURAL; INFILTRATION; INTRACAUDAL; PERINEURAL ONCE
Status: CANCELLED | OUTPATIENT
Start: 2023-08-11 | End: 2023-08-11

## 2023-08-11 RX ORDER — PROPOFOL 10 MG/ML
VIAL (ML) INTRAVENOUS
Status: DISCONTINUED | OUTPATIENT
Start: 2023-08-11 | End: 2023-08-11

## 2023-08-11 RX ORDER — SODIUM CHLORIDE, SODIUM GLUCONATE, SODIUM ACETATE, POTASSIUM CHLORIDE AND MAGNESIUM CHLORIDE 30; 37; 368; 526; 502 MG/100ML; MG/100ML; MG/100ML; MG/100ML; MG/100ML
INJECTION, SOLUTION INTRAVENOUS CONTINUOUS
Status: DISCONTINUED | OUTPATIENT
Start: 2023-08-11 | End: 2023-08-11

## 2023-08-11 RX ORDER — LIDOCAINE HYDROCHLORIDE 20 MG/ML
INJECTION, SOLUTION EPIDURAL; INFILTRATION; INTRACAUDAL; PERINEURAL
Status: COMPLETED
Start: 2023-08-11 | End: 2023-08-11

## 2023-08-11 RX ORDER — LIDOCAINE HYDROCHLORIDE 20 MG/ML
INJECTION, SOLUTION EPIDURAL; INFILTRATION; INTRACAUDAL; PERINEURAL
Status: DISCONTINUED | OUTPATIENT
Start: 2023-08-11 | End: 2023-08-11

## 2023-08-11 RX ADMIN — PROPOFOL 50 MG: 10 INJECTION, EMULSION INTRAVENOUS at 09:08

## 2023-08-11 RX ADMIN — DICYCLOMINE HYDROCHLORIDE 10 MG: 20 INJECTION, SOLUTION INTRAMUSCULAR at 04:08

## 2023-08-11 RX ADMIN — MORPHINE SULFATE 2 MG: 4 INJECTION, SOLUTION INTRAMUSCULAR; INTRAVENOUS at 04:08

## 2023-08-11 RX ADMIN — ALPRAZOLAM 1 MG: 0.5 TABLET ORAL at 08:08

## 2023-08-11 RX ADMIN — ALPRAZOLAM 1 MG: 0.5 TABLET ORAL at 12:08

## 2023-08-11 RX ADMIN — LIDOCAINE HYDROCHLORIDE 3 MG: 20 INJECTION, SOLUTION INTRAVENOUS at 09:08

## 2023-08-11 RX ADMIN — ONDANSETRON 4 MG: 2 INJECTION INTRAMUSCULAR; INTRAVENOUS at 04:08

## 2023-08-11 RX ADMIN — PANTOPRAZOLE SODIUM 8 MG/HR: 40 INJECTION, POWDER, FOR SOLUTION INTRAVENOUS at 04:08

## 2023-08-11 RX ADMIN — MORPHINE SULFATE 2 MG: 4 INJECTION, SOLUTION INTRAMUSCULAR; INTRAVENOUS at 03:08

## 2023-08-11 RX ADMIN — ONDANSETRON 4 MG: 2 INJECTION INTRAMUSCULAR; INTRAVENOUS at 03:08

## 2023-08-11 RX ADMIN — SODIUM CHLORIDE, SODIUM GLUCONATE, SODIUM ACETATE, POTASSIUM CHLORIDE AND MAGNESIUM CHLORIDE: 526; 502; 368; 37; 30 INJECTION, SOLUTION INTRAVENOUS at 09:08

## 2023-08-11 NOTE — ANESTHESIA PREPROCEDURE EVALUATION
"                                                                                                             08/11/2023  Chelsie Lee is a 37 y.o., female who presents with h/o Colitis.      The pt. comes to [facility] endoscopy for the noted procedure under GA/TIVA.      PMHx:  No medical history recorded     Surgical History:  LAPAROSCOPIC GASTRIC BANDING HERNIA REPAIR   ESOPHAGOGASTRODUODENOSCOPY EGD, WITH CLOSED BIOPSY           Vital signs:  Pre Vitals     Current as of 08/11/23 1115  BP: 141/99 Pulse: 84   Resp: 16 SpO2: 100   Temp:    Height: 5' 4" (1.626 m) (08/09/23) Weight: 44 kg (97 lb) (08/09/23)   BMI: 16.6 IBW: 57.4 kg (126 lb 9.3 oz)   Last edited 08/11/23 1004 by           Lab Data:  N/A    EKG:  Pre-op Assessment    I have reviewed the Patient Summary Reports.     I have reviewed the Nursing Notes. I have reviewed the NPO Status.   I have reviewed the Medications.     Review of Systems  Anesthesia Hx:  No problems with previous Anesthesia    Social:  Non-Smoker    Hematology/Oncology:  Hematology Normal   Oncology Normal     EENT/Dental:EENT/Dental Normal   Cardiovascular:  Cardiovascular Normal Exercise tolerance: good   Functional Capacity good / => 4 METS    Pulmonary:  Pulmonary Normal    Renal/:  Renal/ Normal     Hepatic/GI:  Hepatic/GI Normal    Musculoskeletal:  Musculoskeletal Normal    Neurological:  Neurology Normal    Endocrine:  Endocrine Normal    Dermatological:  Skin Normal    Psych:  Psychiatric Normal           Physical Exam  General: Alert, Oriented, Well nourished and Cooperative    Airway:  Mallampati: II   Mouth Opening: Normal  TM Distance: Normal  Tongue: Normal  Neck ROM: Normal ROM    Dental:  Intact    Chest/Lungs:  Clear to auscultation, Normal Respiratory Rate    Heart:  Rate: Normal  Rhythm: Regular Rhythm        Anesthesia Plan  Type of Anesthesia, risks & benefits discussed:    Anesthesia Type: Gen Natural Airway  Intra-op Monitoring Plan: Standard ASA " Monitors  Induction:  IV  Informed Consent: Informed consent signed with the Patient and all parties understand the risks and agree with anesthesia plan.  All questions answered.   ASA Score: 2  Day of Surgery Review of History & Physical: H&P Update referred to the surgeon/provider.    Ready For Surgery From Anesthesia Perspective.     .

## 2023-08-11 NOTE — ANESTHESIA POSTPROCEDURE EVALUATION
Anesthesia Post Evaluation    Patient: Chelsie Lee    Procedure(s) Performed: Procedure(s) (LRB):  COLON (N/A)    Final Anesthesia Type: general      Patient location during evaluation: PACU  Patient participation: Yes- Able to Participate  Level of consciousness: awake and alert  Post-procedure vital signs: reviewed and stable  Pain management: adequate  Airway patency: patent    PONV status at discharge: No PONV, nausea (controlled) and vomiting (controlled)  Anesthetic complications: no      Cardiovascular status: blood pressure returned to baseline and stable  Respiratory status: unassisted  Hydration status: euvolemic  Follow-up not needed.          Vitals Value Taken Time   /99 08/11/23 1004   Temp 36.8 °C (98.2 °F) 08/11/23 0947   Pulse 84 08/11/23 1004   Resp 16 08/11/23 1004   SpO2 100 % 08/11/23 1004         No case tracking events are documented in the log.      Pain/Eran Score: Pain Rating Prior to Med Admin: 8 (8/11/2023  4:41 AM)  Pain Rating Post Med Admin: 2 (8/11/2023  5:11 AM)  Eran Score: 9 (8/11/2023  9:48 AM)

## 2023-08-11 NOTE — PROGRESS NOTES
[facility]  Hospital Medicine Progress Note        Chief Complaint: Emesis (Pt had gastric sleeve revision in febrauary. States perf bowel and migrated mesh during surgery. Pt severe weight loss, intractable vomiting. Tender abd near j tube. Seizure activity Friday and seen at [facility] and sent home. Last BM yesterday and normal. Cbg 101)         Patient information was obtained from patient, patient's family, past medical records and ER records.      HISTORY OF PRESENT ILLNESS:   Chelsie Lee is a 37 y.o. female who PMH includes gastric sleeve revision s/p bowel perforation; anxiety, GERD; presents to the ED at [facility] on 8/8/2023 with a primary complaint of nausea and vomiting with associated abdominal pain.  Patient reports she has had a gastric sleeve revision in February 2023 secondary to bowel perforation and migrated mesh.  She also had a J-tube placed during that time for nutritional support.  Patient reports decreased appetite since then with significant weight loss.  She denies any new foods or medications.  Patient reports she was seen in the ED at [facility] for seizure like activity; she denies any history of seizures in the past.  Patient denies any seizure activity since then.  Lab work done revealed WBCs 13.82, H&H 11.3/33.6, sodium 132, glucose 102, other indices unremarkable.  CT of the abdomen and pelvis with and without contrast impression reviewed demonstrated hiatal hernia and operative changes, cecum and ascending colon to the hepatic flexure is remarkable for irregular mural thickening suggests nonspecific colitis, bilateral adnexa ovarian cystic structures which is large on the right.  Initial vital signs reviewed BP 2/117 pulse 106 respirations 16 temperature 98.2° F O2 saturation 93% on room air.  Patient received hydration antiemetic therapy and pain medication in the ED, her blood pressure trended down and saturations improved.  Patient is admitted to hospital  medicine services for further management.        Patient was admitted for intractable nausea and vomiting.  GI was consulted and she underwent an upper GI endoscopy with findings of esophagitis and gastrojejunal erythema and friable lesions.  GI recommended to begin pantoprazole drip for 3 days.EGD reports noted with GI recommendations.          Today's information  Patient seen and examined at bedside   GI taking for colonoscopy today   Vitals reviewed and stable on room air   GI bleeding scan was negative   Blood cultures negative at 48 hours   F/up post hgb levels   C/o-  j tube leaking       Exam  General appearance: Well-developed, well-nourished female complaints of abdominal pain, non-toxic, in no apparent distress.  HENT: Atraumatic head. Moist mucous membranes of oral cavity.  Eyes: PERRL  Neck: Supple.   Lungs: Clear to auscultation bilaterally. No wheezing present.   Heart: Regular rate and rhythm. S1 and S2 present cap refill brisk  Abdomen: Soft, non-distended, generalized TTP; Bowel sounds are normal, J tube in place   Extremities: No cyanosis, clubbing,GALEAS  Neuro: Oriented x 3, no acute focal deficits  Psych/mental status: Appropriate mood and affect. Responds appropriately to questions.            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  Weakness- POA  Seizures- recent- POA         PLAN:  GI taking for colonoscopy today   Vitals reviewed and stable on room air   GI bleeding scan was negative   Blood cultures negative at 48 hours   Appreciate GI input to continue pantoprazole drip   F/up post hgb levels   Consult nutritionist to optimize nutrition   Pain control as needed   Resume home  medication as deemed necessary        VTE Prophylaxis:  SCD for DVT prophylaxis and will be advised to be as mobile as possible      Patient condition:  Stable         VITAL SIGNS: 24 HRS MIN & MAX LAST   Temp  Min: 97 °F (36.1 °C)  Max: 98.6 °F (37 °C) 97.9 °F (36.6 °C)   BP  Min: 107/73  Max: 156/92 (!) 155/99   Pulse  Min: 67  Max: 121  96   Resp  Min: 14  Max: 24 16   SpO2  Min: 86 %  Max: 100 % 100 %     I have reviewed the following labs:    Recent Labs   Lab 08/08/23  1558 08/09/23  0358 08/10/23  0348   WBC 13.82* 14.38* 8.02   RBC 3.84* 3.47* 2.48*   HGB 11.3* 10.1* 7.2*   HCT 33.6* 31.1* 22.1*   MCV 87.5 89.6 89.1   MCH 29.4 29.1 29.0   MCHC 33.6 32.5* 32.6*   RDW 16.6 17.2* 17.2*   * 412* 322   MPV 11.0* 11.5* 10.7*       Recent Labs   Lab 08/08/23 1558 08/09/23 0358 08/09/23  1849 08/10/23  0348 08/11/23  0651   * 130* 130* 132* 137   K 3.8 3.8 4.2 4.4 4.3   CO2 22 19* 21* 22 20*   BUN 13.4 10.6 6.7* 9.0 6.4*   CREATININE 0.69 0.63 0.56 0.61 0.68   CALCIUM 8.1* 7.7* 7.6* 7.3* 7.3*   MG 1.80  --   --   --   --    ALBUMIN 2.2* 1.9*  --   --   --    ALKPHOS 87 71  --   --   --    ALT 14 11  --   --   --    AST 13 11  --   --   --    BILITOT 1.5 1.1  --   --   --           Microbiology Results (last 7 days)       Procedure Component Value Units Date/Time    Blood Culture #1 **CANNOT BE ORDERED STAT** [[id number]]  (Normal) Collected: 08/08/23 5470    Order Status: Completed Specimen: Blood from Arm, Right Updated: 08/10/23 2100     CULTURE, BLOOD ([facility]) No Growth At 48 Hours    Blood Culture #2 **CANNOT BE ORDERED STAT** [[id number]]  (Normal) Collected: 08/08/23 1733    Order Status: Completed Specimen: Blood from Arm, Left Updated: 08/10/23 2100     CULTURE, BLOOD ([facility]) No Growth At 48 Hours             See below for Radiology    Scheduled Med:       Continuous Infusions:   pantoprazole (PROTONIX) IV infusion 8 mg/hr (08/11/23 0413)        PRN Meds:  0.9%  NaCl infusion (for blood  administration), acetaminophen, acetaminophen, ALPRAZolam, dicyclomine, hydrALAZINE, HYDROcodone-acetaminophen, morphine, naloxone, ondansetron, prochlorperazine, sodium chloride 0.9%       Assessment/Plan:      VTE prophylaxis:     Patient condition:  Stable/Fair/Guarded/ Serious/ Critical    Anticipated discharge and Disposition:         All diagnosis and differential diagnosis have been reviewed; assessment and plan has been documented; I have personally reviewed the labs and test results that are presently available; I have reviewed the patients medication list; I have reviewed the consulting providers response and recommendations. I have reviewed or attempted to review medical records based upon their availability    All of the patient's questions have been  addressed and answered. Patient's is agreeable to the above stated plan. I will continue to monitor closely and make adjustments to medical management as needed.  _____________________________________________________________________    Nutrition Status:    Radiology:  I have personally reviewed the following imaging and agree with the radiologist.     NM GI Bleed Study  Narrative: EXAMINATION:  NM GI BLEED STUDY    CLINICAL HISTORY:  GI bleed;    TECHNIQUE:  After injection of autologous red blood cells labeled in vitro with 25.6mCi of NUE Tc-99m pertechnetate, sequential dynamic images of the abdomen were obtained for 60minutes.    Delayed static images were acquired in various projections through 3 hours..    COMPARISON:  None.    FINDINGS:  There is normal distribution of the activity within the labeled blood pool with visualization of the heart, liver, spleen, kidneys, bladder, and genitals.    Normal vessels are identified, but no abnormal extraluminal focus of activity is identified.  Impression: No evidence of active GI bleeding over 3 hours.  The sensitivity of this study is  approximately 0.5-1 ml/minute.    Electronically signed by: John  Dorita  Date:    08/10/2023  Time:    18:37      Loly Tejeda MD   08/11/2023

## 2023-08-11 NOTE — PROVATION PATIENT INSTRUCTIONS
Discharge Summary/Instructions after an Endoscopic Procedure  Patient Name: Chelsie Lee  Patient MRN: [id number]  Patient YOB: 1985 Friday, August 11, 2023  Wilmer Rossi MD  Dear patient,  As a result of recent federal legislation (The Federal Cures Act), you may   receive lab or pathology results from your procedure in your [facility]   account before your physician is able to contact you. Your physician or   their representative will relay the results to you with their   recommendations at their soonest availability.  Thank you,  RESTRICTIONS:  During your procedure today, you received medications for sedation.  These   medications may affect your judgment, balance and coordination.  Therefore,   for 24 hours, you have the following restrictions:   - DO NOT drive a car, operate machinery, make legal/financial decisions,   sign important papers or drink alcohol.    ACTIVITY:  Today: no heavy lifting, straining or running due to procedural   sedation/anesthesia.  The following day: return to full activity including work.  DIET:  Eat and drink normally unless instructed otherwise.     TREATMENT FOR COMMON SIDE EFFECTS:  - Mild abdominal pain, nausea, belching, bloating or excessive gas:  rest,   eat lightly and use a heating pad.  - Sore Throat: treat with throat lozenges and/or gargle with warm salt   water.  - Because air was used during the procedure, expelling large amounts of air   from your rectum or belching is normal.  - If a bowel prep was taken, you may not have a bowel movement for 1-3 days.    This is normal.  SYMPTOMS TO WATCH FOR AND REPORT TO YOUR PHYSICIAN:  1. Abdominal pain or bloating, other than gas cramps.  2. Chest pain.  3. Back pain.  4. Signs of infection such as: chills or fever occurring within 24 hours   after the procedure.  5. Rectal bleeding, which would show as bright red, maroon, or black stools.   (A tablespoon of blood from the rectum is not serious, especially if    hemorrhoids are present.)  6. Vomiting.  7. Weakness or dizziness.  GO DIRECTLY TO THE NEAREST EMERGENCY ROOM IF YOU HAVE ANY OF THE FOLLOWING:      Difficulty breathing              Chills and/or fever over 101 F   Persistent vomiting and/or vomiting blood   Severe abdominal pain   Severe chest pain   Black, tarry stools   Bleeding- more than one tablespoon   Any other symptom or condition that you feel may need urgent attention  Your doctor recommends these additional instructions:  If any biopsies were taken, your doctors clinic will contact you in 1 to 2   weeks with any results.  - Return patient to hospital mann for observation.   - Advance diet as tolerated.   - Continue present medications.   - Repeat colonoscopy in 6 months because the bowel preparation was   suboptimal.  For questions, problems or results please call your physician - Wilmer Rossi MD at Work:  [phone].  [facility], EMERGENCY ROOM PHONE NUMBER: [phone]  IF A COMPLICATION OR EMERGENCY SITUATION ARISES AND YOU ARE UNABLE TO REACH   YOUR PHYSICIAN - GO DIRECTLY TO THE EMERGENCY ROOM.  MD Wilmer Bird MD  8/11/2023 9:55:50 AM  This report has been verified and signed electronically.  Dear patient,  As a result of recent federal legislation (The Federal Cures Act), you may   receive lab or pathology results from your procedure in your [facility]   account before your physician is able to contact you. Your physician or   their representative will relay the results to you with their   recommendations at their soonest availability.  Thank you,  PROVATION

## 2023-08-12 LAB
ANION GAP SERPL CALC-SCNC: 6 MEQ/L
BUN SERPL-MCNC: 4.8 MG/DL (ref 7–18.7)
CALCIUM SERPL-MCNC: 7.4 MG/DL (ref 8.4–10.2)
CHLORIDE SERPL-SCNC: 108 MMOL/L (ref 98–107)
CO2 SERPL-SCNC: 21 MMOL/L (ref 22–29)
CREAT SERPL-MCNC: 0.6 MG/DL (ref 0.55–1.02)
CREAT/UREA NIT SERPL: 8
GFR SERPLBLD CREATININE-BSD FMLA CKD-EPI: >60 MLS/MIN/1.73/M2
GLUCOSE SERPL-MCNC: 77 MG/DL (ref 74–100)
HCT VFR BLD AUTO: 31.9 % (ref 37–47)
HGB BLD-MCNC: 10.7 G/DL (ref 12–16)
POTASSIUM SERPL-SCNC: 3.5 MMOL/L (ref 3.5–5.1)
SODIUM SERPL-SCNC: 135 MMOL/L (ref 136–145)

## 2023-08-12 PROCEDURE — 63600175 PHARM REV CODE 636 W HCPCS: Performed by: PHYSICIAN ASSISTANT

## 2023-08-12 PROCEDURE — 21400001 HC TELEMETRY ROOM

## 2023-08-12 PROCEDURE — 63600175 PHARM REV CODE 636 W HCPCS: Performed by: NURSE PRACTITIONER

## 2023-08-12 PROCEDURE — 63600175 PHARM REV CODE 636 W HCPCS: Performed by: INTERNAL MEDICINE

## 2023-08-12 PROCEDURE — 25000003 PHARM REV CODE 250: Performed by: NURSE PRACTITIONER

## 2023-08-12 PROCEDURE — C9113 INJ PANTOPRAZOLE SODIUM, VIA: HCPCS | Performed by: PHYSICIAN ASSISTANT

## 2023-08-12 PROCEDURE — 25000003 PHARM REV CODE 250: Performed by: PHYSICIAN ASSISTANT

## 2023-08-12 PROCEDURE — 85014 HEMATOCRIT: CPT | Performed by: INTERNAL MEDICINE

## 2023-08-12 PROCEDURE — 80048 BASIC METABOLIC PNL TOTAL CA: CPT | Performed by: INTERNAL MEDICINE

## 2023-08-12 RX ORDER — HYDROMORPHONE HYDROCHLORIDE 2 MG/ML
0.5 INJECTION, SOLUTION INTRAMUSCULAR; INTRAVENOUS; SUBCUTANEOUS ONCE
Status: COMPLETED | OUTPATIENT
Start: 2023-08-12 | End: 2023-08-12

## 2023-08-12 RX ADMIN — ALPRAZOLAM 1 MG: 0.5 TABLET ORAL at 11:08

## 2023-08-12 RX ADMIN — ONDANSETRON 4 MG: 2 INJECTION INTRAMUSCULAR; INTRAVENOUS at 11:08

## 2023-08-12 RX ADMIN — HYDROCODONE BITARTRATE AND ACETAMINOPHEN 1 TABLET: 10; 325 TABLET ORAL at 07:08

## 2023-08-12 RX ADMIN — DICYCLOMINE HYDROCHLORIDE 10 MG: 20 INJECTION, SOLUTION INTRAMUSCULAR at 06:08

## 2023-08-12 RX ADMIN — MORPHINE SULFATE 2 MG: 4 INJECTION, SOLUTION INTRAMUSCULAR; INTRAVENOUS at 08:08

## 2023-08-12 RX ADMIN — ALPRAZOLAM 1 MG: 0.5 TABLET ORAL at 07:08

## 2023-08-12 RX ADMIN — MORPHINE SULFATE 2 MG: 4 INJECTION, SOLUTION INTRAMUSCULAR; INTRAVENOUS at 04:08

## 2023-08-12 RX ADMIN — ONDANSETRON 4 MG: 2 INJECTION INTRAMUSCULAR; INTRAVENOUS at 04:08

## 2023-08-12 RX ADMIN — ONDANSETRON 4 MG: 2 INJECTION INTRAMUSCULAR; INTRAVENOUS at 09:08

## 2023-08-12 RX ADMIN — HYDROMORPHONE HYDROCHLORIDE 0.5 MG: 2 INJECTION INTRAMUSCULAR; INTRAVENOUS; SUBCUTANEOUS at 09:08

## 2023-08-12 RX ADMIN — DICYCLOMINE HYDROCHLORIDE 10 MG: 20 INJECTION, SOLUTION INTRAMUSCULAR at 09:08

## 2023-08-12 RX ADMIN — PANTOPRAZOLE SODIUM 8 MG/HR: 40 INJECTION, POWDER, FOR SOLUTION INTRAVENOUS at 11:08

## 2023-08-12 RX ADMIN — HYDRALAZINE HYDROCHLORIDE 10 MG: 20 INJECTION INTRAMUSCULAR; INTRAVENOUS at 08:08

## 2023-08-12 RX ADMIN — PANTOPRAZOLE SODIUM 8 MG/HR: 40 INJECTION, POWDER, FOR SOLUTION INTRAVENOUS at 03:08

## 2023-08-12 RX ADMIN — HYDRALAZINE HYDROCHLORIDE 10 MG: 20 INJECTION INTRAMUSCULAR; INTRAVENOUS at 11:08

## 2023-08-12 RX ADMIN — ACETAMINOPHEN 1000 MG: 500 TABLET, FILM COATED ORAL at 02:08

## 2023-08-12 RX ADMIN — PANTOPRAZOLE SODIUM 8 MG/HR: 40 INJECTION, POWDER, FOR SOLUTION INTRAVENOUS at 06:08

## 2023-08-12 NOTE — PROGRESS NOTES
[facility]  Hospital Medicine Progress Note        Chief Complaint: Emesis (Pt had gastric sleeve revision in febrauary. States perf bowel and migrated mesh during surgery. Pt severe weight loss, intractable vomiting. Tender abd near j tube. Seizure activity Friday and seen at [facility] and sent home. Last BM yesterday and normal. Cbg 101)         Patient information was obtained from patient, patient's family, past medical records and ER records.      HISTORY OF PRESENT ILLNESS:   Chelsie Lee is a 37 y.o. female who PMH includes gastric sleeve revision s/p bowel perforation; anxiety, GERD; presents to the ED at [facility] on 8/8/2023 with a primary complaint of nausea and vomiting with associated abdominal pain.  Patient reports she has had a gastric sleeve revision in February 2023 secondary to bowel perforation and migrated mesh.  She also had a J-tube placed during that time for nutritional support.  Patient reports decreased appetite since then with significant weight loss.  She denies any new foods or medications.  Patient reports she was seen in the ED at [facility] for seizure like activity; she denies any history of seizures in the past.  Patient denies any seizure activity since then.  Lab work done revealed WBCs 13.82, H&H 11.3/33.6, sodium 132, glucose 102, other indices unremarkable.  CT of the abdomen and pelvis with and without contrast impression reviewed demonstrated hiatal hernia and operative changes, cecum and ascending colon to the hepatic flexure is remarkable for irregular mural thickening suggests nonspecific colitis, bilateral adnexa ovarian cystic structures which is large on the right.  Initial vital signs reviewed BP 2/117 pulse 106 respirations 16 temperature 98.2° F O2 saturation 93% on room air.  Patient received hydration antiemetic therapy and pain medication in the ED, her blood pressure trended down and saturations improved.  Patient is admitted to hospital  medicine services for further management.        Patient was admitted for intractable nausea and vomiting.  GI was consulted and she underwent an upper GI endoscopy with findings of esophagitis and gastrojejunal erythema and friable lesions.  GI recommended to begin pantoprazole drip for 3 days.EGD reports noted with GI recommendations.          Today's information  Patient seen and examined at bedside   Vitals reviewed and stable   Hemoglobin levels of 10.7, stable  Patient complaining of abdominal cramps and still once a J-tube change because his leaking onto her clothing and skin   General surgery has been consulted for J-tube problems follow-up recommendations          Exam  General appearance: Well-developed, well-nourished female complaints of abdominal pain, non-toxic, in no apparent distress.  HENT: Atraumatic head. Moist mucous membranes of oral cavity.  Eyes: PERRL  Neck: Supple.   Lungs: Clear to auscultation bilaterally. No wheezing present.   Heart: Regular rate and rhythm. S1 and S2 present cap refill brisk  Abdomen: Soft, non-distended, generalized TTP; Bowel sounds are normal, J tube in place   Extremities: No cyanosis, clubbing,GALEAS  Neuro: Oriented x 3, no acute focal deficits  Psych/mental status: Appropriate mood and affect. Responds appropriately to questions.            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  Weakness- POA  Seizures- recent- POA           PLAN:  Vitals reviewed and stable   Hemoglobin levels of 10.7, stable  Patient complaining of abdominal cramps and still once a J-tube change because his leaking onto her clothing and skin   General surgery has been consulted for  J-tube problems follow-up recommendations   Appreciate GI input to continue pantoprazole drip - day 3/3  Consult nutritionist to optimize nutrition   Pain control as needed   Resume home medication as deemed necessary        VTE Prophylaxis:  SCD for DVT prophylaxis and will be advised to be as mobile as possible      Patient condition:  Stable       Dispo- pending j tube change       VITAL SIGNS: 24 HRS MIN & MAX LAST   Temp  Min: 97.7 °F (36.5 °C)  Max: 98.9 °F (37.2 °C) 98.9 °F (37.2 °C)   BP  Min: 147/88  Max: 160/103 (!) 148/107   Pulse  Min: 85  Max: 96  94   Resp  Min: 16  Max: 18 18   SpO2  Min: 99 %  Max: 100 % 100 %     I have reviewed the following labs:    Recent Labs   Lab 08/09/23  0358 08/10/23  0348 08/11/23  2100 08/12/23  0324   WBC 14.38* 8.02 9.76  --    RBC 3.47* 2.48* 3.90*  --    HGB 10.1* 7.2* 11.5* 10.7*   HCT 31.1* 22.1* 33.5* 31.9*   MCV 89.6 89.1 85.9  --    MCH 29.1 29.0 29.5  --    MCHC 32.5* 32.6* 34.3  --    RDW 17.2* 17.2* 17.6*  --    * 322 311  --    MPV 11.5* 10.7* 10.9*  --        Recent Labs   Lab 08/08/23  1558 08/09/23  0358 08/09/23  1849 08/10/23  0348 08/11/23  0651 08/12/23  0324   * 130*   < > 132* 137 135*   K 3.8 3.8   < > 4.4 4.3 3.5   CO2 22 19*   < > 22 20* 21*   BUN 13.4 10.6   < > 9.0 6.4* 4.8*   CREATININE 0.69 0.63   < > 0.61 0.68 0.60   CALCIUM 8.1* 7.7*   < > 7.3* 7.3* 7.4*   MG 1.80  --   --   --   --   --    ALBUMIN 2.2* 1.9*  --   --   --   --    ALKPHOS 87 71  --   --   --   --    ALT 14 11  --   --   --   --    AST 13 11  --   --   --   --    BILITOT 1.5 1.1  --   --   --   --     < > = values in this interval not displayed.          Microbiology Results (last 7 days)       Procedure Component Value Units Date/Time    Blood Culture #2 **CANNOT BE ORDERED STAT** [[id number]]  (Normal) Collected: 08/08/23 5684    Order Status: Completed Specimen: Blood from Arm, Left Updated: 08/11/23 2100     CULTURE, BLOOD ([facility]) No Growth At 72 Hours    Blood  Culture #1 **CANNOT BE ORDERED STAT** [[id number]]  (Normal) Collected: 08/08/23 1733    Order Status: Completed Specimen: Blood from Arm, Right Updated: 08/11/23 2100     CULTURE, BLOOD ([facility]) No Growth At 72 Hours             See below for Radiology    Scheduled Med:       Continuous Infusions:   pantoprazole (PROTONIX) IV infusion 8 mg/hr (08/12/23 0332)        PRN Meds:  0.9%  NaCl infusion (for blood administration), acetaminophen, acetaminophen, ALPRAZolam, dicyclomine, hydrALAZINE, HYDROcodone-acetaminophen, morphine, naloxone, ondansetron, prochlorperazine, sodium chloride 0.9%       Assessment/Plan:      VTE prophylaxis:     Patient condition:  Stable/Fair/Guarded/ Serious/ Critical    Anticipated discharge and Disposition:         All diagnosis and differential diagnosis have been reviewed; assessment and plan has been documented; I have personally reviewed the labs and test results that are presently available; I have reviewed the patients medication list; I have reviewed the consulting providers response and recommendations. I have reviewed or attempted to review medical records based upon their availability    All of the patient's questions have been  addressed and answered. Patient's is agreeable to the above stated plan. I will continue to monitor closely and make adjustments to medical management as needed.  _____________________________________________________________________    Nutrition Status:    Radiology:  I have personally reviewed the following imaging and agree with the radiologist.     NM GI Bleed Study  Narrative: EXAMINATION:  NM GI BLEED STUDY    CLINICAL HISTORY:  GI bleed;    TECHNIQUE:  After injection of autologous red blood cells labeled in vitro with 25.6mCi of NUE Tc-99m pertechnetate, sequential dynamic images of the abdomen were obtained for 60minutes.    Delayed static images were acquired in various projections through 3 hours..    COMPARISON:  None.    FINDINGS:  There is  normal distribution of the activity within the labeled blood pool with visualization of the heart, liver, spleen, kidneys, bladder, and genitals.    Normal vessels are identified, but no abnormal extraluminal focus of activity is identified.  Impression: No evidence of active GI bleeding over 3 hours.  The sensitivity of this study is  approximately 0.5-1 ml/minute.    Electronically signed by: John Kevin  Date:    08/10/2023  Time:    18:37      Loly Tejeda MD   08/12/2023

## 2023-08-12 NOTE — H&P
Acute Care Surgery   History and Physical Note    Patient Name: Chelsie Lee  YOB: 1985  Date: 08/12/2023 3:21 PM  Date of Admission: 8/8/2023  HD#2  POD#1 Day Post-Op    PRESENTING HISTORY   Chief Complaint/Reason for Admission: leaking J-tube    History of Present Illness:  Patient is a 37-year-old female with an extensive bariatric surgery history including lap sleeve (2013) C/B hiatal hernia s/p hiatal hernia repair with Kel-en-Y gastric bypass and gastric resection requiring G-tube placement who presented on 08/08 with abdominal pain and leaking around J-tube.  The patient states that she has had problems with her G-tube leaking for the past several weeks.  She would a seizure last Friday that was attributed to electrolyte disturbances secondary to poor nutrition.  The patient came to [facility] to be evaluated.    Ms. Katz is afebrile with stable vital signs.  She is tolerating p.o. intake.  When she gets feeds at night through her J-tube, tube leaks and insertion site as well as at catheter end.     Review of Systems:  12 point ROS negative except as stated in HPI    PAST HISTORY:   Past medical history:  History reviewed. No pertinent past medical history.    Past surgical history:  Past Surgical History:   Procedure Laterality Date    EGD, WITH CLOSED BIOPSY  8/9/2023    Procedure: EGD, WITH CLOSED BIOPSY;  Surgeon: Wilmer Rossi MD;  Location: [facility] ENDOSCOPY;  Service: Gastroenterology;;    ESOPHAGOGASTRODUODENOSCOPY N/A 8/9/2023    Procedure: EGD;  Surgeon: Wilmer Rossi MD;  Location: [facility] ENDOSCOPY;  Service: Gastroenterology;  Laterality: N/A;    HERNIA REPAIR      LAPAROSCOPIC GASTRIC BANDING         Family history:  History reviewed. No pertinent family history.    Social history:  Social History     Socioeconomic History    Marital status: Unknown   Tobacco Use    Smoking status: Never    Smokeless tobacco: Never   Substance and Sexual Activity    Alcohol use:  Never    Drug use: Never     Social Determinants of Health     Financial Resource Strain: Low Risk  (8/10/2023)    Overall Financial Resource Strain (CARDIA)     Difficulty of Paying Living Expenses: Not hard at all   Food Insecurity: No Food Insecurity (8/10/2023)    Hunger Vital Sign     Worried About Running Out of Food in the Last Year: Never true     Ran Out of Food in the Last Year: Never true   Transportation Needs: No Transportation Needs (8/10/2023)    PRAPARE - Transportation     Lack of Transportation (Medical): No     Lack of Transportation (Non-Medical): No   Physical Activity: Inactive (8/10/2023)    Exercise Vital Sign     Days of Exercise per Week: 0 days     Minutes of Exercise per Session: 0 min   Stress: No Stress Concern Present (8/10/2023)    [employer] [place] [employer] - Occupational Stress Questionnaire     Feeling of Stress : Only a little   Social Connections: Moderately Isolated (8/10/2023)    Social Connection and Isolation Panel [NHANES]     Frequency of Communication with Friends and Family: More than three times a week     Frequency of Social Gatherings with Friends and Family: More than three times a week     Attends [employer] Services: Never     Active Member of Clubs or Organizations: No     Attends Club or Organization Meetings: Never     Marital Status:    Housing Stability: Unknown (8/10/2023)    Housing Stability Vital Sign     Unable to Pay for Housing in the Last Year: No     Number of Places Lived in the Last Year: 1     Social History     Tobacco Use   Smoking Status Never   Smokeless Tobacco Never      Social History     Substance and Sexual Activity   Alcohol Use Never        MEDICATIONS & ALLERGIES:     No current facility-administered medications on file prior to encounter.     Current Outpatient Medications on File Prior to Encounter   Medication Sig    ALPRAZolam (XANAX) 1 MG tablet Take 1 mg by mouth 3 (three) times daily.    cyanocobalamin 1,000  "mcg/mL injection Inject 1,000 mcg into the muscle every 30 days.    dicyclomine (BENTYL) 10 MG capsule Take 10 mg by mouth every 6 (six) hours as needed.    hyoscyamine (LEVSIN/SL) 0.125 mg Subl Place under the tongue.    megestroL (MEGACE ES) 625 mg/5 mL (125 mg/mL) Susp Take 625 mg by mouth.    oxyCODONE-acetaminophen (PERCOCET) 7.5-325 mg per tablet Take by mouth.    promethazine (PHENERGAN) 25 mg/mL injection SMARTSI Milliliter(s) IM Every 6 Hours PRN       Allergies:   Review of patient's allergies indicates:   Allergen Reactions    Penicillin Rash       Scheduled Meds:    Continuous Infusions:   pantoprazole (PROTONIX) IV infusion 8 mg/hr (23 1143)       PRN Meds:0.9%  NaCl infusion (for blood administration), acetaminophen, acetaminophen, ALPRAZolam, dicyclomine, hydrALAZINE, HYDROcodone-acetaminophen, morphine, naloxone, ondansetron, prochlorperazine, sodium chloride 0.9%    OBJECTIVE:   Vital Signs:  VITAL SIGNS: 24 HR MIN & MAX LAST   Temp  Min: 97.7 °F (36.5 °C)  Max: 99.1 °F (37.3 °C)  99.1 °F (37.3 °C)   BP  Min: 148/107  Max: 160/103  (!) 150/103    Pulse  Min: 85  Max: 96  94    Resp  Min: 16  Max: 18  18    SpO2  Min: 99 %  Max: 100 %  100 %      HT: 5' 4" (162.6 cm)  WT: 44 kg (97 lb)  BMI: 16.6     Intake/output:  Intake/Output - Last 3 Shifts         08/10 07 0659  07 0659  07 0659    P.O.  420     I.V. (mL/kg)  500 (11.4)     Blood 625      IV Piggyback  500     Total Intake(mL/kg) 625 (14.2) 1420 (32.3)     Net +625 +1420            Urine Occurrence 5 x 2 x     Stool Occurrence 6 x              Intake/Output Summary (Last 24 hours) at 2023 1521  Last data filed at 2023 0419  Gross per 24 hour   Intake 420 ml   Output --   Net 420 ml         Physical Exam:  General: no acute distress  HEENT: Normocephalic, atraumatic, PERRL  CV: RR  Resp: NWOB on RA  GI:  Abdomen soft, mildly bloated, mildly TTP around J-tube site  MSK: No muscle atrophy, " "cyanosis, peripheral edema, moving all extremities spontaneously  Skin/wounds:  No rashes, ulcers, erythema  Neuro:  CNII-XII grossly intact, alert and oriented to person, place, and time    Labs:  Troponin:  No results for input(s): "TROPONINI" in the last 72 hours.  CBC:  Recent Labs     08/10/23  0348 08/11/23  2100 08/12/23  0324   WBC 8.02 9.76  --    RBC 2.48* 3.90*  --    HGB 7.2* 11.5* 10.7*   HCT 22.1* 33.5* 31.9*    311  --    MCV 89.1 85.9  --    MCH 29.0 29.5  --    MCHC 32.6* 34.3  --      CMP:  Recent Labs     08/12/23  0324   CALCIUM 7.4*   *   K 3.5   CO2 21*   BUN 4.8*   CREATININE 0.60     Lactic Acid:  No results for input(s): "LACTATE" in the last 72 hours.  ETOH:  No results for input(s): "ETHANOL" in the last 72 hours.   Urine Drug Screen:  No results for input(s): "COCAINE", "OPIATE", "BARBITURATE", "AMPHETAMINE", "FENTANYL", "CANNABINOIDS", "MDMA" in the last 72 hours.    Invalid input(s): "BENZODIAZEPINE", "PHENCYCLIDINE"   ABG:  No results for input(s): "PH", "PCO2", "PO2", "HCO3", "BE", "POCSATURATED" in the last 72 hours.    Diagnostic Results:  NM GI Bleed Study   Final Result      No evidence of active GI bleeding over 3 hours.  The sensitivity of this study is  approximately 0.5-1 ml/minute.         Electronically signed by: John Kevin   Date:    08/10/2023   Time:    18:37      US Pelvis Comp with Transvag NON-OB (xpd   Final Result      Large cyst with echogenic material identified in the right ovary with measurements as above.      Status post partial hysterectomy.      No other abnormalities         Electronically signed by: Thiago Glynn   Date:    08/09/2023   Time:    09:03      CT Abdomen Pelvis W Wo Contrast   Final Result      1. Hiatal hernia and operative changes.      2. Cecum and ascending colon to the hepatic flexure is remarkable for irregular mural thickening which suggest nonspecific colitis.  Please correlate clinically.  Attention to " follow-up exams      3. Bilateral adnexa/ovarian cystic structures which is large on the right..         Electronically signed by: Edgardo Tyler   Date:    08/08/2023   Time:    20:24          ASSESSMENT & PLAN:    37-year-old female with extensive bariatric surgical history presenting with a leaking J-tube.    - patient has 14 [place] J-tube in place.  Flushes easily, no leakage at insertion site with flushing.  Balloon was inflated to 5 cc.   - some leaking around the catheter and of J-tube.   - will replace J-tube at bedside later today, followed by tube study  - remainder of plan of care per primary team    Sofia Dwyer MD   [employer] General Surgery, PGY-2

## 2023-08-12 NOTE — PROGRESS NOTES
"Gastroenterology Progress Note    Subjective/Interval History:  8-12-23  No n/v.   tolerating clear liquids and pudding.  Slept very well last night  complaining j-tube leaking around the stoma which has been an issue for some time. - states was placed by Dr. LOPEZ, however also states that DR. Lopez did not want to change it fr unknown reason,         Vital Signs:  BP (!) 148/107   Pulse 94   Temp 98.9 °F (37.2 °C) (Oral)   Resp 18   Ht 5' 4" (1.626 m)   Wt 44 kg (97 lb)   SpO2 100%   Breastfeeding No   BMI 16.65 kg/m²   Body mass index is 16.65 kg/m².    Physical Exam:  Physical Exam  Constitutional:       General: She is not in acute distress.     Appearance: She is cachectic. She is ill-appearing (chronically).   HENT:      Head: Normocephalic and atraumatic.      Comments: prominent cheekbones and sunken eyes  Eyes:      General: No scleral icterus.     Extraocular Movements: Extraocular movements intact.   Cardiovascular:      Rate and Rhythm: Normal rate and regular rhythm.   Pulmonary:      Effort: Pulmonary effort is normal. No respiratory distress.   Abdominal:      General: Bowel sounds are normal. There is no distension.      Palpations: Abdomen is soft. There is no mass.      Tenderness: There is abdominal tenderness (mild in left abdomen). There is no guarding or rebound.      Comments: Several abdominal surgical scars noted, well healed.  J tube in left abdomen.    Musculoskeletal:         General: Normal range of motion.      Right lower leg: No edema.      Left lower leg: No edema.   Skin:     General: Skin is warm and dry.      Coloration: Skin is pale.   Neurological:      Mental Status: She is alert and oriented to person, place, and time.   Psychiatric:         Mood and Affect: Mood and affect normal.      Labs:  Recent Results (from the past 48 hour(s))   Prepare RBC 2 Units; to give stat    Collection Time: 08/10/23 10:02 AM   Result Value Ref Range    UNIT NUMBER [id number]     UNIT " ABO/RH O NEG     DISPENSE STATUS Transfused     Unit Expiration [id number]     Product Code N0268G69     Unit Blood Type Code 9500     CROSSMATCH INTERPRETATION Compatible     UNIT NUMBER [id number]     UNIT ABO/RH O POS     DISPENSE STATUS Transfused     Unit Expiration [id number]     Product Code I9865Q60     Unit Blood Type Code 5100     CROSSMATCH INTERPRETATION Compatible    Type & Screen    Collection Time: 08/10/23 10:02 AM   Result Value Ref Range    Group & Rh O POS     Indirect Lalit GEL NEG     Specimen Outdate 08/13/2023 23:59    ABORH RETYPE    Collection Time: 08/10/23 10:55 AM   Result Value Ref Range    ABORH Retype O POS    Sodium, Random Urine    Collection Time: 08/10/23 10:14 PM   Result Value Ref Range    Urine Sodium 137.0 mmol/L   Creatinine, Random Urine    Collection Time: 08/10/23 10:14 PM   Result Value Ref Range    Urine Creatinine 30.3 (L) 45.0 - 106.0 mg/dL   Osmolality, Urine    Collection Time: 08/10/23 10:14 PM   Result Value Ref Range    Urine Osmolality 384 300 - 1,300 mOsm/kg   Basic Metabolic Panel    Collection Time: 08/11/23  6:51 AM   Result Value Ref Range    Sodium Level 137 136 - 145 mmol/L    Potassium Level 4.3 3.5 - 5.1 mmol/L    Chloride 108 (H) 98 - 107 mmol/L    Carbon Dioxide 20 (L) 22 - 29 mmol/L    Glucose Level 80 74 - 100 mg/dL    Blood Urea Nitrogen 6.4 (L) 7.0 - 18.7 mg/dL    Creatinine 0.68 0.55 - 1.02 mg/dL    BUN/Creatinine Ratio 9     Calcium Level Total 7.3 (L) 8.4 - 10.2 mg/dL    Anion Gap 9.0 mEq/L    eGFR >60 mls/min/1.73/m2   CBC with Differential    Collection Time: 08/11/23  9:00 PM   Result Value Ref Range    WBC 9.76 4.50 - 11.50 x10(3)/mcL    RBC 3.90 (L) 4.20 - 5.40 x10(6)/mcL    Hgb 11.5 (L) 12.0 - 16.0 g/dL    Hct 33.5 (L) 37.0 - 47.0 %    MCV 85.9 80.0 - 94.0 fL    MCH 29.5 27.0 - 31.0 pg    MCHC 34.3 33.0 - 36.0 g/dL    RDW 17.6 (H) 11.5 - 17.0 %    Platelet 311 130 - 400 x10(3)/mcL    MPV 10.9 (H) 7.4 - 10.4 fL    Neut % 58.2 %     Lymph % 27.0 %    Mono % 11.3 %    Eos % 2.3 %    Basophil % 0.4 %    Lymph # 2.64 0.6 - 4.6 x10(3)/mcL    Neut # 5.68 2.1 - 9.2 x10(3)/mcL    Mono # 1.10 0.1 - 1.3 x10(3)/mcL    Eos # 0.22 0 - 0.9 x10(3)/mcL    Baso # 0.04 <=0.2 x10(3)/mcL    IG# 0.08 (H) 0 - 0.04 x10(3)/mcL    IG% 0.8 %    NRBC% 0.0 %   Hemoglobin and Hematocrit    Collection Time: 08/12/23  3:24 AM   Result Value Ref Range    Hgb 10.7 (L) 12.0 - 16.0 g/dL    Hct 31.9 (L) 37.0 - 47.0 %   Basic Metabolic Panel    Collection Time: 08/12/23  3:24 AM   Result Value Ref Range    Sodium Level 135 (L) 136 - 145 mmol/L    Potassium Level 3.5 3.5 - 5.1 mmol/L    Chloride 108 (H) 98 - 107 mmol/L    Carbon Dioxide 21 (L) 22 - 29 mmol/L    Glucose Level 77 74 - 100 mg/dL    Blood Urea Nitrogen 4.8 (L) 7.0 - 18.7 mg/dL    Creatinine 0.60 0.55 - 1.02 mg/dL    BUN/Creatinine Ratio 8     Calcium Level Total 7.4 (L) 8.4 - 10.2 mg/dL    Anion Gap 6.0 mEq/L    eGFR >60 mls/min/1.73/m2       Assessment/Plan:  38 yo CF known to Dr. 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  Complicated by severe malnutrition, weight loss of 60 lbs, and persistent abdominal pain, n/v.    Abdominal pain, N/V - secondary to severe diffuse ulceration  S/p EGD 8/9/23:  LA grade B reflux esophagitis, Kel-en-Y gastrojejunostomy with gastrojejunal anastomosis characterized by congestion, erythema, and ulceration. proximal pouch showed edema and friability with mild stenosis around the prior sleeve site.  Upon entering the distal pouch past the stenosis, 90% of the gastric mucosa is ulcerated, including the GJ anastamosis. At 55cm, the jejunal limb branches into 2 areas  of anastamosis, all healthy in apperance. Bxed taken for h.pylori  - PPI gtt x72 hours given severity of ulceration and now melena  - prn antiemetics  - clear liquids   - TFs held, continue to hold    2. GI bleeding: melena with some BRB  Could be upper GI or lower.  CT on admit with right colonic mural thickening   3. Acute on chronic anemia   Hgb 11.3 -- 10.1 -- 7.2 --10.7    Labs stable  PUD on EGD  Needs to continue PPI drip until 21:00 tonight, then transition to BID PPI    Pt wants to have her J-tube changed - will need surgical consult - was originally placed by Dr. Lopez    No further Gi recs - please call if needed    Pari Adame NP as scribe for Dr. Stewart Blackwell

## 2023-08-13 LAB
BACTERIA BLD CULT: NORMAL
BACTERIA BLD CULT: NORMAL

## 2023-08-13 PROCEDURE — 25000003 PHARM REV CODE 250: Performed by: NURSE PRACTITIONER

## 2023-08-13 PROCEDURE — 63600175 PHARM REV CODE 636 W HCPCS: Performed by: NURSE PRACTITIONER

## 2023-08-13 PROCEDURE — C9113 INJ PANTOPRAZOLE SODIUM, VIA: HCPCS | Performed by: PHYSICIAN ASSISTANT

## 2023-08-13 PROCEDURE — 25000003 PHARM REV CODE 250: Performed by: PHYSICIAN ASSISTANT

## 2023-08-13 PROCEDURE — 63600175 PHARM REV CODE 636 W HCPCS: Performed by: INTERNAL MEDICINE

## 2023-08-13 PROCEDURE — 63600175 PHARM REV CODE 636 W HCPCS: Performed by: PHYSICIAN ASSISTANT

## 2023-08-13 PROCEDURE — 25000003 PHARM REV CODE 250: Performed by: INTERNAL MEDICINE

## 2023-08-13 PROCEDURE — 21400001 HC TELEMETRY ROOM

## 2023-08-13 RX ORDER — PANTOPRAZOLE SODIUM 40 MG/1
40 TABLET, DELAYED RELEASE ORAL
Status: DISCONTINUED | OUTPATIENT
Start: 2023-08-13 | End: 2023-08-20 | Stop reason: HOSPADM

## 2023-08-13 RX ORDER — AMLODIPINE BESYLATE 5 MG/1
5 TABLET ORAL DAILY
Status: DISCONTINUED | OUTPATIENT
Start: 2023-08-14 | End: 2023-08-19

## 2023-08-13 RX ORDER — HYDROMORPHONE HYDROCHLORIDE 2 MG/ML
1 INJECTION, SOLUTION INTRAMUSCULAR; INTRAVENOUS; SUBCUTANEOUS EVERY 4 HOURS PRN
Status: DISCONTINUED | OUTPATIENT
Start: 2023-08-13 | End: 2023-08-17

## 2023-08-13 RX ADMIN — ALPRAZOLAM 1 MG: 0.5 TABLET ORAL at 11:08

## 2023-08-13 RX ADMIN — ALPRAZOLAM 1 MG: 0.5 TABLET ORAL at 10:08

## 2023-08-13 RX ADMIN — ONDANSETRON 4 MG: 2 INJECTION INTRAMUSCULAR; INTRAVENOUS at 08:08

## 2023-08-13 RX ADMIN — HYDROCODONE BITARTRATE AND ACETAMINOPHEN 1 TABLET: 10; 325 TABLET ORAL at 06:08

## 2023-08-13 RX ADMIN — MORPHINE SULFATE 2 MG: 4 INJECTION, SOLUTION INTRAMUSCULAR; INTRAVENOUS at 08:08

## 2023-08-13 RX ADMIN — ONDANSETRON 4 MG: 2 INJECTION INTRAMUSCULAR; INTRAVENOUS at 11:08

## 2023-08-13 RX ADMIN — HYDROMORPHONE HYDROCHLORIDE 1 MG: 2 INJECTION INTRAMUSCULAR; INTRAVENOUS; SUBCUTANEOUS at 11:08

## 2023-08-13 RX ADMIN — ONDANSETRON 4 MG: 2 INJECTION INTRAMUSCULAR; INTRAVENOUS at 05:08

## 2023-08-13 RX ADMIN — PANTOPRAZOLE SODIUM 40 MG: 40 TABLET, DELAYED RELEASE ORAL at 05:08

## 2023-08-13 RX ADMIN — PANTOPRAZOLE SODIUM 8 MG/HR: 40 INJECTION, POWDER, FOR SOLUTION INTRAVENOUS at 04:08

## 2023-08-13 RX ADMIN — HYDROMORPHONE HYDROCHLORIDE 1 MG: 2 INJECTION INTRAMUSCULAR; INTRAVENOUS; SUBCUTANEOUS at 05:08

## 2023-08-13 RX ADMIN — PANTOPRAZOLE SODIUM 40 MG: 40 TABLET, DELAYED RELEASE ORAL at 10:08

## 2023-08-13 RX ADMIN — HYDROMORPHONE HYDROCHLORIDE 1 MG: 2 INJECTION INTRAMUSCULAR; INTRAVENOUS; SUBCUTANEOUS at 10:08

## 2023-08-13 NOTE — PLAN OF CARE
Plan of Care    J tube successfully replaced at bedside. Tube study shows good position of tube.     Please restart tube feeds. Will see how patient tolerates.    Sofia Dwyer MD   [employer] General Surgery, PGY-2

## 2023-08-13 NOTE — PROGRESS NOTES
[facility]  Hospital Medicine Progress Note        Chief Complaint: Emesis (Pt had gastric sleeve revision in febrauary. States perf bowel and migrated mesh during surgery. Pt severe weight loss, intractable vomiting. Tender abd near j tube. Seizure activity Friday and seen at [facility] and sent home. Last BM yesterday and normal. Cbg 101)         Patient information was obtained from patient, patient's family, past medical records and ER records.      HISTORY OF PRESENT ILLNESS:   Chelsie Lee is a 37 y.o. female who PMH includes gastric sleeve revision s/p bowel perforation; anxiety, GERD; presents to the ED at [facility] on 8/8/2023 with a primary complaint of nausea and vomiting with associated abdominal pain.  Patient reports she has had a gastric sleeve revision in February 2023 secondary to bowel perforation and migrated mesh.  She also had a J-tube placed during that time for nutritional support.  Patient reports decreased appetite since then with significant weight loss.  She denies any new foods or medications.  Patient reports she was seen in the ED at [facility] for seizure like activity; she denies any history of seizures in the past.  Patient denies any seizure activity since then.  Lab work done revealed WBCs 13.82, H&H 11.3/33.6, sodium 132, glucose 102, other indices unremarkable.  CT of the abdomen and pelvis with and without contrast impression reviewed demonstrated hiatal hernia and operative changes, cecum and ascending colon to the hepatic flexure is remarkable for irregular mural thickening suggests nonspecific colitis, bilateral adnexa ovarian cystic structures which is large on the right.  Initial vital signs reviewed BP 2/117 pulse 106 respirations 16 temperature 98.2° F O2 saturation 93% on room air.  Patient received hydration antiemetic therapy and pain medication in the ED, her blood pressure trended down and saturations improved.  Patient is admitted to hospital  medicine services for further management.        Patient was admitted for intractable nausea and vomiting.  GI was consulted and she underwent an upper GI endoscopy with findings of esophagitis and gastrojejunal erythema and friable lesions.  GI recommended to begin pantoprazole drip for 3 days.EGD reports noted with GI recommendations.          Today's information  Patient seen and examined at bedside   Patient complaining of abdominal pain  J-tube was changed on 08/12 however still with much leakage at side   General surgery on board   Vitals reviewed and stable on room air      Exam  General appearance: Well-developed, well-nourished female complaints of abdominal pain, non-toxic, in no apparent distress.  HENT: Atraumatic head. Moist mucous membranes of oral cavity.  Eyes: PERRL  Neck: Supple.   Lungs: Clear to auscultation bilaterally. No wheezing present.   Heart: Regular rate and rhythm. S1 and S2 present cap refill brisk  Abdomen: Soft, non-distended, generalized TTP; Bowel sounds are normal, J tube in place   Extremities: No cyanosis, clubbing,GALEAS  Neuro: Oriented x 3, no acute focal deficits  Psych/mental status: Appropriate mood and affect. Responds appropriately to questions.            ASSESSMENT:  Intractable nausea and vomiting   Acute esophagitis /duodenitis   Acute GI bleed stable   Acute on chronic anemia   J tube leaking s/p changed on 8/12   Leukocytosis, resolved   Severe malnutrition   Right ovarian cysts, stable  History of partial hysterectomy   Abdominal pain- generalized-   HTN- controlled   S/P gastric sleeve with revision- secondary to mesh migration- POA  Weakness- POA  Seizures- recent- POA           PLAN:  J-tube was changed on 08/12 however still with much leakage at side   General surgery on board   GI recommends to transition to p.o. pantoprazole b.i.d.  Consult nutritionist to optimize nutrition   Pain control as needed   Resume home medication as deemed necessary        VTE  Prophylaxis:  SCD for DVT prophylaxis and will be advised to be as mobile as possible      Patient condition:  Stable        Dispo- pending j tube problems resolved          VITAL SIGNS: 24 HRS MIN & MAX LAST   Temp  Min: 97.9 °F (36.6 °C)  Max: 98.1 °F (36.7 °C) 98 °F (36.7 °C)   BP  Min: 135/87  Max: 159/84 (!) 159/84   Pulse  Min: 77  Max: 99  95   Resp  Min: 16  Max: 20 18   SpO2  Min: 98 %  Max: 100 % 98 %     I have reviewed the following labs:    Recent Labs   Lab 08/09/23  0358 08/10/23  0348 08/11/23  2100 08/12/23  0324   WBC 14.38* 8.02 9.76  --    RBC 3.47* 2.48* 3.90*  --    HGB 10.1* 7.2* 11.5* 10.7*   HCT 31.1* 22.1* 33.5* 31.9*   MCV 89.6 89.1 85.9  --    MCH 29.1 29.0 29.5  --    MCHC 32.5* 32.6* 34.3  --    RDW 17.2* 17.2* 17.6*  --    * 322 311  --    MPV 11.5* 10.7* 10.9*  --        Recent Labs   Lab 08/08/23  1558 08/09/23  0358 08/09/23  1849 08/10/23  0348 08/11/23  0651 08/12/23  0324   * 130*   < > 132* 137 135*   K 3.8 3.8   < > 4.4 4.3 3.5   CO2 22 19*   < > 22 20* 21*   BUN 13.4 10.6   < > 9.0 6.4* 4.8*   CREATININE 0.69 0.63   < > 0.61 0.68 0.60   CALCIUM 8.1* 7.7*   < > 7.3* 7.3* 7.4*   MG 1.80  --   --   --   --   --    ALBUMIN 2.2* 1.9*  --   --   --   --    ALKPHOS 87 71  --   --   --   --    ALT 14 11  --   --   --   --    AST 13 11  --   --   --   --    BILITOT 1.5 1.1  --   --   --   --     < > = values in this interval not displayed.          Microbiology Results (last 7 days)       Procedure Component Value Units Date/Time    Blood Culture #1 **CANNOT BE ORDERED STAT** [[id number]]  (Normal) Collected: 08/08/23 1733    Order Status: Completed Specimen: Blood from Arm, Right Updated: 08/12/23 2101     CULTURE, BLOOD ([facility]) No Growth At 96 Hours    Blood Culture #2 **CANNOT BE ORDERED STAT** [[id number]]  (Normal) Collected: 08/08/23 1733    Order Status: Completed Specimen: Blood from Arm, Left Updated: 08/12/23 2101     CULTURE, BLOOD ([facility]) No Growth At 96 Hours              See below for Radiology    Scheduled Med:   pantoprazole  40 mg Oral BID AC        Continuous Infusions:       PRN Meds:  0.9%  NaCl infusion (for blood administration), acetaminophen, acetaminophen, ALPRAZolam, dicyclomine, hydrALAZINE, HYDROcodone-acetaminophen, HYDROmorphone, naloxone, ondansetron, prochlorperazine, sodium chloride 0.9%       Assessment/Plan:      VTE prophylaxis:     Patient condition:  Stable/Fair/Guarded/ Serious/ Critical    Anticipated discharge and Disposition:         All diagnosis and differential diagnosis have been reviewed; assessment and plan has been documented; I have personally reviewed the labs and test results that are presently available; I have reviewed the patients medication list; I have reviewed the consulting providers response and recommendations. I have reviewed or attempted to review medical records based upon their availability    All of the patient's questions have been  addressed and answered. Patient's is agreeable to the above stated plan. I will continue to monitor closely and make adjustments to medical management as needed.  _____________________________________________________________________    Nutrition Status:    Radiology:  I have personally reviewed the following imaging and agree with the radiologist.     XR Gastric tube check, non-radiologist performed  Narrative: EXAMINATION:  XR GASTRIC TUBE CHECK, NON-RADIOLOGIST PERFORMED    CLINICAL HISTORY:  To evaluate J tube placement;    TECHNIQUE:  One view    COMPARISON:  None available    FINDINGS:  Contrast instilled through the tube opacifies the bowel loop without contrast extravasation.  Bowel gas pattern is nonspecific.  Impression: As above    Electronically signed by: Edgardo Tyler  Date:    08/12/2023  Time:    20:10      Loly Tejeda MD   08/13/2023

## 2023-08-14 LAB
POCT GLUCOSE: 82 MG/DL (ref 70–110)
POCT GLUCOSE: 83 MG/DL (ref 70–110)

## 2023-08-14 PROCEDURE — 63600175 PHARM REV CODE 636 W HCPCS: Performed by: INTERNAL MEDICINE

## 2023-08-14 PROCEDURE — 99222 PR INITIAL HOSPITAL CARE,LEVL II: ICD-10-PCS | Mod: ,,, | Performed by: NURSE PRACTITIONER

## 2023-08-14 PROCEDURE — 63600175 PHARM REV CODE 636 W HCPCS: Performed by: NURSE PRACTITIONER

## 2023-08-14 PROCEDURE — 21400001 HC TELEMETRY ROOM

## 2023-08-14 PROCEDURE — 25000003 PHARM REV CODE 250: Performed by: INTERNAL MEDICINE

## 2023-08-14 PROCEDURE — 25000003 PHARM REV CODE 250: Performed by: NURSE PRACTITIONER

## 2023-08-14 PROCEDURE — 99222 1ST HOSP IP/OBS MODERATE 55: CPT | Mod: ,,, | Performed by: NURSE PRACTITIONER

## 2023-08-14 RX ADMIN — ALPRAZOLAM 1 MG: 0.5 TABLET ORAL at 09:08

## 2023-08-14 RX ADMIN — PROCHLORPERAZINE EDISYLATE 5 MG: 5 INJECTION INTRAMUSCULAR; INTRAVENOUS at 06:08

## 2023-08-14 RX ADMIN — HYDROMORPHONE HYDROCHLORIDE 1 MG: 2 INJECTION INTRAMUSCULAR; INTRAVENOUS; SUBCUTANEOUS at 07:08

## 2023-08-14 RX ADMIN — PANTOPRAZOLE SODIUM 40 MG: 40 TABLET, DELAYED RELEASE ORAL at 03:08

## 2023-08-14 RX ADMIN — PANTOPRAZOLE SODIUM 40 MG: 40 TABLET, DELAYED RELEASE ORAL at 06:08

## 2023-08-14 RX ADMIN — AMLODIPINE BESYLATE 5 MG: 5 TABLET ORAL at 10:08

## 2023-08-14 RX ADMIN — ONDANSETRON 4 MG: 2 INJECTION INTRAMUSCULAR; INTRAVENOUS at 10:08

## 2023-08-14 RX ADMIN — ONDANSETRON 4 MG: 2 INJECTION INTRAMUSCULAR; INTRAVENOUS at 03:08

## 2023-08-14 RX ADMIN — HYDROMORPHONE HYDROCHLORIDE 1 MG: 2 INJECTION INTRAMUSCULAR; INTRAVENOUS; SUBCUTANEOUS at 10:08

## 2023-08-14 RX ADMIN — HYDROMORPHONE HYDROCHLORIDE 1 MG: 2 INJECTION INTRAMUSCULAR; INTRAVENOUS; SUBCUTANEOUS at 03:08

## 2023-08-14 NOTE — CONSULTS
Inpatient consult to Palliative Care  Consult performed by: Sushila Quintero FNP  Consult ordered by: Loly Tejeda MD      Patient Name: Chelsie Lee   MRN: [id number]   Admission Date: 8/8/2023   Hospital Length of Stay: 4   Attending Provider: Loly Tejeda MD   Consulting Provider: Sushila TREVIZO  Reason for Consult: Pain  Primary Care Physician: No, Primary Doctor     Principal Problem: <principal problem not specified>     Patient information was obtained from patient and ER records.      Final diagnoses:  [K52.9] Colitis (Primary)  [R11.2] Intractable nausea and vomiting     Assessment/Plan:     I reviewed the patient and family's understanding of the seriousness of the illness and its expected prognosis. We discussed the patient's goals of care and treatment preferences. I clarified current code status. I identified the surrogate decision maker or health care POA. I answered all questions and we formulated a plan including recommendations for symptom management and how to best achieve goals of care.       Advance Care Planning     Date: 08/14/2023    [facility]  I engaged the patient in a voluntary conversation about advance care planning and we specifically addressed what the goals of care would be moving forward, in light of the patient's change in clinical status, specifically current  condition.  We did specifically address the patient's likely prognosis, which is fair .  We explored the patient's values and preferences for future care.  The patient endorses that what is most important right now is to focus on extending life as long as possible, even it it means sacrificing quality and curative/life-prolongation (regardless of treatment burdens)    Accordingly, we have decided that the best plan to meet the patient's goals includes continuing with treatment    Met with patient--introduced service and discussed patient's history and current condition.  Patient states she had her initial surgery  in 2013 from which he fully recovered and was doing well until about 2019 when she began having complications.  Reports that pandemic happened shortly after, so she did not seek medical care.  Discussed that she had surgery with Dr. Lopez for hiatal hernia and then required another surgery in March of this year.  Expressed frustration that she has had repeated hospitalizations without improvement in her condition.  Informed that the current plan is to replace tube and remove if necessary.    Patient tearful and reports that her parents are helping to care for her children while her  is away at work in [place].  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  Reports that Dr. Lopez has informed her there are no more surgical options left for her and that she has no follow-up outside of the hospital. Expressed concern that she will continue to lose weight until her death.      Discussed symptom management and the goal of finding oral medication to help with pain management.  Patient feels that if her stomach problems were addressed, she would not require IV narcotics.  Discussed that I will discuss case with hospitalist for recommendations.             Recommendations:     Pain:  continue dilaudid 1 mg IV q 4 PRN      History of Present Illness:     Patient is a 38 yo female with a PMH of gastric sleeve with revision s/p bowel perforation, anxiety, GERD who presented to the ED at [facility] on 08/08/2023 with primary complaint of nausea and vomiting with associated abdominal pain.  P  er patient report she had a gastric sleeve revision in February of 2023 secondary to bowel perforation and migrated mesh in also had J-tube placed at that time for nutritional support.  Patient reports that she has had decreased appetite with significant weight loss since that time.  She was seen in the ED at [facility] for seizure-like activity and was subsequently sent home with no  report of any seizure-like activity.  CT of the abdomen pelvis with and without contrast demonstrated hiatal hernia and operative changes, cecum and ascending colon to the hepatic flexure remarkable for irregular mural thickening suggesting nonspecific colitis, and bilateral adnexa ovarian cystic structures larger on the right.  General surgery has been consulted for management of the J-tube.  Patient is currently sitting up in bed with no family present. Palliative care consulted for support and symptom management.       Active Ambulatory Problems     Diagnosis Date Noted    No Active Ambulatory Problems     Resolved Ambulatory Problems     Diagnosis Date Noted    No Resolved Ambulatory Problems     No Additional Past Medical History        Past Surgical History:   Procedure Laterality Date    COLONOSCOPY N/A 8/11/2023    Procedure: COLON;  Surgeon: Wilmer Rossi MD;  Location: [facility] ENDOSCOPY;  Service: Gastroenterology;  Laterality: N/A;    EGD, WITH CLOSED BIOPSY  8/9/2023    Procedure: EGD, WITH CLOSED BIOPSY;  Surgeon: Wilmer Rossi MD;  Location: [facility] ENDOSCOPY;  Service: Gastroenterology;;    ESOPHAGOGASTRODUODENOSCOPY N/A 8/9/2023    Procedure: EGD;  Surgeon: Wilmer Rossi MD;  Location: [facility] ENDOSCOPY;  Service: Gastroenterology;  Laterality: N/A;    HERNIA REPAIR      LAPAROSCOPIC GASTRIC BANDING          Review of patient's allergies indicates:   Allergen Reactions    Penicillin Rash          Current Facility-Administered Medications:     0.9%  NaCl infusion (for blood administration), , Intravenous, Q24H PRN, Loly Tejeda MD, New Bag at 08/10/23 2314    acetaminophen tablet 1,000 mg, 1,000 mg, Oral, Q6H PRN, Zaida Polk FNP, 1,000 mg at 08/12/23 0238    acetaminophen tablet 650 mg, 650 mg, Oral, Q4H PRN, Zaida Plok FNP    ALPRAZolam tablet 1 mg, 1 mg, Oral, TID PRN, Chelsy Childers FNP, 1 mg at 08/13/23 2341    amLODIPine tablet 5 mg, 5 mg, Oral,  "Daily, Loly Tejeda MD, 5 mg at 08/14/23 1026    dicyclomine injection 10 mg, 10 mg, Intramuscular, QID PRN, Loly Tejeda MD, 10 mg at 08/12/23 1846    hydrALAZINE injection 10 mg, 10 mg, Intravenous, Q2H PRN, Zaida Polk FNP, 10 mg at 08/12/23 2329    HYDROcodone-acetaminophen  mg per tablet 1 tablet, 1 tablet, Oral, Q6H PRN, Zaida Polk FNP, 1 tablet at 08/13/23 0606    HYDROmorphone (PF) injection 1 mg, 1 mg, Intravenous, Q4H PRN, Loly Tejeda MD, 1 mg at 08/14/23 1026    naloxone 0.4 mg/mL injection 0.02 mg, 0.02 mg, Intravenous, PRN, Zaida Polk FNP    ondansetron injection 4 mg, 4 mg, Intravenous, Q4H PRN, Zaida Polk FNP, 4 mg at 08/14/23 1026    pantoprazole EC tablet 40 mg, 40 mg, Oral, BID AC, Loly Tejeda MD, 40 mg at 08/14/23 0626    prochlorperazine injection Soln 5 mg, 5 mg, Intravenous, Q6H PRN, Zaida Polk FNP, 5 mg at 08/10/23 2101    sodium chloride 0.9% flush 10 mL, 10 mL, Intravenous, PRN, Zaida Polk FNP     0.9%  NaCl infusion (for blood administration), acetaminophen, acetaminophen, ALPRAZolam, dicyclomine, hydrALAZINE, HYDROcodone-acetaminophen, HYDROmorphone, naloxone, ondansetron, prochlorperazine, sodium chloride 0.9%     History reviewed. No pertinent family history.     Review of Systems   Constitutional:  Positive for activity change, appetite change and fatigue.   Gastrointestinal:  Positive for abdominal pain.            Objective:   BP (!) 160/101   Pulse 88   Temp 98.1 °F (36.7 °C) (Oral)   Resp 16   Ht 5' 4" (1.626 m)   Wt 44 kg (97 lb)   SpO2 99%   Breastfeeding No   BMI 16.65 kg/m²      Physical Exam  Constitutional:       Appearance: She is ill-appearing.   HENT:      Head: Normocephalic.   Eyes:      Pupils: Pupils are equal, round, and reactive to light.   Cardiovascular:      Rate and Rhythm: Normal rate.   Pulmonary:      Effort: Pulmonary effort is normal. "   Musculoskeletal:      Comments: sarcopenia   Skin:     Coloration: Skin is pale.   Neurological:      Mental Status: She is oriented to person, place, and time.           FAMILY CONTACTS:     Review of Symptoms  Review of Symptoms      Symptom Assessment (ESAS 0-10 Scale)  Pain:  0  Dyspnea:  0  Anxiety:  0  Nausea:  0  Depression:  0  Anorexia:  0  Fatigue:  0  Insomnia:  0  Restlessness:  0  Agitation:  0         Bowel Management Plan (BMP):  Yes      Performance Status:  40    Living Arrangements:  Lives with family    Psychosocial/Cultural:   See Palliative Psychosocial Note: Yes  Patient is  with 3 young children and is a special .   She is currently on sabbatical.  **Primary  to Follow**  Palliative Care  Consult: No    Spiritual:  F - Sri and Belief:  Congregational      Advance Care Planning   Advance Directives:     Decision Making:  Patient answered questions  Goals of Care: The patient endorses that what is most important right now is to focus on curative/life-prolongation (regardless of treatment burdens)    Accordingly, we have decided that the best plan to meet the patient's goals includes continuing with treatment          PAINAD: NA    Caregiver burden formerly assessed: Yes        > 50% of 60 min of encounter was spent in chart review, face to face discussion of goals of care, symptom assessment, coordination of care and emotional support.         Sushila Quintero FNP, [facility]  Palliative Medicine  [facility]

## 2023-08-14 NOTE — PROGRESS NOTES
[facility]  Hospital Medicine Progress Note        Chief Complaint: Emesis (Pt had gastric sleeve revision in febrauary. States perf bowel and migrated mesh during surgery. Pt severe weight loss, intractable vomiting. Tender abd near j tube. Seizure activity Friday and seen at [facility] and sent home. Last BM yesterday and normal. Cbg 101)         Patient information was obtained from patient, patient's family, past medical records and ER records.      HISTORY OF PRESENT ILLNESS:   Chelsie Lee is a 37 y.o. female who PMH includes gastric sleeve revision s/p bowel perforation; anxiety, GERD; presents to the ED at [facility] on 8/8/2023 with a primary complaint of nausea and vomiting with associated abdominal pain.  Patient reports she has had a gastric sleeve revision in February 2023 secondary to bowel perforation and migrated mesh.  She also had a J-tube placed during that time for nutritional support.  Patient reports decreased appetite since then with significant weight loss.  She denies any new foods or medications.  Patient reports she was seen in the ED at [facility] for seizure like activity; she denies any history of seizures in the past.  Patient denies any seizure activity since then.  Lab work done revealed WBCs 13.82, H&H 11.3/33.6, sodium 132, glucose 102, other indices unremarkable.  CT of the abdomen and pelvis with and without contrast impression reviewed demonstrated hiatal hernia and operative changes, cecum and ascending colon to the hepatic flexure is remarkable for irregular mural thickening suggests nonspecific colitis, bilateral adnexa ovarian cystic structures which is large on the right.  Initial vital signs reviewed BP 2/117 pulse 106 respirations 16 temperature 98.2° F O2 saturation 93% on room air.  Patient received hydration antiemetic therapy and pain medication in the ED, her blood pressure trended down and saturations improved.  Patient is admitted to hospital  medicine services for further management.        Patient was admitted for intractable nausea and vomiting.  GI was consulted and she underwent an upper GI endoscopy with findings of esophagitis and gastrojejunal erythema and friable lesions.  GI recommended to begin pantoprazole drip for 3 days.EGD reports noted with GI recommendations.          Today's information  Patient seen and examined at bedside   Patient complaining of continued abdominal pain, nausea, loss of appetite, J-tube still leaking   General surgery on board trying to sort out the leaking J-tube recommends Will upsized the j tube and if that does not work will need to remove the J-tube to allow for healing.     Exam  General appearance: Well-developed, well-nourished female complaints of abdominal pain, non-toxic, in no apparent distress.  HENT: Atraumatic head. Moist mucous membranes of oral cavity.  Eyes: PERRL  Neck: Supple.   Lungs: Clear to auscultation bilaterally. No wheezing present.   Heart: Regular rate and rhythm. S1 and S2 present cap refill brisk  Abdomen: Soft, non-distended, generalized TTP; Bowel sounds are normal, J tube in place   Extremities: No cyanosis, clubbing,GALEAS  Neuro: Oriented x 3, no acute focal deficits  Psych/mental status: Appropriate mood and affect. Responds appropriately to questions.            ASSESSMENT:  Intractable nausea and vomiting   Acute esophagitis /duodenitis   Acute GI bleed stable   Acute on chronic anemia   J tube leaking s/p changed on 8/12   Leukocytosis, resolved   Severe malnutrition   Right ovarian cysts, stable  History of partial hysterectomy   Abdominal pain- generalized-   HTN- controlled   S/P gastric sleeve with revision- secondary to mesh migration- POA  Weakness- POA  Seizures- recent- POA           PLAN:  General surgery on board trying to sort out the leaking J-tube recommends Will upsized the j tube and if that does not work will need to remove the J-tube to allow for healing.   J-tube  was changed on 08/12 however still with much leakage at side   GI recommends to transition to p.o. pantoprazole b.i.d.  Consult nutritionist to optimize nutrition   Pain control as needed   Consult palliative Care for pain control and palliative care  Resume home medication as deemed necessary        VTE Prophylaxis:  SCD for DVT prophylaxis and will be advised to be as mobile as possible      Patient condition:  Stable        Dispo- pending j tube problems resolved             VITAL SIGNS: 24 HRS MIN & MAX LAST   Temp  Min: 98.1 °F (36.7 °C)  Max: 98.3 °F (36.8 °C) 98.1 °F (36.7 °C)   BP  Min: 133/84  Max: 160/101 (!) 160/101   Pulse  Min: 79  Max: 93  88   Resp  Min: 16  Max: 19 16   SpO2  Min: 98 %  Max: 100 % 99 %     I have reviewed the following labs:    Recent Labs   Lab 08/09/23  0358 08/10/23  0348 08/11/23  2100 08/12/23  0324   WBC 14.38* 8.02 9.76  --    RBC 3.47* 2.48* 3.90*  --    HGB 10.1* 7.2* 11.5* 10.7*   HCT 31.1* 22.1* 33.5* 31.9*   MCV 89.6 89.1 85.9  --    MCH 29.1 29.0 29.5  --    MCHC 32.5* 32.6* 34.3  --    RDW 17.2* 17.2* 17.6*  --    * 322 311  --    MPV 11.5* 10.7* 10.9*  --        Recent Labs   Lab 08/08/23  1558 08/09/23  0358 08/09/23  1849 08/10/23  0348 08/11/23  0651 08/12/23  0324   * 130*   < > 132* 137 135*   K 3.8 3.8   < > 4.4 4.3 3.5   CO2 22 19*   < > 22 20* 21*   BUN 13.4 10.6   < > 9.0 6.4* 4.8*   CREATININE 0.69 0.63   < > 0.61 0.68 0.60   CALCIUM 8.1* 7.7*   < > 7.3* 7.3* 7.4*   MG 1.80  --   --   --   --   --    ALBUMIN 2.2* 1.9*  --   --   --   --    ALKPHOS 87 71  --   --   --   --    ALT 14 11  --   --   --   --    AST 13 11  --   --   --   --    BILITOT 1.5 1.1  --   --   --   --     < > = values in this interval not displayed.          Microbiology Results (last 7 days)       Procedure Component Value Units Date/Time    Blood Culture #2 **CANNOT BE ORDERED STAT** [[id number]]  (Normal) Collected: 08/08/23 0536    Order Status: Completed Specimen: Blood  from Arm, Left Updated: 08/13/23 2100     CULTURE, BLOOD ([facility]) No Growth at 5 days    Blood Culture #1 **CANNOT BE ORDERED STAT** [[id number]]  (Normal) Collected: 08/08/23 1733    Order Status: Completed Specimen: Blood from Arm, Right Updated: 08/13/23 2100     CULTURE, BLOOD ([facility]) No Growth at 5 days             See below for Radiology    Scheduled Med:   amLODIPine  5 mg Oral Daily    pantoprazole  40 mg Oral BID AC        Continuous Infusions:       PRN Meds:  0.9%  NaCl infusion (for blood administration), acetaminophen, acetaminophen, ALPRAZolam, dicyclomine, hydrALAZINE, HYDROcodone-acetaminophen, HYDROmorphone, naloxone, ondansetron, prochlorperazine, sodium chloride 0.9%       Assessment/Plan:      VTE prophylaxis:     Patient condition:  Stable/Fair/Guarded/ Serious/ Critical    Anticipated discharge and Disposition:         All diagnosis and differential diagnosis have been reviewed; assessment and plan has been documented; I have personally reviewed the labs and test results that are presently available; I have reviewed the patients medication list; I have reviewed the consulting providers response and recommendations. I have reviewed or attempted to review medical records based upon their availability    All of the patient's questions have been  addressed and answered. Patient's is agreeable to the above stated plan. I will continue to monitor closely and make adjustments to medical management as needed.  _____________________________________________________________________    Nutrition Status:    Radiology:  I have personally reviewed the following imaging and agree with the radiologist.     XR Gastric tube check, non-radiologist performed  Narrative: EXAMINATION:  XR GASTRIC TUBE CHECK, NON-RADIOLOGIST PERFORMED    CLINICAL HISTORY:  To evaluate J tube placement;    TECHNIQUE:  One view    COMPARISON:  None available    FINDINGS:  Contrast instilled through the tube opacifies the bowel loop without  contrast extravasation.  Bowel gas pattern is nonspecific.  Impression: As above    Electronically signed by: Egdardo Tyler  Date:    08/12/2023  Time:    20:10      Loly Tejeda MD   08/14/2023

## 2023-08-14 NOTE — PROGRESS NOTES
"   Acute Care Surgery   Progress Note  Admit Date: 2023  HD#4  POD#3 Days Post-Op    Subjective:   Interval history:  AFVSS  Tube continues to leak. Discussed with patient's previous surgeon, the tube is not a J tube, but actually a feeding tube within the remnant stomach.    Home Meds:  Current Outpatient Medications   Medication Instructions    ALPRAZolam (XANAX) 1 mg, Oral, 3 times daily    cyanocobalamin 1,000 mcg, Intramuscular, Every 30 days    dicyclomine (BENTYL) 10 mg, Oral, Every 6 hours PRN    hyoscyamine (LEVSIN/SL) 0.125 mg Subl Sublingual    megestroL (MEGACE ES) 625 mg, Oral    oxyCODONE-acetaminophen (PERCOCET) 7.5-325 mg per tablet Oral    promethazine (PHENERGAN) 25 mg/mL injection SMARTSI Milliliter(s) IM Every 6 Hours PRN      Scheduled Meds:   amLODIPine  5 mg Oral Daily    pantoprazole  40 mg Oral BID AC     Continuous Infusions:  PRN Meds:0.9%  NaCl infusion (for blood administration), acetaminophen, acetaminophen, ALPRAZolam, dicyclomine, hydrALAZINE, HYDROcodone-acetaminophen, HYDROmorphone, naloxone, ondansetron, prochlorperazine, sodium chloride 0.9%     Objective:     VITAL SIGNS: 24 HR MIN & MAX LAST   Temp  Min: 98.1 °F (36.7 °C)  Max: 98.3 °F (36.8 °C)  98.1 °F (36.7 °C)   BP  Min: 133/84  Max: 160/101  (!) 160/101    Pulse  Min: 79  Max: 93  88    Resp  Min: 16  Max: 19  16    SpO2  Min: 98 %  Max: 100 %  99 %      HT: 5' 4" (162.6 cm)  WT: 44 kg (97 lb)  BMI: 16.6     Intake/output:  Intake/Output - Last 3 Shifts         08/12 0700  08/13 0659 08/13 0700  08/14 0659 08/14 0700  08/15 0659    P.O. 360 30     I.V. (mL/kg)       IV Piggyback       Total Intake(mL/kg) 360 (8.2) 30 (0.7)     Urine (mL/kg/hr) 300 (0.3)      Total Output 300      Net +60 +30            Urine Occurrence 2 x 1 x             Intake/Output Summary (Last 24 hours) at 2023 1447  Last data filed at 2023 0044  Gross per 24 hour   Intake 30 ml   Output --   Net 30 ml       " "      Lines/drains/airway:       Peripheral IV - Single Lumen 08/12/23 2330 22 G Anterior;Right Forearm (Active)   Site Assessment Clean;Dry;Intact 08/12/23 2346   Line Status Infusing 08/13/23 0400   Dressing Status Intact 08/12/23 2346   Dressing Intervention Integrity maintained 08/13/23 0400   Number of days: 0            Gastrostomy/Enterostomy  midline (Active)   Securement secured to abdomen 08/12/23 1930   Drainage green;other (see comments) 08/12/23 1930   Clamp Status/Tolerance clamped 08/12/23 1930   Dressing moist 08/12/23 1930   Insertion Site other (see comments) 08/12/23 1930   Site Care site cleansed w/ soap and water;secured w/ tape;sterile precut T-slit dressing applied 08/12/23 1930   Number of days:        Physical examination:  Gen: NAD, AAOx3, answering questions appropriately  HEENT: NCAT  CV: RR  Resp: NWOB on RA  Abd: Soft, non-distended, TTP around remnantG-tube site.  Ext: moving all extremities spontaneously and purposefully  Neuro: CN II-XII grossly intact    Labs:  Renal:  Recent Labs     08/12/23  0324   BUN 4.8*   CREATININE 0.60       No results for input(s): "LACTIC" in the last 72 hours.  FENGI:  Recent Labs     08/12/23  0324   *   K 3.5   CO2 21*   CALCIUM 7.4*       Heme:  Recent Labs     08/11/23  2100 08/12/23  0324   HGB 11.5* 10.7*   HCT 33.5* 31.9*     --        ID:  Recent Labs     08/11/23 2100   WBC 9.76       CBG:  Recent Labs     08/12/23  0324   GLUCOSE 77        Cardiovascular:  No results for input(s): "TROPONINI", "CKTOTAL", "CKMB", "BNP" in the last 168 hours.  I have reviewed all pertinent lab results within the past 24 hours.    Imaging:  XR Gastric tube check, non-radiologist performed   Final Result      As above         Electronically signed by: Edgardo Tyler   Date:    08/12/2023   Time:    20:10      NM GI Bleed Study   Final Result      No evidence of active GI bleeding over 3 hours.  The sensitivity of this study is  approximately 0.5-1 " ml/minute.         Electronically signed by: John Kevin   Date:    08/10/2023   Time:    18:37      US Pelvis Comp with Transvag NON-OB (xpd   Final Result      Large cyst with echogenic material identified in the right ovary with measurements as above.      Status post partial hysterectomy.      No other abnormalities         Electronically signed by: Thiago Glynn   Date:    08/09/2023   Time:    09:03      CT Abdomen Pelvis W Wo Contrast   Final Result      1. Hiatal hernia and operative changes.      2. Cecum and ascending colon to the hepatic flexure is remarkable for irregular mural thickening which suggest nonspecific colitis.  Please correlate clinically.  Attention to follow-up exams      3. Bilateral adnexa/ovarian cystic structures which is large on the right..         Electronically signed by: Edgardo Tyler   Date:    08/08/2023   Time:    20:24           I have reviewed all pertinent imaging results/findings within the past 24 hours.    Micro/Path/Other:  Microbiology Results (last 7 days)       Procedure Component Value Units Date/Time    Blood Culture #2 **CANNOT BE ORDERED STAT** [[id number]]  (Normal) Collected: 08/08/23 1733    Order Status: Completed Specimen: Blood from Arm, Left Updated: 08/13/23 2100     CULTURE, BLOOD ([facility]) No Growth at 5 days    Blood Culture #1 **CANNOT BE ORDERED STAT** [[id number]]  (Normal) Collected: 08/08/23 1733    Order Status: Completed Specimen: Blood from Arm, Right Updated: 08/13/23 2100     CULTURE, BLOOD ([facility]) No Growth at 5 days           Specimen (168h ago, onward)       Start     Ordered    08/09/23 1219  Specimen to Pathology  RELEASE UPON ORDERING        References:    Click here for ordering Quick Tip   Question:  Release to patient  Answer:  Immediate    08/09/23 1219                     Assessment & Plan:   37-year-old female with extensive bariatric surgical history presenting with a leaking remnant gastric tube.    - Will upside to 16Fr and  reevaluate.       Stewart Jacob MD  [employer] General Surgery, PGY-2

## 2023-08-14 NOTE — CONSULTS
Inpatient Nutrition Assessment    Admit Date: 8/8/2023   Total duration of encounter: 6 days     Nutrition Recommendation/Prescription     -Continue current diet as tolerated. Encourage small, frequent meals for nausea.   -Once able to use J-tube, recommend night time feedings. TF recs:  Isosource 1.5, goal rate of 65mL/hr x 12 hrs per day (7p-7a). Will provide:  1170 kcal (76% est needs)  53 gm protein (80% est needs)  593 mL free water (45% est needs)  -Provide 60mL free water flushes per hr that TF is running to meet fluid needs.   **start feeds @ 25mL/hr and advance to goal rate as tolerated**  -Monitor K, phos, and Mag with EN initiation as pt may be at risk for refeeding.     Communication of Recommendations: reviewed with nurse    Nutrition Assessment     Malnutrition Assessment/Nutrition-Focused Physical Exam       Malnutrition Level: other (see comments) (unable to evaluate)  Energy Intake (Malnutrition): other (see comments) (unable to evaluate)  Weight Loss (Malnutrition): other (see comments) (unable to evaluate)  Subcutaneous Fat (Malnutrition): other (see comments) (unable to evaluate)           Muscle Mass (Malnutrition): other (see comments) (unable to evaluate)                                   A minimum of two characteristics is recommended for diagnosis of either severe or non-severe malnutrition.    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  Weakness  Seizures- recent    Relevant Medical  "History:   Gastric sleeve revision   Anxiety  Nausea with vomiting    Nutrition-Related Medications: protonix, zofran PRN   Calorie Containing IV Medications: no significant kcals from medications at this time    Nutrition-Related Labs:  8/10/23: Na 132, GFR>60  23: Na 135, Glu 77, GFR>60    Diet/PN Order: Diet low fiber/residue  Oral Supplement Order: none  Tube Feeding Order: none  Appetite/Oral Intake: good/% of meals  Factors Affecting Nutritional Intake: altered gastrointestinal function  Food/Mormon/Cultural Preferences: none reported  Food Allergies: no known food allergies    Skin Integrity: intact  Wound(s):   none noted    Comments    8/10/23: Attempted to see pt x 2 and pt was not in the room; noted that pt is on clear liquids; consult for malnutrition and severe wt loss. Will conduct NFPE on f/u.     23: Consult for J-tube feeds. Pt with J-tube placed on . Noted 100% po intake document per EMR. Attempted to see pt but MD was with pt during rounds.    Anthropometrics    Height: 5' 4" (162.6 cm)    Last Weight: 44 kg (97 lb) (23 0800) Weight Method: Standard Scale  BMI (Calculated): 16.6  BMI Classification: underweight (BMI less than 18.5)     Ideal Body Weight (IBW), Female: 120 lb     % Ideal Body Weight, Female (lb): 80.83 %                             Usual Weight Provided By: unable to obtain usual weight    Wt Readings from Last 5 Encounters:   23 44 kg (97 lb)     Weight Change(s) Since Admission:  Admit Weight: 40.8 kg (90 lb) (23 1547)  23: 44 kg    Estimated Needs    Weight Used For Calorie Calculations: 44 kg (97 lb)  Energy Calorie Requirements (kcal): 1540 kcal (MSJ x 1.4 SF)  Energy Need Method: [place]- Jeor  Weight Used For Protein Calculations: 44 kg (97 lb)  Protein Requirements: 66 gm (1.5g/kg)  Fluid Requirements (mL): 1320 mL (30mL/kg)  Temp (24hrs), Av.2 °F (36.8 °C), Min:98.1 °F (36.7 °C), Max:98.3 °F (36.8 °C)       Enteral " Nutrition    Patient not receiving enteral nutrition at this time.    Parenteral Nutrition    Patient not receiving parenteral nutrition support at this time.    Evaluation of Received Nutrient Intake    Calories: not meeting estimated needs  Protein: not meeting estimated needs    Patient Education    Not applicable.    Nutrition Diagnosis     PES: Inadequate oral intake related to altered GI function as evidenced by NPO/clear liquid diet since admit. (improving)    Interventions/Goals     Intervention(s): modified composition of parenteral nutrition, modified rate of parenteral nutrition, and collaboration with other providers  Goal: Meet greater than 75% of nutritional needs by follow-up. (goal progressing)    Monitoring & Evaluation     Dietitian will monitor energy intake and weight.  Nutrition Risk/Follow-Up: high (follow-up in 1-4 days)   Please consult if re-assessment needed sooner.

## 2023-08-15 PROCEDURE — 21400001 HC TELEMETRY ROOM

## 2023-08-15 PROCEDURE — 63600175 PHARM REV CODE 636 W HCPCS: Performed by: NURSE PRACTITIONER

## 2023-08-15 PROCEDURE — 25500020 PHARM REV CODE 255: Performed by: INTERNAL MEDICINE

## 2023-08-15 PROCEDURE — 25000003 PHARM REV CODE 250: Performed by: INTERNAL MEDICINE

## 2023-08-15 PROCEDURE — 63600175 PHARM REV CODE 636 W HCPCS: Performed by: INTERNAL MEDICINE

## 2023-08-15 PROCEDURE — 25000003 PHARM REV CODE 250: Performed by: NURSE PRACTITIONER

## 2023-08-15 RX ADMIN — ALPRAZOLAM 1 MG: 0.5 TABLET ORAL at 05:08

## 2023-08-15 RX ADMIN — HYDROMORPHONE HYDROCHLORIDE 1 MG: 2 INJECTION INTRAMUSCULAR; INTRAVENOUS; SUBCUTANEOUS at 08:08

## 2023-08-15 RX ADMIN — HYDROMORPHONE HYDROCHLORIDE 1 MG: 2 INJECTION INTRAMUSCULAR; INTRAVENOUS; SUBCUTANEOUS at 07:08

## 2023-08-15 RX ADMIN — AMLODIPINE BESYLATE 5 MG: 5 TABLET ORAL at 08:08

## 2023-08-15 RX ADMIN — ONDANSETRON 4 MG: 2 INJECTION INTRAMUSCULAR; INTRAVENOUS at 08:08

## 2023-08-15 RX ADMIN — HYDRALAZINE HYDROCHLORIDE 10 MG: 20 INJECTION INTRAMUSCULAR; INTRAVENOUS at 07:08

## 2023-08-15 RX ADMIN — PANTOPRAZOLE SODIUM 40 MG: 40 TABLET, DELAYED RELEASE ORAL at 05:08

## 2023-08-15 RX ADMIN — ONDANSETRON 4 MG: 2 INJECTION INTRAMUSCULAR; INTRAVENOUS at 01:08

## 2023-08-15 RX ADMIN — ONDANSETRON 4 MG: 2 INJECTION INTRAMUSCULAR; INTRAVENOUS at 07:08

## 2023-08-15 RX ADMIN — HYDROMORPHONE HYDROCHLORIDE 1 MG: 2 INJECTION INTRAMUSCULAR; INTRAVENOUS; SUBCUTANEOUS at 01:08

## 2023-08-15 RX ADMIN — IOPAMIDOL 100 ML: 755 INJECTION, SOLUTION INTRAVENOUS at 11:08

## 2023-08-15 RX ADMIN — ALPRAZOLAM 1 MG: 0.5 TABLET ORAL at 08:08

## 2023-08-15 RX ADMIN — HYDROMORPHONE HYDROCHLORIDE 1 MG: 2 INJECTION INTRAMUSCULAR; INTRAVENOUS; SUBCUTANEOUS at 03:08

## 2023-08-15 NOTE — PROGRESS NOTES
[facility]  Hospital Medicine Progress Note        Chief Complaint: Emesis (Pt had gastric sleeve revision in febrauary. States perf bowel and migrated mesh during surgery. Pt severe weight loss, intractable vomiting. Tender abd near j tube. Seizure activity Friday and seen at [facility] and sent home. Last BM yesterday and normal. Cbg 101)         Patient information was obtained from patient, patient's family, past medical records and ER records.      HISTORY OF PRESENT ILLNESS:   Chelsie Lee is a 37 y.o. female who PMH includes gastric sleeve revision s/p bowel perforation; anxiety, GERD; presents to the ED at [facility] on 8/8/2023 with a primary complaint of nausea and vomiting with associated abdominal pain.  Patient reports she has had a gastric sleeve revision in February 2023 secondary to bowel perforation and migrated mesh.  She also had a J-tube placed during that time for nutritional support.  Patient reports decreased appetite since then with significant weight loss.  She denies any new foods or medications.  Patient reports she was seen in the ED at [facility] for seizure like activity; she denies any history of seizures in the past.  Patient denies any seizure activity since then.  Lab work done revealed WBCs 13.82, H&H 11.3/33.6, sodium 132, glucose 102, other indices unremarkable.  CT of the abdomen and pelvis with and without contrast impression reviewed demonstrated hiatal hernia and operative changes, cecum and ascending colon to the hepatic flexure is remarkable for irregular mural thickening suggests nonspecific colitis, bilateral adnexa ovarian cystic structures which is large on the right.  Initial vital signs reviewed BP 2/117 pulse 106 respirations 16 temperature 98.2° F O2 saturation 93% on room air.  Patient received hydration antiemetic therapy and pain medication in the ED, her blood pressure trended down and saturations improved.  Patient is admitted to hospital  medicine services for further management.        Patient was admitted for intractable nausea and vomiting.  GI was consulted and she underwent an upper GI endoscopy with findings of esophagitis and gastrojejunal erythema and friable lesions.  GI recommended to begin pantoprazole drip for 3 days.EGD reports noted with GI recommendations.          Today's information  Patient seen and examined at bedside   General surgery on board follow-up recommendations   Appreciate palliative Care input  Discontinue telemetry     Exam  General appearance: Well-developed, well-nourished female complaints of abdominal pain, non-toxic, in no apparent distress.  HENT: Atraumatic head. Moist mucous membranes of oral cavity.  Eyes: PERRL  Neck: Supple.   Lungs: Clear to auscultation bilaterally. No wheezing present.   Heart: Regular rate and rhythm. S1 and S2 present cap refill brisk  Abdomen: Soft, non-distended, generalized TTP; Bowel sounds are normal, G tube in place   Extremities: No cyanosis, clubbing,GALEAS  Neuro: Oriented x 3, no acute focal deficits  Psych/mental status: Appropriate mood and affect. Responds appropriately to questions.            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  GI recommends to transition to p.o. pantoprazole b.i.d.  Consult nutritionist to optimize nutrition   Pain control as needed   Consult palliative Care for pain  control and palliative care  Resume home medication as deemed necessary        VTE Prophylaxis:  SCD for DVT prophylaxis and will be advised to be as mobile as possible      Patient condition:  Stable        Dispo- pending g tube problems resolved             VITAL SIGNS: 24 HRS MIN & MAX LAST   Temp  Min: 97.4 °F (36.3 °C)  Max: 98 °F (36.7 °C) 97.4 °F (36.3 °C)   BP  Min: 109/71  Max: 162/98 (!) 162/98   Pulse  Min: 83  Max: 96  93   Resp  Min: 16  Max: 19 19   SpO2  Min: 96 %  Max: 100 % 100 %     I have reviewed the following labs:    Recent Labs   Lab 08/09/23  0358 08/10/23  0348 08/11/23  2100 08/12/23  0324   WBC 14.38* 8.02 9.76  --    RBC 3.47* 2.48* 3.90*  --    HGB 10.1* 7.2* 11.5* 10.7*   HCT 31.1* 22.1* 33.5* 31.9*   MCV 89.6 89.1 85.9  --    MCH 29.1 29.0 29.5  --    MCHC 32.5* 32.6* 34.3  --    RDW 17.2* 17.2* 17.6*  --    * 322 311  --    MPV 11.5* 10.7* 10.9*  --        Recent Labs   Lab 08/08/23  1558 08/09/23  0358 08/09/23  1849 08/10/23  0348 08/11/23  0651 08/12/23  0324   * 130*   < > 132* 137 135*   K 3.8 3.8   < > 4.4 4.3 3.5   CO2 22 19*   < > 22 20* 21*   BUN 13.4 10.6   < > 9.0 6.4* 4.8*   CREATININE 0.69 0.63   < > 0.61 0.68 0.60   CALCIUM 8.1* 7.7*   < > 7.3* 7.3* 7.4*   MG 1.80  --   --   --   --   --    ALBUMIN 2.2* 1.9*  --   --   --   --    ALKPHOS 87 71  --   --   --   --    ALT 14 11  --   --   --   --    AST 13 11  --   --   --   --    BILITOT 1.5 1.1  --   --   --   --     < > = values in this interval not displayed.          Microbiology Results (last 7 days)       Procedure Component Value Units Date/Time    Blood Culture #2 **CANNOT BE ORDERED STAT** [[id number]]  (Normal) Collected: 08/08/23 1733    Order Status: Completed Specimen: Blood from Arm, Left Updated: 08/13/23 2100     CULTURE, BLOOD ([facility]) No Growth at 5 days    Blood Culture #1 **CANNOT BE ORDERED STAT** [[id number]]  (Normal) Collected: 08/08/23 1733    Order Status: Completed Specimen: Blood from  Arm, Right Updated: 08/13/23 2100     CULTURE, BLOOD ([facility]) No Growth at 5 days             See below for Radiology    Scheduled Med:   amLODIPine  5 mg Oral Daily    pantoprazole  40 mg Oral BID AC        Continuous Infusions:       PRN Meds:  0.9%  NaCl infusion (for blood administration), acetaminophen, acetaminophen, ALPRAZolam, diatrizoate meglumineand-diatrizoate sodium, dicyclomine, hydrALAZINE, HYDROcodone-acetaminophen, HYDROmorphone, naloxone, ondansetron, prochlorperazine, sodium chloride 0.9%       Assessment/Plan:      VTE prophylaxis:     Patient condition:  Stable/Fair/Guarded/ Serious/ Critical    Anticipated discharge and Disposition:         All diagnosis and differential diagnosis have been reviewed; assessment and plan has been documented; I have personally reviewed the labs and test results that are presently available; I have reviewed the patients medication list; I have reviewed the consulting providers response and recommendations. I have reviewed or attempted to review medical records based upon their availability    All of the patient's questions have been  addressed and answered. Patient's is agreeable to the above stated plan. I will continue to monitor closely and make adjustments to medical management as needed.  _____________________________________________________________________    Nutrition Status:    Radiology:  I have personally reviewed the following imaging and agree with the radiologist.     CT Chest Abdomen Pelvis With Contrast (xpd)  Narrative: EXAMINATION:  CT CHEST ABDOMEN PELVIS WITH CONTRAST (XPD)    CLINICAL HISTORY:  Weight loss, unintended;complex gastric anatomy, hx multiple bariatric procedures, hiatal hernia;    TECHNIQUE:  Helical acquisition with axial, sagittal and coronal reformations obtained from the thoracic inlet to the pubic symphysis following the IV administration of contrast.    Automated tube current modulation, weight-based exposure dosing, and/or  iterative reconstruction technique utilized to reach lowest reasonably achievable exposure rate.    DLP: 155 mGy*cm    COMPARISON:  CT abdomen pelvis 08/08/2023    FINDINGS:  BASE OF NECK: No significant abnormality.    HEART: Normal size. No effusion.    THORACIC VASCULATURE: No aortic aneurysm and no significant atherosclerosis.Pulmonary arteries enhance normally.    JESSIE/MEDIASTINUM: No enlarged lymph nodes by size criteria.    AIRWAYS: Patent.    LUNGS/PLEURA: Clear lungs. No pleural effusion or thickening.    THORACIC SOFT TISSUES: Unremarkable.    LIVER: The liver is hypodense.    BILIARY: No calcified gallstones.    PANCREAS: No inflammatory change.    SPLEEN: Normal in size    ADRENALS: No mass.    KIDNEYS/URETERS: The kidneys enhance symmetrically.  No hydronephrosis.    GI TRACT/MESENTERY: There are postsurgical changes of Kel-en-Y gastric bypass.  Very diminutive proximal gastric pouch.  Small sliding hiatal hernia.  No evidence of obstruction.  There is a gastrostomy balloon in the retained distal gastric body.  It is very difficult to confidently delineate the pylorus.  Suspect pylorus is visible on series 3, image 68.  Redundant fold versus potential ulcer at the posterior wall of the gastric antrum (3, 66).  Contrast administered via gastrostomy is seen in the biliopancreatic limb which is not distended and not appreciably obstructed.  No appreciable leak.    PERITONEUM: No free fluid.No free air.    LYMPH NODES: No enlarged lymph nodes by size criteria.    ABDOMINOPELVIC VASCULATURE: No significant atherosclerosis or aneurysm.    BLADDER: Normal appearance given degree of distention.    REPRODUCTIVE ORGANS: 4.6 cm right ovarian cyst; previously 5.6 cm.    ABDOMINAL WALL: Unremarkable.    BONES: No acute osseous abnormality.  Impression: 1. 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  No appreciable leak or obstruction.  2. Redundant fold versus potential ulcer at the  posterior wall of the gastric antrum  3. Small hiatal hernia  4. Hepatic steatosis    Electronically signed by: Ethel Bal  Date:    08/15/2023  Time:    12:13      Loly Tejeda MD   08/15/2023

## 2023-08-15 NOTE — CONSULTS
Reason for consult:  Leaking gastrostomy tube, patient requesting 2nd opinion for complex bariatric surgical history    History of present illness:  This is a 37-year-old female who is currently hospitalized seeking a 2nd opinion for her severe weight loss, complex bariatric history, and leaking gastrostomy tube.  Patient had a laparoscopic vertical sleeve gastrectomy in [place], she later developed a hiatal hernia some years later had a conversion of the sleeve gastrectomy to a mini gastric bypass she developed complications, including a worsening hiatal hernia.  At that point she sought bariatric treatment from a local bariatric surgeon, a laparoscopic repair of hiatal hernia was performed and conversion of the mini gastric bypass to a standard Kel-en-Y gastric bypass configuration.  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  Ultimately patient developed worsening abdominal pain, she would had a feeding jejunostomy placed through the gastric feeding tube in her epigastrium, this essentially was a long gastric tube that went from the gastric remnant into the duodenum.  She is had much leaking around the tube,    The general surgery service was consulted and did upsize the gastrostomy tube and this was only minimally effective in preventing the leaking.  She is currently off of the tube feeds, and I was consulted to address this overall situation.          Patient has relied on narcotic pain medications for least a year.  She reports being compliant with her Protonix and Carafate medications.  She also reports compliance with a multivitamin regimen.      Patient had a upper endoscopy performed by Gastroenterology on the 8th August 2023, this EGD demonstrated a stricture within the gastric pouch, the proximal pouch appeared relatively normal but distal to the stenosis in the gastric pouch, there was  an area of 90% ulceration.      It is unclear whether or not this represents a marginal ulcer, versus severe gastritis at this distal gastric segment.  She also had postprocedural bleeding requiring transfusion but this was self-limited     Patient does have a diet ordered and tries to eat, she was unable to eat large amounts at 1 time.      She reports losing 60 lb within the last several months.    Prior to her 1st bariatric operation 2013, her weight was around 200 lb, she is now 97 lb with a BMI of 16.65         Medications:  Please see med rec     Allergies: Penicillin    Past surgical history:  Laparoscopic vertical sleeve gastrectomy, laparoscopic mini gastric bypass, laparoscopic repair of hiatal hernia with mesh, laparoscopic conversion of many gastric bypass to standard Kel-en-Y gastric bypass, exploratory laparotomy, appendectomy        Review of systems:  Reports fatigue, lack of energy,  No headaches dizziness  No chest pain orthopnea or dyspnea on exertion   No respiratory distress, hemoptysis or coughing wheezing   Does report frequent abdominal pain, inability to tolerate anything but small amounts of food, normally has bowel movements daily     Physical exam:  Normotensive non tachycardic, saturating 100% on room air, afebrile   She does demonstrate temporal wasting, quite gaunt and thin but not cachectic, significant loose skin   Otherwise normocephalic atraumatic pupils equal round reactive to light and accommodation   No cervical goiter, trachea is midline no cervical lymphadenopathy   Regular rate and rhythm no rubs murmurs or gallops   Clear to auscultation bilaterally no wheezes rales or rhonchi   Abdomen is soft nontender nondistended bowel sounds are present, not quite scaphoid abdomen, but thin, there is a gastrostomy tube in her epigastrium, she has a well-healed midline scar  No rebound tenderness or guarding    Assessment plan:  This is a 37-year-old female with complicated bariatric  history, it does seem that she currently has a Kel-en-Y gastric bypass configuration with a gastrostomy tube in her epigastrium which seems to leak.      I have ordered a CT chest abdomen pelvis with bariatric protocol, I have also requested that contrast was given through the gastrostomy tube so that we can determine if there appears to be any kinking of her hepatobiliary limb, Kel limb or other segment.      This may need to be followed up with the ordinary swallow study.      Patient is not in tremendous shape for an operation at this time however if she has uncontrolled leaking through the gastrostomy tube, I will consider taking down the gastrostomy tube which seems to have been leaking for several months and place a ordinary feeding jejunostomy tube.      Revisional surgery is a possibility however this will be a challenge because she has a history of severe or significant hiatal hernia which was repaired with mesh and also to reverse the gastric bypass would likely cause severe reflux, furthermore she does not have much of a gastric remnant and gastric reanastomosis would leave her with a small segment prone to reflux.      She will need daily Protonix 40 mg and Carafate 1 g Q daily every day for any chance to allow her gastric ulceration to heal, I recommend no coffee, no caffeine, not even decaffeinated caffeine RT as these can impair ulcer healing     Patient says she does not smoke, however she was reliant on narcotic pain medications, I do feel that this can impair her progress and cause nausea, may need pain management specialist to help her wean off of these medications, due to the prolonged nature of her narcotic use as prescribed, she may have developed narcotic tolerance which can be difficult to treat when she still seems to have some pain     Is imperative that she avoids nonsteroidal anti-inflammatory drugs    I will review the CT scan was ordered, I may also order a swallow study if sufficient  information is not gathered from the CT scan     I will continue to follow this patient with you     Thank you for the consultation, my cell phone number is [phone], call me anytime   please not hesitate to contact me for care of this patient or any other patient in need of general bariatric surgical care thank you      Patient had a CT scan on 8th August 2023, this did not show any dilated hepatobiliary limb orogastric set segments, there was a visible long gastric tube that is likely referred to as her J-tube that goes a distance down the duodenum, but did not axillary reach her jejunum.    I saw no otherwise abnormalities however this was done without contrast     Patient is hypoalbuminemic and hypo pre-albumin anemic,  She has no leukocytosis, H and H is 10.7 and 31.9, she is acidotic, hypochloremic BUN and creatinine are within normal limits

## 2023-08-16 LAB
ALBUMIN SERPL-MCNC: 1.9 G/DL (ref 3.5–5)
ALBUMIN/GLOB SERPL: 0.9 RATIO (ref 1.1–2)
ALP SERPL-CCNC: 70 UNIT/L (ref 40–150)
ALT SERPL-CCNC: 14 UNIT/L (ref 0–55)
AST SERPL-CCNC: 11 UNIT/L (ref 5–34)
BASOPHILS # BLD AUTO: 0.05 X10(3)/MCL
BASOPHILS NFR BLD AUTO: 0.6 %
BILIRUB SERPL-MCNC: 0.8 MG/DL
BUN SERPL-MCNC: 5.1 MG/DL (ref 7–18.7)
CALCIUM SERPL-MCNC: 7.5 MG/DL (ref 8.4–10.2)
CHLORIDE SERPL-SCNC: 104 MMOL/L (ref 98–107)
CO2 SERPL-SCNC: 23 MMOL/L (ref 22–29)
CREAT SERPL-MCNC: 0.59 MG/DL (ref 0.55–1.02)
EOSINOPHIL # BLD AUTO: 0.1 X10(3)/MCL (ref 0–0.9)
EOSINOPHIL NFR BLD AUTO: 1.3 %
ERYTHROCYTE [DISTWIDTH] IN BLOOD BY AUTOMATED COUNT: 16 % (ref 11.5–17)
GFR SERPLBLD CREATININE-BSD FMLA CKD-EPI: >60 MLS/MIN/1.73/M2
GLOBULIN SER-MCNC: 2.2 GM/DL (ref 2.4–3.5)
GLUCOSE SERPL-MCNC: 101 MG/DL (ref 74–100)
HCT VFR BLD AUTO: 34.1 % (ref 37–47)
HGB BLD-MCNC: 11.3 G/DL (ref 12–16)
IMM GRANULOCYTES # BLD AUTO: 0.05 X10(3)/MCL (ref 0–0.04)
IMM GRANULOCYTES NFR BLD AUTO: 0.6 %
LYMPHOCYTES # BLD AUTO: 1.53 X10(3)/MCL (ref 0.6–4.6)
LYMPHOCYTES NFR BLD AUTO: 19.6 %
MAGNESIUM SERPL-MCNC: 2 MG/DL (ref 1.6–2.6)
MCH RBC QN AUTO: 29 PG (ref 27–31)
MCHC RBC AUTO-ENTMCNC: 33.1 G/DL (ref 33–36)
MCV RBC AUTO: 87.7 FL (ref 80–94)
MONOCYTES # BLD AUTO: 0.83 X10(3)/MCL (ref 0.1–1.3)
MONOCYTES NFR BLD AUTO: 10.7 %
NEUTROPHILS # BLD AUTO: 5.23 X10(3)/MCL (ref 2.1–9.2)
NEUTROPHILS NFR BLD AUTO: 67.2 %
NRBC BLD AUTO-RTO: 0 %
PHOSPHATE SERPL-MCNC: 3 MG/DL (ref 2.3–4.7)
PLATELET # BLD AUTO: 419 X10(3)/MCL (ref 130–400)
PMV BLD AUTO: 10.5 FL (ref 7.4–10.4)
POTASSIUM SERPL-SCNC: 4.2 MMOL/L (ref 3.5–5.1)
PROT SERPL-MCNC: 4.1 GM/DL (ref 6.4–8.3)
RBC # BLD AUTO: 3.89 X10(6)/MCL (ref 4.2–5.4)
SODIUM SERPL-SCNC: 130 MMOL/L (ref 136–145)
WBC # SPEC AUTO: 7.79 X10(3)/MCL (ref 4.5–11.5)

## 2023-08-16 PROCEDURE — 80053 COMPREHEN METABOLIC PANEL: CPT | Performed by: INTERNAL MEDICINE

## 2023-08-16 PROCEDURE — 83735 ASSAY OF MAGNESIUM: CPT | Performed by: INTERNAL MEDICINE

## 2023-08-16 PROCEDURE — 25000003 PHARM REV CODE 250: Performed by: NURSE PRACTITIONER

## 2023-08-16 PROCEDURE — 63600175 PHARM REV CODE 636 W HCPCS: Performed by: NURSE PRACTITIONER

## 2023-08-16 PROCEDURE — 85025 COMPLETE CBC W/AUTO DIFF WBC: CPT | Performed by: INTERNAL MEDICINE

## 2023-08-16 PROCEDURE — 25000003 PHARM REV CODE 250: Performed by: INTERNAL MEDICINE

## 2023-08-16 PROCEDURE — 63600175 PHARM REV CODE 636 W HCPCS: Performed by: INTERNAL MEDICINE

## 2023-08-16 PROCEDURE — 25000003 PHARM REV CODE 250: Performed by: STUDENT IN AN ORGANIZED HEALTH CARE EDUCATION/TRAINING PROGRAM

## 2023-08-16 PROCEDURE — 84100 ASSAY OF PHOSPHORUS: CPT | Performed by: INTERNAL MEDICINE

## 2023-08-16 PROCEDURE — 21400001 HC TELEMETRY ROOM

## 2023-08-16 RX ADMIN — ALPRAZOLAM 1 MG: 0.5 TABLET ORAL at 10:08

## 2023-08-16 RX ADMIN — AMLODIPINE BESYLATE 5 MG: 5 TABLET ORAL at 10:08

## 2023-08-16 RX ADMIN — HYDROMORPHONE HYDROCHLORIDE 1 MG: 2 INJECTION INTRAMUSCULAR; INTRAVENOUS; SUBCUTANEOUS at 07:08

## 2023-08-16 RX ADMIN — THERA TABS 1 TABLET: TAB at 10:08

## 2023-08-16 RX ADMIN — ONDANSETRON 4 MG: 2 INJECTION INTRAMUSCULAR; INTRAVENOUS at 07:08

## 2023-08-16 RX ADMIN — PANTOPRAZOLE SODIUM 40 MG: 40 TABLET, DELAYED RELEASE ORAL at 05:08

## 2023-08-16 RX ADMIN — HYDROCODONE BITARTRATE AND ACETAMINOPHEN 1 TABLET: 10; 325 TABLET ORAL at 03:08

## 2023-08-16 RX ADMIN — ALPRAZOLAM 1 MG: 0.5 TABLET ORAL at 08:08

## 2023-08-16 RX ADMIN — ONDANSETRON 4 MG: 2 INJECTION INTRAMUSCULAR; INTRAVENOUS at 03:08

## 2023-08-16 RX ADMIN — PANTOPRAZOLE SODIUM 40 MG: 40 TABLET, DELAYED RELEASE ORAL at 03:08

## 2023-08-16 NOTE — PROGRESS NOTES
[facility]  Hospital Medicine Progress Note        Chief Complaint: Emesis (Pt had gastric sleeve revision in febrauary. States perf bowel and migrated mesh during surgery. Pt severe weight loss, intractable vomiting. Tender abd near j tube. Seizure activity Friday and seen at [facility] and sent home. Last BM yesterday and normal. Cbg 101)         Patient information was obtained from patient, patient's family, past medical records and ER records.      HISTORY OF PRESENT ILLNESS:   Chelsie Lee is a 37 y.o. female who PMH includes gastric sleeve revision s/p bowel perforation; anxiety, GERD; presents to the ED at [facility] on 8/8/2023 with a primary complaint of nausea and vomiting with associated abdominal pain.  Patient reports she has had a gastric sleeve revision in February 2023 secondary to bowel perforation and migrated mesh.  She also had a J-tube placed during that time for nutritional support.  Patient reports decreased appetite since then with significant weight loss.  She denies any new foods or medications.  Patient reports she was seen in the ED at [facility] for seizure like activity; she denies any history of seizures in the past.  Patient denies any seizure activity since then.  Lab work done revealed WBCs 13.82, H&H 11.3/33.6, sodium 132, glucose 102, other indices unremarkable.  CT of the abdomen and pelvis with and without contrast impression reviewed demonstrated hiatal hernia and operative changes, cecum and ascending colon to the hepatic flexure is remarkable for irregular mural thickening suggests nonspecific colitis, bilateral adnexa ovarian cystic structures which is large on the right.  Initial vital signs reviewed BP 2/117 pulse 106 respirations 16 temperature 98.2° F O2 saturation 93% on room air.  Patient received hydration antiemetic therapy and pain medication in the ED, her blood pressure trended down and saturations improved.  Patient is admitted to hospital  medicine services for further management.        Patient was admitted for intractable nausea and vomiting.  GI was consulted and she underwent an upper GI endoscopy with findings of esophagitis and gastrojejunal erythema and friable lesions.  GI recommended to begin pantoprazole drip for 3 days.EGD reports noted with GI recommendations.          Today's information  Patient seen and examined at bedside   Patient reports she was vomiting early this morning but responded to Zofran   CT abdomen reviewed shows gastric ulcer.  No obstruction  General surgery recommends to begin feedings today through the G-tube          Exam  General appearance: Well-developed, well-nourished female complaints of abdominal pain, non-toxic, in no apparent distress.  HENT: Atraumatic head. Moist mucous membranes of oral cavity.  Eyes: PERRL  Neck: Supple.   Lungs: Clear to auscultation bilaterally. No wheezing present.   Heart: Regular rate and rhythm. S1 and S2 present cap refill brisk  Abdomen: Soft, non-distended, generalized TTP; Bowel sounds are normal, G tube in place   Extremities: No cyanosis, clubbing,GALEAS  Neuro: Oriented x 3, no acute focal deficits  Psych/mental status: Appropriate mood and affect. Responds appropriately to questions.            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  No obstruction  General surgery recommends to begin feedings today through the G-tube   Appreciate palliative Care input  GI recommends to transition to p.o. pantoprazole b.i.d.  Consult nutritionist to  optimize nutrition   Pain control as needed   Consult palliative Care for pain control and palliative care  Resume home medication as deemed necessary        VTE Prophylaxis:  SCD for DVT prophylaxis and will be advised to be as mobile as possible      Patient condition:  Stable        Dispo- pending g tube problems resolved             VITAL SIGNS: 24 HRS MIN & MAX LAST   Temp  Min: 97.6 °F (36.4 °C)  Max: 98.2 °F (36.8 °C) 98.2 °F (36.8 °C)   BP  Min: 117/69  Max: 161/119 126/79   Pulse  Min: 87  Max: 108  108   Resp  Min: 16  Max: 20 18   SpO2  Min: 99 %  Max: 100 % 100 %     I have reviewed the following labs:    Recent Labs   Lab 08/10/23  0348 08/11/23  2100 08/12/23  0324 08/16/23  0944   WBC 8.02 9.76  --  7.79   RBC 2.48* 3.90*  --  3.89*   HGB 7.2* 11.5* 10.7* 11.3*   HCT 22.1* 33.5* 31.9* 34.1*   MCV 89.1 85.9  --  87.7   MCH 29.0 29.5  --  29.0   MCHC 32.6* 34.3  --  33.1   RDW 17.2* 17.6*  --  16.0    311  --  419*   MPV 10.7* 10.9*  --  10.5*       Recent Labs   Lab 08/11/23  0651 08/12/23  0324 08/16/23  0944    135* 130*   K 4.3 3.5 4.2   CO2 20* 21* 23   BUN 6.4* 4.8* 5.1*   CREATININE 0.68 0.60 0.59   CALCIUM 7.3* 7.4* 7.5*   MG  --   --  2.00   ALBUMIN  --   --  1.9*   ALKPHOS  --   --  70   ALT  --   --  14   AST  --   --  11   BILITOT  --   --  0.8          Microbiology Results (last 7 days)       Procedure Component Value Units Date/Time    Blood Culture #2 **CANNOT BE ORDERED STAT** [[id number]]  (Normal) Collected: 08/08/23 2835    Order Status: Completed Specimen: Blood from Arm, Left Updated: 08/13/23 2100     CULTURE, BLOOD ([facility]) No Growth at 5 days    Blood Culture #1 **CANNOT BE ORDERED STAT** [[id number]]  (Normal) Collected: 08/08/23 1733    Order Status: Completed Specimen: Blood from Arm, Right Updated: 08/13/23 2100     CULTURE, BLOOD ([facility]) No Growth at 5 days             See below for Radiology    Scheduled Med:   amLODIPine  5 mg Oral Daily    multivitamin  1 tablet  Oral Daily    pantoprazole  40 mg Oral BID AC        Continuous Infusions:       PRN Meds:  0.9%  NaCl infusion (for blood administration), acetaminophen, acetaminophen, ALPRAZolam, diatrizoate meglumineand-diatrizoate sodium, dicyclomine, hydrALAZINE, HYDROcodone-acetaminophen, HYDROmorphone, naloxone, ondansetron, prochlorperazine, sodium chloride 0.9%       Assessment/Plan:      VTE prophylaxis:     Patient condition:  Stable/Fair/Guarded/ Serious/ Critical    Anticipated discharge and Disposition:         All diagnosis and differential diagnosis have been reviewed; assessment and plan has been documented; I have personally reviewed the labs and test results that are presently available; I have reviewed the patients medication list; I have reviewed the consulting providers response and recommendations. I have reviewed or attempted to review medical records based upon their availability    All of the patient's questions have been  addressed and answered. Patient's is agreeable to the above stated plan. I will continue to monitor closely and make adjustments to medical management as needed.  _____________________________________________________________________    Nutrition Status:    Radiology:  I have personally reviewed the following imaging and agree with the radiologist.     CT Chest Abdomen Pelvis With Contrast (xpd)  Narrative: EXAMINATION:  CT CHEST ABDOMEN PELVIS WITH CONTRAST (XPD)    CLINICAL HISTORY:  Weight loss, unintended;complex gastric anatomy, hx multiple bariatric procedures, hiatal hernia;    TECHNIQUE:  Helical acquisition with axial, sagittal and coronal reformations obtained from the thoracic inlet to the pubic symphysis following the IV administration of contrast.    Automated tube current modulation, weight-based exposure dosing, and/or iterative reconstruction technique utilized to reach lowest reasonably achievable exposure rate.    DLP: 155 mGy*cm    COMPARISON:  CT abdomen pelvis  08/08/2023    FINDINGS:  BASE OF NECK: No significant abnormality.    HEART: Normal size. No effusion.    THORACIC VASCULATURE: No aortic aneurysm and no significant atherosclerosis.Pulmonary arteries enhance normally.    JESSIE/MEDIASTINUM: No enlarged lymph nodes by size criteria.    AIRWAYS: Patent.    LUNGS/PLEURA: Clear lungs. No pleural effusion or thickening.    THORACIC SOFT TISSUES: Unremarkable.    LIVER: The liver is hypodense.    BILIARY: No calcified gallstones.    PANCREAS: No inflammatory change.    SPLEEN: Normal in size    ADRENALS: No mass.    KIDNEYS/URETERS: The kidneys enhance symmetrically.  No hydronephrosis.    GI TRACT/MESENTERY: There are postsurgical changes of Kel-en-Y gastric bypass.  Very diminutive proximal gastric pouch.  Small sliding hiatal hernia.  No evidence of obstruction.  There is a gastrostomy balloon in the retained distal gastric body.  It is very difficult to confidently delineate the pylorus.  Suspect pylorus is visible on series 3, image 68.  Redundant fold versus potential ulcer at the posterior wall of the gastric antrum (3, 66).  Contrast administered via gastrostomy is seen in the biliopancreatic limb which is not distended and not appreciably obstructed.  No appreciable leak.    PERITONEUM: No free fluid.No free air.    LYMPH NODES: No enlarged lymph nodes by size criteria.    ABDOMINOPELVIC VASCULATURE: No significant atherosclerosis or aneurysm.    BLADDER: Normal appearance given degree of distention.    REPRODUCTIVE ORGANS: 4.6 cm right ovarian cyst; previously 5.6 cm.    ABDOMINAL WALL: Unremarkable.    BONES: No acute osseous abnormality.  Impression: 1. 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  No appreciable leak or obstruction.  2. Redundant fold versus potential ulcer at the posterior wall of the gastric antrum  3. Small hiatal hernia  4. Hepatic steatosis    Electronically signed by: Ethel  Valeriano  Date:    08/15/2023  Time:    12:13      Loly Tejeda MD   08/16/2023

## 2023-08-16 NOTE — PROGRESS NOTES
Patient personally seen and examined this morning   CT scan with p.o. and IV contrast was reviewed as well   Patient had no leaking of gastric contents onto her skin overnight however she was not receiving tube feeds  She is tolerating p.o. diet, she would small amounts but is able to tolerate her diet nonetheless     She is in good spirits, she is normotensive, non tachycardic and afebrile   Regular rate and rhythm no rubs murmurs or gallops   Clear to auscultation bilaterally no wheezes rales or rhonchi   Abdomen is soft nontender nondistended bowel sounds present, gastrostomy tube and epigastrium is clean with no drainage     Assessment and plan:  37-year-old female with complex bariatric history, she has no gastric leaking   Fortunately this patient was tolerating a p.o. diet, unfortunately she does have significant gastric ulcer which will likely take quite some time to heal completely  This patient will need months of Carafate to be taken daily, and Protonix  She is not to be given any caffeine of any kind, not even decaffeinated coffee or decaffeinated tea as these still contained some caffeine and decrease the chances of adequate ulceration healing     I would recommend continuing to allow this patient to have a p.o. diet, she is to avoid all NSAIDs and aspirin and avoid nicotine, chocolate, and caffeine    Any gastric reconstruction is problematic for several reasons, 1 if we were to reverse her gastric bypass, we would have very little remaining stomach to work with, she also has hiatal hernia would likely suffer from reflux and currently she does not have enough healthy gastric pouch to perform a safe anastomosis     I recommend restarting her tube feeds, this supplemental enteral nutrition will be helpful, if she continues to have leaks with administration of tube feeds, I will consider taking her to the operating room and taking down her gastrostomy site and giving her a feeding jejunostomy tube,  feeding jejunostomy tubes also can leak which can be bothersome and painful as well.      I believe she is on the current best course of action at present, she will need time to heal, inadequate nutrition.      I recommend nutrition assessment to advise her on the appropriate caloric and commercially available products and supplements to help to achieve this adequate nutrition.      Her BMI is 16, a goal would be to come to a BMI of at least 18.      Another goal is for her to be able to eat foods without significant pain,    No immediate surgical intervention required,     Please start tube feeds slowly, advance to goal with help of nutrition, she may not require as much tube feeds as she is still able to tolerate a p.o. diet    Fortunately her CT scan demonstrates that there is no bowel obstruction of the hepatobiliary limb or the Kel limb, her gastrointestinal tract appears to be open which is good for her

## 2023-08-16 NOTE — PLAN OF CARE
Patient is Active with [facility]. [facility] sent to update on patient.

## 2023-08-17 PROCEDURE — 25000003 PHARM REV CODE 250: Performed by: NURSE PRACTITIONER

## 2023-08-17 PROCEDURE — 25000003 PHARM REV CODE 250: Performed by: INTERNAL MEDICINE

## 2023-08-17 PROCEDURE — 99233 PR SUBSEQUENT HOSPITAL CARE,LEVL III: ICD-10-PCS | Mod: ,,, | Performed by: NURSE PRACTITIONER

## 2023-08-17 PROCEDURE — 25000003 PHARM REV CODE 250: Performed by: STUDENT IN AN ORGANIZED HEALTH CARE EDUCATION/TRAINING PROGRAM

## 2023-08-17 PROCEDURE — 63600175 PHARM REV CODE 636 W HCPCS: Performed by: NURSE PRACTITIONER

## 2023-08-17 PROCEDURE — 21400001 HC TELEMETRY ROOM

## 2023-08-17 PROCEDURE — 99233 SBSQ HOSP IP/OBS HIGH 50: CPT | Mod: ,,, | Performed by: NURSE PRACTITIONER

## 2023-08-17 RX ORDER — DULOXETIN HYDROCHLORIDE 20 MG/1
20 CAPSULE, DELAYED RELEASE ORAL 2 TIMES DAILY
Status: DISCONTINUED | OUTPATIENT
Start: 2023-08-17 | End: 2023-08-20 | Stop reason: HOSPADM

## 2023-08-17 RX ORDER — MIRTAZAPINE 7.5 MG/1
7.5 TABLET, FILM COATED ORAL NIGHTLY
Status: DISCONTINUED | OUTPATIENT
Start: 2023-08-17 | End: 2023-08-20 | Stop reason: HOSPADM

## 2023-08-17 RX ORDER — HYDROMORPHONE HYDROCHLORIDE 2 MG/ML
1 INJECTION, SOLUTION INTRAMUSCULAR; INTRAVENOUS; SUBCUTANEOUS EVERY 8 HOURS PRN
Status: DISCONTINUED | OUTPATIENT
Start: 2023-08-17 | End: 2023-08-20 | Stop reason: HOSPADM

## 2023-08-17 RX ADMIN — ALPRAZOLAM 1 MG: 0.5 TABLET ORAL at 08:08

## 2023-08-17 RX ADMIN — DULOXETINE HYDROCHLORIDE 20 MG: 20 CAPSULE, DELAYED RELEASE ORAL at 08:08

## 2023-08-17 RX ADMIN — ONDANSETRON 4 MG: 2 INJECTION INTRAMUSCULAR; INTRAVENOUS at 06:08

## 2023-08-17 RX ADMIN — AMLODIPINE BESYLATE 5 MG: 5 TABLET ORAL at 08:08

## 2023-08-17 RX ADMIN — PANTOPRAZOLE SODIUM 40 MG: 40 TABLET, DELAYED RELEASE ORAL at 06:08

## 2023-08-17 RX ADMIN — HYDROCODONE BITARTRATE AND ACETAMINOPHEN 1 TABLET: 10; 325 TABLET ORAL at 08:08

## 2023-08-17 RX ADMIN — THERA TABS 1 TABLET: TAB at 08:08

## 2023-08-17 RX ADMIN — MIRTAZAPINE 7.5 MG: 7.5 TABLET ORAL at 08:08

## 2023-08-17 RX ADMIN — ONDANSETRON 4 MG: 2 INJECTION INTRAMUSCULAR; INTRAVENOUS at 08:08

## 2023-08-17 RX ADMIN — ALPRAZOLAM 1 MG: 0.5 TABLET ORAL at 10:08

## 2023-08-17 NOTE — CONSULTS
Consults    Patient Name: Chelsie Lee   MRN: [id number]   Admission Date: 8/8/2023   Hospital Length of Stay: 7   Attending Provider: Marry Jenkins MD   Consulting Provider: Sushila TREVIZO  Reason for Consult: Goals of Care  Primary Care Physician:  No, Primary Doctor     Principal Problem: <principal problem not specified>       Final diagnoses:  [K52.9] Colitis (Primary)  [R11.2] Intractable nausea and vomiting      Assessment/Plan:     I reviewed the patient and family's understanding of the seriousness of the illness and its expected prognosis. We discussed the patient's goals of care and treatment preferences.        Advance Care Planning     Date: 08/17/2023    [facility]  I engaged the patient in a voluntary conversation about advance care planning and we specifically addressed what the goals of care would be moving forward, in light of the patient's change in clinical status, specifically current condition.  We did specifically address the patient's likely prognosis, which is fair .  We explored the patient's values and preferences for future care.  The patient endorses that what is most important right now is to focus on curative/life-prolongation (regardless of treatment burdens)    Accordingly, we have decided that the best plan to meet the patient's goals includes continuing with treatment    Discussed case with staff RN who had extensive discussion with patient concerning narcotic use and recommended psych evaluation.    Met with patient who seems to be in better spirits and is encouraged by being able to tolerate more intake.  Informed that she spoke with dietician and now feels she has better options.  Discussed that she is concerned about her narcotic use and will work to decrease.  Discussed that I will work to adjust medications accordingly.  Discussed with patient that narcotics, aside from chronic or cancer pain, are a bridge to help with mobility while healing.  Discussed that her  surgical issues would most benefit from nutrition and lifestyle adjustments.  She verbalized understanding.  Offered support and informed I would continue to follow.            Recommendations:     Pain: adjust dilaudid to 1 mg Q 8 PRN and will gradually decrease norco 10 to 5 mg.        Interval History:     Patient lying in bed and hemodynamically stable.  CT abdomen revealed gastric ulcer with no obstruction.  Plan is for initiation of feedings.       Active Ambulatory Problems     Diagnosis Date Noted    No Active Ambulatory Problems     Resolved Ambulatory Problems     Diagnosis Date Noted    No Resolved Ambulatory Problems     No Additional Past Medical History        Past Surgical History:   Procedure Laterality Date    COLONOSCOPY N/A 8/11/2023    Procedure: COLON;  Surgeon: Wilmer Rossi MD;  Location: [facility] ENDOSCOPY;  Service: Gastroenterology;  Laterality: N/A;    EGD, WITH CLOSED BIOPSY  8/9/2023    Procedure: EGD, WITH CLOSED BIOPSY;  Surgeon: Wilmer Rossi MD;  Location: [facility] ENDOSCOPY;  Service: Gastroenterology;;    ESOPHAGOGASTRODUODENOSCOPY N/A 8/9/2023    Procedure: EGD;  Surgeon: Wilmer Rossi MD;  Location: [facility] ENDOSCOPY;  Service: Gastroenterology;  Laterality: N/A;    HERNIA REPAIR      LAPAROSCOPIC GASTRIC BANDING          Review of patient's allergies indicates:   Allergen Reactions    Penicillin Rash          Current Facility-Administered Medications:     0.9%  NaCl infusion (for blood administration), , Intravenous, Q24H PRN, Loly Tejeda MD, New Bag at 08/10/23 2314    acetaminophen tablet 1,000 mg, 1,000 mg, Oral, Q6H PRN, Zaida Polk FNP, 1,000 mg at 08/12/23 0238    acetaminophen tablet 650 mg, 650 mg, Oral, Q4H PRN, Zaida Polk FNP    ALPRAZolam tablet 1 mg, 1 mg, Oral, TID PRN, Chelsy Childers FNP, 1 mg at 08/17/23 1042    amLODIPine tablet 5 mg, 5 mg, Oral, Daily, Loly Tejeda MD, 5 mg at 08/17/23 0801    diatrizoate  "meglumineand-diatrizoate sodium ([place]) solution 10 mL, 10 mL, Oral, ONCE PRN, Loly Tejeda MD    dicyclomine injection 10 mg, 10 mg, Intramuscular, QID PRN, Loly Tejeda MD, 10 mg at 08/12/23 1846    hydrALAZINE injection 10 mg, 10 mg, Intravenous, Q2H PRN, Zaida Polk FNP, 10 mg at 08/15/23 1935    HYDROcodone-acetaminophen  mg per tablet 1 tablet, 1 tablet, Oral, Q6H PRN, Zaida Polk FNP, 1 tablet at 08/17/23 0801    HYDROmorphone (PF) injection 1 mg, 1 mg, Intravenous, Q4H PRN, Loly Tejeda MD, 1 mg at 08/16/23 1910    multivitamin tablet, 1 tablet, Oral, Daily, Stewart Jacob MD, 1 tablet at 08/17/23 0801    naloxone 0.4 mg/mL injection 0.02 mg, 0.02 mg, Intravenous, PRN, Zaida Polk FNP    ondansetron injection 4 mg, 4 mg, Intravenous, Q4H PRN, Zaida Polk FNP, 4 mg at 08/17/23 0801    pantoprazole EC tablet 40 mg, 40 mg, Oral, BID AC, Loly Tejeda MD, 40 mg at 08/17/23 0613    prochlorperazine injection Soln 5 mg, 5 mg, Intravenous, Q6H PRN, Zaida Polk FNP, 5 mg at 08/14/23 1831    sodium chloride 0.9% flush 10 mL, 10 mL, Intravenous, PRN, Zaida Polk FNP     0.9%  NaCl infusion (for blood administration), acetaminophen, acetaminophen, ALPRAZolam, diatrizoate meglumineand-diatrizoate sodium, dicyclomine, hydrALAZINE, HYDROcodone-acetaminophen, HYDROmorphone, naloxone, ondansetron, prochlorperazine, sodium chloride 0.9%     History reviewed. No pertinent family history.       Review of Systems   Constitutional:  Positive for activity change, appetite change and fatigue.            Objective:   /88   Pulse 93   Temp 98.3 °F (36.8 °C) (Oral)   Resp 18   Ht 5' 4" (1.626 m)   Wt 44 kg (97 lb)   SpO2 100%   Breastfeeding No   BMI 16.65 kg/m²      Physical Exam   Constitutional: She is oriented to person, place, and time. She appears ill.   Eyes: Pupils are equal, round, and reactive to light. "   Cardiovascular: Normal rate. Pulmonary:      Effort: Pulmonary effort is normal.     Abdominal: Soft.   Musculoskeletal:      Comments: sarcopenia   Neurological: She is oriented to person, place, and time.   Skin: Skin is warm. There is pallor.          Review of Symptoms  Review of Symptoms      Symptom Assessment (ESAS 0-10 Scale)  Pain:  0  Dyspnea:  0  Anxiety:  0  Nausea:  0  Depression:  0  Anorexia:  0  Fatigue:  0  Insomnia:  0  Restlessness:  0  Agitation:  0         Psychosocial/Cultural:   See Palliative Psychosocial Note: Yes  **Primary  to Follow**  Palliative Care  Consult: No      Advance Care Planning   Advance Directives:   Goals of Care: What is most important right now is to focus on extending life as long as possible, even it it means sacrificing quality, curative/life-prolongation (regardless of treatment burdens). Accordingly, we have decided that the best plan to meet the patient's goals includes continuing with treatment.          PAINAD: NA    Caregiver burden formerly assessed: Yes      No results displayed because visit has over 200 results.               > 50% of 35 min of encounter was spent in chart review, face to face discussion of goals of care, symptom assessment, coordination of care and emotional support.    Sushila Quintero FNP, [facility]  Palliative Medicine  [facility]

## 2023-08-17 NOTE — CONSULTS
Inpatient Nutrition Assessment    Admit Date: 8/8/2023   Total duration of encounter: 9 days     Nutrition Recommendation/Prescription     -Continue current diet as tolerated. Encourage small, frequent meals for nausea.   -Once able to use J-tube, recommend night time feedings. TF recs:  Isosource 1.5, goal rate of 65mL/hr x 12 hrs per day (7p-7a). Will provide:  1170 kcal (76% est needs)  53 gm protein (80% est needs)  593 mL free water (45% est needs)  -Provide 60mL free water flushes per hr that TF is running to meet fluid needs.   -Trial ProSource Protein BID for additional protein. Can either be given po or via J-tube. Provides an additional 60 kcal and 15 gm protein per packet.   -Monitor wt, labs, and intake.     Communication of Recommendations: reviewed with patient    Nutrition Assessment     Malnutrition Assessment/Nutrition-Focused Physical Exam       Malnutrition Level: other (see comments) (unable to evaluate)  Energy Intake (Malnutrition): other (see comments) (unable to evaluate)  Weight Loss (Malnutrition): other (see comments) (unable to evaluate)  Subcutaneous Fat (Malnutrition): other (see comments) (unable to evaluate)           Muscle Mass (Malnutrition): other (see comments) (unable to evaluate)                                   A minimum of two characteristics is recommended for diagnosis of either severe or non-severe malnutrition.    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  Weakness  Seizures- recent    Relevant Medical History:   Gastric sleeve revision   Anxiety  Nausea with vomiting    Nutrition-Related Medications: protonix, MVI  Calorie Containing IV Medications: no significant kcals from medications at this time    Nutrition-Related Labs:  8/10/23: Na 132, GFR>60  8/12/23: Na 135, Glu 77, GFR>60  8/16/23: Na 130, Glu 101, GFR>60    Diet/PN Order: Diet low fiber/residue  Oral Supplement Order: none  Tube Feeding Order: none  Appetite/Oral Intake: fair/25-50% of meals  Factors Affecting Nutritional Intake: altered gastrointestinal function  Food/Orthodox/Cultural Preferences: none reported  Food Allergies: no known food allergies    Skin Integrity: intact  Wound(s):   none noted    Comments    8/10/23: Attempted to see pt x 2 and pt was not in the room; noted that pt is on clear liquids; consult for malnutrition and severe wt loss. Will conduct NFPE on f/u.     8/14/23: Consult for J-tube feeds. Pt with J-tube placed on 8/12. Noted 100% po intake document per EMR. Attempted to see pt but MD was with pt during rounds.    8/17/23: Pt receiving nightly J-tube feeds and tolerating thus far; last night was her first night receiving. Pt reports that she is also trying to eat during the day; she eats small amounts at a time. She is receptive to trying ProSource protein.     Anthropometrics    Height: 5' 4" (162.6 cm)    Last Weight: 44 kg (97 lb) (08/09/23 0800) Weight Method: Standard Scale  BMI (Calculated): 16.6  BMI Classification: underweight (BMI less than 18.5)     Ideal Body Weight (IBW), Female: 120 lb     % Ideal Body Weight, Female (lb): 80.83 %                             Usual Weight Provided By: unable to obtain usual weight    Wt Readings from Last 5 Encounters:   08/09/23 44 kg (97 lb)     Weight Change(s) Since Admission:  Admit Weight: 40.8 kg (90 lb) (08/08/23 1547)  8/9/23-8/17/23: 44 kg    Estimated Needs    Weight Used For Calorie Calculations: 44 kg (97 " lb)  Energy Calorie Requirements (kcal): 1540 kcal (MSJ x 1.4 SF)  Energy Need Method: Blunt-St Gleason  Weight Used For Protein Calculations: 44 kg (97 lb)  Protein Requirements: 66 gm (1.5g/kg)  Fluid Requirements (mL): 1320 mL (30mL/kg)  Temp (24hrs), Av.2 °F (36.8 °C), Min:98 °F (36.7 °C), Max:98.3 °F (36.8 °C)       Enteral Nutrition    Formula: Isosource 1.5 Aramis  Rate/Volume: 65 mL/hr x 12 hrs per day  Water Flushes: 60mL/hr that TF is running  Additives/Modulars: none at this time  Route: jejunostomy tube  Method: cyclic  Total Nutrition Provided by Tube Feeding, Additives, and Flushes:  Calories Provided  1170 kcal/d, 76% needs   Protein Provided  53 g/d, 80% needs   Fluid Provided  1313 ml/d, 99% needs       Parenteral Nutrition    Patient not receiving parenteral nutrition support at this time.    Evaluation of Received Nutrient Intake    Calories: meeting estimated needs  Protein: meeting estimated needs    Patient Education    Education Provided: high calorie/high protein diet  Teaching Method: explanation and printed materials  Comprehension: verbalizes understanding  Barriers to Learning: none evident  Expected Compliance: good  Comments: All questions were answered and dietitian's contact information was provided.     Nutrition Diagnosis     PES: Inadequate oral intake related to altered GI function as evidenced by NPO/clear liquid diet since admit. (improving)    Interventions/Goals     Intervention(s): modified composition of parenteral nutrition, modified rate of parenteral nutrition, and collaboration with other providers  Goal: Meet greater than 75% of nutritional needs by follow-up. (goal progressing)    Monitoring & Evaluation     Dietitian will monitor energy intake and weight.  Nutrition Risk/Follow-Up: high (follow-up in 1-4 days)   Please consult if re-assessment needed sooner.

## 2023-08-17 NOTE — PROGRESS NOTES
[facility]  Hospital Medicine Progress Note        Chief Complaint: Emesis (Pt had gastric sleeve revision in febrauary. States perf bowel and migrated mesh during surgery. Pt severe weight loss, intractable vomiting. Tender abd near j tube. Seizure activity Friday and seen at [facility] and sent home. Last BM yesterday and normal. Cbg 101)         Patient information was obtained from patient, patient's family, past medical records and ER records.      HISTORY OF PRESENT ILLNESS:   Chelsie Lee is a 37 y.o. female who PMH includes gastric sleeve revision s/p bowel perforation; anxiety, GERD; presents to the ED at [facility] on 8/8/2023 with a primary complaint of nausea and vomiting with associated abdominal pain.  Patient reports she has had a gastric sleeve revision in February 2023 secondary to bowel perforation and migrated mesh.  She also had a J-tube placed during that time for nutritional support.  Patient reports decreased appetite since then with significant weight loss.  She denies any new foods or medications.  Patient reports she was seen in the ED at [facility] for seizure like activity; she denies any history of seizures in the past.  Patient denies any seizure activity since then.  Lab work done revealed WBCs 13.82, H&H 11.3/33.6, sodium 132, glucose 102, other indices unremarkable.  CT of the abdomen and pelvis with and without contrast impression reviewed demonstrated hiatal hernia and operative changes, cecum and ascending colon to the hepatic flexure is remarkable for irregular mural thickening suggests nonspecific colitis, bilateral adnexa ovarian cystic structures which is large on the right.  Initial vital signs reviewed BP 2/117 pulse 106 respirations 16 temperature 98.2° F O2 saturation 93% on room air.  Patient received hydration antiemetic therapy and pain medication in the ED, her blood pressure trended down and saturations improved.  Patient is admitted to hospital  medicine services for further management.        Patient was admitted for intractable nausea and vomiting.  GI was consulted and she underwent an upper GI endoscopy with findings of esophagitis and gastrojejunal erythema and friable lesions.  GI recommended to begin pantoprazole drip for 3 days.EGD reports noted with GI recommendations.          Today's information  Patient seen and examined at bedside she reported she felt nauseous this a.m. received Zofran which helped she received tube feeds last night from 7:00 p.m. to 7:00 a.m. but not at goal goal 65 mL/hour         Exam  General appearance:  no apparent distress.  Lungs:  Unlabored breathing   Heart:  Normal rate   Abdomen: Soft, non-distended,  G tube in place   Extremities:  Warm   Neuro:  Alert, Oriented x 3, no acute focal deficits  Psych/mental status:  Responds appropriately to questions.            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  Appreciate recommendations     CT abdomen reviewed shows gastric ulcer.  No obstruction  Continue p.o. pantoprazole b.i.d.  Tube feeds per nutrition recommendation   Pain control as needed   Noted palliative Care inputs, appreciate assistance, palliative Care for pain control   Case discussed with patient's nurse  Monitor electrolytes, labs in the a.m.     VTE Prophylaxis:  SCD for DVT prophylaxis and will be advised to be as mobile as possible      Patient condition:  Stable        Dispo- pending g tube problems, tube feed  not at goal              VITAL SIGNS: 24 HRS MIN & MAX LAST   Temp  Min: 98 °F (36.7 °C)  Max: 98.3 °F (36.8 °C) 98.3 °F (36.8 °C)   BP  Min: 114/66  Max: 138/78 (!) 136/93   Pulse  Min: 84  Max: 108  92   Resp  Min: 16  Max: 18 18   SpO2  Min: 97 %  Max: 100 % 100 %     I have reviewed the following labs:    Recent Labs   Lab 08/11/23 2100 08/12/23 0324 08/16/23  0944   WBC 9.76  --  7.79   RBC 3.90*  --  3.89*   HGB 11.5* 10.7* 11.3*   HCT 33.5* 31.9* 34.1*   MCV 85.9  --  87.7   MCH 29.5  --  29.0   MCHC 34.3  --  33.1   RDW 17.6*  --  16.0     --  419*   MPV 10.9*  --  10.5*       Recent Labs   Lab 08/11/23  0651 08/12/23 0324 08/16/23  0944    135* 130*   K 4.3 3.5 4.2   CO2 20* 21* 23   BUN 6.4* 4.8* 5.1*   CREATININE 0.68 0.60 0.59   CALCIUM 7.3* 7.4* 7.5*   MG  --   --  2.00   ALBUMIN  --   --  1.9*   ALKPHOS  --   --  70   ALT  --   --  14   AST  --   --  11   BILITOT  --   --  0.8          Microbiology Results (last 7 days)       Procedure Component Value Units Date/Time    Blood Culture #2 **CANNOT BE ORDERED STAT** [[id number]]  (Normal) Collected: 08/08/23 1733    Order Status: Completed Specimen: Blood from Arm, Left Updated: 08/13/23 2100     CULTURE, BLOOD ([facility]) No Growth at 5 days    Blood Culture #1 **CANNOT BE ORDERED STAT** [[id number]]  (Normal) Collected: 08/08/23 1733    Order Status: Completed Specimen: Blood from Arm, Right Updated: 08/13/23 2100     CULTURE, BLOOD ([facility]) No Growth at 5 days             See below for Radiology    Scheduled Med:   amLODIPine  5 mg Oral Daily    multivitamin  1 tablet Oral Daily    pantoprazole  40 mg Oral BID AC        Continuous Infusions:       PRN Meds:  0.9%  NaCl infusion (for blood administration), acetaminophen, acetaminophen, ALPRAZolam, diatrizoate meglumineand-diatrizoate sodium, dicyclomine, hydrALAZINE, HYDROcodone-acetaminophen, HYDROmorphone, naloxone, ondansetron, prochlorperazine, sodium chloride 0.9%      _____________________________________________________________________    Nutrition Status:    Radiology:  I have personally reviewed the following imaging and agree with the radiologist.     CT Chest Abdomen Pelvis With Contrast (xpd)  Narrative: EXAMINATION:  CT CHEST ABDOMEN PELVIS WITH CONTRAST (XPD)    CLINICAL HISTORY:  Weight loss, unintended;complex gastric anatomy, hx multiple bariatric procedures, hiatal hernia;    TECHNIQUE:  Helical acquisition with axial, sagittal and coronal reformations obtained from the thoracic inlet to the pubic symphysis following the IV administration of contrast.    Automated tube current modulation, weight-based exposure dosing, and/or iterative reconstruction technique utilized to reach lowest reasonably achievable exposure rate.    DLP: 155 mGy*cm    COMPARISON:  CT abdomen pelvis 08/08/2023    FINDINGS:  BASE OF NECK: No significant abnormality.    HEART: Normal size. No effusion.    THORACIC VASCULATURE: No aortic aneurysm and no significant atherosclerosis.Pulmonary arteries enhance normally.    JESSIE/MEDIASTINUM: No enlarged lymph nodes by size criteria.    AIRWAYS: Patent.    LUNGS/PLEURA: Clear lungs. No pleural effusion or thickening.    THORACIC SOFT TISSUES: Unremarkable.    LIVER: The liver is hypodense.    BILIARY: No calcified gallstones.    PANCREAS: No inflammatory change.    SPLEEN: Normal in size    ADRENALS: No mass.    KIDNEYS/URETERS: The kidneys enhance symmetrically.  No hydronephrosis.    GI TRACT/MESENTERY: There are postsurgical changes of Kel-en-Y gastric bypass.  Very diminutive proximal gastric pouch.  Small sliding hiatal hernia.  No evidence of obstruction.  There is a gastrostomy balloon in the retained distal gastric body.  It is very difficult to confidently delineate the pylorus.  Suspect pylorus is visible on series 3, image 68.  Redundant fold versus potential ulcer at the posterior wall of the gastric antrum (3, 66).  Contrast  administered via gastrostomy is seen in the biliopancreatic limb which is not distended and not appreciably obstructed.  No appreciable leak.    PERITONEUM: No free fluid.No free air.    LYMPH NODES: No enlarged lymph nodes by size criteria.    ABDOMINOPELVIC VASCULATURE: No significant atherosclerosis or aneurysm.    BLADDER: Normal appearance given degree of distention.    REPRODUCTIVE ORGANS: 4.6 cm right ovarian cyst; previously 5.6 cm.    ABDOMINAL WALL: Unremarkable.    BONES: No acute osseous abnormality.  Impression: 1. 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  No appreciable leak or obstruction.  2. Redundant fold versus potential ulcer at the posterior wall of the gastric antrum  3. Small hiatal hernia  4. Hepatic steatosis    Electronically signed by: Ethel Bal  Date:    08/15/2023  Time:    12:13      Marry Jenkins MD   08/17/2023

## 2023-08-18 LAB
ANION GAP SERPL CALC-SCNC: 7 MEQ/L
BUN SERPL-MCNC: 10.2 MG/DL (ref 7–18.7)
CALCIUM SERPL-MCNC: 7.5 MG/DL (ref 8.4–10.2)
CHLORIDE SERPL-SCNC: 108 MMOL/L (ref 98–107)
CO2 SERPL-SCNC: 21 MMOL/L (ref 22–29)
CREAT SERPL-MCNC: 0.63 MG/DL (ref 0.55–1.02)
CREAT/UREA NIT SERPL: 16
GFR SERPLBLD CREATININE-BSD FMLA CKD-EPI: >60 MLS/MIN/1.73/M2
GLUCOSE SERPL-MCNC: 90 MG/DL (ref 74–100)
MAGNESIUM SERPL-MCNC: 2 MG/DL (ref 1.6–2.6)
PHOSPHATE SERPL-MCNC: 3.4 MG/DL (ref 2.3–4.7)
POTASSIUM SERPL-SCNC: 4.7 MMOL/L (ref 3.5–5.1)
SODIUM SERPL-SCNC: 136 MMOL/L (ref 136–145)

## 2023-08-18 PROCEDURE — 80048 BASIC METABOLIC PNL TOTAL CA: CPT | Performed by: INTERNAL MEDICINE

## 2023-08-18 PROCEDURE — 21400001 HC TELEMETRY ROOM

## 2023-08-18 PROCEDURE — 25000003 PHARM REV CODE 250: Performed by: NURSE PRACTITIONER

## 2023-08-18 PROCEDURE — 83735 ASSAY OF MAGNESIUM: CPT | Performed by: INTERNAL MEDICINE

## 2023-08-18 PROCEDURE — 25000003 PHARM REV CODE 250: Performed by: INTERNAL MEDICINE

## 2023-08-18 PROCEDURE — 63600175 PHARM REV CODE 636 W HCPCS: Performed by: NURSE PRACTITIONER

## 2023-08-18 PROCEDURE — 86304 IMMUNOASSAY TUMOR CA 125: CPT | Performed by: INTERNAL MEDICINE

## 2023-08-18 PROCEDURE — 25000003 PHARM REV CODE 250: Performed by: STUDENT IN AN ORGANIZED HEALTH CARE EDUCATION/TRAINING PROGRAM

## 2023-08-18 PROCEDURE — 84100 ASSAY OF PHOSPHORUS: CPT | Performed by: INTERNAL MEDICINE

## 2023-08-18 RX ADMIN — ACETAMINOPHEN 1000 MG: 500 TABLET, FILM COATED ORAL at 06:08

## 2023-08-18 RX ADMIN — AMLODIPINE BESYLATE 5 MG: 5 TABLET ORAL at 08:08

## 2023-08-18 RX ADMIN — THERA TABS 1 TABLET: TAB at 08:08

## 2023-08-18 RX ADMIN — ALPRAZOLAM 1 MG: 0.5 TABLET ORAL at 08:08

## 2023-08-18 RX ADMIN — PANTOPRAZOLE SODIUM 40 MG: 40 TABLET, DELAYED RELEASE ORAL at 05:08

## 2023-08-18 RX ADMIN — DULOXETINE HYDROCHLORIDE 20 MG: 20 CAPSULE, DELAYED RELEASE ORAL at 08:08

## 2023-08-18 RX ADMIN — ONDANSETRON 4 MG: 2 INJECTION INTRAMUSCULAR; INTRAVENOUS at 08:08

## 2023-08-18 RX ADMIN — PANTOPRAZOLE SODIUM 40 MG: 40 TABLET, DELAYED RELEASE ORAL at 04:08

## 2023-08-18 RX ADMIN — MIRTAZAPINE 7.5 MG: 7.5 TABLET ORAL at 08:08

## 2023-08-18 RX ADMIN — ONDANSETRON 4 MG: 2 INJECTION INTRAMUSCULAR; INTRAVENOUS at 04:08

## 2023-08-18 NOTE — CONSULTS
"8/17/2023  Chelsie Lee   1985   [id number]            Psychiatry Inpatient Admission Note    Date of Admission: 8/8/2023  3:51 PM    Chief Complaint: " I  want to be normal again"    SUBJECTIVE:   History of Present Illness:   Chelsie Lee is a 37 y.o. female with past history of gastric sleeve revision s/p bowel perforation; anxiety, GERD; presents to the ED at [facility] on 8/8/2023 with a primary complaint of nausea and vomiting with associated abdominal pain.  Patient reports she has had a gastric sleeve revision in February 2023 secondary to bowel perforation and migrated mesh.  She also had a J-tube placed during that time for nutritional support.  Patient reports decreased appetite  with significant weight loss. She is admitted for intractable nausea and vomiting. Palliative was consulted for pain control and palliative care.   Psych has been consulted  to address patient's mood symptoms. During this visit, patient is awake and alert. She is receptive and willingly shares information. She reports that her health crises started last February after a gastric bypass surgery.  She expresses her frustrations over her incessant pain, nausea and vomiting.  She describes past struggles "to be normal and have my life back", but has been mostly deterred by her current health status. Ms Lee expresses her fear about death and dying. She adds " I am afraid I am going to die, I don't want to die". She describes feeling like a burden to her family and not able to perform her role as a mother to her children. She details past multiple hospitalizations at other facility in [place]. She denies SI. She endorses anxiety, depression and hopelessness. She describes feeling helpless, left out  and lonely. Although she acknowledges the support and cohesiveness of her immediate family, she expresses  her suspicion of her partner's  extramarital affairs. She tearfully admits "I can not really do anything about  it, he pays " "the bills".  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   She describes experiencing  fragmented sleep and poor appetite. She denies auditory and visual hallucinations. She denies paranoid and delusional thinking and she does not appear to be responding to internal stimuli. She scores 17 and 15 respectively on the LIZZ 7 and PHQ 9 clinical rating scales.  She acknowledges needs to "get off these pain medications" but believes Xanax has been helpful with falling asleep.. Patient express her willingness to try "whatever helps". Proposed plan of care discussed  in details with patient. She expresses her understanding and she is in agreement with care plan.     Past Psychiatric History:   Previous Psychiatric Hospitalizations: No history   Previous Medication Trials: No history  Previous Suicide Attempts: Denies   Outpatient psychiatrist: No history    Past Medical/Surgical History:   History reviewed. No pertinent past medical history.  Past Surgical History:   Procedure Laterality Date    COLONOSCOPY N/A 8/11/2023    Procedure: COLON;  Surgeon: Wilmer Rossi MD;  Location: [facility] ENDOSCOPY;  Service: Gastroenterology;  Laterality: N/A;    EGD, WITH CLOSED BIOPSY  8/9/2023    Procedure: EGD, WITH CLOSED BIOPSY;  Surgeon: Wilmer Rossi MD;  Location: [facility] ENDOSCOPY;  Service: Gastroenterology;;    ESOPHAGOGASTRODUODENOSCOPY N/A 8/9/2023    Procedure: EGD;  Surgeon: Wilmer Rossi MD;  Location: [facility] ENDOSCOPY;  Service: Gastroenterology;  Laterality: N/A;    HERNIA REPAIR      LAPAROSCOPIC GASTRIC BANDING         Family Psychiatric History: Denies        Allergies:   Review of patient's allergies indicates:   Allergen Reactions    Penicillin Rash       Substance Abuse History:   Tobacco: Denies   Alcohol: Past  ETOH use, none of recent  Illicit Substances: Denies  Treatment: No history      Current " Medications:   Home Psychiatric Meds: No history    Scheduled Meds:    amLODIPine  5 mg Oral Daily    DULoxetine  20 mg Oral BID    mirtazapine  7.5 mg Oral QHS    multivitamin  1 tablet Oral Daily    pantoprazole  40 mg Oral BID AC      PRN Meds: 0.9%  NaCl infusion (for blood administration), acetaminophen, acetaminophen, ALPRAZolam, diatrizoate meglumineand-diatrizoate sodium, dicyclomine, hydrALAZINE, HYDROcodone-acetaminophen, HYDROmorphone, naloxone, ondansetron, prochlorperazine, sodium chloride 0.9%   Psychotherapeutics (From admission, onward)      Start     Stop Route Frequency Ordered    08/17/23 2100  DULoxetine DR capsule 20 mg         -- Oral 2 times daily 08/17/23 1754 08/17/23 2100  mirtazapine tablet 7.5 mg         -- Oral Nightly 08/17/23 1754    08/10/23 2215  ALPRAZolam tablet 1 mg         -- Oral 3 times daily PRN 08/10/23 2112            Social History:  Housing Status: Lives with family  Relationship Status/Sexual Orientation: ,  Heterosexual ,   Children: 3  Education: Undergraduate degree   Employment Status/Info: Previously employed as a     history: No history  History of physical/sexual abuse: Denies    Access to gun: No history       Legal History:   Past Charges/Incarcerations/pending charges: No history         OBJECTIVE:   Medical Review Of Systems:    Pertinent items are noted in HPI.  Vitals   Vitals:    08/17/23 1516   BP: 123/82   Pulse: 86   Resp:    Temp: 98 °F (36.7 °C)        Labs/Imaging/Studies:   Recent Results (from the past 48 hour(s))   Comprehensive Metabolic Panel    Collection Time: 08/16/23  9:44 AM   Result Value Ref Range    Sodium Level 130 (L) 136 - 145 mmol/L    Potassium Level 4.2 3.5 - 5.1 mmol/L    Chloride 104 98 - 107 mmol/L    Carbon Dioxide 23 22 - 29 mmol/L    Glucose Level 101 (H) 74 - 100 mg/dL    Blood Urea Nitrogen 5.1 (L) 7.0 - 18.7 mg/dL    Creatinine 0.59 0.55 - 1.02 mg/dL    Calcium Level Total 7.5 (L) 8.4 - 10.2  "mg/dL    Protein Total 4.1 (L) 6.4 - 8.3 gm/dL    Albumin Level 1.9 (L) 3.5 - 5.0 g/dL    Globulin 2.2 (L) 2.4 - 3.5 gm/dL    Albumin/Globulin Ratio 0.9 (L) 1.1 - 2.0 ratio    Bilirubin Total 0.8 <=1.5 mg/dL    Alkaline Phosphatase 70 40 - 150 unit/L    Alanine Aminotransferase 14 0 - 55 unit/L    Aspartate Aminotransferase 11 5 - 34 unit/L    eGFR >60 mls/min/1.73/m2   Magnesium    Collection Time: 08/16/23  9:44 AM   Result Value Ref Range    Magnesium Level 2.00 1.60 - 2.60 mg/dL   Phosphorus    Collection Time: 08/16/23  9:44 AM   Result Value Ref Range    Phosphorus Level 3.0 2.3 - 4.7 mg/dL   CBC with Differential    Collection Time: 08/16/23  9:44 AM   Result Value Ref Range    WBC 7.79 4.50 - 11.50 x10(3)/mcL    RBC 3.89 (L) 4.20 - 5.40 x10(6)/mcL    Hgb 11.3 (L) 12.0 - 16.0 g/dL    Hct 34.1 (L) 37.0 - 47.0 %    MCV 87.7 80.0 - 94.0 fL    MCH 29.0 27.0 - 31.0 pg    MCHC 33.1 33.0 - 36.0 g/dL    RDW 16.0 11.5 - 17.0 %    Platelet 419 (H) 130 - 400 x10(3)/mcL    MPV 10.5 (H) 7.4 - 10.4 fL    Neut % 67.2 %    Lymph % 19.6 %    Mono % 10.7 %    Eos % 1.3 %    Basophil % 0.6 %    Lymph # 1.53 0.6 - 4.6 x10(3)/mcL    Neut # 5.23 2.1 - 9.2 x10(3)/mcL    Mono # 0.83 0.1 - 1.3 x10(3)/mcL    Eos # 0.10 0 - 0.9 x10(3)/mcL    Baso # 0.05 <=0.2 x10(3)/mcL    IG# 0.05 (H) 0 - 0.04 x10(3)/mcL    IG% 0.6 %    NRBC% 0.0 %      No results found for: "PHENYTOIN", "PHENOBARB", "VALPROATE", "CBMZ"        Psychiatric Mental Status Exam:  General Appearance: dressed in hospital garb, lying in bed, thin and gaunt  Arousal: alert with clear sensorium  Behavior: normal; cooperative; reasonably friendly, pleasant, and polite; appropriate eye-contact; under good behavioral control  Movements and Motor Activity: no abnormal involuntary movements noted; no tics, no tremors, no akathisia, no dystonia, no evidence of tardive dyskinesia; no psychomotor agitation or retardation  Orientation: intact; oriented fully to person, place, time and " situation  Speech: conversational, spontaneous  Mood: Depressed and Anxious  Affect: mood-incongruent, anxious, angry  Thought Process: organized  Associations: intact, no loosening of associations  Thought Content and Perceptions: no suicidal or homicidal ideation, no auditory or visual hallucinations, no paranoid ideation, no ideas of reference, no evidence of delusions or psychosis  Recent and Remote Memory: intact; per interview/observation with patient  Attention and Concentration: intact; per interview/observation with patient  Fund of Knowledge: intact; based on history, vocabulary, fund of knowledge, syntax, grammar, and content  Insight: intact; based on understanding of severity of illness and HPI  Judgment: intact; based on patient's behavior and HPI      ASSESSMENT/PLAN:   Diagnoses:  SUBSTANCE-RELATED DISORDERS; Opioid-Related Disorders; Opioid Dependence With Physiological Dependence , MOOD DISORDERS; Dysthymic Disorder (F34.1), SLEEP DISORDERS; Insomnia Related to medical condition  [indicate the Axis I or Axis II Disorder] (F48.9), and ADJUSTMENT DISORDERS; Adjustment Disorder with Mixed Anxiety and Depressed Mood (F43.23)     NO DIAGNOSIS ON AXIS II (Z03.89)     History reviewed. No pertinent past medical history.       Problem lists and Management Plans:  Start Duloxetine 20 mg PO daily: Slowly titrate as tolerated.  Mirtazapine 7.5 mg po hs: Titrate to 15 mg as tolerated  Agree with current attempt to taper opiate Analgesics : Use lowest effective dose  Continue Alprazolam as needed for panic symptoms and severe anxiety. Avoid co administration with Mirtazapine to reduce oversedation.   Psych will follow up in AM    I have provided medical direction for the development of the Treatment plan.  These services will be provided while this patient is under my care and will be based on an individualized plan of care.  The patient can demonstrate a reasonable expectation of improvement in his/her  disorder as a result of the active treatment being provided.    Lynn Vera

## 2023-08-18 NOTE — PROGRESS NOTES
[facility]  Hospital Medicine Progress Note        Chief Complaint: Emesis (Pt had gastric sleeve revision in febrauary. States perf bowel and migrated mesh during surgery. Pt severe weight loss, intractable vomiting. Tender abd near j tube. Seizure activity Friday and seen at [facility] and sent home. Last BM yesterday and normal. Cbg 101)         Patient information was obtained from patient, patient's family, past medical records and ER records.      HISTORY OF PRESENT ILLNESS:   Chelsie Lee is a 37 y.o. female who PMH includes gastric sleeve revision s/p bowel perforation; anxiety, GERD; presents to the ED at [facility] on 8/8/2023 with a primary complaint of nausea and vomiting with associated abdominal pain.  Patient reports she has had a gastric sleeve revision in February 2023 secondary to bowel perforation and migrated mesh.  She also had a J-tube placed during that time for nutritional support.  Patient reports decreased appetite since then with significant weight loss.  She denies any new foods or medications.  Patient reports she was seen in the ED at [facility] for seizure like activity; she denies any history of seizures in the past.  Patient denies any seizure activity since then.  Lab work done revealed WBCs 13.82, H&H 11.3/33.6, sodium 132, glucose 102, other indices unremarkable.  CT of the abdomen and pelvis with and without contrast impression reviewed demonstrated hiatal hernia and operative changes, cecum and ascending colon to the hepatic flexure is remarkable for irregular mural thickening suggests nonspecific colitis, bilateral adnexa ovarian cystic structures which is large on the right.  Initial vital signs reviewed BP 2/117 pulse 106 respirations 16 temperature 98.2° F O2 saturation 93% on room air.  Patient received hydration antiemetic therapy and pain medication in the ED, her blood pressure trended down and saturations improved.  Patient is admitted to hospital  medicine services for further management.        Patient was admitted for intractable nausea and vomiting.  GI was consulted and she underwent an upper GI endoscopy with findings of esophagitis and gastrojejunal erythema and friable lesions.  GI recommended to begin pantoprazole drip for 3 days.EGD reports noted with GI recommendations.          Today's information  Patient seen and examined at bedside   Patient has no new complaints today trying to take a nap   Vitals reviewed and stable   Labs reviewed and stable   Appreciate psych input started on Cymbalta, mirtazapine and alprazolam as needed   Appreciate palliative input Pain meds adjusted  Continue tube feeds- goal of 65 mL/hour           Exam  General appearance:  no apparent distress.  Lungs:  Unlabored breathing   Heart:  Normal rate   Abdomen: Soft, non-distended,  G tube in place   Extremities:  Warm   Neuro:  Alert, Oriented x 3, no acute focal deficits  Psych/mental status:  Responds appropriately to questions.            ASSESSMENT:  Intractable nausea and vomiting   Acute esophagitis /duodenitis   Acute GI bleed stable   Acute on chronic anemia   g tube leaking s/p changed on 8/12   Leukocytosis, resolved   Severe malnutrition   Right ovarian cysts, stable  History of partial hysterectomy   Abdominal pain- generalized-   HTN- controlled   S/P gastric sleeve with revision- secondary to mesh migration- POA  Weakness- POA  Seizures- recent- POA           PLAN:  Continue tube feeds- goal of 65 mL/hour  Noted general surgery input from yesterday.  Appreciate recommendations    CT abdomen reviewed shows gastric ulcer.  No obstruction  Continue p.o. pantoprazole b.i.d.  Tube feeds per nutrition recommendation  Appreciate psych input started on Cymbalta, mirtazapine and alprazolam as needed   Appreciate palliative input Pain meds adjusted   Case discussed with patient's nurse  Monitor electrolytes,     VTE Prophylaxis:  SCD for DVT prophylaxis and will be  advised to be as mobile as possible      Patient condition:  Stable        Dispo- pending g tube problems, tube feed not at goal                 VITAL SIGNS: 24 HRS MIN & MAX LAST   Temp  Min: 97.9 °F (36.6 °C)  Max: 99.3 °F (37.4 °C) 98.2 °F (36.8 °C)   BP  Min: 122/82  Max: 148/105 122/82   Pulse  Min: 83  Max: 97  86   Resp  Min: 18  Max: 20 18   SpO2  Min: 97 %  Max: 100 % 97 %     I have reviewed the following labs:    Recent Labs   Lab 08/11/23 2100 08/12/23 0324 08/16/23  0944   WBC 9.76  --  7.79   RBC 3.90*  --  3.89*   HGB 11.5* 10.7* 11.3*   HCT 33.5* 31.9* 34.1*   MCV 85.9  --  87.7   MCH 29.5  --  29.0   MCHC 34.3  --  33.1   RDW 17.6*  --  16.0     --  419*   MPV 10.9*  --  10.5*       Recent Labs   Lab 08/12/23 0324 08/16/23 0944 08/18/23  0339   * 130* 136   K 3.5 4.2 4.7   CO2 21* 23 21*   BUN 4.8* 5.1* 10.2   CREATININE 0.60 0.59 0.63   CALCIUM 7.4* 7.5* 7.5*   MG  --  2.00 2.00   ALBUMIN  --  1.9*  --    ALKPHOS  --  70  --    ALT  --  14  --    AST  --  11  --    BILITOT  --  0.8  --           Microbiology Results (last 7 days)       Procedure Component Value Units Date/Time    Blood Culture #2 **CANNOT BE ORDERED STAT** [[id number]]  (Normal) Collected: 08/08/23 1733    Order Status: Completed Specimen: Blood from Arm, Left Updated: 08/13/23 2100     CULTURE, BLOOD ([facility]) No Growth at 5 days    Blood Culture #1 **CANNOT BE ORDERED STAT** [[id number]]  (Normal) Collected: 08/08/23 1733    Order Status: Completed Specimen: Blood from Arm, Right Updated: 08/13/23 2100     CULTURE, BLOOD ([facility]) No Growth at 5 days             See below for Radiology    Scheduled Med:   amLODIPine  5 mg Oral Daily    DULoxetine  20 mg Oral BID    mirtazapine  7.5 mg Oral QHS    multivitamin  1 tablet Oral Daily    pantoprazole  40 mg Oral BID AC        Continuous Infusions:       PRN Meds:  0.9%  NaCl infusion (for blood administration), acetaminophen, acetaminophen, ALPRAZolam, diatrizoate  meglumineand-diatrizoate sodium, dicyclomine, hydrALAZINE, HYDROcodone-acetaminophen, HYDROmorphone, naloxone, ondansetron, prochlorperazine, sodium chloride 0.9%       Assessment/Plan:      VTE prophylaxis:     Patient condition:  Stable/Fair/Guarded/ Serious/ Critical    Anticipated discharge and Disposition:         All diagnosis and differential diagnosis have been reviewed; assessment and plan has been documented; I have personally reviewed the labs and test results that are presently available; I have reviewed the patients medication list; I have reviewed the consulting providers response and recommendations. I have reviewed or attempted to review medical records based upon their availability    All of the patient's questions have been  addressed and answered. Patient's is agreeable to the above stated plan. I will continue to monitor closely and make adjustments to medical management as needed.  _____________________________________________________________________    Nutrition Status:    Radiology:  I have personally reviewed the following imaging and agree with the radiologist.     CT Chest Abdomen Pelvis With Contrast (xpd)  Narrative: EXAMINATION:  CT CHEST ABDOMEN PELVIS WITH CONTRAST (XPD)    CLINICAL HISTORY:  Weight loss, unintended;complex gastric anatomy, hx multiple bariatric procedures, hiatal hernia;    TECHNIQUE:  Helical acquisition with axial, sagittal and coronal reformations obtained from the thoracic inlet to the pubic symphysis following the IV administration of contrast.    Automated tube current modulation, weight-based exposure dosing, and/or iterative reconstruction technique utilized to reach lowest reasonably achievable exposure rate.    DLP: 155 mGy*cm    COMPARISON:  CT abdomen pelvis 08/08/2023    FINDINGS:  BASE OF NECK: No significant abnormality.    HEART: Normal size. No effusion.    THORACIC VASCULATURE: No aortic aneurysm and no significant atherosclerosis.Pulmonary arteries  enhance normally.    JESSIE/MEDIASTINUM: No enlarged lymph nodes by size criteria.    AIRWAYS: Patent.    LUNGS/PLEURA: Clear lungs. No pleural effusion or thickening.    THORACIC SOFT TISSUES: Unremarkable.    LIVER: The liver is hypodense.    BILIARY: No calcified gallstones.    PANCREAS: No inflammatory change.    SPLEEN: Normal in size    ADRENALS: No mass.    KIDNEYS/URETERS: The kidneys enhance symmetrically.  No hydronephrosis.    GI TRACT/MESENTERY: There are postsurgical changes of Kel-en-Y gastric bypass.  Very diminutive proximal gastric pouch.  Small sliding hiatal hernia.  No evidence of obstruction.  There is a gastrostomy balloon in the retained distal gastric body.  It is very difficult to confidently delineate the pylorus.  Suspect pylorus is visible on series 3, image 68.  Redundant fold versus potential ulcer at the posterior wall of the gastric antrum (3, 66).  Contrast administered via gastrostomy is seen in the biliopancreatic limb which is not distended and not appreciably obstructed.  No appreciable leak.    PERITONEUM: No free fluid.No free air.    LYMPH NODES: No enlarged lymph nodes by size criteria.    ABDOMINOPELVIC VASCULATURE: No significant atherosclerosis or aneurysm.    BLADDER: Normal appearance given degree of distention.    REPRODUCTIVE ORGANS: 4.6 cm right ovarian cyst; previously 5.6 cm.    ABDOMINAL WALL: Unremarkable.    BONES: No acute osseous abnormality.  Impression: 1. 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  No appreciable leak or obstruction.  2. Redundant fold versus potential ulcer at the posterior wall of the gastric antrum  3. Small hiatal hernia  4. Hepatic steatosis    Electronically signed by: Ethel Bal  Date:    08/15/2023  Time:    12:13      Loly Tejeda MD   08/18/2023

## 2023-08-19 LAB — CANCER AG125 SERPL-ACNC: 13.2 UNIT/ML (ref 0–35)

## 2023-08-19 PROCEDURE — 25000003 PHARM REV CODE 250: Performed by: INTERNAL MEDICINE

## 2023-08-19 PROCEDURE — 25000003 PHARM REV CODE 250: Performed by: STUDENT IN AN ORGANIZED HEALTH CARE EDUCATION/TRAINING PROGRAM

## 2023-08-19 PROCEDURE — 25000003 PHARM REV CODE 250: Performed by: NURSE PRACTITIONER

## 2023-08-19 PROCEDURE — 21400001 HC TELEMETRY ROOM

## 2023-08-19 PROCEDURE — 63600175 PHARM REV CODE 636 W HCPCS: Performed by: NURSE PRACTITIONER

## 2023-08-19 RX ORDER — CLONIDINE HYDROCHLORIDE 0.1 MG/1
0.1 TABLET ORAL EVERY 6 HOURS PRN
Status: DISCONTINUED | OUTPATIENT
Start: 2023-08-19 | End: 2023-08-20 | Stop reason: HOSPADM

## 2023-08-19 RX ORDER — AMLODIPINE BESYLATE 5 MG/1
10 TABLET ORAL DAILY
Status: DISCONTINUED | OUTPATIENT
Start: 2023-08-20 | End: 2023-08-20 | Stop reason: HOSPADM

## 2023-08-19 RX ORDER — AMLODIPINE BESYLATE 5 MG/1
5 TABLET ORAL ONCE
Status: COMPLETED | OUTPATIENT
Start: 2023-08-19 | End: 2023-08-19

## 2023-08-19 RX ORDER — ONDANSETRON 4 MG/1
4 TABLET, ORALLY DISINTEGRATING ORAL EVERY 6 HOURS PRN
Status: DISCONTINUED | OUTPATIENT
Start: 2023-08-19 | End: 2023-08-20 | Stop reason: HOSPADM

## 2023-08-19 RX ADMIN — THERA TABS 1 TABLET: TAB at 08:08

## 2023-08-19 RX ADMIN — ONDANSETRON 4 MG: 2 INJECTION INTRAMUSCULAR; INTRAVENOUS at 08:08

## 2023-08-19 RX ADMIN — ALPRAZOLAM 1 MG: 0.5 TABLET ORAL at 06:08

## 2023-08-19 RX ADMIN — ACETAMINOPHEN 1000 MG: 500 TABLET, FILM COATED ORAL at 02:08

## 2023-08-19 RX ADMIN — ALPRAZOLAM 1 MG: 0.5 TABLET ORAL at 08:08

## 2023-08-19 RX ADMIN — RIVAROXABAN 10 MG: 10 TABLET, FILM COATED ORAL at 05:08

## 2023-08-19 RX ADMIN — DULOXETINE HYDROCHLORIDE 20 MG: 20 CAPSULE, DELAYED RELEASE ORAL at 08:08

## 2023-08-19 RX ADMIN — CLONIDINE HYDROCHLORIDE 0.1 MG: 0.1 TABLET ORAL at 06:08

## 2023-08-19 RX ADMIN — ACETAMINOPHEN 1000 MG: 500 TABLET, FILM COATED ORAL at 08:08

## 2023-08-19 RX ADMIN — AMLODIPINE BESYLATE 5 MG: 5 TABLET ORAL at 08:08

## 2023-08-19 RX ADMIN — MIRTAZAPINE 7.5 MG: 7.5 TABLET ORAL at 08:08

## 2023-08-19 RX ADMIN — PANTOPRAZOLE SODIUM 40 MG: 40 TABLET, DELAYED RELEASE ORAL at 04:08

## 2023-08-19 RX ADMIN — ONDANSETRON 4 MG: 2 INJECTION INTRAMUSCULAR; INTRAVENOUS at 01:08

## 2023-08-19 RX ADMIN — ACETAMINOPHEN 1000 MG: 500 TABLET, FILM COATED ORAL at 05:08

## 2023-08-19 RX ADMIN — ONDANSETRON 4 MG: 4 TABLET, ORALLY DISINTEGRATING ORAL at 05:08

## 2023-08-19 RX ADMIN — PANTOPRAZOLE SODIUM 40 MG: 40 TABLET, DELAYED RELEASE ORAL at 05:08

## 2023-08-19 NOTE — PROGRESS NOTES
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  Patient underwent gastric sleeve revision in February, 2023 secondary to bowel perforation and migrated mesh.  She had J-tube placement then for nutritional support . patient presented to the ED with complaints of decreased appetite and significant weight loss .  Was seen in ED in outlying facility with seizure-like activity x1 .  no previous history of seizures, no more similar activity after that .  lab significant for leukocytosis, electrolyte derangement paid CT abdomen/pelvis revealed hiatal hernia, nonspecific colitis and bilateral ovarian cystic structures larger on the right.  Patient was admitted hospitalist medicine service .  GI services consulted to further evaluate nausea/vomiting.  Patient underwent EGD which revealed esophagitis, erythema and congestion off gastrojejunal anastomosis.  She also underwent colonoscopy and was found to have hemorrhoids, scattered blood clots, inadequate prep.  She also noted to have rectal polyp for which she underwent polypectomy.  GI planning for repeat colonoscopy in 6 months given inadequate prep.  Had some issues with J-tube leaking . general surgery was consulted.  Tube was exchanged.  She was also evaluated by her bariatric surgeon given her symptoms post sleeve revision .  Per bariatric surgery any gastric reconstruction is problematic.  Recommended restarting tube feedings and also to continue enteral nutrition as much as she can tolerate .  Patient's BMI is only 16.  Given patient's anxiety psych also was consulted, initiated Cymbalta, Remeron and p.r.n. Xanax for panic symptoms.  Abdominal symptoms slowly improving    Interval Hx:   Patient seen at bedside . she is in better spirits  today.  Oral intake is not adequate, but she does supplemental tube feedings at nighttime.  Hemodynamics are stable.    Objective/physical exam:  General: In no acute distress, afebrile  Chest: Clear to auscultation bilaterally  Heart: S1, S2, no appreciable murmur  Abdomen: Soft, nontender, BS +  MSK: Warm, no lower extremity edema, no clubbing or cyanosis  Neurologic: Alert and oriented x4, moving all extremities with good strength     VITAL SIGNS: 24 HRS MIN & MAX LAST   Temp  Min: 97.2 °F (36.2 °C)  Max: 98.9 °F (37.2 °C) 98 °F (36.7 °C)   BP  Min: 120/80  Max: 144/97 (!) 144/97   Pulse  Min: 85  Max: 96  89   Resp  Min: 18  Max: 18 18   SpO2  Min: 97 %  Max: 99 % 99 %       Recent Labs   Lab 08/16/23  0944   WBC 7.79   RBC 3.89*   HGB 11.3*   HCT 34.1*   MCV 87.7   MCH 29.0   MCHC 33.1   RDW 16.0   *   MPV 10.5*         Recent Labs   Lab 08/16/23  0944 08/18/23  0339   * 136   K 4.2 4.7   CO2 23 21*   BUN 5.1* 10.2   CREATININE 0.59 0.63   CALCIUM 7.5* 7.5*   MG 2.00 2.00   ALBUMIN 1.9*  --    ALKPHOS 70  --    ALT 14  --    AST 11  --    BILITOT 0.8  --           Microbiology Results (last 7 days)       Procedure Component Value Units Date/Time    Blood Culture #2 **CANNOT BE ORDERED STAT** [[id number]]  (Normal) Collected: 08/08/23 1733    Order Status: Completed Specimen: Blood from Arm, Left Updated: 08/13/23 2100     CULTURE, BLOOD ([facility]) No Growth at 5 days    Blood Culture #1 **CANNOT BE ORDERED STAT** [[id number]]  (Normal) Collected: 08/08/23 1733    Order Status: Completed Specimen: Blood from Arm, Right Updated: 08/13/23 2100     CULTURE, BLOOD ([facility]) No Growth at 5 days             Scheduled Med:   amLODIPine  5 mg Oral Daily    DULoxetine  20 mg Oral BID    mirtazapine  7.5 mg Oral QHS    multivitamin  1 tablet Oral Daily    pantoprazole  40 mg Oral BID AC          Assessment/Plan:    Intractable nausea/vomiting with abdominal pain-improved   Profound/unintentional weight loss-60  lb  Gastric sleeve complicated by bowel perforation in February, 2023 requiring revision, J-tube placement, J-tube exchanged this hospitalization  Opioid dependence  Adjustment disorder with mixed anxiety and depression  Bilateral adnexal cyst  Prophylaxis    EGD-esophagitis, gastrojejunal anastomosis erythema and congestion   Patient is on Protonix 40 mg b.i.d.  Colonoscopy-hemorrhoids, rectal polyp, resected  Prep was not optimal and therefore plan to repeat colonoscopy in 6 months   GI signed off   Abdominal symptoms are relatively better  Oral intake is still not adequate, but she has supplemental feedings through J-tube at night  Evaluated by psych and was initiated on Cymbalta, Remeron  Avoiding narcotics as much as possible   Patient's initial CT with adnexal cyst, I will obtain pelvic ultrasound to further evaluate this   Obtain    Labs stable   Evaluated by bariatric surgeon, no indication for anymore invasive interventions  Continue home meds-amlodipine, multivitamin  DVT prophylaxis-initiate Xarelto    Follow-up pelvic ultrasound if this is unremarkable patient can possibly go home with family tomorrow      Ember Aguilar MD   08/19/2023

## 2023-08-19 NOTE — PLAN OF CARE
Problem: Adult Inpatient Plan of Care  Goal: Plan of Care Review  Outcome: Ongoing, Progressing  Goal: Patient-Specific Goal (Individualized)  Outcome: Ongoing, Progressing  Goal: Absence of Hospital-Acquired Illness or Injury  Outcome: Ongoing, Progressing  Goal: Optimal Comfort and Wellbeing  Outcome: Ongoing, Progressing  Goal: Readiness for Transition of Care  Outcome: Ongoing, Progressing     Problem: Coping Ineffective  Goal: Effective Coping  Outcome: Ongoing, Progressing     Problem: Malnutrition  Goal: Improved Nutritional Intake  Outcome: Ongoing, Progressing

## 2023-08-19 NOTE — PSYCH
"[facility]  Consult Note  Psychiatry        Referring Physician: Marry Jenkins MD    Examiner: Bertha Houser NP  Date: 8/19/2023  Patient Status: Inpt  patient seen individually, chart reviewed, and case discussed with staff    "I'm all right, I'm taking a bath."    C/F, has been slowly doing more independent tasks. Required PRN Xanax earlier in the day per nurse, has been calm and settled since. Able to be redirected and de-escalated at present. Has been compliant with other meds without incident. Able to make needs known, reports less breakthrough anxiety, and mood fluctuations. Appetite fair, still requires some encouragement. Sleeping fair last night.     Mental Status Exam:     Eye Contact: fair    Attitude toward examiner: guarded    Motor Activity: normal    Speech: minimal and soft    Cognitions: normal    PERCEPTIONS: denies hallucinations    THOUGHT PROCESSES: distractibility and circumstantial    THOUGHT CONTENT: DENIES suicidal ideation and DENIES homicidal ideation    AFFECT/MOOD: anxious    INSIGHT: limited    JUDGEMENT: limited    Recommendations:   1.) cont current POC  2.) psych sign off  "

## 2023-08-20 VITALS
DIASTOLIC BLOOD PRESSURE: 86 MMHG | HEIGHT: 64 IN | HEART RATE: 83 BPM | RESPIRATION RATE: 18 BRPM | WEIGHT: 97 LBS | BODY MASS INDEX: 16.56 KG/M2 | SYSTOLIC BLOOD PRESSURE: 133 MMHG | OXYGEN SATURATION: 100 % | TEMPERATURE: 98 F

## 2023-08-20 PROBLEM — R11.2 INTRACTABLE NAUSEA AND VOMITING: Status: ACTIVE | Noted: 2023-08-20

## 2023-08-20 LAB
ALBUMIN SERPL-MCNC: 1.9 G/DL (ref 3.5–5)
ALBUMIN/GLOB SERPL: 0.8 RATIO (ref 1.1–2)
ALP SERPL-CCNC: 64 UNIT/L (ref 40–150)
ALT SERPL-CCNC: 18 UNIT/L (ref 0–55)
AST SERPL-CCNC: 16 UNIT/L (ref 5–34)
BASOPHILS # BLD AUTO: 0.05 X10(3)/MCL
BASOPHILS NFR BLD AUTO: 0.9 %
BILIRUB SERPL-MCNC: 0.5 MG/DL
BUN SERPL-MCNC: 9.6 MG/DL (ref 7–18.7)
CALCIUM SERPL-MCNC: 7.8 MG/DL (ref 8.4–10.2)
CHLORIDE SERPL-SCNC: 104 MMOL/L (ref 98–107)
CO2 SERPL-SCNC: 28 MMOL/L (ref 22–29)
CREAT SERPL-MCNC: 0.62 MG/DL (ref 0.55–1.02)
EOSINOPHIL # BLD AUTO: 0.1 X10(3)/MCL (ref 0–0.9)
EOSINOPHIL NFR BLD AUTO: 1.9 %
ERYTHROCYTE [DISTWIDTH] IN BLOOD BY AUTOMATED COUNT: 15.3 % (ref 11.5–17)
GFR SERPLBLD CREATININE-BSD FMLA CKD-EPI: >60 MLS/MIN/1.73/M2
GLOBULIN SER-MCNC: 2.4 GM/DL (ref 2.4–3.5)
GLUCOSE SERPL-MCNC: 84 MG/DL (ref 74–100)
HCT VFR BLD AUTO: 35.8 % (ref 37–47)
HGB BLD-MCNC: 12 G/DL (ref 12–16)
IMM GRANULOCYTES # BLD AUTO: 0.03 X10(3)/MCL (ref 0–0.04)
IMM GRANULOCYTES NFR BLD AUTO: 0.6 %
LYMPHOCYTES # BLD AUTO: 1.65 X10(3)/MCL (ref 0.6–4.6)
LYMPHOCYTES NFR BLD AUTO: 30.6 %
MCH RBC QN AUTO: 30.2 PG (ref 27–31)
MCHC RBC AUTO-ENTMCNC: 33.5 G/DL (ref 33–36)
MCV RBC AUTO: 89.9 FL (ref 80–94)
MONOCYTES # BLD AUTO: 0.68 X10(3)/MCL (ref 0.1–1.3)
MONOCYTES NFR BLD AUTO: 12.6 %
NEUTROPHILS # BLD AUTO: 2.89 X10(3)/MCL (ref 2.1–9.2)
NEUTROPHILS NFR BLD AUTO: 53.4 %
NRBC BLD AUTO-RTO: 0 %
PLATELET # BLD AUTO: 432 X10(3)/MCL (ref 130–400)
PMV BLD AUTO: 10.3 FL (ref 7.4–10.4)
POTASSIUM SERPL-SCNC: 4.9 MMOL/L (ref 3.5–5.1)
PROT SERPL-MCNC: 4.3 GM/DL (ref 6.4–8.3)
RBC # BLD AUTO: 3.98 X10(6)/MCL (ref 4.2–5.4)
SODIUM SERPL-SCNC: 134 MMOL/L (ref 136–145)
WBC # SPEC AUTO: 5.4 X10(3)/MCL (ref 4.5–11.5)

## 2023-08-20 PROCEDURE — 25000003 PHARM REV CODE 250: Performed by: NURSE PRACTITIONER

## 2023-08-20 PROCEDURE — 80053 COMPREHEN METABOLIC PANEL: CPT | Performed by: INTERNAL MEDICINE

## 2023-08-20 PROCEDURE — 25000003 PHARM REV CODE 250: Performed by: INTERNAL MEDICINE

## 2023-08-20 PROCEDURE — 25000003 PHARM REV CODE 250: Performed by: STUDENT IN AN ORGANIZED HEALTH CARE EDUCATION/TRAINING PROGRAM

## 2023-08-20 PROCEDURE — 85025 COMPLETE CBC W/AUTO DIFF WBC: CPT | Performed by: INTERNAL MEDICINE

## 2023-08-20 RX ORDER — HYDROCORTISONE ACETATE 25 MG/1
25 SUPPOSITORY RECTAL 2 TIMES DAILY
Qty: 20 SUPPOSITORY | Refills: 0 | Status: SHIPPED | OUTPATIENT
Start: 2023-08-20 | End: 2023-08-30

## 2023-08-20 RX ORDER — HYDROCODONE BITARTRATE AND ACETAMINOPHEN 10; 325 MG/1; MG/1
1 TABLET ORAL EVERY 12 HOURS PRN
Qty: 10 TABLET | Refills: 0 | Status: SHIPPED | OUTPATIENT
Start: 2023-08-20 | End: 2023-08-25

## 2023-08-20 RX ORDER — AMLODIPINE BESYLATE 10 MG/1
10 TABLET ORAL DAILY
Qty: 30 TABLET | Refills: 0 | Status: SHIPPED | OUTPATIENT
Start: 2023-08-20 | End: 2023-10-26

## 2023-08-20 RX ORDER — MIRTAZAPINE 7.5 MG/1
7.5 TABLET, FILM COATED ORAL NIGHTLY
Qty: 30 TABLET | Refills: 0 | Status: ON HOLD | OUTPATIENT
Start: 2023-08-20 | End: 2023-10-17

## 2023-08-20 RX ORDER — DULOXETIN HYDROCHLORIDE 20 MG/1
20 CAPSULE, DELAYED RELEASE ORAL 2 TIMES DAILY
Qty: 60 CAPSULE | Refills: 0 | Status: ON HOLD | OUTPATIENT
Start: 2023-08-20 | End: 2023-10-17 | Stop reason: HOSPADM

## 2023-08-20 RX ORDER — ALPRAZOLAM 1 MG/1
1 TABLET ORAL 3 TIMES DAILY PRN
Start: 2023-08-20 | End: 2023-10-05

## 2023-08-20 RX ORDER — PANTOPRAZOLE SODIUM 40 MG/1
40 TABLET, DELAYED RELEASE ORAL
Qty: 60 TABLET | Refills: 0 | Status: SHIPPED | OUTPATIENT
Start: 2023-08-20 | End: 2023-11-30

## 2023-08-20 RX ADMIN — ALPRAZOLAM 1 MG: 0.5 TABLET ORAL at 01:08

## 2023-08-20 RX ADMIN — AMLODIPINE BESYLATE 10 MG: 5 TABLET ORAL at 08:08

## 2023-08-20 RX ADMIN — ALPRAZOLAM 1 MG: 0.5 TABLET ORAL at 04:08

## 2023-08-20 RX ADMIN — ACETAMINOPHEN 1000 MG: 500 TABLET, FILM COATED ORAL at 01:08

## 2023-08-20 RX ADMIN — THERA TABS 1 TABLET: TAB at 08:08

## 2023-08-20 RX ADMIN — ONDANSETRON 4 MG: 4 TABLET, ORALLY DISINTEGRATING ORAL at 08:08

## 2023-08-20 RX ADMIN — PANTOPRAZOLE SODIUM 40 MG: 40 TABLET, DELAYED RELEASE ORAL at 06:08

## 2023-08-20 RX ADMIN — DULOXETINE HYDROCHLORIDE 20 MG: 20 CAPSULE, DELAYED RELEASE ORAL at 08:08

## 2023-08-20 NOTE — DISCHARGE SUMMARY
[facility] Hospital Medicine Discharge Summary    Admit Date: 8/8/2023  Discharge Date and Time: 8/20/20238:59 AM  Admitting Physician: ABIMAEL Team  Discharging Physician: Ember gAuilar MD.  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  Patient underwent gastric sleeve revision in February, 2023 secondary to bowel perforation and migrated mesh.  She had J-tube placement then for nutritional support . patient presented to the ED with complaints of decreased appetite and significant weight loss .  Was seen in ED in outlying facility with seizure-like activity x1 .  no previous history of seizures, no more similar activity after that .  lab significant for leukocytosis, electrolyte derangement paid CT abdomen/pelvis revealed hiatal hernia, nonspecific colitis and bilateral ovarian cystic structures larger on the right.  Patient was admitted hospitalist medicine service .  GI services consulted to further evaluate nausea/vomiting.  Patient underwent EGD which revealed esophagitis, erythema and congestion off gastrojejunal anastomosis.  She also underwent colonoscopy and was found to have hemorrhoids, scattered blood clots, inadequate prep.  She also noted to have rectal polyp for which she underwent polypectomy.  GI planning for repeat colonoscopy in 6 months given inadequate prep.  Had some issues with J-tube leaking . general surgery was consulted.  Tube was  exchanged.  She was also evaluated by her bariatric surgeon given her symptoms post sleeve revision .  Per bariatric surgery any gastric reconstruction is problematic.  Recommended restarting tube feedings and also to continue enteral nutrition as much as she can tolerate .  Patient's BMI is only 16.  Given patient's anxiety psych also was consulted, initiated Cymbalta, Remeron and p.r.n. Xanax for panic symptoms.  Abdominal symptoms slowly improving.  Oral intake still not adequate as of 8/19.  Patient has supplemental feedings through J-tube at night.  Psych initiated Cymbalta, Remeron. avoiding narcotics as much as possible .initial CT with concerns for adnexal cyst.  Pelvic ultrasound ordered and  also ordered . ultrasound pelvis consistent with benign findings.   also within normal limits.  Evaluated by podiatric surgeon, no indication for any more invasive interventions . patient re-evaluated 8/20.  Symptomatically stable.  Abdominal symptoms much better.  Almost resolved paid on by mouth diet as tolerated and supplementing through tube feedings at night paid patient was cleared by all specialist for DC.  Therefore it was decided to discharge the patient home with family.  Discharge medications per med rec.  Follow-up with bariatric surgeon, PCP, psych, GI.  Treatment plans discussed with the patient and she voiced understanding to everything explained.  Vitals:  VITAL SIGNS: 24 HRS MIN & MAX LAST   Temp  Min: 97.8 °F (36.6 °C)  Max: 98.1 °F (36.7 °C) 98.1 °F (36.7 °C)   BP  Min: 106/64  Max: 164/109 112/76   Pulse  Min: 73  Max: 95  83   Resp  Min: 16  Max: 20 19   SpO2  Min: 97 %  Max: 100 % 98 %       Physical Exam:  General appearance:  No acute distress  HENT: Atraumatic   Lungs: Clear to auscultation bilaterally.   Heart: RRR,No edema  Abdomen: Soft, non tender   Extremities: warm  Neuro:  Awake, alert, oriented x4  Psych/mental status: Appropriate mood and affect.      Procedures Performed:  No admission procedures for hospital encounter.     Significant Diagnostic Studies: See Full reports for all details    Recent Labs   Lab 08/16/23  0944 08/20/23  0524   WBC 7.79 5.40   RBC 3.89* 3.98*   HGB 11.3* 12.0   HCT 34.1* 35.8*   MCV 87.7 89.9   MCH 29.0 30.2   MCHC 33.1 33.5   RDW 16.0 15.3   * 432*   MPV 10.5* 10.3       Recent Labs   Lab 08/16/23  0944 08/18/23  0339 08/20/23  0524   * 136 134*   K 4.2 4.7 4.9   CO2 23 21* 28   BUN 5.1* 10.2 9.6   CREATININE 0.59 0.63 0.62   CALCIUM 7.5* 7.5* 7.8*   MG 2.00 2.00  --    ALBUMIN 1.9*  --  1.9*   ALKPHOS 70  --  64   ALT 14  --  18   AST 11  --  16   BILITOT 0.8  --  0.5        Microbiology Results (last 7 days)       Procedure Component Value Units Date/Time    Blood Culture #2 **CANNOT BE ORDERED STAT** [[id number]]  (Normal) Collected: 08/08/23 1733    Order Status: Completed Specimen: Blood from Arm, Left Updated: 08/13/23 2100     CULTURE, BLOOD ([facility]) No Growth at 5 days    Blood Culture #1 **CANNOT BE ORDERED STAT** [[id number]]  (Normal) Collected: 08/08/23 1733    Order Status: Completed Specimen: Blood from Arm, Right Updated: 08/13/23 2100     CULTURE, BLOOD ([facility]) No Growth at 5 days             US Pelvis Limited Non OB  EXAMINATION  US PELVIS LIMITED NON OB    CLINICAL HISTORY  *Adnexal cystic structures;  *LMP: Status post hysterectomy (2021).    TECHNIQUE  Multiplanar grayscale sonographic evaluation of the pelvis was performed utilizing transabdominal approach, as well as focused Doppler interrogation.    COMPARISON  8 August 2023    FINDINGS  Exam quality: adequate for evaluation    Uterus: Again not visualized.  No short interval change of the limited vaginal cuff evaluation.    Adnexa: No interval development of expansile mass-like lesion or complex cyst.  As would be expected, there is no significant short interval change of the ovaries and previously described right ovarian cyst.  The cyst remains overall simple in appearance  measuring up to 3.2 cm in diameter with no peripheral nodularity or internal vascular flow.  A 1.1 cm anechoic structure at the left ovary is compatible with benign/physiologic etiology such is dominant follicle.  Doppler interrogation of the ovaries demonstrates grossly maintained low resistance arterial waveforms and venous outflow.    Other findings: No free pelvic fluid appreciated.  Partially visualized urinary bladder is unremarkable.    MEASUREMENTS    *Uterus: Not present.  *Endometrial stripe: Not present.  *Right ovary: 4.2 cm x 3 cm x 3.3 cm (21 cc)  *Left ovary: 3.5 cm x 1.8 cm x 3.5 cm (12 cc)    IMPRESSION  1. No appreciable change of simple right ovarian cyst, as would be expected in the relatively short interval.  Of note, this is statistically assured to represent benign etiology given overall appearance, size less than 5 cm, and patient premenopausal age.  No additional imaging follow-up recommended.  2. Left ovarian structure consistent with dominant follicle.  3. No suspicious interval changes of the pelvis.    Electronically signed by: Jaret Clarke  Date:    08/19/2023  Time:    17:02         Medication List        START taking these medications      amLODIPine 10 MG tablet  Commonly known as: NORVASC  Take 1 tablet (10 mg total) by mouth once daily.     DULoxetine 20 MG capsule  Commonly known as: CYMBALTA  Take 1 capsule (20 mg total) by mouth 2 (two) times daily.     HYDROcodone-acetaminophen  mg per tablet  Commonly known as: NORCO  Take 1 tablet by mouth every 12 (twelve) hours as needed for Pain.     hydrocortisone 25 mg suppository  Commonly known as: ANUSOL-HC  Place 1 suppository (25 mg total) rectally 2 (two) times daily. for 10 days     mirtazapine 7.5 MG Tab  Commonly known as: REMERON  Take 1 tablet (7.5 mg total) by mouth every evening.     multivitamin Tab  Take 1 tablet by mouth once daily.  Start taking on: August 21, 2023     pantoprazole 40 MG tablet  Commonly known as:  PROTONIX  Take 1 tablet (40 mg total) by mouth 2 (two) times daily before meals.            CHANGE how you take these medications      ALPRAZolam 1 MG tablet  Commonly known as: XANAX  Take 1 tablet (1 mg total) by mouth 3 (three) times daily as needed for Anxiety.  What changed:   when to take this  reasons to take this            CONTINUE taking these medications      cyanocobalamin 1,000 mcg/mL injection     dicyclomine 10 MG capsule  Commonly known as: BENTYL     hyoscyamine 0.125 mg Subl  Commonly known as: LEVSIN/SL     megestroL 625 mg/5 mL (125 mg/mL) Susp  Commonly known as: MEGACE ES            STOP taking these medications      oxyCODONE-acetaminophen 7.5-325 mg per tablet  Commonly known as: PERCOCET     promethazine 25 mg/mL injection  Commonly known as: PHENERGAN               Where to Get Your Medications        These medications were sent to [facility] [address] [facility] [address] [facility]  [address] [facility] [address]      Phone: [phone]   amLODIPine 10 MG tablet  DULoxetine 20 MG capsule  HYDROcodone-acetaminophen  mg per tablet  hydrocortisone 25 mg suppository  mirtazapine 7.5 MG Tab  multivitamin Tab  pantoprazole 40 MG tablet       Information about where to get these medications is not yet available    Ask your nurse or doctor about these medications  ALPRAZolam 1 MG tablet          Explained in detail to the patient about the discharge plan, medications, and follow-up visits. Pt understands and agrees with the treatment plan  Discharge Disposition:     Discharged Condition: stable  Diet-   Dietary Orders (From admission, onward)       Start     Ordered    08/16/23 1508  Tube Feedings/Formulas 65; 780; Isosource 1.5; Jejunostomy; 60; Other (see comments)  Continuous        Comments: - Start TF @ 25mL/hr & advance as tolerated.  - Run from 7p - 7a  - 60mL H2O q1 Hour while tube feeds running (7p - 7a)   Question Answer Comment   Formula Rate (mL/hr):  65    Feeding Volume (mL): 780    Select Formula: Isosource 1.5    Route: Jejunostomy    Free water flush volume (mL): 60    Free water flush frequency: Other (see comments)        08/16/23 1510    08/11/23 1047  Diet low fiber/residue  Diet effective now         08/11/23 1046                   Medications Per DC med rec  Activities as tolerated   Follow-up Information       No, Primary Doctor Follow up in 1 week(s).               Wilmer Rossi MD Follow up in 1 week(s).    Specialty: Gastroenterology  Contact information:  [address]  [address]  [place] [address]  [phone]               Valeriano Riddle MD Follow up in 1 week(s).    Specialty: General Surgery  Contact information:  [address]  [address]  [place] [address]  [phone]               OP Psych Follow Up in 1 month Follow up.                           For further questions contact hospitalist office    Discharge time 33 minutes    For worsening symptoms, chest pain, shortness of breath, increased abdominal pain, high grade fever, stroke or stroke like symptoms, immediately go to the nearest Emergency Room or call 911 as soon as possible.      Ember Hurt M.D, on 8/20/2023. at 8:59 AM.

## 2023-08-24 ENCOUNTER — PATIENT OUTREACH (OUTPATIENT)
Dept: ADMINISTRATIVE | Facility: CLINIC | Age: 38
End: 2023-08-24
Payer: COMMERCIAL

## 2023-08-24 NOTE — PROGRESS NOTES
C3 nurse attempted to contact Chelsie Lee for a TCC post hospital discharge follow up call. No answer. The patient does not have a scheduled HOSFU appointment, and the pt does not have an [facility] PCP.

## 2023-08-25 NOTE — PROGRESS NOTES
3rd attempt-C3 nurse attempted to contact Chelsie Lee for a TCC post hospital discharge follow up call. No answer. The patient does not have a scheduled HOSFU appointment, and the pt does not have an [facility] PCP.

## 2023-10-05 ENCOUNTER — HOSPITAL ENCOUNTER (INPATIENT)
Facility: HOSPITAL | Age: 38
LOS: 8 days | Discharge: HOME HEALTH | DRG: 393 | End: 2023-10-17
Attending: STUDENT IN AN ORGANIZED HEALTH CARE EDUCATION/TRAINING PROGRAM | Admitting: INTERNAL MEDICINE
Payer: COMMERCIAL

## 2023-10-05 DIAGNOSIS — R07.9 CHEST PAIN, UNSPECIFIED TYPE: ICD-10-CM

## 2023-10-05 DIAGNOSIS — Z90.3 H/O GASTRIC SLEEVE: ICD-10-CM

## 2023-10-05 DIAGNOSIS — K25.9 MULTIPLE GASTRIC ULCERS: ICD-10-CM

## 2023-10-05 DIAGNOSIS — R07.9 CHEST PAIN: ICD-10-CM

## 2023-10-05 DIAGNOSIS — Z98.84 H/O GASTRIC BYPASS: ICD-10-CM

## 2023-10-05 DIAGNOSIS — R11.2 INTRACTABLE NAUSEA AND VOMITING: ICD-10-CM

## 2023-10-05 DIAGNOSIS — E43 SEVERE MALNUTRITION: Primary | ICD-10-CM

## 2023-10-05 DIAGNOSIS — R11.10 INTRACTABLE VOMITING: ICD-10-CM

## 2023-10-05 DIAGNOSIS — K20.90 ESOPHAGITIS: ICD-10-CM

## 2023-10-05 DIAGNOSIS — E43 SEVERE MALNUTRITION: ICD-10-CM

## 2023-10-05 LAB
ALBUMIN SERPL-MCNC: 2.2 G/DL (ref 3.5–5)
ALBUMIN/GLOB SERPL: 0.8 RATIO (ref 1.1–2)
ALP SERPL-CCNC: 99 UNIT/L (ref 40–150)
ALT SERPL-CCNC: 20 UNIT/L (ref 0–55)
APPEARANCE UR: ABNORMAL
AST SERPL-CCNC: 18 UNIT/L (ref 5–34)
BACTERIA #/AREA URNS AUTO: NORMAL /HPF
BASOPHILS # BLD AUTO: 0.07 X10(3)/MCL
BASOPHILS NFR BLD AUTO: 0.8 %
BILIRUB SERPL-MCNC: 1 MG/DL
BILIRUB UR QL STRIP.AUTO: ABNORMAL
BUN SERPL-MCNC: 9.3 MG/DL (ref 7–18.7)
CALCIUM SERPL-MCNC: 8 MG/DL (ref 8.4–10.2)
CHLORIDE SERPL-SCNC: 108 MMOL/L (ref 98–107)
CO2 SERPL-SCNC: 20 MMOL/L (ref 22–29)
COLOR UR AUTO: ABNORMAL
CREAT SERPL-MCNC: 0.79 MG/DL (ref 0.55–1.02)
EOSINOPHIL # BLD AUTO: 0.14 X10(3)/MCL (ref 0–0.9)
EOSINOPHIL NFR BLD AUTO: 1.6 %
ERYTHROCYTE [DISTWIDTH] IN BLOOD BY AUTOMATED COUNT: 14 % (ref 11.5–17)
GFR SERPLBLD CREATININE-BSD FMLA CKD-EPI: >60 MLS/MIN/1.73/M2
GLOBULIN SER-MCNC: 2.9 GM/DL (ref 2.4–3.5)
GLUCOSE SERPL-MCNC: 100 MG/DL (ref 74–100)
GLUCOSE UR QL STRIP.AUTO: NEGATIVE
HCT VFR BLD AUTO: 41.9 % (ref 37–47)
HGB BLD-MCNC: 13.6 G/DL (ref 12–16)
IMM GRANULOCYTES # BLD AUTO: 0.03 X10(3)/MCL (ref 0–0.04)
IMM GRANULOCYTES NFR BLD AUTO: 0.3 %
KETONES UR QL STRIP.AUTO: ABNORMAL
LACTATE SERPL-SCNC: 2 MMOL/L (ref 0.5–2.2)
LEUKOCYTE ESTERASE UR QL STRIP.AUTO: ABNORMAL
LIPASE SERPL-CCNC: 5 U/L
LYMPHOCYTES # BLD AUTO: 2.37 X10(3)/MCL (ref 0.6–4.6)
LYMPHOCYTES NFR BLD AUTO: 27.5 %
MAGNESIUM SERPL-MCNC: 2 MG/DL (ref 1.6–2.6)
MCH RBC QN AUTO: 29.6 PG (ref 27–31)
MCHC RBC AUTO-ENTMCNC: 32.5 G/DL (ref 33–36)
MCV RBC AUTO: 91.1 FL (ref 80–94)
MONOCYTES # BLD AUTO: 0.87 X10(3)/MCL (ref 0.1–1.3)
MONOCYTES NFR BLD AUTO: 10.1 %
NEUTROPHILS # BLD AUTO: 5.15 X10(3)/MCL (ref 2.1–9.2)
NEUTROPHILS NFR BLD AUTO: 59.7 %
NITRITE UR QL STRIP.AUTO: NEGATIVE
NRBC BLD AUTO-RTO: 0 %
PH UR STRIP.AUTO: 5.5 [PH]
PLATELET # BLD AUTO: 288 X10(3)/MCL (ref 130–400)
PMV BLD AUTO: 11.4 FL (ref 7.4–10.4)
POTASSIUM SERPL-SCNC: 4.3 MMOL/L (ref 3.5–5.1)
PROT SERPL-MCNC: 5.1 GM/DL (ref 6.4–8.3)
PROT UR QL STRIP.AUTO: NEGATIVE
RBC # BLD AUTO: 4.6 X10(6)/MCL (ref 4.2–5.4)
RBC #/AREA URNS AUTO: <5 /HPF
RBC UR QL AUTO: NEGATIVE
SODIUM SERPL-SCNC: 136 MMOL/L (ref 136–145)
SP GR UR STRIP.AUTO: 1.02 (ref 1–1.03)
SQUAMOUS #/AREA URNS AUTO: <5 /HPF
UROBILINOGEN UR STRIP-ACNC: 1
WBC # SPEC AUTO: 8.63 X10(3)/MCL (ref 4.5–11.5)
WBC #/AREA URNS AUTO: <5 /HPF

## 2023-10-05 PROCEDURE — 63600175 PHARM REV CODE 636 W HCPCS: Performed by: INTERNAL MEDICINE

## 2023-10-05 PROCEDURE — 83605 ASSAY OF LACTIC ACID: CPT | Performed by: NURSE PRACTITIONER

## 2023-10-05 PROCEDURE — 96375 TX/PRO/DX INJ NEW DRUG ADDON: CPT

## 2023-10-05 PROCEDURE — 25000003 PHARM REV CODE 250: Performed by: INTERNAL MEDICINE

## 2023-10-05 PROCEDURE — G0378 HOSPITAL OBSERVATION PER HR: HCPCS

## 2023-10-05 PROCEDURE — 84100 ASSAY OF PHOSPHORUS: CPT | Performed by: PHYSICIAN ASSISTANT

## 2023-10-05 PROCEDURE — 25500020 PHARM REV CODE 255: Performed by: NURSE PRACTITIONER

## 2023-10-05 PROCEDURE — 96361 HYDRATE IV INFUSION ADD-ON: CPT

## 2023-10-05 PROCEDURE — 83690 ASSAY OF LIPASE: CPT | Performed by: NURSE PRACTITIONER

## 2023-10-05 PROCEDURE — 87040 BLOOD CULTURE FOR BACTERIA: CPT | Performed by: NURSE PRACTITIONER

## 2023-10-05 PROCEDURE — 85025 COMPLETE CBC W/AUTO DIFF WBC: CPT | Performed by: NURSE PRACTITIONER

## 2023-10-05 PROCEDURE — 80053 COMPREHEN METABOLIC PANEL: CPT | Performed by: NURSE PRACTITIONER

## 2023-10-05 PROCEDURE — 63600175 PHARM REV CODE 636 W HCPCS: Performed by: NURSE PRACTITIONER

## 2023-10-05 PROCEDURE — 81001 URINALYSIS AUTO W/SCOPE: CPT | Performed by: NURSE PRACTITIONER

## 2023-10-05 PROCEDURE — 96374 THER/PROPH/DIAG INJ IV PUSH: CPT

## 2023-10-05 PROCEDURE — C9113 INJ PANTOPRAZOLE SODIUM, VIA: HCPCS | Performed by: INTERNAL MEDICINE

## 2023-10-05 PROCEDURE — S5010 5% DEXTROSE AND 0.45% SALINE: HCPCS | Performed by: INTERNAL MEDICINE

## 2023-10-05 PROCEDURE — 99285 EMERGENCY DEPT VISIT HI MDM: CPT | Mod: 25

## 2023-10-05 PROCEDURE — 83735 ASSAY OF MAGNESIUM: CPT | Performed by: NURSE PRACTITIONER

## 2023-10-05 RX ORDER — POLYETHYLENE GLYCOL 3350 17 G/17G
17 POWDER, FOR SOLUTION ORAL 2 TIMES DAILY PRN
Status: DISCONTINUED | OUTPATIENT
Start: 2023-10-05 | End: 2023-10-17 | Stop reason: HOSPADM

## 2023-10-05 RX ORDER — DULOXETIN HYDROCHLORIDE 20 MG/1
20 CAPSULE, DELAYED RELEASE ORAL 2 TIMES DAILY
Status: DISCONTINUED | OUTPATIENT
Start: 2023-10-05 | End: 2023-10-06

## 2023-10-05 RX ORDER — ALPRAZOLAM 0.5 MG/1
2 TABLET ORAL 3 TIMES DAILY PRN
Status: DISCONTINUED | OUTPATIENT
Start: 2023-10-05 | End: 2023-10-17 | Stop reason: HOSPADM

## 2023-10-05 RX ORDER — SUCRALFATE 1 G/1
1 TABLET ORAL 4 TIMES DAILY
Status: DISCONTINUED | OUTPATIENT
Start: 2023-10-05 | End: 2023-10-17 | Stop reason: HOSPADM

## 2023-10-05 RX ORDER — ONDANSETRON 2 MG/ML
4 INJECTION INTRAMUSCULAR; INTRAVENOUS EVERY 4 HOURS PRN
Status: DISCONTINUED | OUTPATIENT
Start: 2023-10-05 | End: 2023-10-17 | Stop reason: HOSPADM

## 2023-10-05 RX ORDER — DEXTROSE MONOHYDRATE AND SODIUM CHLORIDE 5; .45 G/100ML; G/100ML
INJECTION, SOLUTION INTRAVENOUS CONTINUOUS
Status: DISCONTINUED | OUTPATIENT
Start: 2023-10-05 | End: 2023-10-08

## 2023-10-05 RX ORDER — SODIUM CHLORIDE 0.9 % (FLUSH) 0.9 %
10 SYRINGE (ML) INJECTION
Status: DISCONTINUED | OUTPATIENT
Start: 2023-10-05 | End: 2023-10-17 | Stop reason: HOSPADM

## 2023-10-05 RX ORDER — HYDROMORPHONE HYDROCHLORIDE 2 MG/ML
1 INJECTION, SOLUTION INTRAMUSCULAR; INTRAVENOUS; SUBCUTANEOUS
Status: COMPLETED | OUTPATIENT
Start: 2023-10-05 | End: 2023-10-05

## 2023-10-05 RX ORDER — DICYCLOMINE HYDROCHLORIDE 10 MG/1
10 CAPSULE ORAL EVERY 6 HOURS PRN
Status: DISCONTINUED | OUTPATIENT
Start: 2023-10-05 | End: 2023-10-17 | Stop reason: HOSPADM

## 2023-10-05 RX ORDER — AMOXICILLIN 250 MG
2 CAPSULE ORAL 2 TIMES DAILY PRN
Status: DISCONTINUED | OUTPATIENT
Start: 2023-10-05 | End: 2023-10-17 | Stop reason: HOSPADM

## 2023-10-05 RX ORDER — PANTOPRAZOLE SODIUM 40 MG/10ML
40 INJECTION, POWDER, LYOPHILIZED, FOR SOLUTION INTRAVENOUS 2 TIMES DAILY
Status: DISCONTINUED | OUTPATIENT
Start: 2023-10-06 | End: 2023-10-17 | Stop reason: HOSPADM

## 2023-10-05 RX ORDER — MIRTAZAPINE 7.5 MG/1
7.5 TABLET, FILM COATED ORAL NIGHTLY
Status: DISCONTINUED | OUTPATIENT
Start: 2023-10-05 | End: 2023-10-17 | Stop reason: HOSPADM

## 2023-10-05 RX ORDER — ACETAMINOPHEN 500 MG
500 TABLET ORAL EVERY 6 HOURS PRN
Status: DISCONTINUED | OUTPATIENT
Start: 2023-10-05 | End: 2023-10-15

## 2023-10-05 RX ORDER — ALPRAZOLAM 2 MG/1
2 TABLET ORAL 3 TIMES DAILY PRN
COMMUNITY
Start: 2023-09-14

## 2023-10-05 RX ORDER — ONDANSETRON 2 MG/ML
4 INJECTION INTRAMUSCULAR; INTRAVENOUS
Status: COMPLETED | OUTPATIENT
Start: 2023-10-05 | End: 2023-10-05

## 2023-10-05 RX ORDER — TALC
6 POWDER (GRAM) TOPICAL NIGHTLY PRN
Status: DISCONTINUED | OUTPATIENT
Start: 2023-10-05 | End: 2023-10-17 | Stop reason: HOSPADM

## 2023-10-05 RX ORDER — MAG HYDROX/ALUMINUM HYD/SIMETH 200-200-20
30 SUSPENSION, ORAL (FINAL DOSE FORM) ORAL 4 TIMES DAILY PRN
Status: DISCONTINUED | OUTPATIENT
Start: 2023-10-05 | End: 2023-10-17 | Stop reason: HOSPADM

## 2023-10-05 RX ORDER — PROCHLORPERAZINE EDISYLATE 5 MG/ML
5 INJECTION INTRAMUSCULAR; INTRAVENOUS EVERY 6 HOURS PRN
Status: DISCONTINUED | OUTPATIENT
Start: 2023-10-05 | End: 2023-10-17 | Stop reason: HOSPADM

## 2023-10-05 RX ORDER — ENOXAPARIN SODIUM 100 MG/ML
30 INJECTION SUBCUTANEOUS EVERY 24 HOURS
Status: DISCONTINUED | OUTPATIENT
Start: 2023-10-06 | End: 2023-10-17 | Stop reason: HOSPADM

## 2023-10-05 RX ORDER — MEGESTROL ACETATE 40 MG/ML
600 SUSPENSION ORAL DAILY
Status: DISCONTINUED | OUTPATIENT
Start: 2023-10-06 | End: 2023-10-17 | Stop reason: HOSPADM

## 2023-10-05 RX ORDER — AMLODIPINE BESYLATE 5 MG/1
10 TABLET ORAL DAILY
Status: DISCONTINUED | OUTPATIENT
Start: 2023-10-06 | End: 2023-10-17 | Stop reason: HOSPADM

## 2023-10-05 RX ORDER — ACETAMINOPHEN 325 MG/1
650 TABLET ORAL EVERY 4 HOURS PRN
Status: DISCONTINUED | OUTPATIENT
Start: 2023-10-05 | End: 2023-10-05

## 2023-10-05 RX ORDER — HYDROMORPHONE HYDROCHLORIDE 2 MG/ML
1 INJECTION, SOLUTION INTRAMUSCULAR; INTRAVENOUS; SUBCUTANEOUS EVERY 4 HOURS PRN
Status: DISCONTINUED | OUTPATIENT
Start: 2023-10-05 | End: 2023-10-15

## 2023-10-05 RX ORDER — PANTOPRAZOLE SODIUM 40 MG/10ML
80 INJECTION, POWDER, LYOPHILIZED, FOR SOLUTION INTRAVENOUS ONCE
Status: COMPLETED | OUTPATIENT
Start: 2023-10-05 | End: 2023-10-05

## 2023-10-05 RX ADMIN — ONDANSETRON 4 MG: 2 INJECTION INTRAMUSCULAR; INTRAVENOUS at 03:10

## 2023-10-05 RX ADMIN — ONDANSETRON 4 MG: 2 INJECTION INTRAMUSCULAR; INTRAVENOUS at 10:10

## 2023-10-05 RX ADMIN — DEXTROSE AND SODIUM CHLORIDE: 5; 450 INJECTION, SOLUTION INTRAVENOUS at 11:10

## 2023-10-05 RX ADMIN — ALPRAZOLAM 2 MG: 0.5 TABLET ORAL at 10:10

## 2023-10-05 RX ADMIN — HYDROMORPHONE HYDROCHLORIDE 1 MG: 2 INJECTION INTRAMUSCULAR; INTRAVENOUS; SUBCUTANEOUS at 06:10

## 2023-10-05 RX ADMIN — PANTOPRAZOLE SODIUM 80 MG: 40 INJECTION, POWDER, LYOPHILIZED, FOR SOLUTION INTRAVENOUS at 10:10

## 2023-10-05 RX ADMIN — IOPAMIDOL 100 ML: 755 INJECTION, SOLUTION INTRAVENOUS at 06:10

## 2023-10-05 RX ADMIN — HYDROMORPHONE HYDROCHLORIDE 1 MG: 2 INJECTION INTRAMUSCULAR; INTRAVENOUS; SUBCUTANEOUS at 10:10

## 2023-10-05 RX ADMIN — SODIUM CHLORIDE, POTASSIUM CHLORIDE, SODIUM LACTATE AND CALCIUM CHLORIDE 1000 ML: 600; 310; 30; 20 INJECTION, SOLUTION INTRAVENOUS at 03:10

## 2023-10-05 NOTE — FIRST PROVIDER EVALUATION
Medical screening examination initiated.  I have conducted a focused provider triage encounter, findings are as follows:    Brief history of present illness:  Patient states nausea, diarrhea, and fever. States leaking around her PEG tube.    There were no vitals filed for this visit.    Pertinent physical exam:  Awake, alert, ambulatory      Brief workup plan:  Labs    Preliminary workup initiated; this workup will be continued and followed by the physician or advanced practice provider that is assigned to the patient when roomed.

## 2023-10-05 NOTE — ED PROVIDER NOTES
Encounter Date: 10/5/2023       History     Chief Complaint   Patient presents with    Nausea    Fatigue     Weakness, nausea x 3 days. Pt with PEG tube in place, c/o leaking around the site. Fever 101 at home, last tylenol PTA. Last intake from PEG 2 days ago, Also reports diarrhea this pat 2 days.     Abdominal Pain     See MDM    The history is provided by the patient. No  was used.     Review of patient's allergies indicates:   Allergen Reactions    Penicillin Rash    Penicillin Rash     Past Medical History:   Diagnosis Date    GERD (gastroesophageal reflux disease)     Hypertension      Past Surgical History:   Procedure Laterality Date    COLONOSCOPY N/A 8/11/2023    Procedure: COLON;  Surgeon: Wilmer Rossi MD;  Location: [facility] ENDOSCOPY;  Service: Gastroenterology;  Laterality: N/A;    EGD, WITH CLOSED BIOPSY  8/9/2023    Procedure: EGD, WITH CLOSED BIOPSY;  Surgeon: Wilmer Rossi MD;  Location: [facility] ENDOSCOPY;  Service: Gastroenterology;;    ESOPHAGOGASTRODUODENOSCOPY N/A 8/9/2023    Procedure: EGD;  Surgeon: Wilmer Rossi MD;  Location: [facility] ENDOSCOPY;  Service: Gastroenterology;  Laterality: N/A;    HERNIA REPAIR      LAPAROSCOPIC GASTRIC BANDING       History reviewed. No pertinent family history.  Social History     Tobacco Use    Smoking status: Never    Smokeless tobacco: Never   Substance Use Topics    Alcohol use: Never    Drug use: Never     Review of Systems   Constitutional:  Negative for fever.   Respiratory:  Negative for cough and shortness of breath.    Cardiovascular:  Negative for chest pain.   Gastrointestinal:  Positive for abdominal pain, nausea and vomiting.   Genitourinary:  Negative for difficulty urinating and dysuria.   Musculoskeletal:  Negative for gait problem.   Skin:  Negative for color change.   Neurological:  Negative for dizziness, speech difficulty and headaches.   Psychiatric/Behavioral:  Negative for hallucinations and suicidal  ideas.    All other systems reviewed and are negative.      Physical Exam     Initial Vitals [10/05/23 1332]   BP Pulse Resp Temp SpO2   (!) 152/104 88 17 98.1 °F (36.7 °C) 98 %      MAP       --         Physical Exam    Nursing note and vitals reviewed.  Constitutional: She appears well-developed and well-nourished.   HENT:   Head: Normocephalic.   Eyes: EOM are normal.   Neck:   Normal range of motion.  Cardiovascular:  Normal rate, regular rhythm, normal heart sounds and intact distal pulses.           Pulmonary/Chest: Breath sounds normal. No respiratory distress.   Abdominal: Abdomen is soft. Bowel sounds are normal. There is abdominal tenderness.   Peg tube   Musculoskeletal:         General: Normal range of motion.      Cervical back: Normal range of motion.     Neurological: She is alert and oriented to person, place, and time. She has normal strength.   Skin: Skin is warm and dry.   Psychiatric: She has a normal mood and affect. Her behavior is normal. Judgment and thought content normal.         ED Course   Procedures  Labs Reviewed   COMPREHENSIVE METABOLIC PANEL - Abnormal; Notable for the following components:       Result Value    Chloride 108 (*)     Carbon Dioxide 20 (*)     Calcium Level Total 8.0 (*)     Protein Total 5.1 (*)     Albumin Level 2.2 (*)     Albumin/Globulin Ratio 0.8 (*)     All other components within normal limits   URINALYSIS, REFLEX TO URINE CULTURE - Abnormal; Notable for the following components:    Appearance, UA Cloudy (*)     Ketones, UA Trace (*)     Bilirubin, UA 1+ (*)     Leukocyte Esterase, UA Trace (*)     All other components within normal limits   CBC WITH DIFFERENTIAL - Abnormal; Notable for the following components:    MCHC 32.5 (*)     MPV 11.4 (*)     All other components within normal limits   LIPASE - Normal   LACTIC ACID, PLASMA - Normal   MAGNESIUM - Normal   URINALYSIS, MICROSCOPIC - Normal   BLOOD CULTURE [facility]   BLOOD CULTURE [facility]   CBC W/ AUTO DIFFERENTIAL     Narrative:     The following orders were created for panel order CBC Auto Differential.  Procedure                               Abnormality         Status                     ---------                               -----------         ------                     CBC with Differential[[id number]]       Abnormal            Final result                 Please view results for these tests on the individual orders.          Imaging Results              CT Abdomen Pelvis With Contrast (Final result)  Result time 10/05/23 19:01:39      Final result by Ethel Bal MD (10/05/23 19:01:39)                   Impression:      Postsurgical changes of gastric bypass.  Edematous appearance of the distal esophagus, proximal gastric pouch and small bowel.  Correlate for esophagitis, gastritis and enteritis.  There is perienteric fat stranding without appreciable free air/perforation.      Electronically signed by: Ethel Bal  Date:    10/05/2023  Time:    19:01               Narrative:    EXAMINATION:  CT ABDOMEN PELVIS WITH CONTRAST    CLINICAL HISTORY:  Abdominal pain, acute, nonlocalized;    TECHNIQUE:  Helically acquired images with axial, sagittal and coronal reformations were obtained from the lung bases to the pubic symphysis after the IV administration of contrast.    Automated tube current modulation, weight-based exposure dosing, and/or iterative reconstruction technique utilized to reach lowest reasonably achievable exposure rate.    DLP: 351 mGy*cm    COMPARISON:  CT chest abdomen pelvis 08/15/2023    FINDINGS:  HEART: Normal in size. No pericardial effusion.    LUNG BASES: Well aerated.    LIVER: Too small to characterize hypodensity at the left lobe of the liver.  Probable focal fatty infiltration adjacent to the falciform ligament.    BILIARY: No calcified gallstones.    PANCREAS: No inflammatory change.    SPLEEN: Normal in size    ADRENALS: No mass.    KIDNEYS/URETERS: The kidneys enhance  symmetrically.  No hydronephrosis.    GI TRACT/MESENTERY: There are postsurgical changes of gastric bypass.  Edematous appearance of the GE junction and proximal gastric pouch.  There is fat stranding in the left upper quadrant.  There is a gastrostomy balloon at the retained distal gastric body of the biliopancreatic limb.  No evidence of bowel obstruction.  Mildly edematous appearance of the small bowel.    PERITONEUM: No free fluid.No free air.    LYMPH NODES: No enlarged lymph nodes by size criteria.    VASCULATURE: No significant atherosclerosis or aneurysm.    BLADDER: Nondistended bladder limits CT evaluation    REPRODUCTIVE ORGANS: Physiologic left ovary corpus luteal cyst.  Resolved right ovarian cyst.    SOFT TISSUES: Unremarkable.    BONES: No acute osseous abnormality.                                       Medications   lactated ringers bolus 1,000 mL (1,000 mLs Intravenous New Bag 10/5/23 1552)   ondansetron injection 4 mg (4 mg Intravenous Given 10/5/23 1552)   HYDROmorphone (PF) injection 1 mg (1 mg Intravenous Given 10/5/23 1814)   iopamidoL (ISOVUE-370) injection 100 mL (100 mLs Intravenous Given 10/5/23 1849)     Medical Decision Making  Historian:  Patient.  Patient is a 37-year-old female  that presents with abdominal pain that has been present ongoing. Associated symptoms nausea and vomiting. Surrounding information is has had problems since she had a hiatal hernia repair in January. Exacerbated by nothing. Relieved by nothing. Patient treatment prior to arrival none. Risk factors include none. Other history pertaining to this complaint nothing.   Assessment:  See physical exam.  DD:  Small-bowel obstruction, pancreatitis, gastritis, colitis      Amount and/or Complexity of Data Reviewed  Labs:      Details: Labs low albumin   Radiology: ordered.     Details: CT esophagitis   Discussion of management or test interpretation with external provider(s): History was obtained.  Physical was performed.   I discussed this case with emergency room physician Dr. Seamus Greene, and he agrees with admission for intractable vomiting and esophagitis.  I did page Hospital Medicine for admission.I spoke to Kayla GIORDANO, she accepts admission.  No social determinants that affect healthcare were noted.    Risk  Prescription drug management.               ED Course as of 10/05/23 1944   Thu Oct 05, 2023   1931 Paged HM [CL]      ED Course User Index  [CL] Mj Diaz FNP                    Clinical Impression:   Final diagnoses:  [K20.90] Esophagitis (Primary)  [R11.10] Intractable vomiting        ED Disposition Condition    Observation Stable                Mj Diaz FNP  10/05/23 1944

## 2023-10-06 LAB — PHOSPHATE SERPL-MCNC: 4.1 MG/DL (ref 2.3–4.7)

## 2023-10-06 PROCEDURE — 63600175 PHARM REV CODE 636 W HCPCS: Performed by: NURSE PRACTITIONER

## 2023-10-06 PROCEDURE — G0378 HOSPITAL OBSERVATION PER HR: HCPCS

## 2023-10-06 PROCEDURE — S0179 MEGESTROL 20 MG: HCPCS | Performed by: INTERNAL MEDICINE

## 2023-10-06 PROCEDURE — 63600175 PHARM REV CODE 636 W HCPCS: Performed by: INTERNAL MEDICINE

## 2023-10-06 PROCEDURE — C9113 INJ PANTOPRAZOLE SODIUM, VIA: HCPCS | Performed by: INTERNAL MEDICINE

## 2023-10-06 PROCEDURE — 25000003 PHARM REV CODE 250: Performed by: INTERNAL MEDICINE

## 2023-10-06 PROCEDURE — 96372 THER/PROPH/DIAG INJ SC/IM: CPT | Performed by: INTERNAL MEDICINE

## 2023-10-06 RX ADMIN — ONDANSETRON 4 MG: 2 INJECTION INTRAMUSCULAR; INTRAVENOUS at 03:10

## 2023-10-06 RX ADMIN — AMLODIPINE BESYLATE 10 MG: 5 TABLET ORAL at 08:10

## 2023-10-06 RX ADMIN — PANTOPRAZOLE SODIUM 40 MG: 40 INJECTION, POWDER, FOR SOLUTION INTRAVENOUS at 09:10

## 2023-10-06 RX ADMIN — SUCRALFATE 1 G: 1 TABLET ORAL at 04:10

## 2023-10-06 RX ADMIN — ALPRAZOLAM 2 MG: 0.5 TABLET ORAL at 09:10

## 2023-10-06 RX ADMIN — MEGESTROL ACETATE 600 MG: 400 SUSPENSION ORAL at 08:10

## 2023-10-06 RX ADMIN — HYDROMORPHONE HYDROCHLORIDE 1 MG: 2 INJECTION INTRAMUSCULAR; INTRAVENOUS; SUBCUTANEOUS at 03:10

## 2023-10-06 RX ADMIN — HYDROMORPHONE HYDROCHLORIDE 1 MG: 2 INJECTION INTRAMUSCULAR; INTRAVENOUS; SUBCUTANEOUS at 09:10

## 2023-10-06 RX ADMIN — ENOXAPARIN SODIUM 30 MG: 40 INJECTION SUBCUTANEOUS at 04:10

## 2023-10-06 RX ADMIN — ALPRAZOLAM 2 MG: 0.5 TABLET ORAL at 08:10

## 2023-10-06 RX ADMIN — SUCRALFATE 1 G: 1 TABLET ORAL at 08:10

## 2023-10-06 NOTE — H&P
[facility] Hospital Medicine - H&P Note    Patient Name: Chelsie Lee  MRN: [id number]  PCP: Galdino Caceres MD  Admitting Physician: Coleman Le MD  Admission Class: OP- Observation   Date of Service: 10/05/2023  Code status: Full    Chief Complaint   Nausea    History of Present Illness   This is a 37-year-old female with medical history of multiple abdominal surgeries started with sleeve gastrectomy in 2013 followed by Kel-en-Y gastric bypass in [place] in June 2023, laparoscopic hiatal hernia repair with mesh on 01/17/2023, complicated ray incarceration and possible leakage and underwent exploratory laparotomy on 01/27/2023 which did not reveal perforation and during hospital stay had recurrence of abdominal pain and underwent a repeat exploratory laparotomy on 02/07/2023 with partial gastrectomy and gastrojejunostomy for necrotic segment of the stomach as well as underwent G-tube placement in the antrum.  Since had recurrent admission for continued weight loss and recurrent nausea, vomiting and abdominal pain with last admission in early August 2023 where she underwent EGD on 08/09/2023 which revealed LA grade B reflux esophagitis with no bleeding, Kel-en-Y gastrojejunostomy with gastrojejunal anastomosis characterized by congestion erythema and ulceration, biopsy negative for H pylori.  She was discharged on Protonix and Carafate.    Present to the ED with complaint of nausea, vomiting, upper abdominal pain, continued G-tube leakage.  Reported fever at home.  Report chronic intermittent loose stool since February that is unchanged.    On arrival to ED she was afebrile and hemodynamically stable.  Labs notable for albumin 2.2.  CT abdomen pelvis show edematous appearance of the GE junction and proximal gastric pouch, G-tube balloon in the distal gastric body/biliopancreatic limb.  Mild edematous appearance of the small bowel.  No evidence of bowel obstruction or  perforation.    She was given analgesics and referred to hospital medicine service for further evaluation and management.    ROS   Except as documented, all other systems reviewed and negative     Past Medical History   Sleeve gastrectomy for weight loss in 2013, Kel-en-Y gastric bypass in [place] June 2022, laparoscopic repair of incarcerated hiatal hernia 01/17/2023, readmitted with leakage/perforation at GE junction on 01/27/2023 and underwent repair but no evidence of leakage or perforation found, recurrence of pain on 02/07/2023 and perforation was confirmed with upper GI serious due to necrotic portion of the stomach and underwent partial gastrectomy with gastrojejunostomy and G- tube placed in the antrum.   requiring revision and PEG tube placement  Anxiety and depression  Underweight/Severe protein calorie malnutrition    Past Surgical History     Past Surgical History:   Procedure Laterality Date    COLONOSCOPY N/A 8/11/2023    Procedure: COLON;  Surgeon: Wilmer Rossi MD;  Location: [facility] ENDOSCOPY;  Service: Gastroenterology;  Laterality: N/A;    EGD, WITH CLOSED BIOPSY  8/9/2023    Procedure: EGD, WITH CLOSED BIOPSY;  Surgeon: Wilmer Rossi MD;  Location: [facility] ENDOSCOPY;  Service: Gastroenterology;;    ESOPHAGOGASTRODUODENOSCOPY N/A 8/9/2023    Procedure: EGD;  Surgeon: Wilmer Rossi MD;  Location: [facility] ENDOSCOPY;  Service: Gastroenterology;  Laterality: N/A;    HERNIA REPAIR      LAPAROSCOPIC GASTRIC BANDING         Social History     Social History     Tobacco Use    Smoking status: Never    Smokeless tobacco: Never   Substance Use Topics    Alcohol use: Never        Family History   Reviewed and negative    Allergies   Penicillin and Penicillin    Home Medications     Prior to Admission medications    Medication Sig Start Date End Date Taking? Authorizing Provider   ALPRAZolam (XANAX) 2 MG Tab Take 2 mg by mouth 3 (three) times daily as needed. 9/14/23   Provider, Historical    amLODIPine (NORVASC) 10 MG tablet Take 1 tablet (10 mg total) by mouth once daily. 8/20/23 9/19/23  Ember Aguilar MD   cyanocobalamin 1,000 mcg/mL injection Inject 1,000 mcg into the muscle every 30 days. 8/6/23   Provider, Historical   dicyclomine (BENTYL) 10 MG capsule Take 10 mg by mouth every 6 (six) hours as needed. 8/3/23   Provider, Historical   DULoxetine (CYMBALTA) 20 MG capsule Take 1 capsule (20 mg total) by mouth 2 (two) times daily. 8/20/23 9/19/23  Ember Aguilar MD   ergocalciferol (ERGOCALCIFEROL) 50,000 unit Cap Take 50,000 Units by mouth every 7 days. 10/21/22   Provider, Historical   hyoscyamine (LEVSIN/SL) 0.125 mg Subl Place under the tongue. 8/3/23   Provider, Historical   megestroL (MEGACE ES) 625 mg/5 mL (125 mg/mL) Susp Take 625 mg by mouth. 8/5/23   Provider, Historical   mirtazapine (REMERON) 7.5 MG Tab Take 1 tablet (7.5 mg total) by mouth every evening. 8/20/23 9/19/23  Ember Aguilar MD   pantoprazole (PROTONIX) 40 MG tablet Take 1 tablet (40 mg total) by mouth 2 (two) times daily before meals. 8/20/23 9/19/23  Ember Aguilar MD   ALPRAZolam (XANAX) 1 MG tablet Take 1 mg by mouth 3 (three) times daily. 10/20/22 10/5/23  Provider, Historical   ALPRAZolam (XANAX) 1 MG tablet Take 1 tablet (1 mg total) by mouth 3 (three) times daily as needed for Anxiety. 8/20/23 10/5/23  Ember Aguilar MD   buPROPion (WELLBUTRIN XL) 150 MG TB24 tablet 1 tablet in the morning  10/5/23  Provider, Historical   docusate sodium (COLACE) 50 MG capsule Take 1 capsule (50 mg total) by mouth once daily. 10/29/22 10/5/23  Janusz Toure MD   polyethylene glycol (GLYCOLAX) 17 gram PwPk Take 17 g by mouth 2 (two) times daily as needed (constipation). 10/29/22 10/5/23  Janusz Toure MD        Physical Exam   Vital Signs  Temp:  [98.1 °F (36.7 °C)]   Pulse:  [76-88]   Resp:  [15-25]   BP: (121-152)/()   SpO2:  [95 %-100 %]    General: Appears comfortable  HEENT: NC/AT  Neck:  No JVD  Chest: CTABL  CVS:  Regular rhythm. Normal S1/S2.  Abdomen: nondistended, normoactive BS, soft and non-tender.  Peg tube in place minimal erythema around the tube with no sign of infection.  MSK: No obvious deformity or joint swelling, muscle wasting  Skin: Warm and dry  Neuro: AAOx3, no focal neurological deficit  Psych: Cooperative    Labs     Recent Labs     10/05/23  1359   WBC 8.63   RBC 4.60   HGB 13.6   HCT 41.9   MCV 91.1   MCH 29.6   MCHC 32.5*   RDW 14.0           Recent Labs     10/05/23  1359      K 4.3   CHLORIDE 108*   CO2 20*   BUN 9.3   CREATININE 0.79   EGFRNORACEVR >60   GLUCOSE 100   CALCIUM 8.0*   MG 2.00   ALBUMIN 2.2*   GLOBULIN 2.9   ALKPHOS 99   ALT 20   AST 18   BILITOT 1.0   LIPASE 5     Recent Labs     10/05/23  1359   LACTIC 2.0        Microbiology Results (last 7 days)       Procedure Component Value Units Date/Time    Blood Culture [[id number]] Collected: 10/05/23 1359    Order Status: Resulted Specimen: Blood Updated: 10/05/23 1424    Blood Culture [[id number]] Collected: 10/05/23 1359    Order Status: Resulted Specimen: Blood Updated: 10/05/23 1424           Imaging     CT Abdomen Pelvis With Contrast   Final Result      Postsurgical changes of gastric bypass.  Edematous appearance of the distal esophagus, proximal gastric pouch and small bowel.  Correlate for esophagitis, gastritis and enteritis.  There is perienteric fat stranding without appreciable free air/perforation.         Electronically signed by: Ethel Bal   Date:    10/05/2023   Time:    19:01          Assessment   Severe reflux esophagitis and gastritis  Underweight and Severe protein calorie malnutrition    History of anxiety, multiple bariatric surgeries and hiatal hernia repair with complications    Plan   Protonix 80 mg IV x1 followed by 40 mg IV b.i.d.  Sucralfate 1 g QID  D5 half-normal saline at 50 mL/hour  Consider changing G-tube to J-tube placement, will consult GI for further opinion.  Nutrition consult  Home  medication reviewed and resumed  VTE Prophylaxis: Rj Le MD  Internal Medicine

## 2023-10-06 NOTE — PLAN OF CARE
10/06/23 1224   Discharge Assessment   Assessment Type Discharge Planning Assessment   Confirmed/corrected address, phone number and insurance Yes   Confirmed Demographics Correct on Facesheet   Source of Information patient   Communicated [place] with patient/caregiver Date not available/Unable to determine   Reason For Admission Esophagitis   People in Home spouse;child(mahamed), dependent   Do you expect to return to your current living situation? Yes   Do you have help at home or someone to help you manage your care at home? Yes   Who are your caregiver(s) and their phone number(s)? Melisa (mom) [phone]   Prior to hospitilization cognitive status: Unable to Assess   Current cognitive status: Alert/Oriented   Equipment Currently Used at Home other (see comments);nutrition supplies;feeding device  (Kangaroo Pump)   Do you currently have service(s) that help you manage your care at home? Yes   Name and [facility] number of [facility]   Is the pt/caregiver preference to resume services with current agency Yes   Do you take prescription medications? Yes   Do you have prescription coverage? Yes   Coverage BCBS   Who is going to help you get home at discharge? Family   How do you get to doctors appointments? car, drives self;family or friend will provide   Are you on dialysis? No   Do you take coumadin? No   DME Needed Upon Discharge  none   Discharge Plan discussed with: Patient   Transition of Care Barriers None   Discharge Plan A Home Health   Discharge Plan B Home Health   OTHER   Name(s) of People in Home Jewel Mata and 3 children     Patient says she is current with [facility]. Spoke with Brenda [phone] patient is current with the home and community based services. Brenda confirms they just received auth to see patient on Monday. Brenda BS director is asking to be contacted once patient flips to inpatient here in the hospital.

## 2023-10-06 NOTE — PROGRESS NOTES
[facility]  Hospital Medicine Progress Note        Chief Complaint: Inpatient Follow-up     HPI:   37-year-old female with medical history of multiple abdominal surgeries started with sleeve gastrectomy in 2013 followed by Kel-en-Y gastric bypass in [place] in June 2023, laparoscopic hiatal hernia repair with mesh on 01/17/2023, complicated ray incarceration and possible leakage and underwent exploratory laparotomy on 01/27/2023 which did not reveal perforation and during hospital stay had recurrence of abdominal pain and underwent a repeat exploratory laparotomy on 02/07/2023 with partial gastrectomy and gastrojejunostomy for necrotic segment of the stomach as well as underwent G-tube placement in the antrum.  Since had recurrent admission for continued weight loss and recurrent nausea, vomiting and abdominal pain with last admission in early August 2023 where she underwent EGD on 08/09/2023 which revealed LA grade B reflux esophagitis with no bleeding, Kel-en-Y gastrojejunostomy with gastrojejunal anastomosis characterized by congestion erythema and ulceration, biopsy negative for H pylori.  She was discharged on Protonix and Carafate.     Present to the ED with complaint of nausea, vomiting, upper abdominal pain, continued G-tube leakage.  Reported fever at home.  Report chronic intermittent loose stool since February that is unchanged.     On arrival to ED she was afebrile and hemodynamically stable.  Labs notable for albumin 2.2.  CT abdomen pelvis show edematous appearance of the GE junction and proximal gastric pouch, G-tube balloon in the distal gastric body/biliopancreatic limb.  Mild edematous appearance of the small bowel.  No evidence of bowel obstruction or perforation.     She was given analgesics and referred to hospital medicine service for further evaluation and management.  Interval Hx:   Pt reported significant weight loss, not tolerating po diet, , persistent g tube  leaking      Case was discussed with patient's nurse and  on the floor.    Objective/physical exam:  General: thin white female, In no acute distress, afebrile  Chest: Clear to auscultation bilaterally  Heart: RRR, +S1, S2, no appreciable murmur  Abdomen: Soft, g tube with bile  MSK: Warm, no lower extremity edema  Neurologic: Alert and oriented , no fnd    VITAL SIGNS: 24 HRS MIN & MAX LAST   Temp  Min: 97.3 °F (36.3 °C)  Max: 98.1 °F (36.7 °C) 98 °F (36.7 °C)   BP  Min: 121/99  Max: 152/104 (!) 130/100 (Reported to GONZALES Schultz)   Pulse  Min: 74  Max: 88  74   Resp  Min: 15  Max: 25 18   SpO2  Min: 95 %  Max: 100 % 100 %     I have reviewed the following labs:  Recent Labs   Lab 10/05/23  1359   WBC 8.63   RBC 4.60   HGB 13.6   HCT 41.9   MCV 91.1   MCH 29.6   MCHC 32.5*   RDW 14.0      MPV 11.4*     Recent Labs   Lab 10/05/23  1359      K 4.3   CO2 20*   BUN 9.3   CREATININE 0.79   CALCIUM 8.0*   MG 2.00   ALBUMIN 2.2*   ALKPHOS 99   ALT 20   AST 18   BILITOT 1.0     Microbiology Results (last 7 days)       Procedure Component Value Units Date/Time    Blood Culture [[id number]] Collected: 10/05/23 1359    Order Status: Resulted Specimen: Blood Updated: 10/05/23 1424    Blood Culture [[id number]] Collected: 10/05/23 1359    Order Status: Resulted Specimen: Blood Updated: 10/05/23 1424             See below for Radiology    Scheduled Med:   amLODIPine  10 mg Oral Daily    enoxparin  30 mg Subcutaneous Q24H (prophylaxis, 1700)    megestroL  600 mg Oral Daily    mirtazapine  7.5 mg Oral QHS    pantoprazole  40 mg Intravenous BID    sucralfate  1 g Oral QID      Continuous Infusions:   dextrose 5 % and 0.45 % NaCl 50 mL/hr at 10/05/23 2307      PRN Meds:  acetaminophen, ALPRAZolam, aluminum-magnesium hydroxide-simethicone, dicyclomine, HYDROmorphone, melatonin, ondansetron, polyethylene glycol, prochlorperazine, senna-docusate 8.6-50 mg, sodium chloride 0.9%     Assessment/Plan:  Severe reflux  esophagitis and gastritis  Underweight and Severe protein calorie malnutrition     History of anxiety, multiple bariatric surgeries and hiatal hernia repair with complications     Cont Protonix 80 mg IV x1 followed by 40 mg IV b.i.d., Sucralfate 1 g QID  Cont D5 half-normal saline at 50 mL/hour  ? G-tube to J-tube placement,  GI consulted for further opinion.  obtain Nutrition consult  Care discussed with pt's RN      VTE prophylaxis: scds    Patient condition:  Stable    Anticipated discharge and Disposition:   tbd      _____________________________________________________________________    Nutrition Status:    Radiology:  I have personally reviewed the following imaging and agree with the radiologist.     CT Abdomen Pelvis With Contrast  Narrative: EXAMINATION:  CT ABDOMEN PELVIS WITH CONTRAST    CLINICAL HISTORY:  Abdominal pain, acute, nonlocalized;    TECHNIQUE:  Helically acquired images with axial, sagittal and coronal reformations were obtained from the lung bases to the pubic symphysis after the IV administration of contrast.    Automated tube current modulation, weight-based exposure dosing, and/or iterative reconstruction technique utilized to reach lowest reasonably achievable exposure rate.    DLP: 351 mGy*cm    COMPARISON:  CT chest abdomen pelvis 08/15/2023    FINDINGS:  HEART: Normal in size. No pericardial effusion.    LUNG BASES: Well aerated.    LIVER: Too small to characterize hypodensity at the left lobe of the liver.  Probable focal fatty infiltration adjacent to the falciform ligament.    BILIARY: No calcified gallstones.    PANCREAS: No inflammatory change.    SPLEEN: Normal in size    ADRENALS: No mass.    KIDNEYS/URETERS: The kidneys enhance symmetrically.  No hydronephrosis.    GI TRACT/MESENTERY: There are postsurgical changes of gastric bypass.  Edematous appearance of the GE junction and proximal gastric pouch.  There is fat stranding in the left upper quadrant.  There is a gastrostomy  balloon at the retained distal gastric body of the biliopancreatic limb.  No evidence of bowel obstruction.  Mildly edematous appearance of the small bowel.    PERITONEUM: No free fluid.No free air.    LYMPH NODES: No enlarged lymph nodes by size criteria.    VASCULATURE: No significant atherosclerosis or aneurysm.    BLADDER: Nondistended bladder limits CT evaluation    REPRODUCTIVE ORGANS: Physiologic left ovary corpus luteal cyst.  Resolved right ovarian cyst.    SOFT TISSUES: Unremarkable.    BONES: No acute osseous abnormality.  Impression: Postsurgical changes of gastric bypass.  Edematous appearance of the distal esophagus, proximal gastric pouch and small bowel.  Correlate for esophagitis, gastritis and enteritis.  There is perienteric fat stranding without appreciable free air/perforation.    Electronically signed by: Ethel Bal  Date:    10/05/2023  Time:    19:01      Marry Jenkins MD   10/06/2023

## 2023-10-06 NOTE — NURSING
Nurses Note -- 4 Eyes      10/5/2023   10:09 PM      Skin assessed during: Admit      [x] No Altered Skin Integrity Present    [x]Prevention Measures Documented      [] Yes- Altered Skin Integrity Present or Discovered   [] LDA Added if Not in Epic (Describe Wound)   [] New Altered Skin Integrity was Present on Admit and Documented in LDA   [] Wound Image Taken    Wound Care Consulted? No    Attending Nurse:  Minerva Bowling LPN     Second RN/Staff Member:   SUSAN Dougherty

## 2023-10-06 NOTE — CONSULTS
Consult Note    Reason for Consult:      We were consulted by Dr. Jenkins to evaluate this patient for G tube leakage, J tube exchange.    HPI:     37-year-old  female known to Dr. Stewart Blackwell who had a vertical sleeve gastrectomy for weight loss in 2013.  After the birth of her 3rd child she had trouble losing weight and went back to [place] and had a mini gastric bypass in summer 2022.  She had a known hiatal hernia, but the surgeon was not comfortable performing the repair.  She developed intractable nausea and vomiting with hematemesis.  EGD 11/03/2022 found esophageal hiatal hernia, grade D esophagitis compatible with ulcerative esophagitis, severe ulceration and inflammation of the small bowel, severe gastritis.  Patient underwent incarcerated hiatal hernia repair with mesh with Dr. Lopez 01/17/2023.  Postoperatively she developed sudden onset abdominal pain and CT was concerning for recurrence of hiatal hernia with questionable leak in the diaphragm.  Patient was taken back to surgery on 01/27/2023 for exploration and placement of gastrostomy tube.  There was no stigmata of leak found in the hernia remained intact but the stomach did slide up into the hiatus.  Patient had persistent abdominal pain and started leaking ingested blue dye from Penrose drain which suggested a gastric fistula.  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  Due to the size of the fistula, patient was taken back for lap on 02/07/2023 in order to repair the fistula.  She was found to have ischemic changes to the stomach resulting in a large 2 cm area of necrosis in the stomach.  Gastrojejunostomy was performed and gastrostomy tube was placed in the antrum.  Patient presented back to the ED with an acute abdomen in March.  CT abdomen did not reveal any obvious etiology.  She was taken back to surgery for ex lap on 03/23/2023 findings:  Thin yellowish  "exudate fluid in the lower abdomen, minimal contamination in the upper abdomen, G-tube site adherent to abdominal wall, colon looked normal, appendix involved in the pelvic complex and appeared irritated.  Appendectomy was performed and "jejunostomy tube" was placed.     These procedures have been complicated by severe malnutrition and weakness due to persistent abdominal pain, nausea, vomiting, inability to tolerate p.o. intake or J-tube feedings.  Patient states that at times she is good days where she has minimal pain in his able to eat without vomiting and tolerate her J-tube feedings continuously at 50 cc/hour, however other days she is unable to tolerate p.o. intake without nausea or vomiting and is unable to even tolerate her J-tube feedings as they make her severely nauseated and dry heave.  Most day she vomits at least once daily.  She has abdominal pain around the J-tube site; it was not constant but it is severe when it occurs.  She has lost 60 lb in the last 7 months and currently weighs around 90 lbs.  She is hungry and dreams about food but just cannot tolerate it. Due to the weakness, she feels she is unable to care for her children or complete certain ADLs  (such as driving or grocery shopping) in fear that she will "pass out."  She states she will randomly feel dizzy/lightheaded and then fall to the ground.  She also reports ankle swelling.  She is specifically asking about possible cardiac work-up.     Patient was most recently seen by our group here on 08/09/2023 with Abdominal pain, N/V.  S/p EGD 8/9/23:  LA grade B reflux esophagitis, Kel-en-Y gastrojejunostomy with gastrojejunal anastomosis characterized by congestion, erythema, and ulceration. proximal pouch showed edema and friability with mild stenosis around the prior sleeve site.  Upon entering the distal pouch past the stenosis, 90% of the gastric mucosa is ulcerated, including the GJ anastamosis. At 55cm, the jejunal limb branches into " "2 areas of anastamosis, all healthy in apperance. Bxed taken for h.pylori.  Hospital course complicated by melena.  S/p colonoscopy 8/11/23:  Extensive amounts of semi liquid stool in the entire colon precluding visualization with scattered blood clots but no active bleeding seen.  Liquid brown stool seen exiting the ICV.  5 mm rectal polyp not resected.  Hemorrhoids.    During that admission, she did report J-tube leakage and requested it be changed.  Surgery was consulted (Dr. Grey) and placed 18Fr tube to see if it would seal.  Recommendations was that if it did not that it would have to be removed/revised.      CT C/A/P w IV contrast and bariatric protocol on 8/15/23: gastrostomy balloon in the retained distal gastric body. Contrast administered via gastrostomy is seen in the biliopancreatic limb which is not distended and not appreciably obstructed.    Bariatric surgery (Dr. Riddle) saw the patient and said that any gastric reconstruction would be problematic "if we were to reverse her gastric bypass, we would have very little remaining stomach to work with, she also has hiatal hernia would likely suffer from reflux and currently she does not have enough healthy gastric pouch to perform a safe anastomosis."  He did mention " I will consider taking her to the operating room and taking down her gastrostomy site and giving her a feeding jejunostomy tube."    Present to the ED on 10/5 with complaint of nausea, vomiting, upper abdominal pain, continued G-tube leakage.  Reported fever 101 at home.  G-tube leakage is bilious and tube feedings.  C/O diarrhea with tube feedings.  Also causes abdominal pain.  States it is too harsh for her stomach.  Never tired banana flakes.  Currently using Jevity.     On presentation, patient afebrile.  Labs without leukocytosis, albumin 2.2, normal renal indices, and no anemia.  CT abd/pelv w IV contrast noted Edematous appearance of the distal esophagus, proximal gastric pouch and " small bowel.  Correlate for esophagitis, gastritis and enteritis.  There is perienteric fat stranding in the left upper quadrant without appreciable free air/perforation.    GI was consulted to Consider changing G-tube to J-tube.  Currently tolerating full liquids.  TFs held.      Previous records reviewed in detail.     PCP:  Galdino Caceres MD    Review of patient's allergies indicates:   Allergen Reactions    Penicillin Rash    Penicillin Rash        Current Facility-Administered Medications   Medication Dose Route Frequency Provider Last Rate Last Admin    acetaminophen tablet 500 mg  500 mg Oral Q6H PRN Coleman Le MD        ALPRAZolam tablet 2 mg  2 mg Oral TID PRN Coleman Le MD   2 mg at 10/06/23 0853    aluminum-magnesium hydroxide-simethicone 200-200-20 mg/5 mL suspension 30 mL  30 mL Oral QID PRN Coleman Le MD        amLODIPine tablet 10 mg  10 mg Oral Daily Coleman Le MD   10 mg at 10/06/23 0853    dextrose 5 % and 0.45 % NaCl infusion   Intravenous Continuous Coleman Le MD 50 mL/hr at 10/05/23 2307 New Bag at 10/05/23 2307    dicyclomine capsule 10 mg  10 mg Oral Q6H PRN Coleman Le MD        enoxaparin injection 30 mg  30 mg Subcutaneous Q24H (prophylaxis, 1700) Coleman Le MD        HYDROmorphone (PF) injection 1 mg  1 mg Intravenous Q4H PRN Kayla Loving, FNP   1 mg at 10/06/23 0901    megestroL 400 mg/10 mL (10 mL) suspension 600 mg  600 mg Oral Daily Coleman Le MD   600 mg at 10/06/23 0853    melatonin tablet 6 mg  6 mg Oral Nightly PRN Coleman Le MD        mirtazapine tablet 7.5 mg  7.5 mg Oral QHS Coleman Le MD        ondansetron injection 4 mg  4 mg Intravenous Q4H PRN Coleman Le MD   4 mg at 10/05/23 2251    pantoprazole injection 40 mg  40 mg Intravenous BID Coleman Le MD   40 mg at 10/06/23 0901    polyethylene glycol packet 17 g  17 g Oral BID PRN Coleman Le MD        prochlorperazine injection Soln 5 mg  5 mg Intravenous Q6H PRN Rey, Coleman TRUJILLO MD         senna-docusate 8.6-50 mg per tablet 2 tablet  2 tablet Oral BID PRN Coleman Le MD        sodium chloride 0.9% flush 10 mL  10 mL Intravenous PRN Coleman Le MD        sucralfate tablet 1 g  1 g Oral QID Coleman Le MD   1 g at 10/06/23 0853     Medications Prior to Admission   Medication Sig Dispense Refill Last Dose    ALPRAZolam (XANAX) 2 MG Tab Take 2 mg by mouth 3 (three) times daily as needed.       ergocalciferol (ERGOCALCIFEROL) 50,000 unit Cap Take 50,000 Units by mouth every 7 days.       hyoscyamine (LEVSIN/SL) 0.125 mg Subl Place under the tongue.       megestroL (MEGACE ES) 625 mg/5 mL (125 mg/mL) Susp Take 625 mg by mouth.       amLODIPine (NORVASC) 10 MG tablet Take 1 tablet (10 mg total) by mouth once daily. 30 tablet 0     cyanocobalamin 1,000 mcg/mL injection Inject 1,000 mcg into the muscle every 30 days.       dicyclomine (BENTYL) 10 MG capsule Take 10 mg by mouth every 6 (six) hours as needed.       DULoxetine (CYMBALTA) 20 MG capsule Take 1 capsule (20 mg total) by mouth 2 (two) times daily. 60 capsule 0     mirtazapine (REMERON) 7.5 MG Tab Take 1 tablet (7.5 mg total) by mouth every evening. 30 tablet 0     pantoprazole (PROTONIX) 40 MG tablet Take 1 tablet (40 mg total) by mouth 2 (two) times daily before meals. 60 tablet 0        Past Medical History:  Past Medical History:   Diagnosis Date    GERD (gastroesophageal reflux disease)     Hypertension       Past Surgical History:  Past Surgical History:   Procedure Laterality Date    COLONOSCOPY N/A 8/11/2023    Procedure: COLON;  Surgeon: Wilmer Rossi MD;  Location: [facility] ENDOSCOPY;  Service: Gastroenterology;  Laterality: N/A;    EGD, WITH CLOSED BIOPSY  8/9/2023    Procedure: EGD, WITH CLOSED BIOPSY;  Surgeon: Wilmer Rossi MD;  Location: [facility] ENDOSCOPY;  Service: Gastroenterology;;    ESOPHAGOGASTRODUODENOSCOPY N/A 8/9/2023    Procedure: EGD;  Surgeon: Wilmer Rossi MD;  Location: [facility] ENDOSCOPY;  Service:  Gastroenterology;  Laterality: N/A;    HERNIA REPAIR      LAPAROSCOPIC GASTRIC BANDING        Family History:  History reviewed. No pertinent family history.  Social History:  Social History     Tobacco Use    Smoking status: Never    Smokeless tobacco: Never   Substance Use Topics    Alcohol use: Never       Review of Systems:     Review of Systems   Constitutional:  Positive for fever and unexpected weight change. Negative for appetite change, chills, diaphoresis and fatigue.   HENT:  Negative for trouble swallowing.    Respiratory:  Negative for cough, chest tightness and shortness of breath.    Cardiovascular:  Negative for chest pain, palpitations and leg swelling.   Gastrointestinal:  Positive for abdominal pain, diarrhea, nausea and vomiting. Negative for abdominal distention, blood in stool, constipation and rectal pain.   Skin:  Negative for color change and pallor.   Neurological:  Positive for dizziness, syncope, weakness and light-headedness.   Psychiatric/Behavioral:  Negative for confusion.        Objective:     VITAL SIGNS: 24 HR MIN & MAX LAST    Temp  Min: 97.3 °F (36.3 °C)  Max: 98.1 °F (36.7 °C)  98 °F (36.7 °C)        BP  Min: 121/99  Max: 152/104  (!) 130/100 (Reported to GONZALES Schultz)     Pulse  Min: 74  Max: 88  74     Resp  Min: 15  Max: 25  18    SpO2  Min: 95 %  Max: 100 %  100 %      No intake or output data in the 24 hours ending 10/06/23 1015    Physical Exam  Constitutional:       General: She is not in acute distress.     Appearance: She is cachectic. She is ill-appearing (chronically).   HENT:      Head: Normocephalic and atraumatic.      Comments: Temporal wasting  Eyes:      General: No scleral icterus.     Extraocular Movements: Extraocular movements intact.      Comments: Eyes sunken   Cardiovascular:      Rate and Rhythm: Normal rate and regular rhythm.   Pulmonary:      Effort: Pulmonary effort is normal. No respiratory distress.   Abdominal:      General: Bowel sounds are normal.  There is no distension.      Palpations: Abdomen is soft. There is no mass.      Tenderness: There is no abdominal tenderness. There is no guarding or rebound.      Comments: G tube to left abdomen with bilious material in tube lumen.  No leakage at present.  Skin surround stoma with erythema.    Musculoskeletal:         General: Normal range of motion.      Right lower leg: No edema.      Left lower leg: No edema.   Skin:     General: Skin is warm and dry.   Neurological:      Mental Status: She is alert and oriented to person, place, and time.   Psychiatric:         Mood and Affect: Mood and affect normal.           Recent Results (from the past 48 hour(s))   Comprehensive Metabolic Panel    Collection Time: 10/05/23  1:59 PM   Result Value Ref Range    Sodium Level 136 136 - 145 mmol/L    Potassium Level 4.3 3.5 - 5.1 mmol/L    Chloride 108 (H) 98 - 107 mmol/L    Carbon Dioxide 20 (L) 22 - 29 mmol/L    Glucose Level 100 74 - 100 mg/dL    Blood Urea Nitrogen 9.3 7.0 - 18.7 mg/dL    Creatinine 0.79 0.55 - 1.02 mg/dL    Calcium Level Total 8.0 (L) 8.4 - 10.2 mg/dL    Protein Total 5.1 (L) 6.4 - 8.3 gm/dL    Albumin Level 2.2 (L) 3.5 - 5.0 g/dL    Globulin 2.9 2.4 - 3.5 gm/dL    Albumin/Globulin Ratio 0.8 (L) 1.1 - 2.0 ratio    Bilirubin Total 1.0 <=1.5 mg/dL    Alkaline Phosphatase 99 40 - 150 unit/L    Alanine Aminotransferase 20 0 - 55 unit/L    Aspartate Aminotransferase 18 5 - 34 unit/L    eGFR >60 mls/min/1.73/m2   Lipase    Collection Time: 10/05/23  1:59 PM   Result Value Ref Range    Lipase Level 5 <=60 U/L   Lactic Acid, Plasma    Collection Time: 10/05/23  1:59 PM   Result Value Ref Range    Lactic Acid Level 2.0 0.5 - 2.2 mmol/L   Magnesium    Collection Time: 10/05/23  1:59 PM   Result Value Ref Range    Magnesium Level 2.00 1.60 - 2.60 mg/dL   CBC with Differential    Collection Time: 10/05/23  1:59 PM   Result Value Ref Range    WBC 8.63 4.50 - 11.50 x10(3)/mcL    RBC 4.60 4.20 - 5.40 x10(6)/mcL    Hgb  13.6 12.0 - 16.0 g/dL    Hct 41.9 37.0 - 47.0 %    MCV 91.1 80.0 - 94.0 fL    MCH 29.6 27.0 - 31.0 pg    MCHC 32.5 (L) 33.0 - 36.0 g/dL    RDW 14.0 11.5 - 17.0 %    Platelet 288 130 - 400 x10(3)/mcL    MPV 11.4 (H) 7.4 - 10.4 fL    Neut % 59.7 %    Lymph % 27.5 %    Mono % 10.1 %    Eos % 1.6 %    Basophil % 0.8 %    Lymph # 2.37 0.6 - 4.6 x10(3)/mcL    Neut # 5.15 2.1 - 9.2 x10(3)/mcL    Mono # 0.87 0.1 - 1.3 x10(3)/mcL    Eos # 0.14 0 - 0.9 x10(3)/mcL    Baso # 0.07 <=0.2 x10(3)/mcL    IG# 0.03 0 - 0.04 x10(3)/mcL    IG% 0.3 %    NRBC% 0.0 %   Urinalysis, Reflex to Urine Culture    Collection Time: 10/05/23  7:26 PM    Specimen: Urine   Result Value Ref Range    Color, UA Dark Yellow Yellow, Light-Yellow, Dark Yellow, Mitzi, Straw    Appearance, UA Cloudy (A) Clear    Specific Gravity, UA 1.024 1.005 - 1.030    pH, UA 5.5 5.0 - 8.5    Protein, UA Negative Negative    Glucose, UA Negative Negative, Normal    Ketones, UA Trace (A) Negative    Blood, UA Negative Negative    Bilirubin, UA 1+ (A) Negative    Urobilinogen, UA 1.0 0.2, 1.0, Normal    Nitrites, UA Negative Negative    Leukocyte Esterase, UA Trace (A) Negative   Urinalysis, Microscopic    Collection Time: 10/05/23  7:26 PM   Result Value Ref Range    RBC, UA <5 <=5 /HPF    WBC, UA <5 <=5 /HPF    Squamous Epithelial Cells, UA <5 <=5 /HPF    Bacteria, UA None Seen None Seen, Rare, Occasional /HPF       CT Abdomen Pelvis With Contrast    Result Date: 10/5/2023  EXAMINATION: CT ABDOMEN PELVIS WITH CONTRAST CLINICAL HISTORY: Abdominal pain, acute, nonlocalized; TECHNIQUE: Helically acquired images with axial, sagittal and coronal reformations were obtained from the lung bases to the pubic symphysis after the IV administration of contrast. Automated tube current modulation, weight-based exposure dosing, and/or iterative reconstruction technique utilized to reach lowest reasonably achievable exposure rate. DLP: 351 mGy*cm COMPARISON: CT chest abdomen pelvis  08/15/2023 FINDINGS: HEART: Normal in size. No pericardial effusion. LUNG BASES: Well aerated. LIVER: Too small to characterize hypodensity at the left lobe of the liver.  Probable focal fatty infiltration adjacent to the falciform ligament. BILIARY: No calcified gallstones. PANCREAS: No inflammatory change. SPLEEN: Normal in size ADRENALS: No mass. KIDNEYS/URETERS: The kidneys enhance symmetrically.  No hydronephrosis. GI TRACT/MESENTERY: There are postsurgical changes of gastric bypass.  Edematous appearance of the GE junction and proximal gastric pouch.  There is fat stranding in the left upper quadrant.  There is a gastrostomy balloon at the retained distal gastric body of the biliopancreatic limb.  No evidence of bowel obstruction.  Mildly edematous appearance of the small bowel. PERITONEUM: No free fluid.No free air. LYMPH NODES: No enlarged lymph nodes by size criteria. VASCULATURE: No significant atherosclerosis or aneurysm. BLADDER: Nondistended bladder limits CT evaluation REPRODUCTIVE ORGANS: Physiologic left ovary corpus luteal cyst.  Resolved right ovarian cyst. SOFT TISSUES: Unremarkable. BONES: No acute osseous abnormality.     Postsurgical changes of gastric bypass.  Edematous appearance of the distal esophagus, proximal gastric pouch and small bowel.  Correlate for esophagitis, gastritis and enteritis.  There is perienteric fat stranding without appreciable free air/perforation. Electronically signed by: Ethel Bal Date:    10/05/2023 Time:    19:01    Assessment / Plan:     38 yo CF known to Dr. 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  Complicated by severe  malnutrition and persistent abdominal pain, n/v.  Recent EGD 8/9/23 with severe diffuse ulceration.  Here again with abd pain, n/v, and G tube leaking. GI was consulted to Consider changing G-tube to J-tube.    - Recommend bariatric surgery evaluation regarding tube. No plans for GI intervention on tube.  - PPI BID (use IV while inpatient) and carafate QID indef.   - PUD/Acid reflux diet.     Please call GI if needed.     Thank you for allowing us to participate in this patient's care.

## 2023-10-06 NOTE — CONSULTS
Inpatient Nutrition Assessment    Admit Date: 10/5/2023   Total duration of encounter: 1 day     Nutrition Recommendation/Prescription     Add phosphorus level to blood in lab from yesterday to establish baseline.     Recommend placing J-tube as medically feasible:    Initiate tube feedings 10 mL/hr for 24 hrs, then increase as tolerated by 10 mL/hr q 12 hrs to goal rate as below:    Impact Peptide 1.5 @ goal rate of 55 mL/hr would provide**:  1650 kcals (100% est needs)  101 g protein (116% protein)  847 mL fluid (49% est needs)    Can flush 140 mL q 4 hrs for an additional 840 mL fluid and a total daily fluid provision (TF + flush) of 1687 mL (97% est needs). Could also try 70 mL q 2 hrs if preferred.     Monitor magnesium and phosphorus for 4 days. Replete if needed.     For po diet, consider low residue.     Communication of Recommendations: reviewed with provider, reviewed with nurse, and reviewed with patient    Nutrition Assessment     Malnutrition Assessment/Nutrition-Focused Physical Exam    Malnutrition Context: chronic illness (10/06/23 1225)  Malnutrition Level: severe (10/06/23 1225)  Energy Intake (Malnutrition): less than 75% for greater than or equal to 1 month (10/06/23 1225)  Weight Loss (Malnutrition): greater than 10% in 6 months (10/06/23 1225)  Subcutaneous Fat (Malnutrition): severe depletion (10/06/23 1225)  Orbital Region (Subcutaneous Fat Loss): severe depletion  Upper Arm Region (Subcutaneous Fat Loss): severe depletion     Muscle Mass (Malnutrition): severe depletion (10/06/23 1225)  Zoroastrianism Region (Muscle Loss): severe depletion     Clavicle and Acromion Bone Region (Muscle Loss): severe depletion     Dorsal Hand (Muscle Loss): severe depletion  Patellar Region (Muscle Loss): severe depletion     Posterior Calf Region (Muscle Loss): severe depletion           A minimum of two characteristics is recommended for diagnosis of either severe or non-severe malnutrition.    Chart  "Review    Reason Seen: physician consult for poor po and PEG    Malnutrition Screening Tool Results   Have you recently lost weight without trying?: Yes: 2-13 lbs  Have you been eating poorly because of a decreased appetite?: No   MST Score: 1     Diagnosis:  Esophagitis, N/V/D, h/o Sleeve Gastrectomy in 2013 and Gastric Bypass in [place] in June 2022, h/o hiatal hernia repair 1/23, Leaking G-tube    Relevant Medical History:   Past Medical History:   Diagnosis Date    GERD (gastroesophageal reflux disease)     Hypertension      Past Surgical History:   Procedure Laterality Date    COLONOSCOPY N/A 8/11/2023    Procedure: COLON;  Surgeon: Wilmer Rossi MD;  Location: [facility] ENDOSCOPY;  Service: Gastroenterology;  Laterality: N/A;    EGD, WITH CLOSED BIOPSY  8/9/2023    Procedure: EGD, WITH CLOSED BIOPSY;  Surgeon: Wilmer Rossi MD;  Location: [facility] ENDOSCOPY;  Service: Gastroenterology;;    ESOPHAGOGASTRODUODENOSCOPY N/A 8/9/2023    Procedure: EGD;  Surgeon: Wilmer Rossi MD;  Location: [facility] ENDOSCOPY;  Service: Gastroenterology;  Laterality: N/A;    HERNIA REPAIR      LAPAROSCOPIC GASTRIC BANDING       Review of patient's allergies indicates:   Allergen Reactions    Penicillin Rash    Penicillin Rash        Nutrition-Related Medications:    amLODIPine  10 mg Oral Daily    enoxparin  30 mg Subcutaneous Q24H (prophylaxis, 1700)    megestroL  600 mg Oral Daily    mirtazapine  7.5 mg Oral QHS    pantoprazole  40 mg Intravenous BID    sucralfate  1 g Oral QID       dextrose 5 % and 0.45 % NaCl 50 mL/hr at 10/05/23 2307      Calorie Containing IV Medications: no significant kcals from medications at this time    Nutrition-Related Labs:  No results found for: "HGBA1C"  8/18/2023: Phosphorus Level 3.4 mg/dL (Ref range: 2.3 - 4.7 mg/dL)  10/5/2023: Magnesium Level 2.00 mg/dL (Ref range: 1.60 - 2.60 mg/dL); Potassium Level 4.3 mmol/L (Ref range: 3.5 - 5.1 mmol/L); Sodium Level 136 mmol/L (Ref range: 136 - " "145 mmol/L)   Recent Labs   Lab 10/05/23  1359   WBC 8.63   RBC 4.60   HGB 13.6   HCT 41.9   MCV 91.1   MCH 29.6   MCHC 32.5*     Recent Labs   Lab 10/05/23  1359      K 4.3   CO2 20*   BUN 9.3   CREATININE 0.79   CALCIUM 8.0*   MG 2.00   ALBUMIN 2.2*   ALKPHOS 99   ALT 20   AST 18   BILITOT 1.0       Diet/PN Order: Diet full liquid  Oral Supplement Order: none  Tube Feeding Order: none  Appetite/Oral Intake: poor/0-25% of meals  Factors Affecting Nutritional Intake: decreased appetite, diarrhea, early satiety, and nausea  Food/Pentecostalism/Cultural Preferences: none reported  Food Allergies: none reported    Skin Integrity: intact, drain/device(s)  Wound(s):   n/a    Comments    10/6/23: Pt reports poor appetite worsening over 6-12 months w/ GI complications, has been on G-tube feedings w/ Jevity 1.2 but has been experiencing progressively worse diarrhea she suspects due to the formula recently, she eats very little by mouth, often has n/v, has very poor appetite, the thought of food often makes her sick, has lost around 80 lbs over the past 14 months per EMR. It's possible that some of the weight loss was intentional given surgica hx, but sounds like all weight loss since January 2023 was unintentional. Doctors are discussing replacing G-tueb w/ J-tube which I also think is worth trying. Pt and I did discuss that sugary drinks and too many artifical sweeteners can contribute to diarrhea, as it sounds like she drinks sports drinks regularly.     Anthropometrics    Height: 5' 4" (162.6 cm) Height Method: Stated  Last Weight: 43.5 kg (96 lb) (10/05/23 2159) Weight Method: Standard Scale  BMI (Calculated): 16.5  BMI Classification: underweight (BMI less than 18.5)     Ideal Body Weight (IBW), Female: 120 lb     % Ideal Body Weight, Female (lb): 80 %                             Usual Weight Provided By: patient and EMR weight history    Wt Readings from Last 5 Encounters:   10/05/23 43.5 kg (96 lb)   08/09/23 44 kg " (97 lb)   22 59 kg (130 lb)   22 64.4 kg (141 lb 15.6 oz)   10/28/22 64.4 kg (142 lb)     Weight Change(s) Since Admission:  Admit Weight: 40.2 kg (88 lb 10 oz) (10/05/23 1332)      Estimated Needs    Weight Used For Calorie Calculations: 43.5 kg (95 lb 14.4 oz)  Energy Calorie Requirements (kcal): 4512-7193 kcals (35-40 kcals/kg)  Energy Need Method: Kcal/kg  Weight Used For Protein Calculations: 43.5 kg (95 lb 14.4 oz)  Protein Requirements: 78-87 g (1.8-2.0 g/kg)  Fluid Requirements (mL): 1740 mL (40 mL/kg)  Temp (24hrs), Av °F (36.7 °C), Min:97.3 °F (36.3 °C), Max:98.5 °F (36.9 °C)       Enteral Nutrition    Patient not receiving enteral nutrition at this time.    Parenteral Nutrition    Patient not receiving parenteral nutrition support at this time.    Evaluation of Received Nutrient Intake    Calories: not meeting estimated needs  Protein: not meeting estimated needs    Patient Education    Not applicable.    Nutrition Diagnosis     PES: Malnutrition related to suboptimal protein/energy intake or absorption altered GI function as evidenced by less than 75% needs met for greater than 1 month, severe fat depletion, severe muscle depletion, and greater than 10% weight loss in 6 months in the context of multiple GI surgeries and post op complications. (new)     Interventions/Goals     Intervention(s): modified composition of meals/snacks, modified composition of enteral nutrition, modified rate of enteral nutrition, and collaboration with other providers  Goal: Meet greater than 75% of nutritional needs by follow-up. (new)    Monitoring & Evaluation     Dietitian will monitor energy intake, enteral nutrition intake, weight, electrolyte/renal panel, glucose/endocrine profile, and gastrointestinal profile.  Nutrition Risk/Follow-Up: high (follow-up in 1-4 days)   Please consult if re-assessment needed sooner.    Ron Taveras RD   10/06/2023

## 2023-10-07 PROCEDURE — 25000003 PHARM REV CODE 250: Performed by: INTERNAL MEDICINE

## 2023-10-07 PROCEDURE — S5010 5% DEXTROSE AND 0.45% SALINE: HCPCS | Performed by: INTERNAL MEDICINE

## 2023-10-07 PROCEDURE — S0179 MEGESTROL 20 MG: HCPCS | Performed by: INTERNAL MEDICINE

## 2023-10-07 PROCEDURE — G0378 HOSPITAL OBSERVATION PER HR: HCPCS

## 2023-10-07 PROCEDURE — C9113 INJ PANTOPRAZOLE SODIUM, VIA: HCPCS | Performed by: INTERNAL MEDICINE

## 2023-10-07 PROCEDURE — 63600175 PHARM REV CODE 636 W HCPCS: Performed by: NURSE PRACTITIONER

## 2023-10-07 PROCEDURE — 63600175 PHARM REV CODE 636 W HCPCS: Performed by: INTERNAL MEDICINE

## 2023-10-07 RX ADMIN — ONDANSETRON 4 MG: 2 INJECTION INTRAMUSCULAR; INTRAVENOUS at 09:10

## 2023-10-07 RX ADMIN — SUCRALFATE 1 G: 1 TABLET ORAL at 11:10

## 2023-10-07 RX ADMIN — AMLODIPINE BESYLATE 10 MG: 5 TABLET ORAL at 11:10

## 2023-10-07 RX ADMIN — DEXTROSE AND SODIUM CHLORIDE: 5; 450 INJECTION, SOLUTION INTRAVENOUS at 03:10

## 2023-10-07 RX ADMIN — HYDROMORPHONE HYDROCHLORIDE 1 MG: 2 INJECTION INTRAMUSCULAR; INTRAVENOUS; SUBCUTANEOUS at 03:10

## 2023-10-07 RX ADMIN — ALPRAZOLAM 2 MG: 0.5 TABLET ORAL at 11:10

## 2023-10-07 RX ADMIN — SUCRALFATE 1 G: 1 TABLET ORAL at 09:10

## 2023-10-07 RX ADMIN — HYDROMORPHONE HYDROCHLORIDE 1 MG: 2 INJECTION INTRAMUSCULAR; INTRAVENOUS; SUBCUTANEOUS at 09:10

## 2023-10-07 RX ADMIN — MEGESTROL ACETATE 600 MG: 400 SUSPENSION ORAL at 11:10

## 2023-10-07 RX ADMIN — ONDANSETRON 4 MG: 2 INJECTION INTRAMUSCULAR; INTRAVENOUS at 03:10

## 2023-10-07 RX ADMIN — PANTOPRAZOLE SODIUM 40 MG: 40 INJECTION, POWDER, FOR SOLUTION INTRAVENOUS at 09:10

## 2023-10-07 RX ADMIN — MIRTAZAPINE 7.5 MG: 7.5 TABLET ORAL at 09:10

## 2023-10-07 RX ADMIN — ALPRAZOLAM 2 MG: 0.5 TABLET ORAL at 09:10

## 2023-10-07 NOTE — PROGRESS NOTES
[facility]  Hospital Medicine Progress Note        Chief Complaint: Inpatient Follow-up     HPI:   37-year-old female with medical history of multiple abdominal surgeries started with sleeve gastrectomy in 2013 followed by Kel-en-Y gastric bypass in [place] in June 2023, laparoscopic hiatal hernia repair with mesh on 01/17/2023, complicated ray incarceration and possible leakage and underwent exploratory laparotomy on 01/27/2023 which did not reveal perforation and during hospital stay had recurrence of abdominal pain and underwent a repeat exploratory laparotomy on 02/07/2023 with partial gastrectomy and gastrojejunostomy for necrotic segment of the stomach as well as underwent G-tube placement in the antrum.  Since had recurrent admission for continued weight loss and recurrent nausea, vomiting and abdominal pain with last admission in early August 2023 where she underwent EGD on 08/09/2023 which revealed LA grade B reflux esophagitis with no bleeding, Kel-en-Y gastrojejunostomy with gastrojejunal anastomosis characterized by congestion erythema and ulceration, biopsy negative for H pylori.  She was discharged on Protonix and Carafate.     Present to the ED with complaint of nausea, vomiting, upper abdominal pain, continued G-tube leakage.  Reported fever at home.  Report chronic intermittent loose stool since February that is unchanged.     On arrival to ED she was afebrile and hemodynamically stable.  Labs notable for albumin 2.2.  CT abdomen pelvis show edematous appearance of the GE junction and proximal gastric pouch, G-tube balloon in the distal gastric body/biliopancreatic limb.  Mild edematous appearance of the small bowel.  No evidence of bowel obstruction or perforation.     She was given analgesics and referred to hospital medicine service for further evaluation and management.  Interval Hx:   No acute events reported overnight.  She remains very anxious about unable to  have any nutrition intake reported could not even tolerate yogurt and still with bile in G-tube       Objective/physical exam:  General: thin white female, In no acute distress, afebrile  Chest: Clear to auscultation bilaterally  Heart: RRR, +S1, S2, no appreciable murmur  Abdomen: Soft, g tube with bile  MSK: Warm, no lower extremity edema  Neurologic: Alert and oriented , no fnd    VITAL SIGNS: 24 HRS MIN & MAX LAST   Temp  Min: 97.5 °F (36.4 °C)  Max: 98.6 °F (37 °C) 98.1 °F (36.7 °C)   BP  Min: 120/80  Max: 147/100 (!) 144/101   Pulse  Min: 72  Max: 92  92   Resp  Min: 16  Max: 21 16   SpO2  Min: 99 %  Max: 100 % 100 %     I have reviewed the following labs:  Recent Labs   Lab 10/05/23  1359   WBC 8.63   RBC 4.60   HGB 13.6   HCT 41.9   MCV 91.1   MCH 29.6   MCHC 32.5*   RDW 14.0      MPV 11.4*     Recent Labs   Lab 10/05/23  1359      K 4.3   CO2 20*   BUN 9.3   CREATININE 0.79   CALCIUM 8.0*   MG 2.00   ALBUMIN 2.2*   ALKPHOS 99   ALT 20   AST 18   BILITOT 1.0     Microbiology Results (last 7 days)       Procedure Component Value Units Date/Time    Blood Culture [[id number]]  (Normal) Collected: 10/05/23 1359    Order Status: Completed Specimen: Blood Updated: 10/06/23 1601     CULTURE, BLOOD ([facility]) No Growth At 24 Hours    Blood Culture [[id number]]  (Normal) Collected: 10/05/23 1359    Order Status: Completed Specimen: Blood Updated: 10/06/23 1601     CULTURE, BLOOD ([facility]) No Growth At 24 Hours             See below for Radiology    Scheduled Med:   amLODIPine  10 mg Oral Daily    enoxparin  30 mg Subcutaneous Q24H (prophylaxis, 1700)    megestroL  600 mg Oral Daily    mirtazapine  7.5 mg Oral QHS    pantoprazole  40 mg Intravenous BID    sucralfate  1 g Oral QID      Continuous Infusions:   dextrose 5 % and 0.45 % NaCl 50 mL/hr at 10/06/23 1657      PRN Meds:  acetaminophen, ALPRAZolam, aluminum-magnesium hydroxide-simethicone, dicyclomine, HYDROmorphone, melatonin, ondansetron, polyethylene  glycol, prochlorperazine, senna-docusate 8.6-50 mg, sodium chloride 0.9%     Assessment/Plan:  Severe reflux esophagitis and gastritis  Underweight and Severe protein calorie malnutrition     History of anxiety, multiple bariatric surgeries and hiatal hernia repair with complications     Patient continues to have poor p.o.  intake , and continues to have bile output through G-tube   Noted GI consultation input  We will consult General surgery today  Noted nutritional input,  Cont Protonix  40 mg IV b.i.d., Sucralfate 1 g QID  Cont D5 half-normal saline at 50 mL/hour   Care discussed with pt's RN      VTE prophylaxis: scds    Patient condition:  Stable    Anticipated discharge and Disposition:   tbd      _____________________________________________________________________    Nutrition Status:    Radiology:  I have personally reviewed the following imaging and agree with the radiologist.     CT Abdomen Pelvis With Contrast  Narrative: EXAMINATION:  CT ABDOMEN PELVIS WITH CONTRAST    CLINICAL HISTORY:  Abdominal pain, acute, nonlocalized;    TECHNIQUE:  Helically acquired images with axial, sagittal and coronal reformations were obtained from the lung bases to the pubic symphysis after the IV administration of contrast.    Automated tube current modulation, weight-based exposure dosing, and/or iterative reconstruction technique utilized to reach lowest reasonably achievable exposure rate.    DLP: 351 mGy*cm    COMPARISON:  CT chest abdomen pelvis 08/15/2023    FINDINGS:  HEART: Normal in size. No pericardial effusion.    LUNG BASES: Well aerated.    LIVER: Too small to characterize hypodensity at the left lobe of the liver.  Probable focal fatty infiltration adjacent to the falciform ligament.    BILIARY: No calcified gallstones.    PANCREAS: No inflammatory change.    SPLEEN: Normal in size    ADRENALS: No mass.    KIDNEYS/URETERS: The kidneys enhance symmetrically.  No hydronephrosis.    GI TRACT/MESENTERY: There  are postsurgical changes of gastric bypass.  Edematous appearance of the GE junction and proximal gastric pouch.  There is fat stranding in the left upper quadrant.  There is a gastrostomy balloon at the retained distal gastric body of the biliopancreatic limb.  No evidence of bowel obstruction.  Mildly edematous appearance of the small bowel.    PERITONEUM: No free fluid.No free air.    LYMPH NODES: No enlarged lymph nodes by size criteria.    VASCULATURE: No significant atherosclerosis or aneurysm.    BLADDER: Nondistended bladder limits CT evaluation    REPRODUCTIVE ORGANS: Physiologic left ovary corpus luteal cyst.  Resolved right ovarian cyst.    SOFT TISSUES: Unremarkable.    BONES: No acute osseous abnormality.  Impression: Postsurgical changes of gastric bypass.  Edematous appearance of the distal esophagus, proximal gastric pouch and small bowel.  Correlate for esophagitis, gastritis and enteritis.  There is perienteric fat stranding without appreciable free air/perforation.    Electronically signed by: Ethel Bal  Date:    10/05/2023  Time:    19:01      Marry Jenkins MD   10/07/2023

## 2023-10-07 NOTE — PLAN OF CARE
Problem: Adult Inpatient Plan of Care  Goal: Plan of Care Review  Outcome: Ongoing, Progressing  Goal: Patient-Specific Goal (Individualized)  Outcome: Ongoing, Progressing  Goal: Absence of Hospital-Acquired Illness or Injury  Outcome: Ongoing, Progressing  Goal: Optimal Comfort and Wellbeing  Outcome: Ongoing, Progressing  Goal: Readiness for Transition of Care  Outcome: Ongoing, Progressing     Problem: Skin Injury Risk Increased  Goal: Skin Health and Integrity  Outcome: Ongoing, Progressing     Problem: Bowel Motility Impaired (Bariatric Surgery)  Goal: Effective Bowel Motility and Elimination  Outcome: Ongoing, Progressing     Problem: Fluid and Electrolyte Imbalance (Bariatric Surgery)  Goal: Fluid and Electrolyte Balance  Outcome: Ongoing, Progressing     Problem: Nausea and Vomiting  Goal: Fluid and Electrolyte Balance  Outcome: Ongoing, Progressing

## 2023-10-07 NOTE — CONSULTS
HISTORY & PHYSICAL  Trauma and Acute Care Surgery    Patient Name: Chelsie Lee  YOB: 1985    Date: 10/07/2023                     PRESENTING HISTORY     Chief Complaint/Reason for Admission: Esophagitis    History of Present Illness:  Ms. Chelsie Lee is a 37 y.o. female with complex surgical history admitted with poor tube feed tolerance with surgery consult for possible J tube. In brief, patient had vertical sleeve gastrectomy in 2013 in [place] then mini gastric bypass in [place] in 2022. Had known hiatal hernia surgeon did not repair. EGD 2022 showed esophageal hiatal hernia, ulcerative esophagititis. She developed an incarcerated hiatal hernia repair s/p mesh repair with Dr. Lopez 1/17/23 with concern for post op leak. Taken to OR 1/27 for exploration G tube placement. Noted to have recurrence of hiatal hernia. Patient developed large gastric fistula and taken to OR 2/7 for repair with ischemic changes noted to stomach. Gastrojejunostomy performed and gtube placed in antrum. Presented to ED 3/23 with acute appendicitis and taken for exlap and appendectomy. EGD 8/9 showed esophagitis, erythema, ulceration, pouch edema and friability with mild stenosis. General surgery upsized to 18Fr gtube due to leaking. She reports since then she has had nausea and vomiting when receiving tube feeds and has not been able to tolerate any nutrition. Passing gas but no BM. Evaluated by GI and recommending surgical evaluation.    She is AFVSS. Labs 2 days ago wnl. CT AP showed edematous distal esophagus, proximal gastric pouch and small bowel with perienteric fat stranding and gastrostomy balloon at retained distal gastric body of biliopancreatic limb.    Review of Systems:  12 point ROS negative except as stated in HPI    PAST HISTORY:     Past Medical History:   Diagnosis Date    GERD (gastroesophageal reflux disease)     Hypertension        Past Surgical History:   Procedure Laterality Date     COLONOSCOPY N/A 8/11/2023    Procedure: COLON;  Surgeon: Wilmer Rossi MD;  Location: [facility] ENDOSCOPY;  Service: Gastroenterology;  Laterality: N/A;    EGD, WITH CLOSED BIOPSY  8/9/2023    Procedure: EGD, WITH CLOSED BIOPSY;  Surgeon: Wilmer Rossi MD;  Location: [facility] ENDOSCOPY;  Service: Gastroenterology;;    ESOPHAGOGASTRODUODENOSCOPY N/A 8/9/2023    Procedure: EGD;  Surgeon: Wilmer Rossi MD;  Location: [facility] ENDOSCOPY;  Service: Gastroenterology;  Laterality: N/A;    HERNIA REPAIR      LAPAROSCOPIC GASTRIC BANDING         History reviewed. No pertinent family history.    Social History     Socioeconomic History    Marital status: Unknown   Tobacco Use    Smoking status: Never    Smokeless tobacco: Never   Substance and Sexual Activity    Alcohol use: Never    Drug use: Never   Social History Narrative    ** Merged History Encounter **          Social Determinants of Health     Financial Resource Strain: Low Risk  (8/10/2023)    Overall Financial Resource Strain (CARDIA)     Difficulty of Paying Living Expenses: Not hard at all   Food Insecurity: No Food Insecurity (8/10/2023)    Hunger Vital Sign     Worried About Running Out of Food in the Last Year: Never true     Ran Out of Food in the Last Year: Never true   Transportation Needs: No Transportation Needs (8/10/2023)    PRAPARE - Transportation     Lack of Transportation (Medical): No     Lack of Transportation (Non-Medical): No   Physical Activity: Inactive (8/10/2023)    Exercise Vital Sign     Days of Exercise per Week: 0 days     Minutes of Exercise per Session: 0 min   Stress: No Stress Concern Present (8/10/2023)    [place] [employer] - Occupational Stress Questionnaire     Feeling of Stress : Only a little   Social Connections: Moderately Isolated (8/10/2023)    Social Connection and Isolation Panel [NHANES]     Frequency of Communication with Friends and Family: More than three times a week     Frequency of  Social Gatherings with Friends and Family: More than three times a week     Attends Congregation Services: Never     Active Member of Clubs or Organizations: No     Attends Club or Organization Meetings: Never     Marital Status:    Housing Stability: Unknown (8/10/2023)    Housing Stability Vital Sign     Unable to Pay for Housing in the Last Year: No     Number of Places Lived in the Last Year: 1       MEDICATIONS & ALLERGIES:     No current facility-administered medications on file prior to encounter.     Current Outpatient Medications on File Prior to Encounter   Medication Sig    ALPRAZolam (XANAX) 2 MG Tab Take 2 mg by mouth 3 (three) times daily as needed.    ergocalciferol (ERGOCALCIFEROL) 50,000 unit Cap Take 50,000 Units by mouth every 7 days.    hyoscyamine (LEVSIN/SL) 0.125 mg Subl Place under the tongue.    megestroL (MEGACE ES) 625 mg/5 mL (125 mg/mL) Susp Take 625 mg by mouth.    amLODIPine (NORVASC) 10 MG tablet Take 1 tablet (10 mg total) by mouth once daily.    cyanocobalamin 1,000 mcg/mL injection Inject 1,000 mcg into the muscle every 30 days.    dicyclomine (BENTYL) 10 MG capsule Take 10 mg by mouth every 6 (six) hours as needed.    DULoxetine (CYMBALTA) 20 MG capsule Take 1 capsule (20 mg total) by mouth 2 (two) times daily.    mirtazapine (REMERON) 7.5 MG Tab Take 1 tablet (7.5 mg total) by mouth every evening.    pantoprazole (PROTONIX) 40 MG tablet Take 1 tablet (40 mg total) by mouth 2 (two) times daily before meals.       Review of patient's allergies indicates:   Allergen Reactions    Penicillin Rash    Penicillin Rash       OBJECTIVE:     Vital Signs:  Temp:  [97.5 °F (36.4 °C)-98.6 °F (37 °C)] 98.5 °F (36.9 °C)  Pulse:  [72-92] 83  Resp:  [16-20] 17  SpO2:  [99 %-100 %] 100 %  BP: (113-147)/() 113/79  Body mass index is 16.48 kg/m².     Physical Exam:  General:  No acute distress  HEENT:  Normocephalic, atraumatic  CVS:  RRR  Resp:  no increased WOB  GI:  Abdomen soft,  "non-distended, slightly tender to lower quadrants, gtube in place with some bilious leakage and erythema  :  Deferred  MSK:  No muscle atrophy, cyanosis, peripheral edema, full range of motion  Skin:  No rashes, ulcers, erythema  Neuro:  CNII-XII grossly intact  Psych:  Alert and oriented to person, place, and time    Laboratory  Troponin:  No results for input(s): "TROPONINI" in the last 72 hours.  CBC:  Recent Labs     10/05/23  1359   WBC 8.63   RBC 4.60   HGB 13.6   HCT 41.9      MCV 91.1   MCH 29.6   MCHC 32.5*     CMP:  Recent Labs     10/05/23  1359   CALCIUM 8.0*   ALBUMIN 2.2*      K 4.3   CO2 20*   BUN 9.3   CREATININE 0.79   ALKPHOS 99   ALT 20   AST 18   BILITOT 1.0     Lactic Acid:  Invalid input(s): "LACTATIC"  Etoh:  No results for input(s): "ALCOHOLMEDIC" in the last 72 hours.  Drug Screen:  No results for input(s): "PCDSCOMETHA", "COCAINEMETAB", "OPIATESCREEN", "BARBITURATES", "AMPHETAMINES", "MARIJUANATHC", "PCDSOPHENCYN", "CREATRANDUR", "TOXINFO" in the last 72 hours.      ABG:  No results for input(s): "PH", "PCO2", "PO2", "HCO3", "BE", "POCSATURATED" in the last 72 hours.    Diagnostic Results:  Imaging Results              CT Abdomen Pelvis With Contrast (Final result)  Result time 10/05/23 19:01:39      Final result by Ethel Bal MD (10/05/23 19:01:39)                   Impression:      Postsurgical changes of gastric bypass.  Edematous appearance of the distal esophagus, proximal gastric pouch and small bowel.  Correlate for esophagitis, gastritis and enteritis.  There is perienteric fat stranding without appreciable free air/perforation.      Electronically signed by: Ethel Bal  Date:    10/05/2023  Time:    19:01               Narrative:    EXAMINATION:  CT ABDOMEN PELVIS WITH CONTRAST    CLINICAL HISTORY:  Abdominal pain, acute, nonlocalized;    TECHNIQUE:  Helically acquired images with axial, sagittal and coronal reformations were obtained from the lung " bases to the pubic symphysis after the IV administration of contrast.    Automated tube current modulation, weight-based exposure dosing, and/or iterative reconstruction technique utilized to reach lowest reasonably achievable exposure rate.    DLP: 351 mGy*cm    COMPARISON:  CT chest abdomen pelvis 08/15/2023    FINDINGS:  HEART: Normal in size. No pericardial effusion.    LUNG BASES: Well aerated.    LIVER: Too small to characterize hypodensity at the left lobe of the liver.  Probable focal fatty infiltration adjacent to the falciform ligament.    BILIARY: No calcified gallstones.    PANCREAS: No inflammatory change.    SPLEEN: Normal in size    ADRENALS: No mass.    KIDNEYS/URETERS: The kidneys enhance symmetrically.  No hydronephrosis.    GI TRACT/MESENTERY: There are postsurgical changes of gastric bypass.  Edematous appearance of the GE junction and proximal gastric pouch.  There is fat stranding in the left upper quadrant.  There is a gastrostomy balloon at the retained distal gastric body of the biliopancreatic limb.  No evidence of bowel obstruction.  Mildly edematous appearance of the small bowel.    PERITONEUM: No free fluid.No free air.    LYMPH NODES: No enlarged lymph nodes by size criteria.    VASCULATURE: No significant atherosclerosis or aneurysm.    BLADDER: Nondistended bladder limits CT evaluation    REPRODUCTIVE ORGANS: Physiologic left ovary corpus luteal cyst.  Resolved right ovarian cyst.    SOFT TISSUES: Unremarkable.    BONES: No acute osseous abnormality.                                        ASSESSMENT & PLAN:   Ms. Chelsie Lee is a 37 y.o. female with complex surgical history admitted with poor tube feed tolerance with surgery consult for possible J tube.     2013  vertical sleeve gastrectomy in 2013 in [place]  2022 mini gastric bypass in [place] in 2022 1/17/23 incarcerated hiatal hernia repair s/p mesh repair with Dr. Lopez   1/27 for exploration and G tube placement  2/7 for   gastric fistula repair, gastrojejunostomy performed and gtube placed in antrum  3/23 with acute appendicitis and taken for exlap and appendectomy  8/9 EGD esophagitis, erythema, ulceration, pouch edema and friability with mild stenosis  8/14 gtube upsized to 18Fr    Plan:  - recommend TPN for nutritional support  - will discuss case further with radiology and staff given complex medical history      Bianca Kwan MD  Trauma/Acute Care Surgery   c - [phone]    10/7/2023  6:24 PM

## 2023-10-08 PROCEDURE — 96372 THER/PROPH/DIAG INJ SC/IM: CPT | Performed by: INTERNAL MEDICINE

## 2023-10-08 PROCEDURE — G0378 HOSPITAL OBSERVATION PER HR: HCPCS

## 2023-10-08 PROCEDURE — C9113 INJ PANTOPRAZOLE SODIUM, VIA: HCPCS | Performed by: INTERNAL MEDICINE

## 2023-10-08 PROCEDURE — 63600175 PHARM REV CODE 636 W HCPCS: Performed by: INTERNAL MEDICINE

## 2023-10-08 PROCEDURE — 25500020 PHARM REV CODE 255: Performed by: INTERNAL MEDICINE

## 2023-10-08 PROCEDURE — 63600175 PHARM REV CODE 636 W HCPCS: Performed by: NURSE PRACTITIONER

## 2023-10-08 PROCEDURE — S0179 MEGESTROL 20 MG: HCPCS | Performed by: INTERNAL MEDICINE

## 2023-10-08 PROCEDURE — 25000003 PHARM REV CODE 250: Performed by: INTERNAL MEDICINE

## 2023-10-08 RX ADMIN — PANTOPRAZOLE SODIUM 40 MG: 40 INJECTION, POWDER, FOR SOLUTION INTRAVENOUS at 09:10

## 2023-10-08 RX ADMIN — HYDROMORPHONE HYDROCHLORIDE 1 MG: 2 INJECTION INTRAMUSCULAR; INTRAVENOUS; SUBCUTANEOUS at 04:10

## 2023-10-08 RX ADMIN — SUCRALFATE 1 G: 1 TABLET ORAL at 04:10

## 2023-10-08 RX ADMIN — HYDROMORPHONE HYDROCHLORIDE 1 MG: 2 INJECTION INTRAMUSCULAR; INTRAVENOUS; SUBCUTANEOUS at 09:10

## 2023-10-08 RX ADMIN — ALPRAZOLAM 2 MG: 0.5 TABLET ORAL at 02:10

## 2023-10-08 RX ADMIN — MIRTAZAPINE 7.5 MG: 7.5 TABLET ORAL at 09:10

## 2023-10-08 RX ADMIN — HYDROMORPHONE HYDROCHLORIDE 1 MG: 2 INJECTION INTRAMUSCULAR; INTRAVENOUS; SUBCUTANEOUS at 10:10

## 2023-10-08 RX ADMIN — SUCRALFATE 1 G: 1 TABLET ORAL at 08:10

## 2023-10-08 RX ADMIN — SUCRALFATE 1 G: 1 TABLET ORAL at 01:10

## 2023-10-08 RX ADMIN — LEUCINE, PHENYLALANINE, LYSINE, METHIONINE, ISOLEUCINE, VALINE, HISTIDINE, THREONINE, TRYPTOPHAN, ALANINE, GLYCINE, ARGININE, PROLINE, SERINE, TYROSINE, SODIUM ACETATE, DIBASIC POTASSIUM PHOSPHATE, MAGNESIUM CHLORIDE, SODIUM CHLORIDE, CALCIUM CHLORIDE, DEXTROSE
311; 238; 247; 170; 255; 247; 204; 179; 77; 880; 438; 489; 289; 213; 17; 297; 261; 51; 77; 33; 5 INJECTION INTRAVENOUS at 02:10

## 2023-10-08 RX ADMIN — MEGESTROL ACETATE 600 MG: 400 SUSPENSION ORAL at 08:10

## 2023-10-08 RX ADMIN — ONDANSETRON 4 MG: 2 INJECTION INTRAMUSCULAR; INTRAVENOUS at 04:10

## 2023-10-08 RX ADMIN — PANTOPRAZOLE SODIUM 40 MG: 40 INJECTION, POWDER, FOR SOLUTION INTRAVENOUS at 08:10

## 2023-10-08 RX ADMIN — ONDANSETRON 4 MG: 2 INJECTION INTRAMUSCULAR; INTRAVENOUS at 09:10

## 2023-10-08 RX ADMIN — ALPRAZOLAM 2 MG: 0.5 TABLET ORAL at 09:10

## 2023-10-08 RX ADMIN — ALPRAZOLAM 2 MG: 0.5 TABLET ORAL at 08:10

## 2023-10-08 RX ADMIN — SUCRALFATE 1 G: 1 TABLET ORAL at 09:10

## 2023-10-08 RX ADMIN — ONDANSETRON 4 MG: 2 INJECTION INTRAMUSCULAR; INTRAVENOUS at 10:10

## 2023-10-08 RX ADMIN — ENOXAPARIN SODIUM 30 MG: 40 INJECTION SUBCUTANEOUS at 04:10

## 2023-10-08 RX ADMIN — AMLODIPINE BESYLATE 10 MG: 5 TABLET ORAL at 08:10

## 2023-10-08 RX ADMIN — IOHEXOL 150 ML: 350 INJECTION, SOLUTION INTRAVENOUS at 12:10

## 2023-10-08 NOTE — PLAN OF CARE
"/87   Pulse 95   Temp 97.8 °F (36.6 °C) (Oral)   Resp 20   Ht 5' 4" (1.626 m)   Wt 43.5 kg (96 lb)   SpO2 99%   Breastfeeding No   BMI 16.48 kg/m²     Problem: Adult Inpatient Plan of Care  Goal: Plan of Care Review  Outcome: Ongoing, Progressing  Goal: Patient-Specific Goal (Individualized)  Outcome: Ongoing, Progressing  Goal: Absence of Hospital-Acquired Illness or Injury  Outcome: Ongoing, Progressing  Goal: Optimal Comfort and Wellbeing  Outcome: Ongoing, Progressing  Goal: Readiness for Transition of Care  Outcome: Ongoing, Progressing     Problem: Skin Injury Risk Increased  Goal: Skin Health and Integrity  Outcome: Ongoing, Progressing     Problem: Bowel Motility Impaired (Bariatric Surgery)  Goal: Effective Bowel Motility and Elimination  Outcome: Ongoing, Progressing     Problem: Fluid and Electrolyte Imbalance (Bariatric Surgery)  Goal: Fluid and Electrolyte Balance  Outcome: Ongoing, Progressing     Problem: Nausea and Vomiting  Goal: Fluid and Electrolyte Balance  Outcome: Ongoing, Progressing     "

## 2023-10-08 NOTE — PROGRESS NOTES
[facility]  Hospital Medicine Progress Note        Chief Complaint: Inpatient Follow-up     HPI:   37-year-old female with medical history of multiple abdominal surgeries started with sleeve gastrectomy in 2013 followed by Kel-en-Y gastric bypass in [place] in June 2023, laparoscopic hiatal hernia repair with mesh on 01/17/2023, complicated ray incarceration and possible leakage and underwent exploratory laparotomy on 01/27/2023 which did not reveal perforation and during hospital stay had recurrence of abdominal pain and underwent a repeat exploratory laparotomy on 02/07/2023 with partial gastrectomy and gastrojejunostomy for necrotic segment of the stomach as well as underwent G-tube placement in the antrum.  Since had recurrent admission for continued weight loss and recurrent nausea, vomiting and abdominal pain with last admission in early August 2023 where she underwent EGD on 08/09/2023 which revealed LA grade B reflux esophagitis with no bleeding, Kel-en-Y gastrojejunostomy with gastrojejunal anastomosis characterized by congestion erythema and ulceration, biopsy negative for H pylori.  She was discharged on Protonix and Carafate.     Present to the ED with complaint of nausea, vomiting, upper abdominal pain, continued G-tube leakage.  Reported fever at home.  Report chronic intermittent loose stool since February that is unchanged.     On arrival to ED she was afebrile and hemodynamically stable.  Labs notable for albumin 2.2.  CT abdomen pelvis show edematous appearance of the GE junction and proximal gastric pouch, G-tube balloon in the distal gastric body/biliopancreatic limb.  Mild edematous appearance of the small bowel.  No evidence of bowel obstruction or perforation.     She was given analgesics and referred to hospital medicine service for further evaluation and management.  Interval Hx:   No acute events reported overnight.  No new complaints she had contrast study  this morning results pending she is currently undergoing workup with General surgery    Objective/physical exam:  General: thin white female, In no acute distress, afebrile  Chest: Clear to auscultation bilaterally  Heart: RRR, +S1, S2, no appreciable murmur  Abdomen: Soft, g tube with bile  MSK: Warm, no lower extremity edema  Neurologic: Alert and oriented , no fnd    VITAL SIGNS: 24 HRS MIN & MAX LAST   Temp  Min: 97.8 °F (36.6 °C)  Max: 98.6 °F (37 °C) 98.6 °F (37 °C)   BP  Min: 110/71  Max: 144/101 116/67   Pulse  Min: 78  Max: 102  78   Resp  Min: 16  Max: 20 16   SpO2  Min: 95 %  Max: 100 % 95 %     I have reviewed the following labs:  Recent Labs   Lab 10/05/23  1359   WBC 8.63   RBC 4.60   HGB 13.6   HCT 41.9   MCV 91.1   MCH 29.6   MCHC 32.5*   RDW 14.0      MPV 11.4*     Recent Labs   Lab 10/05/23  1359      K 4.3   CO2 20*   BUN 9.3   CREATININE 0.79   CALCIUM 8.0*   MG 2.00   ALBUMIN 2.2*   ALKPHOS 99   ALT 20   AST 18   BILITOT 1.0     Microbiology Results (last 7 days)       Procedure Component Value Units Date/Time    Blood Culture [[id number]]  (Normal) Collected: 10/05/23 1359    Order Status: Completed Specimen: Blood Updated: 10/07/23 1600     CULTURE, BLOOD ([facility]) No Growth At 48 Hours    Blood Culture [[id number]]  (Normal) Collected: 10/05/23 1359    Order Status: Completed Specimen: Blood Updated: 10/07/23 1600     CULTURE, BLOOD ([facility]) No Growth At 48 Hours             See below for Radiology    Scheduled Med:   amLODIPine  10 mg Oral Daily    enoxparin  30 mg Subcutaneous Q24H (prophylaxis, 1700)    megestroL  600 mg Oral Daily    mirtazapine  7.5 mg Oral QHS    pantoprazole  40 mg Intravenous BID    sucralfate  1 g Oral QID      Continuous Infusions:   dextrose 5 % and 0.45 % NaCl 50 mL/hr at 10/08/23 0714      PRN Meds:  acetaminophen, ALPRAZolam, aluminum-magnesium hydroxide-simethicone, dicyclomine, HYDROmorphone, melatonin, ondansetron, polyethylene glycol, prochlorperazine,  senna-docusate 8.6-50 mg, sodium chloride 0.9%     Assessment/Plan:  Severe reflux esophagitis and gastritis  Underweight and Severe protein calorie malnutrition     History of anxiety, multiple bariatric surgeries and hiatal hernia repair with complications     Patient continues to have poor nutritional intake and currently undergoing workup with surgery   Noted General surgery input appreciate recommendations   Follow general surgery recommendations  We will start patient on peripheral nutrition   Cont Protonix  40 mg IV b.i.d., Sucralfate 1 g QID  We will check labs in the a.m.  Care discussed with pt's RN      VTE prophylaxis: scds    Patient condition:  Stable    Anticipated discharge and Disposition:   tbd      _____________________________________________________________________    Nutrition Status:    Radiology:  I have personally reviewed the following imaging and agree with the radiologist.     CT Abdomen Pelvis With Contrast  Narrative: EXAMINATION:  CT ABDOMEN PELVIS WITH CONTRAST    CLINICAL HISTORY:  Abdominal pain, acute, nonlocalized;    TECHNIQUE:  Helically acquired images with axial, sagittal and coronal reformations were obtained from the lung bases to the pubic symphysis after the IV administration of contrast.    Automated tube current modulation, weight-based exposure dosing, and/or iterative reconstruction technique utilized to reach lowest reasonably achievable exposure rate.    DLP: 351 mGy*cm    COMPARISON:  CT chest abdomen pelvis 08/15/2023    FINDINGS:  HEART: Normal in size. No pericardial effusion.    LUNG BASES: Well aerated.    LIVER: Too small to characterize hypodensity at the left lobe of the liver.  Probable focal fatty infiltration adjacent to the falciform ligament.    BILIARY: No calcified gallstones.    PANCREAS: No inflammatory change.    SPLEEN: Normal in size    ADRENALS: No mass.    KIDNEYS/URETERS: The kidneys enhance symmetrically.  No hydronephrosis.    GI  TRACT/MESENTERY: There are postsurgical changes of gastric bypass.  Edematous appearance of the GE junction and proximal gastric pouch.  There is fat stranding in the left upper quadrant.  There is a gastrostomy balloon at the retained distal gastric body of the biliopancreatic limb.  No evidence of bowel obstruction.  Mildly edematous appearance of the small bowel.    PERITONEUM: No free fluid.No free air.    LYMPH NODES: No enlarged lymph nodes by size criteria.    VASCULATURE: No significant atherosclerosis or aneurysm.    BLADDER: Nondistended bladder limits CT evaluation    REPRODUCTIVE ORGANS: Physiologic left ovary corpus luteal cyst.  Resolved right ovarian cyst.    SOFT TISSUES: Unremarkable.    BONES: No acute osseous abnormality.  Impression: Postsurgical changes of gastric bypass.  Edematous appearance of the distal esophagus, proximal gastric pouch and small bowel.  Correlate for esophagitis, gastritis and enteritis.  There is perienteric fat stranding without appreciable free air/perforation.    Electronically signed by: Ethel Bal  Date:    10/05/2023  Time:    19:01      Marry Jenkins MD   10/08/2023

## 2023-10-08 NOTE — PROGRESS NOTES
"   Acute Care Surgery   Progress Note  Admit Date: 10/5/2023  HD#0  POD#* No surgery found *    Subjective:   Interval history:  AFVSS  NAEON  + gas no BM  Denies nausea or vomiting, tolerated some clears    Home Meds:  Current Outpatient Medications   Medication Instructions    ALPRAZolam (XANAX) 2 mg, Oral, 3 times daily PRN    amLODIPine (NORVASC) 10 mg, Oral, Daily    cyanocobalamin 1,000 mcg, Intramuscular, Every 30 days    dicyclomine (BENTYL) 10 mg, Oral, Every 6 hours PRN    DULoxetine (CYMBALTA) 20 mg, Oral, 2 times daily    ergocalciferol (ERGOCALCIFEROL) 50,000 Units, Oral, Every 7 days    hyoscyamine (LEVSIN/SL) 0.125 mg Subl Sublingual    megestroL (MEGACE ES) 625 mg, Oral    mirtazapine (REMERON) 7.5 mg, Oral, Nightly    pantoprazole (PROTONIX) 40 mg, Oral, 2 times daily before meals      Scheduled Meds:   amLODIPine  10 mg Oral Daily    enoxparin  30 mg Subcutaneous Q24H (prophylaxis, 1700)    megestroL  600 mg Oral Daily    mirtazapine  7.5 mg Oral QHS    pantoprazole  40 mg Intravenous BID    sucralfate  1 g Oral QID     Continuous Infusions:   dextrose 5 % and 0.45 % NaCl 50 mL/hr at 10/08/23 0714     PRN Meds:acetaminophen, ALPRAZolam, aluminum-magnesium hydroxide-simethicone, dicyclomine, HYDROmorphone, melatonin, ondansetron, polyethylene glycol, prochlorperazine, senna-docusate 8.6-50 mg, sodium chloride 0.9%     Objective:     VITAL SIGNS: 24 HR MIN & MAX LAST   Temp  Min: 97.8 °F (36.6 °C)  Max: 98.6 °F (37 °C)  98.6 °F (37 °C)   BP  Min: 110/71  Max: 144/101  116/67    Pulse  Min: 78  Max: 102  78    Resp  Min: 16  Max: 20  16    SpO2  Min: 95 %  Max: 100 %  95 %      HT: 5' 4" (162.6 cm)  WT: 43.5 kg (96 lb)  BMI: 16.5     Intake/output:  Intake/Output - Last 3 Shifts         10/06 0700  10/07 0659 10/07 0700  10/08 0659 10/08 0700  10/09 0659    I.V. (mL/kg) 813.7 (18.7)  1186.8 (27.3)    Total Intake(mL/kg) 813.7 (18.7)  1186.8 (27.3)    Net +813.7  +1186.8                   Intake/Output " "Summary (Last 24 hours) at 10/8/2023 0844  Last data filed at 10/8/2023 0714  Gross per 24 hour   Intake 1186.84 ml   Output --   Net 1186.84 ml           Lines/drains/airway:       Peripheral IV - Single Lumen 10/05/23 1551 22 G Posterior;Right Hand (Active)   Site Assessment Clean;Dry;Intact 10/08/23 0400   Extremity Assessment Distal to IV No redness;No swelling;No warmth;Red 10/08/23 0400   Line Status Infusing 10/08/23 0400   Dressing Status Clean;Dry;Intact 10/08/23 0400   Dressing Intervention Integrity maintained 10/08/23 0400   Number of days: 2            Gastrostomy/Enterostomy  midline (Active)   Number of days:        Physical examination:  General:  No acute distress  HEENT:  Normocephalic, atraumatic  CVS:  RRR  Resp:  no increased WOB  GI:  Abdomen soft, non-distended, nontender, gtube in place with some bilious leakage and erythema  :  Deferred  MSK:  No muscle atrophy, cyanosis, peripheral edema, full range of motion  Skin:  No rashes, ulcers, erythema  Neuro:  CNII-XII grossly intact  Psych:  Alert and oriented to person, place, and time    Labs:  Renal:  Recent Labs     10/05/23  1359   BUN 9.3   CREATININE 0.79     Recent Labs     10/05/23  1359   LACTIC 2.0     FENGI:  Recent Labs     10/05/23  1359      K 4.3   CO2 20*   CALCIUM 8.0*   MG 2.00   PHOS 4.1   ALBUMIN 2.2*   BILITOT 1.0   AST 18   ALKPHOS 99   ALT 20     Heme:  Recent Labs     10/05/23  1359   HGB 13.6   HCT 41.9        ID:  Recent Labs     10/05/23  1359   WBC 8.63     CBG:  Recent Labs     10/05/23  1359   GLUCOSE 100      Cardiovascular:  No results for input(s): "TROPONINI", "CKTOTAL", "CKMB", "BNP" in the last 168 hours.  ABG:  No results for input(s): "PH", "PO2", "PCO2", "HCO3", "BE" in the last 168 hours.   I have reviewed all pertinent lab results within the past 24 hours.    Imaging:  CT Abdomen Pelvis With Contrast   Final Result      Postsurgical changes of gastric bypass.  Edematous appearance of the " distal esophagus, proximal gastric pouch and small bowel.  Correlate for esophagitis, gastritis and enteritis.  There is perienteric fat stranding without appreciable free air/perforation.         Electronically signed by: Ethel Bal   Date:    10/05/2023   Time:    19:01         I have reviewed all pertinent imaging results/findings within the past 24 hours.    Micro/Path/Other:  Microbiology Results (last 7 days)       Procedure Component Value Units Date/Time    Blood Culture [[id number]]  (Normal) Collected: 10/05/23 1359    Order Status: Completed Specimen: Blood Updated: 10/07/23 1600     CULTURE, BLOOD ([facility]) No Growth At 48 Hours    Blood Culture [[id number]]  (Normal) Collected: 10/05/23 1359    Order Status: Completed Specimen: Blood Updated: 10/07/23 1600     CULTURE, BLOOD ([facility]) No Growth At 48 Hours           Specimen (168h ago, onward)      None             Assessment & Plan:   Ms. Chelsie Lee is a 37 y.o. female with complex surgical history admitted with poor tube feed tolerance with surgery consult for possible J tube.      2013  vertical sleeve gastrectomy in 2013 in [place]  2022 mini gastric bypass in [place] in 2022 1/17/23 incarcerated hiatal hernia repair s/p mesh repair with Dr. Lopez   1/27 for exploration and G tube placement  2/7 for  gastric fistula repair, gastrojejunostomy performed and gtube placed in antrum  3/23 with acute appendicitis and taken for exlap and appendectomy  8/9 EGD esophagitis, erythema, ulceration, pouch edema and friability with mild stenosis  8/14 gtube upsized to 18Fr     Plan:  - recommend TPN for nutritional support  - will continue to discuss case further with radiology and staff given complex medical history    Bianca Kwan MD  General Surgery PGY2

## 2023-10-09 LAB
ALBUMIN SERPL-MCNC: 1.6 G/DL (ref 3.5–5)
ANION GAP SERPL CALC-SCNC: 5 MEQ/L
BASOPHILS # BLD AUTO: 0.04 X10(3)/MCL
BASOPHILS NFR BLD AUTO: 0.5 %
BUN SERPL-MCNC: 9.6 MG/DL (ref 7–18.7)
CALCIUM SERPL-MCNC: 7.3 MG/DL (ref 8.4–10.2)
CHLORIDE SERPL-SCNC: 113 MMOL/L (ref 98–107)
CO2 SERPL-SCNC: 19 MMOL/L (ref 22–29)
CREAT SERPL-MCNC: 0.56 MG/DL (ref 0.55–1.02)
CREAT/UREA NIT SERPL: 17
EOSINOPHIL # BLD AUTO: 0.08 X10(3)/MCL (ref 0–0.9)
EOSINOPHIL NFR BLD AUTO: 1 %
ERYTHROCYTE [DISTWIDTH] IN BLOOD BY AUTOMATED COUNT: 14.2 % (ref 11.5–17)
GFR SERPLBLD CREATININE-BSD FMLA CKD-EPI: >60 MLS/MIN/1.73/M2
GLUCOSE SERPL-MCNC: 83 MG/DL (ref 74–100)
HCT VFR BLD AUTO: 33.2 % (ref 37–47)
HGB BLD-MCNC: 10.9 G/DL (ref 12–16)
IMM GRANULOCYTES # BLD AUTO: 0.03 X10(3)/MCL (ref 0–0.04)
IMM GRANULOCYTES NFR BLD AUTO: 0.4 %
LYMPHOCYTES # BLD AUTO: 2.82 X10(3)/MCL (ref 0.6–4.6)
LYMPHOCYTES NFR BLD AUTO: 34.8 %
MCH RBC QN AUTO: 29.3 PG (ref 27–31)
MCHC RBC AUTO-ENTMCNC: 32.8 G/DL (ref 33–36)
MCV RBC AUTO: 89.2 FL (ref 80–94)
MONOCYTES # BLD AUTO: 0.89 X10(3)/MCL (ref 0.1–1.3)
MONOCYTES NFR BLD AUTO: 11 %
NEUTROPHILS # BLD AUTO: 4.24 X10(3)/MCL (ref 2.1–9.2)
NEUTROPHILS NFR BLD AUTO: 52.3 %
NRBC BLD AUTO-RTO: 0 %
PLATELET # BLD AUTO: 247 X10(3)/MCL (ref 130–400)
PMV BLD AUTO: 12.2 FL (ref 7.4–10.4)
POTASSIUM SERPL-SCNC: 4.3 MMOL/L (ref 3.5–5.1)
RBC # BLD AUTO: 3.72 X10(6)/MCL (ref 4.2–5.4)
SODIUM SERPL-SCNC: 137 MMOL/L (ref 136–145)
WBC # SPEC AUTO: 8.1 X10(3)/MCL (ref 4.5–11.5)

## 2023-10-09 PROCEDURE — 80048 BASIC METABOLIC PNL TOTAL CA: CPT | Performed by: INTERNAL MEDICINE

## 2023-10-09 PROCEDURE — C9113 INJ PANTOPRAZOLE SODIUM, VIA: HCPCS | Performed by: INTERNAL MEDICINE

## 2023-10-09 PROCEDURE — 82040 ASSAY OF SERUM ALBUMIN: CPT | Performed by: INTERNAL MEDICINE

## 2023-10-09 PROCEDURE — S0179 MEGESTROL 20 MG: HCPCS | Performed by: INTERNAL MEDICINE

## 2023-10-09 PROCEDURE — 11000001 HC ACUTE MED/SURG PRIVATE ROOM

## 2023-10-09 PROCEDURE — 85025 COMPLETE CBC W/AUTO DIFF WBC: CPT | Performed by: INTERNAL MEDICINE

## 2023-10-09 PROCEDURE — 25000003 PHARM REV CODE 250: Performed by: INTERNAL MEDICINE

## 2023-10-09 PROCEDURE — 63600175 PHARM REV CODE 636 W HCPCS: Performed by: NURSE PRACTITIONER

## 2023-10-09 PROCEDURE — 63600175 PHARM REV CODE 636 W HCPCS: Performed by: INTERNAL MEDICINE

## 2023-10-09 RX ADMIN — ONDANSETRON 4 MG: 2 INJECTION INTRAMUSCULAR; INTRAVENOUS at 03:10

## 2023-10-09 RX ADMIN — MEGESTROL ACETATE 600 MG: 400 SUSPENSION ORAL at 08:10

## 2023-10-09 RX ADMIN — SUCRALFATE 1 G: 1 TABLET ORAL at 02:10

## 2023-10-09 RX ADMIN — SUCRALFATE 1 G: 1 TABLET ORAL at 08:10

## 2023-10-09 RX ADMIN — HYDROMORPHONE HYDROCHLORIDE 1 MG: 2 INJECTION INTRAMUSCULAR; INTRAVENOUS; SUBCUTANEOUS at 03:10

## 2023-10-09 RX ADMIN — PANTOPRAZOLE SODIUM 40 MG: 40 INJECTION, POWDER, FOR SOLUTION INTRAVENOUS at 08:10

## 2023-10-09 RX ADMIN — HYDROMORPHONE HYDROCHLORIDE 1 MG: 2 INJECTION INTRAMUSCULAR; INTRAVENOUS; SUBCUTANEOUS at 08:10

## 2023-10-09 RX ADMIN — ONDANSETRON 4 MG: 2 INJECTION INTRAMUSCULAR; INTRAVENOUS at 08:10

## 2023-10-09 RX ADMIN — ALPRAZOLAM 2 MG: 0.5 TABLET ORAL at 03:10

## 2023-10-09 RX ADMIN — SUCRALFATE 1 G: 1 TABLET ORAL at 05:10

## 2023-10-09 RX ADMIN — ENOXAPARIN SODIUM 30 MG: 40 INJECTION SUBCUTANEOUS at 05:10

## 2023-10-09 RX ADMIN — LEUCINE, PHENYLALANINE, LYSINE, METHIONINE, ISOLEUCINE, VALINE, HISTIDINE, THREONINE, TRYPTOPHAN, ALANINE, GLYCINE, ARGININE, PROLINE, SERINE, TYROSINE, SODIUM ACETATE, DIBASIC POTASSIUM PHOSPHATE, MAGNESIUM CHLORIDE, SODIUM CHLORIDE, CALCIUM CHLORIDE, DEXTROSE
311; 238; 247; 170; 255; 247; 204; 179; 77; 880; 438; 489; 289; 213; 17; 297; 261; 51; 77; 33; 5 INJECTION INTRAVENOUS at 09:10

## 2023-10-09 RX ADMIN — MIRTAZAPINE 7.5 MG: 7.5 TABLET ORAL at 08:10

## 2023-10-09 RX ADMIN — ALPRAZOLAM 2 MG: 0.5 TABLET ORAL at 08:10

## 2023-10-09 RX ADMIN — AMLODIPINE BESYLATE 10 MG: 5 TABLET ORAL at 08:10

## 2023-10-09 NOTE — PROGRESS NOTES
[facility]  Hospital Medicine Progress Note        Chief Complaint: Inpatient Follow-up     HPI:   37-year-old female with medical history of multiple abdominal surgeries started with sleeve gastrectomy in 2013 followed by Kel-en-Y gastric bypass in [place] in June 2023, laparoscopic hiatal hernia repair with mesh on 01/17/2023, complicated ray incarceration and possible leakage and underwent exploratory laparotomy on 01/27/2023 which did not reveal perforation and during hospital stay had recurrence of abdominal pain and underwent a repeat exploratory laparotomy on 02/07/2023 with partial gastrectomy and gastrojejunostomy for necrotic segment of the stomach as well as underwent G-tube placement in the antrum.  Since had recurrent admission for continued weight loss and recurrent nausea, vomiting and abdominal pain with last admission in early August 2023 where she underwent EGD on 08/09/2023 which revealed LA grade B reflux esophagitis with no bleeding, Kel-en-Y gastrojejunostomy with gastrojejunal anastomosis characterized by congestion erythema and ulceration, biopsy negative for H pylori.  She was discharged on Protonix and Carafate.     Present to the ED with complaint of nausea, vomiting, upper abdominal pain, continued G-tube leakage.  Reported fever at home.  Report chronic intermittent loose stool since February that is unchanged.     On arrival to ED she was afebrile and hemodynamically stable.  Labs notable for albumin 2.2.  CT abdomen pelvis show edematous appearance of the GE junction and proximal gastric pouch, G-tube balloon in the distal gastric body/biliopancreatic limb.  Mild edematous appearance of the small bowel.  No evidence of bowel obstruction or perforation.     She was given analgesics and referred to hospital medicine service for further evaluation and management.      Interval Hx:   No acute events reported overnight.  She is feeling optimistic about the  surgery on Wednesday ,no new complaints, attempting po intake with yogurt and soups, no significant abd pain, constant belching      Objective/physical exam:  General: thin white female, In no acute distress, afebrile  Chest: Clear to auscultation bilaterally  Heart: RRR, +S1, S2, no appreciable murmur  Abdomen: Soft, g tube with bile  MSK: Warm, no lower extremity edema  Neurologic: Alert and oriented , no fnd    VITAL SIGNS: 24 HRS MIN & MAX LAST   Temp  Min: 97.5 °F (36.4 °C)  Max: 98.9 °F (37.2 °C) 98.9 °F (37.2 °C)   BP  Min: 126/82  Max: 151/91 132/86   Pulse  Min: 88  Max: 110  88   Resp  Min: 17  Max: 20 18   SpO2  Min: 100 %  Max: 100 % 100 %     I have reviewed the following labs:  Recent Labs   Lab 10/05/23  1359 10/09/23  0548   WBC 8.63 8.10   RBC 4.60 3.72*   HGB 13.6 10.9*   HCT 41.9 33.2*   MCV 91.1 89.2   MCH 29.6 29.3   MCHC 32.5* 32.8*   RDW 14.0 14.2    247   MPV 11.4* 12.2*     Recent Labs   Lab 10/05/23  1359 10/09/23  0548    137   K 4.3 4.3   CO2 20* 19*   BUN 9.3 9.6   CREATININE 0.79 0.56   CALCIUM 8.0* 7.3*   MG 2.00  --    ALBUMIN 2.2*  --    ALKPHOS 99  --    ALT 20  --    AST 18  --    BILITOT 1.0  --      Microbiology Results (last 7 days)       Procedure Component Value Units Date/Time    Blood Culture [[id number]]  (Normal) Collected: 10/05/23 1359    Order Status: Completed Specimen: Blood Updated: 10/08/23 1600     CULTURE, BLOOD ([facility]) No Growth At 72 Hours    Blood Culture [[id number]]  (Normal) Collected: 10/05/23 1359    Order Status: Completed Specimen: Blood Updated: 10/08/23 1600     CULTURE, BLOOD ([facility]) No Growth At 72 Hours             See below for Radiology    Scheduled Med:   amLODIPine  10 mg Oral Daily    enoxparin  30 mg Subcutaneous Q24H (prophylaxis, 1700)    megestroL  600 mg Oral Daily    mirtazapine  7.5 mg Oral QHS    pantoprazole  40 mg Intravenous BID    sucralfate  1 g Oral QID      Continuous Infusions:   Amino acid 4.25% - dextrose 5%  (CLINIMIX-E) solution (1L provides 42.5 gm AA, 50 gm CHO (170 kcal/L dextrose), Na 35, K 30, Mg 5, Ca 4.5, Acetate 70, Cl 39, Phos 15) 75 mL/hr at 10/09/23 0930      PRN Meds:  acetaminophen, ALPRAZolam, aluminum-magnesium hydroxide-simethicone, dicyclomine, HYDROmorphone, melatonin, ondansetron, polyethylene glycol, prochlorperazine, senna-docusate 8.6-50 mg, sodium chloride 0.9%     Assessment/Plan:  Severe reflux esophagitis and gastritis  Underweight and Severe protein calorie malnutrition     History of anxiety, multiple bariatric surgeries and hiatal hernia repair with complications     Cont TPN  Noted General surgery inputs,appreciate recommendations  Follow general surgery recommendations  Cont Protonix  40 mg IV b.i.d., Sucralfate 1 g QID  Labs this AM noted, hb 10.9, bicarb 19, ca 7.3, check albumin, k 4.3  Care discussed with pt's RN      VTE prophylaxis: scds    Patient condition:  Stable    Anticipated discharge and Disposition:   tbd      _____________________________________________________________________    Nutrition Status:    Radiology:  I have personally reviewed the following imaging and agree with the radiologist.     FL Upper GI Water Soluable to Include Esophagram  Narrative: EXAMINATION:  FL UPPER GI WATER SOLUABLE TO INCLUDE ESOPHAGRAM    CLINICAL HISTORY:  Complex revision bariatric surgery;Esophagitis, unspecified without bleeding    TECHNIQUE:  Patient performed several swallows with Ominipaque.  Multiple fluoroscopic images were performed of the esophagus and the stomach.    Fluoroscopy time 1 minutes 24 seconds.    Radiation does 59 mGy.    DAP 1388 uGym2.    Total of 93 fluoroscopic images were performed.    COMPARISON:  CT abdomen pelvis October 5, 2023    FINDINGS:  Timing and emptying of contrast from the hypopharynx was unremarkable.  Unremarkable esophageal primary and secondary peristalsis and there were no tertiary waveforms..  Unremarkable distensibility of the esophagus  without abnormal dilatation, stricture or diverticulum.  Contrast promptly transited into the stomach.  Stomach is of small volume with irregular configuration related to prior surgeries.  No contrast extravasation identified.  Contrast also promptly transited into the small bowel loops across the anastomosis.  Impression: 1. Small stomach with irregular configuration.    2. No contrast extravasation.    Electronically signed by: Edgardo Tyler  Date:    10/08/2023  Time:    13:01      Marry Jenkins MD   10/09/2023

## 2023-10-09 NOTE — PLAN OF CARE
"/89   Pulse 100   Temp 97.8 °F (36.6 °C) (Oral)   Resp 18   Ht 5' 4" (1.626 m)   Wt 43.5 kg (96 lb)   SpO2 100%   Breastfeeding No   BMI 16.48 kg/m²     Problem: Adult Inpatient Plan of Care  Goal: Plan of Care Review  Outcome: Ongoing, Progressing  Goal: Patient-Specific Goal (Individualized)  Outcome: Ongoing, Progressing  Goal: Absence of Hospital-Acquired Illness or Injury  Outcome: Ongoing, Progressing  Goal: Optimal Comfort and Wellbeing  Outcome: Ongoing, Progressing  Goal: Readiness for Transition of Care  Outcome: Ongoing, Progressing     Problem: Skin Injury Risk Increased  Goal: Skin Health and Integrity  Outcome: Ongoing, Progressing     Problem: Bowel Motility Impaired (Bariatric Surgery)  Goal: Effective Bowel Motility and Elimination  Outcome: Ongoing, Progressing     Problem: Fluid and Electrolyte Imbalance (Bariatric Surgery)  Goal: Fluid and Electrolyte Balance  Outcome: Ongoing, Progressing     Problem: Nausea and Vomiting  Goal: Fluid and Electrolyte Balance  Outcome: Ongoing, Progressing     "

## 2023-10-09 NOTE — CONSULTS
Inpatient Nutrition Assessment    Admit Date: 10/5/2023   Total duration of encounter: 4 days     Nutrition Recommendation/Prescription       -Recommend placing J-tube as medically feasible:  ---Initiate tube feedings 10 mL/hr for 24 hrs, then increase as tolerated by 10 mL/hr q 12 hrs to goal rate as below:  Impact Peptide 1.5 @ goal rate of 55 mL/hr would provide**:  1650 kcals (100% est needs)  101 g protein (116% protein)  847 mL fluid (49% est needs)  **based on TF running ~20 hrs per day**    -Can flush 140 mL q 4 hrs for an additional 840 mL fluid and a total daily fluid provision (TF + flush) of 1687 mL (97% est needs). Could also try 70 mL q 2 hrs if preferred.     -Monitor magnesium and phosphorus for 4 days. Replete if needed.   -Advance diet as tolerated per MD. Goal Diet: Low Residue.  -Continue appetite stimulant as medically feasible.     -If TPN warranted, recommend Clinimix E 5/15 @ goal rate of 70mL/hr, to provide:  1407 kcal (93% est needs)  84 gm protein (100% est needs)  1680 mL fluid (97% est needs)  252 gm Dextrose  **recommend starting @ half rate for the first 24 hrs as pt may be at risk for refeeding**    Communication of Recommendations: reviewed with provider, reviewed with nurse, and reviewed with patient    Nutrition Assessment     Malnutrition Assessment/Nutrition-Focused Physical Exam    Malnutrition Context: chronic illness (10/06/23 1225)  Malnutrition Level: severe (10/06/23 1225)  Energy Intake (Malnutrition): less than 75% for greater than or equal to 1 month (10/06/23 1225)  Weight Loss (Malnutrition): greater than 10% in 6 months (10/06/23 1225)  Subcutaneous Fat (Malnutrition): severe depletion (10/06/23 1225)  Orbital Region (Subcutaneous Fat Loss): severe depletion  Upper Arm Region (Subcutaneous Fat Loss): severe depletion     Muscle Mass (Malnutrition): severe depletion (10/06/23 1225)  [place] Region (Muscle Loss): severe depletion     Clavicle and Acromion Bone Region  (Muscle Loss): severe depletion     Dorsal Hand (Muscle Loss): severe depletion  Patellar Region (Muscle Loss): severe depletion     Posterior Calf Region (Muscle Loss): severe depletion           A minimum of two characteristics is recommended for diagnosis of either severe or non-severe malnutrition.    Chart Review    Reason Seen: physician consult for poor po and PEG and follow-up    Malnutrition Screening Tool Results   Have you recently lost weight without trying?: Yes: 2-13 lbs  Have you been eating poorly because of a decreased appetite?: No   MST Score: 1     Diagnosis:  Esophagitis, N/V/D, h/o Sleeve Gastrectomy in 2013 and Gastric Bypass in [place] in June 2022, h/o hiatal hernia repair 1/23, Leaking G-tube    Relevant Medical History:   Past Medical History:   Diagnosis Date    GERD (gastroesophageal reflux disease)     Hypertension      Past Surgical History:   Procedure Laterality Date    COLONOSCOPY N/A 8/11/2023    Procedure: COLON;  Surgeon: Wilmer Rossi MD;  Location: [facility] ENDOSCOPY;  Service: Gastroenterology;  Laterality: N/A;    EGD, WITH CLOSED BIOPSY  8/9/2023    Procedure: EGD, WITH CLOSED BIOPSY;  Surgeon: Wilmer Rossi MD;  Location: [facility] ENDOSCOPY;  Service: Gastroenterology;;    ESOPHAGOGASTRODUODENOSCOPY N/A 8/9/2023    Procedure: EGD;  Surgeon: Wilmer Rossi MD;  Location: [facility] ENDOSCOPY;  Service: Gastroenterology;  Laterality: N/A;    HERNIA REPAIR      LAPAROSCOPIC GASTRIC BANDING       Review of patient's allergies indicates:   Allergen Reactions    Penicillin Rash    Penicillin Rash        Nutrition-Related Medications:    amLODIPine  10 mg Oral Daily    enoxparin  30 mg Subcutaneous Q24H (prophylaxis, 1700)    megestroL  600 mg Oral Daily    mirtazapine  7.5 mg Oral QHS    pantoprazole  40 mg Intravenous BID    sucralfate  1 g Oral QID       Amino acid 4.25% - dextrose 5% (CLINIMIX-E) solution (1L provides 42.5 gm AA, 50 gm CHO (170 kcal/L dextrose), Na 35,  "K 30, Mg 5, Ca 4.5, Acetate 70, Cl 39, Phos 15) 75 mL/hr at 10/09/23 0930      Calorie Containing IV Medications: no significant kcals from medications at this time    Nutrition-Related Labs:  No results found for: "HGBA1C"  10/5/2023: Magnesium Level 2.00 mg/dL (Ref range: 1.60 - 2.60 mg/dL); Phosphorus Level 4.1 mg/dL (Ref range: 2.3 - 4.7 mg/dL)  10/9/2023: Potassium Level 4.3 mmol/L (Ref range: 3.5 - 5.1 mmol/L); Sodium Level 137 mmol/L (Ref range: 136 - 145 mmol/L)   Recent Labs   Lab 10/05/23  1359 10/09/23  0548   WBC 8.63 8.10   RBC 4.60 3.72*   HGB 13.6 10.9*   HCT 41.9 33.2*   MCV 91.1 89.2   MCH 29.6 29.3   MCHC 32.5* 32.8*       Recent Labs   Lab 10/05/23  1359 10/09/23  0548    137   K 4.3 4.3   CO2 20* 19*   BUN 9.3 9.6   CREATININE 0.79 0.56   CALCIUM 8.0* 7.3*   MG 2.00  --    ALBUMIN 2.2*  --    ALKPHOS 99  --    ALT 20  --    AST 18  --    BILITOT 1.0  --          Diet/PN Order: Diet full liquid  Amino acid 4.25% - dextrose 5% (CLINIMIX-E) solution (1L provides 42.5 gm AA, 50 gm CHO (170 kcal/L dextrose), Na 35, K 30, Mg 5, Ca 4.5, Acetate 70, Cl 39, Phos 15)  Oral Supplement Order: none  Tube Feeding Order: none  Appetite/Oral Intake: fair/50-75% of meals  Factors Affecting Nutritional Intake: decreased appetite, diarrhea, early satiety, and nausea  Food/Islam/Cultural Preferences: none reported  Food Allergies: none reported    Skin Integrity: drain/device(s)  Wound(s):   n/a    Comments    10/6/23: Pt reports poor appetite worsening over 6-12 months w/ GI complications, has been on G-tube feedings w/ Jevity 1.2 but has been experiencing progressively worse diarrhea she suspects due to the formula recently, she eats very little by mouth, often has n/v, has very poor appetite, the thought of food often makes her sick, has lost around 80 lbs over the past 14 months per EMR. It's possible that some of the weight loss was intentional given surgica hx, but sounds like all weight loss since " "2023 was unintentional. Doctors are discussing replacing G-tube w/ J-tube which I also think is worth trying. Pt and I did discuss that sugary drinks and too many artifical sweeteners can contribute to diarrhea, as it sounds like she drinks sports drinks regularly.     10/9/23: Pt now on full liquids and tolerating well; appetite/intake is fair/good; pt is eating some outside foods brought in by family. Pt is also on appetite stimulant; would benefit from J-tube placement (surgery discussing); noted per surgery note, also recommending TPN. Noted that PPN was ordered but no longer running and was d/c'ed this morning. Answered all pt's questions regarding diet.     Anthropometrics    Height: 5' 4" (162.6 cm) Height Method: Stated  Last Weight: 43.5 kg (96 lb) (10/05/23 8949) Weight Method: Standard Scale  BMI (Calculated): 16.5  BMI Classification: underweight (BMI less than 18.5)     Ideal Body Weight (IBW), Female: 120 lb     % Ideal Body Weight, Female (lb): 80 %                             Usual Weight Provided By: patient and EMR weight history    Wt Readings from Last 5 Encounters:   10/05/23 43.5 kg (96 lb)   23 44 kg (97 lb)   22 59 kg (130 lb)   22 64.4 kg (141 lb 15.6 oz)   10/28/22 64.4 kg (142 lb)     Weight Change(s) Since Admission:  Admit Weight: 40.2 kg (88 lb 10 oz) (10/05/23 1332)      Estimated Needs    Weight Used For Calorie Calculations: 43.5 kg (95 lb 14.4 oz)  Energy Calorie Requirements (kcal): 9699-0797 kcals (35-40 kcals/kg)  Energy Need Method: Kcal/kg  Weight Used For Protein Calculations: 43.5 kg (95 lb 14.4 oz)  Protein Requirements: 78-87 g (1.8-2.0 g/kg)  Fluid Requirements (mL): 1740 mL (40 mL/kg)  Temp (24hrs), Av °F (36.7 °C), Min:97.5 °F (36.4 °C), Max:98.9 °F (37.2 °C)       Enteral Nutrition    Patient not receiving enteral nutrition at this time.    Parenteral Nutrition    Patient not receiving parenteral nutrition support at this " time.    Evaluation of Received Nutrient Intake    Calories: not meeting estimated needs  Protein: not meeting estimated needs    Patient Education    Education Provided:  gastric surgery/dumping syndrome  Teaching Method: explanation and printed materials  Comprehension: verbalizes understanding  Barriers to Learning: none evident  Expected Compliance: good  Comments: All questions were answered and dietitian's contact information was provided.     Nutrition Diagnosis     PES: Malnutrition related to suboptimal protein/energy intake or absorption altered GI function as evidenced by less than 75% needs met for greater than 1 month, severe fat depletion, severe muscle depletion, and greater than 10% weight loss in 6 months in the context of multiple GI surgeries and post op complications. (continues)     Interventions/Goals     Intervention(s): modified composition of meals/snacks, modified composition of enteral nutrition, modified rate of enteral nutrition, and collaboration with other providers  Goal: Meet greater than 75% of nutritional needs by follow-up. (goal progressing)    Monitoring & Evaluation     Dietitian will monitor energy intake, enteral nutrition intake, weight, electrolyte/renal panel, glucose/endocrine profile, and gastrointestinal profile.  Nutrition Risk/Follow-Up: high (follow-up in 1-4 days)   Please consult if re-assessment needed sooner.    Johanny Stahl RD   10/09/2023

## 2023-10-09 NOTE — PLAN OF CARE
Current with [facility] ,referral sent.  She resides with spouse Christphylliser and kids.  Pt had gastric sleeves in 2013 and states problems Started shortly thereafter.

## 2023-10-10 LAB
B-HCG SERPL QL: NEGATIVE
BACTERIA BLD CULT: NORMAL
BACTERIA BLD CULT: NORMAL

## 2023-10-10 PROCEDURE — 63600175 PHARM REV CODE 636 W HCPCS: Performed by: INTERNAL MEDICINE

## 2023-10-10 PROCEDURE — 81025 URINE PREGNANCY TEST: CPT | Performed by: INTERNAL MEDICINE

## 2023-10-10 PROCEDURE — 11000001 HC ACUTE MED/SURG PRIVATE ROOM

## 2023-10-10 PROCEDURE — 25000003 PHARM REV CODE 250: Performed by: INTERNAL MEDICINE

## 2023-10-10 PROCEDURE — 63600175 PHARM REV CODE 636 W HCPCS: Performed by: NURSE PRACTITIONER

## 2023-10-10 PROCEDURE — C9113 INJ PANTOPRAZOLE SODIUM, VIA: HCPCS | Performed by: INTERNAL MEDICINE

## 2023-10-10 PROCEDURE — S0179 MEGESTROL 20 MG: HCPCS | Performed by: INTERNAL MEDICINE

## 2023-10-10 RX ADMIN — HYDROMORPHONE HYDROCHLORIDE 1 MG: 2 INJECTION INTRAMUSCULAR; INTRAVENOUS; SUBCUTANEOUS at 09:10

## 2023-10-10 RX ADMIN — ONDANSETRON 4 MG: 2 INJECTION INTRAMUSCULAR; INTRAVENOUS at 01:10

## 2023-10-10 RX ADMIN — SUCRALFATE 1 G: 1 TABLET ORAL at 08:10

## 2023-10-10 RX ADMIN — ONDANSETRON 4 MG: 2 INJECTION INTRAMUSCULAR; INTRAVENOUS at 09:10

## 2023-10-10 RX ADMIN — ALPRAZOLAM 2 MG: 0.5 TABLET ORAL at 03:10

## 2023-10-10 RX ADMIN — SUCRALFATE 1 G: 1 TABLET ORAL at 09:10

## 2023-10-10 RX ADMIN — ALPRAZOLAM 2 MG: 0.5 TABLET ORAL at 09:10

## 2023-10-10 RX ADMIN — ONDANSETRON 4 MG: 2 INJECTION INTRAMUSCULAR; INTRAVENOUS at 05:10

## 2023-10-10 RX ADMIN — HYDROMORPHONE HYDROCHLORIDE 1 MG: 2 INJECTION INTRAMUSCULAR; INTRAVENOUS; SUBCUTANEOUS at 05:10

## 2023-10-10 RX ADMIN — MIRTAZAPINE 7.5 MG: 7.5 TABLET ORAL at 09:10

## 2023-10-10 RX ADMIN — AMLODIPINE BESYLATE 10 MG: 5 TABLET ORAL at 08:10

## 2023-10-10 RX ADMIN — ENOXAPARIN SODIUM 30 MG: 40 INJECTION SUBCUTANEOUS at 05:10

## 2023-10-10 RX ADMIN — MEGESTROL ACETATE 600 MG: 400 SUSPENSION ORAL at 08:10

## 2023-10-10 RX ADMIN — LEUCINE, PHENYLALANINE, LYSINE, METHIONINE, ISOLEUCINE, VALINE, HISTIDINE, THREONINE, TRYPTOPHAN, ALANINE, GLYCINE, ARGININE, PROLINE, SERINE, TYROSINE, SODIUM ACETATE, DIBASIC POTASSIUM PHOSPHATE, MAGNESIUM CHLORIDE, SODIUM CHLORIDE, CALCIUM CHLORIDE, DEXTROSE
311; 238; 247; 170; 255; 247; 204; 179; 77; 880; 438; 489; 289; 213; 17; 297; 261; 51; 77; 33; 5 INJECTION INTRAVENOUS at 05:10

## 2023-10-10 RX ADMIN — PANTOPRAZOLE SODIUM 40 MG: 40 INJECTION, POWDER, FOR SOLUTION INTRAVENOUS at 09:10

## 2023-10-10 RX ADMIN — HYDROMORPHONE HYDROCHLORIDE 1 MG: 2 INJECTION INTRAMUSCULAR; INTRAVENOUS; SUBCUTANEOUS at 01:10

## 2023-10-10 RX ADMIN — LEUCINE, PHENYLALANINE, LYSINE, METHIONINE, ISOLEUCINE, VALINE, HISTIDINE, THREONINE, TRYPTOPHAN, ALANINE, GLYCINE, ARGININE, PROLINE, SERINE, TYROSINE, SODIUM ACETATE, DIBASIC POTASSIUM PHOSPHATE, MAGNESIUM CHLORIDE, SODIUM CHLORIDE, CALCIUM CHLORIDE, DEXTROSE
311; 238; 247; 170; 255; 247; 204; 179; 77; 880; 438; 489; 289; 213; 17; 297; 261; 51; 77; 33; 5 INJECTION INTRAVENOUS at 12:10

## 2023-10-10 RX ADMIN — SUCRALFATE 1 G: 1 TABLET ORAL at 01:10

## 2023-10-10 RX ADMIN — SUCRALFATE 1 G: 1 TABLET ORAL at 05:10

## 2023-10-10 RX ADMIN — PANTOPRAZOLE SODIUM 40 MG: 40 INJECTION, POWDER, FOR SOLUTION INTRAVENOUS at 08:10

## 2023-10-10 NOTE — CONSULTS
Chief complaint:  Nonfunctioning gastrostomy tube, severe malnutrition, bariatric complications     History of present illness:  This is a 37-year-old female who has had a complicated bariatric issue.  She had a laparoscopic vertical sleeve gastrectomy in [place] many years ago, she had severe reflux and hiatal hernia, she went back to [place] and had a many gastric bypass.  This was not helpful, it worsened her reflux, she ultimately saw a [facility] bariatric surgeon who performed conversion of the many bypass to a standard Kel-en-Y gastric bypass.  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  Since that time she is had difficulty tolerating foods, she is had multiple EGDs demonstrating marginal ulceration with circumferential ulceration of the distal gastric pouch, she also had gastrostomy tube placement which tends to leak constantly and is essentially nonfunctional because it leaks preventing delivery of adequate enteral feeds.  She does not eat enough to maintain p.o. diet.      She had been on narcotics for quite some time but she stopped on her own.  She is in fear of early death because of severe malnutrition.  She has 3 children, she is lost even more weight since I have seen her 2 months ago.  She is desperate for surgical intervention to address her malnutrition in his hopeful for eventual revisional bariatric surgery so that she may regain the ability to eat       Assessment and plan:  37-year-old female with severe malnutrition, complicated bariatric history, non functioning gastrostomy tube     Surgical history as above     Medications please see med rec     Review of systems:    No headaches dizziness  No chest pain orthopnea dyspnea on exertion   Occasional abdominal pain at gastrostomy tube site         Physical examination:  General:  No acute distress, cachectic  appearing  HEENT:  Temporal wasting, otherwise normocephalic atraumatic  CVS:  RRR  Resp:  Dyspneic  GI:  Abdomen soft, non-distended, nontender, gtube in place with some bilious leakage and erythema, scaphoid abdomen  :  Deferred  MSK:    Skin:  No rashes, ulcers, erythema  Neuro:  CNII-XII grossly intact  Psych:  Alert and oriented to person, place, and time     Patient has increasing weight loss, increasing food intolerance      Over 2 months ago she had EGD demonstrating severe distal gastritis and ulceration, she was unable to tolerate food, her gastrostomy tube leaks constantly and she is unable to effectively use it to deliver nutrition, this has been tested numerous times, her BMI continues to dwindle in her food intolerance continues to dwindle.       Patient is very concerned that she will develop sequelae of chronic malnutrition, her physical exam does demonstrate temporal wasting, she is emaciated appearing and appears dyspneic at rest.       Her lifestyle is severely altered, patient has constant fear of dying early because of her inability to tolerate foods or get enteral nutrition      I believe it is imperative to establish enteral feeds, to achieve this goal, I will take her to the operating room for placement of likely a jejunostomy tube so that she may have enteral nutrition, she will need to have J-tube feedings likely for several weeks before any gastric bypass reconstruction is attempted      Patient has had multiple operations including sleeve, many gastric bypass converted to gastric bypass, a gastric bypass was complicated by gastric fistula and intra-abdominal leaking requiring exploratory laparotomy and corrective procedures      It seems that unless her ulceration heals, she would ultimately require lb gastrectomy and esophagus jejunostomy  This surgical procedure can only be considered after getting adequate enteral feeds      I am planning to take her to the operating room on  Wednesday October the 11th

## 2023-10-10 NOTE — PROGRESS NOTES
[facility] Hospital Medicine Progress Note        Chief Complaint: Inpatient Follow-up     HPI:   37-year-old female with medical history of multiple abdominal surgeries started with sleeve gastrectomy in 2013 followed by Kel-en-Y gastric bypass in [place] in June 2023, laparoscopic hiatal hernia repair with mesh on 01/17/2023, complicated ray incarceration and possible leakage and underwent exploratory laparotomy on 01/27/2023 which did not reveal perforation and during hospital stay had recurrence of abdominal pain and underwent a repeat exploratory laparotomy on 02/07/2023 with partial gastrectomy and gastrojejunostomy for necrotic segment of the stomach as well as underwent G-tube placement in the antrum.  Since had recurrent admission for continued weight loss and recurrent nausea, vomiting and abdominal pain with last admission in early August 2023 where she underwent EGD on 08/09/2023 which revealed LA grade B reflux esophagitis with no bleeding, Kel-en-Y gastrojejunostomy with gastrojejunal anastomosis characterized by congestion erythema and ulceration, biopsy negative for H pylori.  She was discharged on Protonix and Carafate.     Present to the ED with complaint of nausea, vomiting, upper abdominal pain, continued G-tube leakage.  Reported fever at home.  Report chronic intermittent loose stool since February that is unchanged.     On arrival to ED she was afebrile and hemodynamically stable.  Labs notable for albumin 2.2.  CT abdomen pelvis show edematous appearance of the GE junction and proximal gastric pouch, G-tube balloon in the distal gastric body/biliopancreatic limb.  Mild edematous appearance of the small bowel.  No evidence of bowel obstruction or perforation.     She was given analgesics and referred to hospital medicine service for further evaluation and management.    Patient is very concerned that she will develop sequelae of chronic  malnutrition      Interval Hx:   No acute events reported overnight.    Met with  at bed side this AM, discussed the surgeons plans  and ooptimizing nutrition in interim  Pt with no specific complaints    Objective/physical exam:  General: thin white female, In no acute distress, afebrile  Chest: Clear to auscultation bilaterally  Heart: RRR, +S1, S2, no appreciable murmur  Abdomen: Soft, g tube with bile  MSK: Warm, no lower extremity edema  Neurologic: Alert and oriented , no fnd    VITAL SIGNS: 24 HRS MIN & MAX LAST   Temp  Min: 98.4 °F (36.9 °C)  Max: 99.3 °F (37.4 °C) 98.7 °F (37.1 °C)   BP  Min: 120/78  Max: 147/103 127/84   Pulse  Min: 75  Max: 88  75   Resp  Min: 18  Max: 20 18   SpO2  Min: 99 %  Max: 100 % 99 %     I have reviewed the following labs:  Recent Labs   Lab 10/05/23  1359 10/09/23  0548   WBC 8.63 8.10   RBC 4.60 3.72*   HGB 13.6 10.9*   HCT 41.9 33.2*   MCV 91.1 89.2   MCH 29.6 29.3   MCHC 32.5* 32.8*   RDW 14.0 14.2    247   MPV 11.4* 12.2*     Recent Labs   Lab 10/05/23  1359 10/09/23  0548    137   K 4.3 4.3   CO2 20* 19*   BUN 9.3 9.6   CREATININE 0.79 0.56   CALCIUM 8.0* 7.3*   MG 2.00  --    ALBUMIN 2.2* 1.6*   ALKPHOS 99  --    ALT 20  --    AST 18  --    BILITOT 1.0  --      Microbiology Results (last 7 days)       Procedure Component Value Units Date/Time    Blood Culture [[id number]]  (Normal) Collected: 10/05/23 1359    Order Status: Completed Specimen: Blood Updated: 10/09/23 1600     CULTURE, BLOOD ([facility]) No Growth At 96 Hours    Blood Culture [[id number]]  (Normal) Collected: 10/05/23 1359    Order Status: Completed Specimen: Blood Updated: 10/09/23 1600     CULTURE, BLOOD ([facility]) No Growth At 96 Hours             See below for Radiology    Scheduled Med:   amLODIPine  10 mg Oral Daily    enoxparin  30 mg Subcutaneous Q24H (prophylaxis, 1700)    megestroL  600 mg Oral Daily    mirtazapine  7.5 mg Oral QHS    pantoprazole  40 mg Intravenous BID    sucralfate  1 g  Oral QID      Continuous Infusions:   Amino acid 4.25% - dextrose 5% (CLINIMIX-E) solution (1L provides 42.5 gm AA, 50 gm CHO (170 kcal/L dextrose), Na 35, K 30, Mg 5, Ca 4.5, Acetate 70, Cl 39, Phos 15) 75 mL/hr at 10/10/23 0727      PRN Meds:  acetaminophen, ALPRAZolam, aluminum-magnesium hydroxide-simethicone, dicyclomine, HYDROmorphone, melatonin, ondansetron, polyethylene glycol, prochlorperazine, senna-docusate 8.6-50 mg, sodium chloride 0.9%     Assessment/Plan:  Severe reflux esophagitis and gastritis  Underweight and Severe protein calorie malnutrition     History of anxiety, multiple bariatric surgeries and hiatal hernia repair with complications     Labs reviewed, albumin 1.6, from 10/9  Cont peripheral TPN  Supportive care in the interim  Noted surgery inputs,appreciate recommendations  Cont Protonix  40 mg IV b.i.d., Sucralfate 1 g QID  Care discussed with pt's RN, case mngt        VTE prophylaxis: lovenox    Patient condition:  Stable    Anticipated discharge and Disposition:   tbd      _____________________________________________________________________    Nutrition Status:    Radiology:  I have personally reviewed the following imaging and agree with the radiologist.     FL Upper GI Water Soluable to Include Esophagram  Narrative: EXAMINATION:  FL UPPER GI WATER SOLUABLE TO INCLUDE ESOPHAGRAM    CLINICAL HISTORY:  Complex revision bariatric surgery;Esophagitis, unspecified without bleeding    TECHNIQUE:  Patient performed several swallows with Ominipaque.  Multiple fluoroscopic images were performed of the esophagus and the stomach.    Fluoroscopy time 1 minutes 24 seconds.    Radiation does 59 mGy.    DAP 1388 uGym2.    Total of 93 fluoroscopic images were performed.    COMPARISON:  CT abdomen pelvis October 5, 2023    FINDINGS:  Timing and emptying of contrast from the hypopharynx was unremarkable.  Unremarkable esophageal primary and secondary peristalsis and there were no tertiary waveforms..   Unremarkable distensibility of the esophagus without abnormal dilatation, stricture or diverticulum.  Contrast promptly transited into the stomach.  Stomach is of small volume with irregular configuration related to prior surgeries.  No contrast extravasation identified.  Contrast also promptly transited into the small bowel loops across the anastomosis.  Impression: 1. Small stomach with irregular configuration.    2. No contrast extravasation.    Electronically signed by: Edgardo Tyler  Date:    10/08/2023  Time:    13:01      Marry Jenkins MD   10/10/2023

## 2023-10-10 NOTE — PLAN OF CARE
"/85   Pulse 83   Temp 98.8 °F (37.1 °C) (Oral)   Resp 18   Ht 5' 4" (1.626 m)   Wt 43.5 kg (96 lb)   SpO2 100%   Breastfeeding No   BMI 16.48 kg/m²     Problem: Adult Inpatient Plan of Care  Goal: Plan of Care Review  Outcome: Ongoing, Progressing  Goal: Patient-Specific Goal (Individualized)  Outcome: Ongoing, Progressing  Goal: Absence of Hospital-Acquired Illness or Injury  Outcome: Ongoing, Progressing  Goal: Optimal Comfort and Wellbeing  Outcome: Ongoing, Progressing  Goal: Readiness for Transition of Care  Outcome: Ongoing, Progressing     Problem: Skin Injury Risk Increased  Goal: Skin Health and Integrity  Outcome: Ongoing, Progressing     Problem: Bowel Motility Impaired (Bariatric Surgery)  Goal: Effective Bowel Motility and Elimination  Outcome: Ongoing, Progressing     Problem: Fluid and Electrolyte Imbalance (Bariatric Surgery)  Goal: Fluid and Electrolyte Balance  Outcome: Ongoing, Progressing     Problem: Nausea and Vomiting  Goal: Fluid and Electrolyte Balance  Outcome: Ongoing, Progressing     "

## 2023-10-11 ENCOUNTER — ANESTHESIA (OUTPATIENT)
Dept: SURGERY | Facility: HOSPITAL | Age: 38
DRG: 393 | End: 2023-10-11
Payer: COMMERCIAL

## 2023-10-11 ENCOUNTER — ANESTHESIA EVENT (OUTPATIENT)
Dept: SURGERY | Facility: HOSPITAL | Age: 38
DRG: 393 | End: 2023-10-11
Payer: COMMERCIAL

## 2023-10-11 PROBLEM — R07.9 CHEST PAIN: Status: ACTIVE | Noted: 2023-10-11

## 2023-10-11 PROBLEM — E43 SEVERE MALNUTRITION: Status: ACTIVE | Noted: 2023-10-11

## 2023-10-11 PROCEDURE — 36000708 HC OR TIME LEV III 1ST 15 MIN: Performed by: SURGERY

## 2023-10-11 PROCEDURE — 63600175 PHARM REV CODE 636 W HCPCS: Performed by: SURGERY

## 2023-10-11 PROCEDURE — 44186 LAP JEJUNOSTOMY: CPT | Mod: ,,, | Performed by: SURGERY

## 2023-10-11 PROCEDURE — D9220A PRA ANESTHESIA: ICD-10-PCS | Mod: ,,,

## 2023-10-11 PROCEDURE — 25000003 PHARM REV CODE 250

## 2023-10-11 PROCEDURE — 44186 PR LAP, SURG JEJUNOSTOMY: ICD-10-PCS | Mod: ,,, | Performed by: SURGERY

## 2023-10-11 PROCEDURE — P9047 ALBUMIN (HUMAN), 25%, 50ML: HCPCS | Mod: JZ,JG

## 2023-10-11 PROCEDURE — 25000003 PHARM REV CODE 250: Performed by: INTERNAL MEDICINE

## 2023-10-11 PROCEDURE — 27201423 OPTIME MED/SURG SUP & DEVICES STERILE SUPPLY: Performed by: SURGERY

## 2023-10-11 PROCEDURE — 63600175 PHARM REV CODE 636 W HCPCS: Performed by: NURSE PRACTITIONER

## 2023-10-11 PROCEDURE — 63600175 PHARM REV CODE 636 W HCPCS

## 2023-10-11 PROCEDURE — 11000001 HC ACUTE MED/SURG PRIVATE ROOM

## 2023-10-11 PROCEDURE — 25000003 PHARM REV CODE 250: Performed by: ANESTHESIOLOGY

## 2023-10-11 PROCEDURE — 37000008 HC ANESTHESIA 1ST 15 MINUTES: Performed by: SURGERY

## 2023-10-11 PROCEDURE — 37000009 HC ANESTHESIA EA ADD 15 MINS: Performed by: SURGERY

## 2023-10-11 PROCEDURE — 71000033 HC RECOVERY, INTIAL HOUR: Performed by: SURGERY

## 2023-10-11 PROCEDURE — 63600175 PHARM REV CODE 636 W HCPCS: Performed by: INTERNAL MEDICINE

## 2023-10-11 PROCEDURE — D9220A PRA ANESTHESIA: Mod: ,,,

## 2023-10-11 PROCEDURE — 99223 PR INITIAL HOSPITAL CARE,LEVL III: ICD-10-PCS | Mod: 57,,, | Performed by: SURGERY

## 2023-10-11 PROCEDURE — 36000709 HC OR TIME LEV III EA ADD 15 MIN: Performed by: SURGERY

## 2023-10-11 PROCEDURE — 99223 1ST HOSP IP/OBS HIGH 75: CPT | Mod: 57,,, | Performed by: SURGERY

## 2023-10-11 PROCEDURE — C9113 INJ PANTOPRAZOLE SODIUM, VIA: HCPCS | Performed by: INTERNAL MEDICINE

## 2023-10-11 RX ORDER — BUPIVACAINE HYDROCHLORIDE 5 MG/ML
INJECTION, SOLUTION EPIDURAL; INTRACAUDAL
Status: DISCONTINUED | OUTPATIENT
Start: 2023-10-11 | End: 2023-10-11 | Stop reason: HOSPADM

## 2023-10-11 RX ORDER — ONDANSETRON 2 MG/ML
4 INJECTION INTRAMUSCULAR; INTRAVENOUS DAILY PRN
Status: DISCONTINUED | OUTPATIENT
Start: 2023-10-11 | End: 2023-10-11 | Stop reason: HOSPADM

## 2023-10-11 RX ORDER — HYDROMORPHONE HYDROCHLORIDE 2 MG/ML
0.4 INJECTION, SOLUTION INTRAMUSCULAR; INTRAVENOUS; SUBCUTANEOUS EVERY 5 MIN PRN
Status: DISCONTINUED | OUTPATIENT
Start: 2023-10-11 | End: 2023-10-11 | Stop reason: HOSPADM

## 2023-10-11 RX ORDER — PROPOFOL 10 MG/ML
VIAL (ML) INTRAVENOUS
Status: DISCONTINUED | OUTPATIENT
Start: 2023-10-11 | End: 2023-10-11

## 2023-10-11 RX ORDER — HYDROMORPHONE HYDROCHLORIDE 2 MG/ML
0.2 INJECTION, SOLUTION INTRAMUSCULAR; INTRAVENOUS; SUBCUTANEOUS EVERY 5 MIN PRN
Status: DISCONTINUED | OUTPATIENT
Start: 2023-10-11 | End: 2023-10-11 | Stop reason: HOSPADM

## 2023-10-11 RX ORDER — DIPHENHYDRAMINE HYDROCHLORIDE 50 MG/ML
25 INJECTION INTRAMUSCULAR; INTRAVENOUS EVERY 6 HOURS PRN
Status: DISCONTINUED | OUTPATIENT
Start: 2023-10-11 | End: 2023-10-11 | Stop reason: HOSPADM

## 2023-10-11 RX ORDER — ROCURONIUM BROMIDE 10 MG/ML
INJECTION, SOLUTION INTRAVENOUS
Status: DISCONTINUED | OUTPATIENT
Start: 2023-10-11 | End: 2023-10-11

## 2023-10-11 RX ORDER — DEXMEDETOMIDINE HYDROCHLORIDE 100 UG/ML
INJECTION, SOLUTION INTRAVENOUS
Status: DISCONTINUED | OUTPATIENT
Start: 2023-10-11 | End: 2023-10-11

## 2023-10-11 RX ORDER — MIDAZOLAM HYDROCHLORIDE 1 MG/ML
INJECTION INTRAMUSCULAR; INTRAVENOUS
Status: DISCONTINUED | OUTPATIENT
Start: 2023-10-11 | End: 2023-10-11

## 2023-10-11 RX ORDER — PROCHLORPERAZINE EDISYLATE 5 MG/ML
5 INJECTION INTRAMUSCULAR; INTRAVENOUS EVERY 30 MIN PRN
Status: DISCONTINUED | OUTPATIENT
Start: 2023-10-11 | End: 2023-10-11 | Stop reason: HOSPADM

## 2023-10-11 RX ORDER — ONDANSETRON 2 MG/ML
INJECTION INTRAMUSCULAR; INTRAVENOUS
Status: DISCONTINUED | OUTPATIENT
Start: 2023-10-11 | End: 2023-10-11

## 2023-10-11 RX ORDER — NALOXONE HCL 0.4 MG/ML
VIAL (ML) INJECTION
Status: DISCONTINUED | OUTPATIENT
Start: 2023-10-11 | End: 2023-10-11

## 2023-10-11 RX ORDER — ALBUMIN HUMAN 250 G/1000ML
SOLUTION INTRAVENOUS
Status: DISCONTINUED | OUTPATIENT
Start: 2023-10-11 | End: 2023-10-11

## 2023-10-11 RX ORDER — LIDOCAINE HYDROCHLORIDE 10 MG/ML
1 INJECTION, SOLUTION EPIDURAL; INFILTRATION; INTRACAUDAL; PERINEURAL ONCE
Status: CANCELLED | OUTPATIENT
Start: 2023-10-11 | End: 2023-10-11

## 2023-10-11 RX ORDER — MEPERIDINE HYDROCHLORIDE 25 MG/ML
12.5 INJECTION INTRAMUSCULAR; INTRAVENOUS; SUBCUTANEOUS EVERY 10 MIN PRN
Status: DISCONTINUED | OUTPATIENT
Start: 2023-10-11 | End: 2023-10-11 | Stop reason: HOSPADM

## 2023-10-11 RX ORDER — CEFAZOLIN SODIUM 1 G/3ML
INJECTION, POWDER, FOR SOLUTION INTRAMUSCULAR; INTRAVENOUS
Status: DISCONTINUED | OUTPATIENT
Start: 2023-10-11 | End: 2023-10-11

## 2023-10-11 RX ORDER — DEXAMETHASONE SODIUM PHOSPHATE 4 MG/ML
INJECTION, SOLUTION INTRA-ARTICULAR; INTRALESIONAL; INTRAMUSCULAR; INTRAVENOUS; SOFT TISSUE
Status: DISCONTINUED | OUTPATIENT
Start: 2023-10-11 | End: 2023-10-11

## 2023-10-11 RX ORDER — SODIUM CHLORIDE, SODIUM GLUCONATE, SODIUM ACETATE, POTASSIUM CHLORIDE AND MAGNESIUM CHLORIDE 30; 37; 368; 526; 502 MG/100ML; MG/100ML; MG/100ML; MG/100ML; MG/100ML
INJECTION, SOLUTION INTRAVENOUS CONTINUOUS
Status: CANCELLED | OUTPATIENT
Start: 2023-10-11 | End: 2023-11-10

## 2023-10-11 RX ORDER — SCOLOPAMINE TRANSDERMAL SYSTEM 1 MG/1
1 PATCH, EXTENDED RELEASE TRANSDERMAL
Status: DISCONTINUED | OUTPATIENT
Start: 2023-10-11 | End: 2023-10-11 | Stop reason: HOSPADM

## 2023-10-11 RX ORDER — HYDROMORPHONE HYDROCHLORIDE 2 MG/ML
INJECTION, SOLUTION INTRAMUSCULAR; INTRAVENOUS; SUBCUTANEOUS
Status: DISCONTINUED | OUTPATIENT
Start: 2023-10-11 | End: 2023-10-11

## 2023-10-11 RX ORDER — MIDAZOLAM HYDROCHLORIDE 1 MG/ML
2 INJECTION INTRAMUSCULAR; INTRAVENOUS
Status: ACTIVE | OUTPATIENT
Start: 2023-10-11 | End: 2023-10-11

## 2023-10-11 RX ORDER — LIDOCAINE HYDROCHLORIDE 20 MG/ML
INJECTION, SOLUTION EPIDURAL; INFILTRATION; INTRACAUDAL; PERINEURAL
Status: DISCONTINUED | OUTPATIENT
Start: 2023-10-11 | End: 2023-10-11

## 2023-10-11 RX ADMIN — ROCURONIUM BROMIDE 50 MG: 10 SOLUTION INTRAVENOUS at 11:10

## 2023-10-11 RX ADMIN — ENOXAPARIN SODIUM 30 MG: 40 INJECTION SUBCUTANEOUS at 06:10

## 2023-10-11 RX ADMIN — AMLODIPINE BESYLATE 10 MG: 5 TABLET ORAL at 10:10

## 2023-10-11 RX ADMIN — DEXMEDETOMIDINE 6 MCG: 200 INJECTION, SOLUTION INTRAVENOUS at 12:10

## 2023-10-11 RX ADMIN — DEXAMETHASONE SODIUM PHOSPHATE 4 MG: 4 INJECTION, SOLUTION INTRA-ARTICULAR; INTRALESIONAL; INTRAMUSCULAR; INTRAVENOUS; SOFT TISSUE at 12:10

## 2023-10-11 RX ADMIN — PANTOPRAZOLE SODIUM 40 MG: 40 INJECTION, POWDER, FOR SOLUTION INTRAVENOUS at 09:10

## 2023-10-11 RX ADMIN — LEUCINE, PHENYLALANINE, LYSINE, METHIONINE, ISOLEUCINE, VALINE, HISTIDINE, THREONINE, TRYPTOPHAN, ALANINE, GLYCINE, ARGININE, PROLINE, SERINE, TYROSINE, SODIUM ACETATE, DIBASIC POTASSIUM PHOSPHATE, MAGNESIUM CHLORIDE, SODIUM CHLORIDE, CALCIUM CHLORIDE, DEXTROSE
311; 238; 247; 170; 255; 247; 204; 179; 77; 880; 438; 489; 289; 213; 17; 297; 261; 51; 77; 33; 5 INJECTION INTRAVENOUS at 06:10

## 2023-10-11 RX ADMIN — ALBUMIN (HUMAN) 100 ML: 12.5 SOLUTION INTRAVENOUS at 01:10

## 2023-10-11 RX ADMIN — PROPOFOL 120 MG: 10 INJECTION, EMULSION INTRAVENOUS at 11:10

## 2023-10-11 RX ADMIN — DEXMEDETOMIDINE 5 MCG: 200 INJECTION, SOLUTION INTRAVENOUS at 12:10

## 2023-10-11 RX ADMIN — SCOPOLAMINE 1 PATCH: 1 PATCH TRANSDERMAL at 11:10

## 2023-10-11 RX ADMIN — NALOXONE HYDROCHLORIDE 0.08 MCG: 0.4 INJECTION, SOLUTION INTRAMUSCULAR; INTRAVENOUS; SUBCUTANEOUS at 02:10

## 2023-10-11 RX ADMIN — PANTOPRAZOLE SODIUM 40 MG: 40 INJECTION, POWDER, FOR SOLUTION INTRAVENOUS at 10:10

## 2023-10-11 RX ADMIN — MIRTAZAPINE 7.5 MG: 7.5 TABLET ORAL at 09:10

## 2023-10-11 RX ADMIN — ALPRAZOLAM 2 MG: 0.5 TABLET ORAL at 09:10

## 2023-10-11 RX ADMIN — ONDANSETRON 4 MG: 2 INJECTION INTRAMUSCULAR; INTRAVENOUS at 01:10

## 2023-10-11 RX ADMIN — HYDROMORPHONE HYDROCHLORIDE 1 MG: 2 INJECTION, SOLUTION INTRAMUSCULAR; INTRAVENOUS; SUBCUTANEOUS at 12:10

## 2023-10-11 RX ADMIN — SUGAMMADEX 200 MG: 100 INJECTION, SOLUTION INTRAVENOUS at 02:10

## 2023-10-11 RX ADMIN — MIDAZOLAM HYDROCHLORIDE 2 MG: 1 INJECTION, SOLUTION INTRAMUSCULAR; INTRAVENOUS at 11:10

## 2023-10-11 RX ADMIN — HYDROMORPHONE HYDROCHLORIDE 0.5 MG: 2 INJECTION, SOLUTION INTRAMUSCULAR; INTRAVENOUS; SUBCUTANEOUS at 11:10

## 2023-10-11 RX ADMIN — LIDOCAINE HYDROCHLORIDE 80 MG: 20 INJECTION, SOLUTION EPIDURAL; INFILTRATION; INTRACAUDAL; PERINEURAL at 11:10

## 2023-10-11 RX ADMIN — HYDROMORPHONE HYDROCHLORIDE 1 MG: 2 INJECTION INTRAMUSCULAR; INTRAVENOUS; SUBCUTANEOUS at 07:10

## 2023-10-11 RX ADMIN — DEXMEDETOMIDINE 4 MCG: 200 INJECTION, SOLUTION INTRAVENOUS at 12:10

## 2023-10-11 RX ADMIN — HYDROMORPHONE HYDROCHLORIDE 1 MG: 2 INJECTION INTRAMUSCULAR; INTRAVENOUS; SUBCUTANEOUS at 11:10

## 2023-10-11 RX ADMIN — CEFAZOLIN 1 G: 330 INJECTION, POWDER, FOR SOLUTION INTRAMUSCULAR; INTRAVENOUS at 12:10

## 2023-10-11 RX ADMIN — HYDROMORPHONE HYDROCHLORIDE 1 MG: 2 INJECTION INTRAMUSCULAR; INTRAVENOUS; SUBCUTANEOUS at 10:10

## 2023-10-11 RX ADMIN — SODIUM CHLORIDE, SODIUM GLUCONATE, SODIUM ACETATE, POTASSIUM CHLORIDE AND MAGNESIUM CHLORIDE: 526; 502; 368; 37; 30 INJECTION, SOLUTION INTRAVENOUS at 11:10

## 2023-10-11 NOTE — TRANSFER OF CARE
"Anesthesia Transfer of Care Note    Patient: Chelsie Lee    Procedure(s) Performed: Procedure(s) (LRB):  INSERTION, JEJUNOSTOMY TUBE, LAPAROSCOPIC (N/A)  EGD (ESOPHAGOGASTRODUODENOSCOPY) (N/A)    Patient location: PACU    Anesthesia Type: general    Transport from OR: Transported from OR on room air with adequate spontaneous ventilation    Post pain: adequate analgesia    Post assessment: no apparent anesthetic complications    Post vital signs: stable    Level of consciousness: responds to stimulation    Nausea/Vomiting: no nausea/vomiting    Complications: none    Transfer of care protocol was followed      Last vitals:   Visit Vitals  /78 (BP Location: Left arm, Patient Position: Sitting)   Pulse 92   Temp 37.1 °C (98.7 °F) (Oral)   Resp 18   Ht 5' 4" (1.626 m)   Wt 43.5 kg (96 lb)   SpO2 100%   Breastfeeding No   BMI 16.48 kg/m²     "

## 2023-10-11 NOTE — ANESTHESIA PREPROCEDURE EVALUATION
Note written by MD Billie and edited by RACHEL Bardales MD                                                                                                          10/11/2023  Chelsie Lee is a 37 y.o., female.    Chelsie Lee    Pre-op Diagnosis: Esophagitis [K20.90]  Intractable vomiting [R11.10]  Intractable nausea and vomiting [R11.2]  Multiple gastric ulcers [K25.9]  H/O gastric bypass [Z98.84]  H/O gastric sleeve [Z90.3]    Procedure(s):  INSERTION, JEJUNOSTOMY TUBE, LAPAROSCOPIC  EGD (ESOPHAGOGASTRODUODENOSCOPY)   Chief complaint:  Nonfunctioning gastrostomy tube, severe malnutrition, bariatric complications      History of present illness:  This is a 37-year-old female who has had a complicated bariatric issue.  She had a laparoscopic vertical sleeve gastrectomy in [place] many years ago, she had severe reflux and hiatal hernia, she went back to [place] and had a many gastric bypass.  This was not helpful, it worsened her reflux, she ultimately saw a [place] bariatric surgeon who performed conversion of the many bypass to a standard Kel-en-Y gastric bypass.  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  Since that time she is had difficulty tolerating foods, she is had multiple EGDs demonstrating marginal ulceration with circumferential ulceration of the distal gastric pouch, she also had gastrostomy tube placement which tends to leak constantly and is essentially nonfunctional because it leaks preventing delivery of adequate enteral feeds.  She does not eat enough to maintain p.o. diet.       She had been on narcotics for quite some time but she stopped on her own.    Review of patient's allergies indicates:   Allergen Reactions    Penicillin Rash    Penicillin Rash       Current Outpatient Medications   Medication Instructions    ALPRAZolam (XANAX) 2 mg, Oral, 3 times  daily PRN    amLODIPine (NORVASC) 10 mg, Oral, Daily    cyanocobalamin 1,000 mcg, Intramuscular, Every 30 days    dicyclomine (BENTYL) 10 mg, Oral, Every 6 hours PRN    DULoxetine (CYMBALTA) 20 mg, Oral, 2 times daily    ergocalciferol (ERGOCALCIFEROL) 50,000 Units, Oral, Every 7 days    hyoscyamine (LEVSIN/SL) 0.125 mg Subl Sublingual    megestroL (MEGACE ES) 625 mg, Oral    mirtazapine (REMERON) 7.5 mg, Oral, Nightly    pantoprazole (PROTONIX) 40 mg, Oral, 2 times daily before meals           Past Medical History:   Diagnosis Date    GERD (gastroesophageal reflux disease)     Hypertension        Past Surgical History:   Procedure Laterality Date    COLONOSCOPY N/A 8/11/2023    Procedure: COLON;  Surgeon: Wilmer Rossi MD;  Location: [facility] ENDOSCOPY;  Service: Gastroenterology;  Laterality: N/A;    EGD, WITH CLOSED BIOPSY  8/9/2023    Procedure: EGD, WITH CLOSED BIOPSY;  Surgeon: Wilmer Rossi MD;  Location: [facility] ENDOSCOPY;  Service: Gastroenterology;;    ESOPHAGOGASTRODUODENOSCOPY N/A 8/9/2023    Procedure: EGD;  Surgeon: Wilmer Rossi MD;  Location: [facility] ENDOSCOPY;  Service: Gastroenterology;  Laterality: N/A;    HERNIA REPAIR      LAPAROSCOPIC GASTRIC BANDING       Recent Labs   Lab 10/05/23  1359 10/09/23  0548   WBC 8.63 8.10   RBC 4.60 3.72*   HGB 13.6 10.9*   HCT 41.9 33.2*   MCV 91.1 89.2   MCH 29.6 29.3   MCHC 32.5* 32.8*   RDW 14.0 14.2    247   MPV 11.4* 12.2*       Recent Labs   Lab 10/05/23  1359 10/09/23  0548    137   K 4.3 4.3   CO2 20* 19*   BUN 9.3 9.6   CREATININE 0.79 0.56   CALCIUM 8.0* 7.3*   MG 2.00  --    ALBUMIN 2.2* 1.6*   ALKPHOS 99  --    ALT 20  --    AST 18  --    BILITOT 1.0  --      Pre-op Assessment    I have reviewed the Patient Summary Reports.     I have reviewed the Nursing Notes. I have reviewed the NPO Status.   I have reviewed the Medications.     Review of Systems  Anesthesia Hx:  No problems with previous Anesthesia     Social:  Non-Smoker    Hematology/Oncology:  Hematology Normal   Oncology Normal     EENT/Dental:EENT/Dental Normal   Cardiovascular:  Cardiovascular Normal Exercise tolerance: good Hypertension   Functional Capacity good / => 4 METS    Pulmonary:  Pulmonary Normal    Renal/:  Renal/ Normal     Hepatic/GI:   PUD, GERD    Musculoskeletal:  Musculoskeletal Normal    Neurological:   Neuromuscular Disease,    Endocrine:  Endocrine Normal    Dermatological:  Skin Normal    Psych:  Psychiatric Normal           Physical Exam  General: Well nourished, Alert and Oriented    Airway:  Mallampati: III   Mouth Opening: Normal  TM Distance: Normal  Tongue: Normal  Neck ROM: Normal ROM    Dental:  Intact    Chest/Lungs:  Clear to auscultation    Heart:  Rate: Normal  Rhythm: Regular Rhythm  No pretibial edema  No carotid bruits      Anesthesia Plan  Type of Anesthesia, risks & benefits discussed:    Anesthesia Type: Gen ETT  Intra-op Monitoring Plan: Standard ASA Monitors  Post Op Pain Control Plan: multimodal analgesia  Induction:  IV  Airway Plan: Direct, Post-Induction  Informed Consent: Informed consent signed with the Patient and all parties understand the risks and agree with anesthesia plan.  All questions answered. Patient consented to blood products? Yes  ASA Score: 3  Day of Surgery Review of History & Physical: H&P Update referred to the surgeon/provider.    Ready For Surgery From Anesthesia Perspective.     .

## 2023-10-11 NOTE — ANESTHESIA PROCEDURE NOTES
Intubation    Date/Time: 10/11/2023 12:02 PM    Performed by: Zach Amador CRNA  Authorized by: Chas Bardales MD    Intubation:     Induction:  Intravenous    Intubated:  Postinduction    Mask Ventilation:  Easy mask    Attempts:  1    Attempted By:  Student (JUNE Chavez)    Method of Intubation:  Direct    Blade:  Mello 2    Laryngeal View Grade: Grade I - full view of cords      Difficult Airway Encountered?: No      Complications:  None    Airway Device:  Oral endotracheal tube    Airway Device Size:  7.0    Style/Cuff Inflation:  Cuffed (inflated to minimal occlusive pressure)    Tube secured:  23    Secured at:  The lips    Placement Verified By:  Capnometry    Complicating Factors:  None    Findings Post-Intubation:  BS equal bilateral and atraumatic/condition of teeth unchanged

## 2023-10-11 NOTE — PROGRESS NOTES
Patient seen and examined   Patient doing well this morning   Patient's  present at bedside     Awake and alert x4, no acute distress, cranial nerves 2-12 are grossly intact bilaterally  Patient has temporal wasting overall cachectic appearance   Regular rate and rhythm no rubs murmurs or gallops   Clear to auscultation bilaterally no wheezes rales or rhonchi   Abdomen is soft nontender nondistended bowel sounds present, abdomen scaphoid, G-tube in place    Assessment plan:  37-year-old female with severe malnutrition, nonfunctional gastrostomy tube, history of complex bariatric surgery with Kel-en-Y configuration    Taking patient to OR today for laparoscopic possible open lysis of adhesions, placement of feeding jejunostomy tube and EGD

## 2023-10-11 NOTE — PROGRESS NOTES
[facility] Hospital Medicine Progress Note        Chief Complaint: Inpatient Follow-up     HPI:   37-year-old female with medical history of multiple abdominal surgeries started with sleeve gastrectomy in 2013 followed by Kel-en-Y gastric bypass in [place] in June 2023, laparoscopic hiatal hernia repair with mesh on 01/17/2023, complicated ray incarceration and possible leakage and underwent exploratory laparotomy on 01/27/2023 which did not reveal perforation and during hospital stay had recurrence of abdominal pain and underwent a repeat exploratory laparotomy on 02/07/2023 with partial gastrectomy and gastrojejunostomy for necrotic segment of the stomach as well as underwent G-tube placement in the antrum.  Since had recurrent admission for continued weight loss and recurrent nausea, vomiting and abdominal pain with last admission in early August 2023 where she underwent EGD on 08/09/2023 which revealed LA grade B reflux esophagitis with no bleeding, Kel-en-Y gastrojejunostomy with gastrojejunal anastomosis characterized by congestion erythema and ulceration, biopsy negative for H pylori.  She was discharged on Protonix and Carafate.     Present to the ED with complaint of nausea, vomiting, upper abdominal pain, continued G-tube leakage.  Reported fever at home.  Report chronic intermittent loose stool since February that is unchanged.     On arrival to ED she was afebrile and hemodynamically stable.  Labs notable for albumin 2.2.  CT abdomen pelvis show edematous appearance of the GE junction and proximal gastric pouch, G-tube balloon in the distal gastric body/biliopancreatic limb.  Mild edematous appearance of the small bowel.  No evidence of bowel obstruction or perforation.     She was given analgesics and referred to hospital medicine service for further evaluation and management.    Patient is very concerned that she will develop sequelae of chronic  malnutrition      Interval Hx:   No acute events reported overnight.    Pt is currently npo, awaiting procedure today    at bed side       Objective/physical exam:  General: thin white female, In no acute distress, afebrile  Chest: Clear to auscultation bilaterally  Heart: RRR, +S1, S2, no appreciable murmur  Abdomen: Soft, g tube with bile  MSK: Warm, no lower extremity edema  Neurologic: Alert and oriented , no fnd    VITAL SIGNS: 24 HRS MIN & MAX LAST   Temp  Min: 98.5 °F (36.9 °C)  Max: 99 °F (37.2 °C) 98.7 °F (37.1 °C)   BP  Min: 124/84  Max: 137/90 (!) 134/92   Pulse  Min: 80  Max: 105  80   Resp  Min: 18  Max: 18 18   SpO2  Min: 98 %  Max: 100 % 100 %     I have reviewed the following labs:  Recent Labs   Lab 10/05/23  1359 10/09/23  0548   WBC 8.63 8.10   RBC 4.60 3.72*   HGB 13.6 10.9*   HCT 41.9 33.2*   MCV 91.1 89.2   MCH 29.6 29.3   MCHC 32.5* 32.8*   RDW 14.0 14.2    247   MPV 11.4* 12.2*     Recent Labs   Lab 10/05/23  1359 10/09/23  0548    137   K 4.3 4.3   CO2 20* 19*   BUN 9.3 9.6   CREATININE 0.79 0.56   CALCIUM 8.0* 7.3*   MG 2.00  --    ALBUMIN 2.2* 1.6*   ALKPHOS 99  --    ALT 20  --    AST 18  --    BILITOT 1.0  --      Microbiology Results (last 7 days)       Procedure Component Value Units Date/Time    Blood Culture [[id number]]  (Normal) Collected: 10/05/23 1359    Order Status: Completed Specimen: Blood Updated: 10/10/23 1600     CULTURE, BLOOD ([facility]) No Growth at 5 days    Blood Culture [[id number]]  (Normal) Collected: 10/05/23 1359    Order Status: Completed Specimen: Blood Updated: 10/10/23 1600     CULTURE, BLOOD ([facility]) No Growth at 5 days             See below for Radiology    Scheduled Med:   amLODIPine  10 mg Oral Daily    enoxparin  30 mg Subcutaneous Q24H (prophylaxis, 1700)    megestroL  600 mg Oral Daily    mirtazapine  7.5 mg Oral QHS    pantoprazole  40 mg Intravenous BID    sucralfate  1 g Oral QID      Continuous Infusions:   Amino acid 4.25% - dextrose 5%  (CLINIMIX-E) solution (1L provides 42.5 gm AA, 50 gm CHO (170 kcal/L dextrose), Na 35, K 30, Mg 5, Ca 4.5, Acetate 70, Cl 39, Phos 15) 75 mL/hr at 10/11/23 0645      PRN Meds:  acetaminophen, ALPRAZolam, aluminum-magnesium hydroxide-simethicone, dicyclomine, HYDROmorphone, melatonin, ondansetron, polyethylene glycol, prochlorperazine, senna-docusate 8.6-50 mg, sodium chloride 0.9%     Assessment/Plan:  Severe reflux esophagitis and gastritis  Underweight and Severe protein calorie malnutrition     History of anxiety, multiple bariatric surgeries and hiatal hernia repair with complications     Planned for J tube placement today with surgery  Follow up post op recommendations  Cont peripheral TPN  Supportive care in the interim  Cont Protonix  40 mg IV b.i.d., Sucralfate 1 g QID  Care discussed with pt's RN, case mngt  Check labs in AM      VTE prophylaxis: lovenox    Patient condition:  Stable    Anticipated discharge and Disposition:   tbd      _____________________________________________________________________    Nutrition Status:    Radiology:  I have personally reviewed the following imaging and agree with the radiologist.     FL Upper GI Water Soluable to Include Esophagram  Narrative: EXAMINATION:  FL UPPER GI WATER SOLUABLE TO INCLUDE ESOPHAGRAM    CLINICAL HISTORY:  Complex revision bariatric surgery;Esophagitis, unspecified without bleeding    TECHNIQUE:  Patient performed several swallows with Ominipaque.  Multiple fluoroscopic images were performed of the esophagus and the stomach.    Fluoroscopy time 1 minutes 24 seconds.    Radiation does 59 mGy.    DAP 1388 uGym2.    Total of 93 fluoroscopic images were performed.    COMPARISON:  CT abdomen pelvis October 5, 2023    FINDINGS:  Timing and emptying of contrast from the hypopharynx was unremarkable.  Unremarkable esophageal primary and secondary peristalsis and there were no tertiary waveforms..  Unremarkable distensibility of the esophagus without  abnormal dilatation, stricture or diverticulum.  Contrast promptly transited into the stomach.  Stomach is of small volume with irregular configuration related to prior surgeries.  No contrast extravasation identified.  Contrast also promptly transited into the small bowel loops across the anastomosis.  Impression: 1. Small stomach with irregular configuration.    2. No contrast extravasation.    Electronically signed by: Edgardo Tyler  Date:    10/08/2023  Time:    13:01      Marry Jenkins MD   10/11/2023

## 2023-10-11 NOTE — PLAN OF CARE
"BP (!) 137/90   Pulse 105   Temp 98.5 °F (36.9 °C) (Oral)   Resp 18   Ht 5' 4" (1.626 m)   Wt 43.5 kg (96 lb)   SpO2 98%   Breastfeeding No   BMI 16.48 kg/m²     Problem: Adult Inpatient Plan of Care  Goal: Plan of Care Review  Outcome: Ongoing, Progressing  Goal: Patient-Specific Goal (Individualized)  Outcome: Ongoing, Progressing  Goal: Absence of Hospital-Acquired Illness or Injury  Outcome: Ongoing, Progressing  Goal: Optimal Comfort and Wellbeing  Outcome: Ongoing, Progressing  Goal: Readiness for Transition of Care  Outcome: Ongoing, Progressing     Problem: Skin Injury Risk Increased  Goal: Skin Health and Integrity  Outcome: Ongoing, Progressing     Problem: Bowel Motility Impaired (Bariatric Surgery)  Goal: Effective Bowel Motility and Elimination  Outcome: Ongoing, Progressing     Problem: Fluid and Electrolyte Imbalance (Bariatric Surgery)  Goal: Fluid and Electrolyte Balance  Outcome: Ongoing, Progressing     Problem: Nausea and Vomiting  Goal: Fluid and Electrolyte Balance  Outcome: Ongoing, Progressing     "

## 2023-10-12 LAB
ANION GAP SERPL CALC-SCNC: 4 MEQ/L
BASOPHILS # BLD AUTO: 0.02 X10(3)/MCL
BASOPHILS NFR BLD AUTO: 0.2 %
BUN SERPL-MCNC: 11.3 MG/DL (ref 7–18.7)
CALCIUM SERPL-MCNC: 7.8 MG/DL (ref 8.4–10.2)
CHLORIDE SERPL-SCNC: 109 MMOL/L (ref 98–107)
CO2 SERPL-SCNC: 22 MMOL/L (ref 22–29)
CREAT SERPL-MCNC: 0.56 MG/DL (ref 0.55–1.02)
CREAT/UREA NIT SERPL: 20
EOSINOPHIL # BLD AUTO: 0.07 X10(3)/MCL (ref 0–0.9)
EOSINOPHIL NFR BLD AUTO: 0.6 %
ERYTHROCYTE [DISTWIDTH] IN BLOOD BY AUTOMATED COUNT: 14 % (ref 11.5–17)
GFR SERPLBLD CREATININE-BSD FMLA CKD-EPI: >60 MLS/MIN/1.73/M2
GLUCOSE SERPL-MCNC: 81 MG/DL (ref 74–100)
HCT VFR BLD AUTO: 31.1 % (ref 37–47)
HGB BLD-MCNC: 10 G/DL (ref 12–16)
IMM GRANULOCYTES # BLD AUTO: 0.06 X10(3)/MCL (ref 0–0.04)
IMM GRANULOCYTES NFR BLD AUTO: 0.5 %
LYMPHOCYTES # BLD AUTO: 1.66 X10(3)/MCL (ref 0.6–4.6)
LYMPHOCYTES NFR BLD AUTO: 15 %
MAGNESIUM SERPL-MCNC: 1.9 MG/DL (ref 1.6–2.6)
MCH RBC QN AUTO: 28.9 PG (ref 27–31)
MCHC RBC AUTO-ENTMCNC: 32.2 G/DL (ref 33–36)
MCV RBC AUTO: 89.9 FL (ref 80–94)
MONOCYTES # BLD AUTO: 0.91 X10(3)/MCL (ref 0.1–1.3)
MONOCYTES NFR BLD AUTO: 8.2 %
NEUTROPHILS # BLD AUTO: 8.32 X10(3)/MCL (ref 2.1–9.2)
NEUTROPHILS NFR BLD AUTO: 75.5 %
NRBC BLD AUTO-RTO: 0 %
PLATELET # BLD AUTO: 220 X10(3)/MCL (ref 130–400)
PMV BLD AUTO: 12.2 FL (ref 7.4–10.4)
POTASSIUM SERPL-SCNC: 4.7 MMOL/L (ref 3.5–5.1)
PSYCHE PATHOLOGY RESULT: NORMAL
RBC # BLD AUTO: 3.46 X10(6)/MCL (ref 4.2–5.4)
SODIUM SERPL-SCNC: 135 MMOL/L (ref 136–145)
WBC # SPEC AUTO: 11.04 X10(3)/MCL (ref 4.5–11.5)

## 2023-10-12 PROCEDURE — 63600175 PHARM REV CODE 636 W HCPCS: Performed by: NURSE PRACTITIONER

## 2023-10-12 PROCEDURE — 25000003 PHARM REV CODE 250: Performed by: INTERNAL MEDICINE

## 2023-10-12 PROCEDURE — C9113 INJ PANTOPRAZOLE SODIUM, VIA: HCPCS | Performed by: INTERNAL MEDICINE

## 2023-10-12 PROCEDURE — 83735 ASSAY OF MAGNESIUM: CPT | Performed by: INTERNAL MEDICINE

## 2023-10-12 PROCEDURE — 80048 BASIC METABOLIC PNL TOTAL CA: CPT | Performed by: INTERNAL MEDICINE

## 2023-10-12 PROCEDURE — 11000001 HC ACUTE MED/SURG PRIVATE ROOM

## 2023-10-12 PROCEDURE — S0179 MEGESTROL 20 MG: HCPCS | Performed by: INTERNAL MEDICINE

## 2023-10-12 PROCEDURE — 85025 COMPLETE CBC W/AUTO DIFF WBC: CPT | Performed by: INTERNAL MEDICINE

## 2023-10-12 PROCEDURE — 63600175 PHARM REV CODE 636 W HCPCS: Performed by: INTERNAL MEDICINE

## 2023-10-12 RX ORDER — MUPIROCIN 20 MG/G
OINTMENT TOPICAL 2 TIMES DAILY
Status: DISCONTINUED | OUTPATIENT
Start: 2023-10-12 | End: 2023-10-17 | Stop reason: HOSPADM

## 2023-10-12 RX ADMIN — ALPRAZOLAM 2 MG: 0.5 TABLET ORAL at 03:10

## 2023-10-12 RX ADMIN — HYDROMORPHONE HYDROCHLORIDE 1 MG: 2 INJECTION INTRAMUSCULAR; INTRAVENOUS; SUBCUTANEOUS at 06:10

## 2023-10-12 RX ADMIN — ONDANSETRON 4 MG: 2 INJECTION INTRAMUSCULAR; INTRAVENOUS at 11:10

## 2023-10-12 RX ADMIN — Medication 6 MG: at 08:10

## 2023-10-12 RX ADMIN — PANTOPRAZOLE SODIUM 40 MG: 40 INJECTION, POWDER, FOR SOLUTION INTRAVENOUS at 08:10

## 2023-10-12 RX ADMIN — AMLODIPINE BESYLATE 10 MG: 5 TABLET ORAL at 08:10

## 2023-10-12 RX ADMIN — SUCRALFATE 1 G: 1 TABLET ORAL at 06:10

## 2023-10-12 RX ADMIN — MIRTAZAPINE 7.5 MG: 7.5 TABLET ORAL at 08:10

## 2023-10-12 RX ADMIN — LEUCINE, PHENYLALANINE, LYSINE, METHIONINE, ISOLEUCINE, VALINE, HISTIDINE, THREONINE, TRYPTOPHAN, ALANINE, GLYCINE, ARGININE, PROLINE, SERINE, TYROSINE, SODIUM ACETATE, DIBASIC POTASSIUM PHOSPHATE, MAGNESIUM CHLORIDE, SODIUM CHLORIDE, CALCIUM CHLORIDE, DEXTROSE
311; 238; 247; 170; 255; 247; 204; 179; 77; 880; 438; 489; 289; 213; 17; 297; 261; 51; 77; 33; 5 INJECTION INTRAVENOUS at 06:10

## 2023-10-12 RX ADMIN — HYDROMORPHONE HYDROCHLORIDE 1 MG: 2 INJECTION INTRAMUSCULAR; INTRAVENOUS; SUBCUTANEOUS at 02:10

## 2023-10-12 RX ADMIN — ALPRAZOLAM 2 MG: 0.5 TABLET ORAL at 07:10

## 2023-10-12 RX ADMIN — SUCRALFATE 1 G: 1 TABLET ORAL at 08:10

## 2023-10-12 RX ADMIN — ONDANSETRON 4 MG: 2 INJECTION INTRAMUSCULAR; INTRAVENOUS at 02:10

## 2023-10-12 RX ADMIN — ONDANSETRON 4 MG: 2 INJECTION INTRAMUSCULAR; INTRAVENOUS at 08:10

## 2023-10-12 RX ADMIN — LEUCINE, PHENYLALANINE, LYSINE, METHIONINE, ISOLEUCINE, VALINE, HISTIDINE, THREONINE, TRYPTOPHAN, ALANINE, GLYCINE, ARGININE, PROLINE, SERINE, TYROSINE, SODIUM ACETATE, DIBASIC POTASSIUM PHOSPHATE, MAGNESIUM CHLORIDE, SODIUM CHLORIDE, CALCIUM CHLORIDE, DEXTROSE
311; 238; 247; 170; 255; 247; 204; 179; 77; 880; 438; 489; 289; 213; 17; 297; 261; 51; 77; 33; 5 INJECTION INTRAVENOUS at 01:10

## 2023-10-12 RX ADMIN — ONDANSETRON 4 MG: 2 INJECTION INTRAMUSCULAR; INTRAVENOUS at 06:10

## 2023-10-12 RX ADMIN — MEGESTROL ACETATE 600 MG: 400 SUSPENSION ORAL at 08:10

## 2023-10-12 RX ADMIN — ALPRAZOLAM 2 MG: 0.5 TABLET ORAL at 10:10

## 2023-10-12 RX ADMIN — HYDROMORPHONE HYDROCHLORIDE 1 MG: 2 INJECTION INTRAMUSCULAR; INTRAVENOUS; SUBCUTANEOUS at 08:10

## 2023-10-12 RX ADMIN — HYDROMORPHONE HYDROCHLORIDE 1 MG: 2 INJECTION INTRAMUSCULAR; INTRAVENOUS; SUBCUTANEOUS at 03:10

## 2023-10-12 RX ADMIN — SUCRALFATE 1 G: 1 TABLET ORAL at 02:10

## 2023-10-12 RX ADMIN — HYDROMORPHONE HYDROCHLORIDE 1 MG: 2 INJECTION INTRAMUSCULAR; INTRAVENOUS; SUBCUTANEOUS at 10:10

## 2023-10-12 NOTE — OP NOTE
Date of operation:  October 11, 2023     Surgeon:  Valeriano Riddle MD    Operation:  Laparoscopic placement of feeding jejunostomy tube, laparoscopic lysis of adhesions, esophagogastroduodenoscopy    Indications: This is a 37-year-old female who has a complex bariatric history.  Several years ago she had a vertical sleeve gastrectomy in [place] but suffered severe reflux postoperatively.  She ultimately had an additional procedure in [place], a mini gastric bypass which is essentially a Billroth II connected to a small gastric pouch, with hiatal hernia repair.  She had trouble postoperatively, and then had a conversion of a mini gastric bypass to a standard Kel-en-Y gastric bypass.  Her surgeon was Dr. Lopez at [facility].  Postoperatively postoperatively she had difficulty, with perforation and ultimately had exploratory laparotomy with abdominal washout and placement of feeding gastrostomy tube in the gastric remnant, since that time she is had difficulty.  She has chronically malnourished and has marginal ulceration of her gastric jejunostomy and her gastrostomy tube leaks rendering it effectively useless in terms of maintaining enteral feeds.    Patient is concerned that she may die from complications due to malnourishment.  She has 3 children and expresses concern that she may not live long due to her inability to maintain enteral feeds.  I was consulted as a bariatric surgeon to have a look at her case to help with the situation     Preop diagnosis:  Severe malnutrition, nonfunctioning gastrostomy tube, status gastric bypass    Postop diagnosis: Same     Findings:  Marginal ulcer present the gastrojejunal anastomosis, the anastomosis however is patent in the ulcer appears to be in a state of healing,    Her hepatobiliary limb is 150 cm long which is much longer than the standard 40 cm for a Kel-en-Y gastric bypass.  Her common channel is 300 cm long  Her Kel limb is approximately 35 cm long new line her  gastrojejunostomy appears intact    Anesthesia: General endotracheal     Blood loss:  5 cc     Complications:  None     Disposition:  Stable satisfactory to PACU     Details of operation:  Patient was brought to the operating room laid supine on the operating table, general anesthesia was administered she was intubated endotracheally     The gastrostomy tube was prepped into the field, her abdomen was prepped and draped in usual sterile fashion     Faria technique was used to enter the the abdomen in the patient's right lower quadrant was this provided adequate distance for optimal visualization.  There was no injury to intra-abdominal structures during placement of this port     Pneumoperitoneum was achieved, 2 additional 5 mm ports were placed in her right flank, and a 12 mm port was placed in her left lower quadrant     Patient did have some significant adhesions which were taken down with electrocautery, harmonic device and sharp dissection, there was no injury to bowel during this dissection, there was omentum that was obscuring the surgical field which was removed and discarded     Fortunately the small bowel was relatively free of adhesions and the small bowel was run all the way from the ligament of Treitz to the terminal ileum.  In running the small bowel, it became apparent that her hepatobiliary limb is approximally 150 cm long, typically in a patient who has had a standard Kel-en-Y gastric bypass this hepatobiliary limb is approximally 40 cm however although I do not have her many bypass records, it seems as if her surgeon in [place] attempted some variation of a duodenal switch type of operation, in any case the hepatobiliary limb was 150 cm, this led to the jejunal jejunostomy, the common channel was free of adhesions and the common channel was approximately 300 cm long     The Kel limb was 35 cm long and lead to the gastric jejunostomy which appeared intact    Also visible was the gastric remnant  containing a inflated gastrostomy tube balloon, this appeared to be in working order, no segments of the small intestine were dilated including the Kel limb or the hepatic hepatobiliary limb, the common channel was free of obstruction or swelling as well     The small intestines appeared healthy     I placed a left upper quadrant 14 Welsh silastic feeding jejunostomy jejunostomy tube, because the patient was so thin and she is had prior problems with gastrostomy tube leaking, I decided to tunnel the jejunostomy tube in an inferior direction through the subcutaneous space and rectus abdominis muscle to a length of approximately 5 cm to minimize the leaking that can play jejunostomy tube placements     It was tunneled and inferior direction through the subcutaneous tissue and the rectus abdominis muscle, a jejunostomy or jejunotomy was made 35 cm distal to the ligament of Treitz within the hepatobiliary limb, the tube was advanced distally into this segment of jejunum and the balloon was inflated to 2 cc, several tie knot sutures were used to secure the jejunum to the abdominal wall     I chose the proximal jejunum for the J-tube because this will hopefully allow better nutrition     It seems that her 150 cm or elongated hepatobiliary limb is likely partially responsible for her malnutrition    Patient tolerated procedure well, all ports were removed under direct laparoscopic vision and a trans abdominis preperitoneal block block was performed for postoperative analgesia     Skin incisions were closed with 4-0 subcuticular Vicryl sutures, the jejunostomy tube was sutured into place, the G-tube was left in place and not removed during this procedure     Sterile dressings were applied, patient was extubated brought to the PACU in stable satisfactory condition thank you

## 2023-10-12 NOTE — PROGRESS NOTES
Hospital Medicine  Progress Note    Patient Name: Chelsie Lee  MRN: [id number]  Status: IP- Inpatient   Admission Date: 10/5/2023  Length of Stay: 3  Date of Service: 10/12/2023       CC: hospital follow-up for severe malnutrition       SUBJECTIVE      37-year-old female with very complicated intra-abdominal history and multiple surgeries and complications, resulting in chronic malnutrition, presented to ED on 10/5 with ongoing nausea, vomiting, upper abdominal pain, with associated G-tube leakage.  Patient history is complicated, but started with sleeve gastrectomy in [place] back in 2013.  She then underwent an an additional procedure  in [place] in 2023.  It seems she ultimately required a Kel-en-Y gastric bypass with Dr. Lopez at [facility].  She had further complications including perforation, requiring further surgical interventions and placement of feeding gastrotomy tube within her gastic remnant.  However she poorly tolerated tube feeds, takes minimal oral intake and has become progressively more malnutritioned since earlier this year.  More recently she was hospitalized and underwent EGD noting ulceration at her GJ anastomosis site, for which she has been on PPI and Carafate.     Evaluation here included CT abdomen pelvis showing edematous appearance of the GE junction and proximal gastric pouch, G-tube balloon in the distal gastric body/biliopancreatic limb; mild edematous appearance of the small bowel.; no evidence of bowel obstruction or perforation.  She was afebrile with stable electrolytes and renal function, though she did appeared chronically malnutritioned    Today: Patient seen and examined at bedside, and chart reviewed.  Underwent laparoscopy with surgery yesterday including placement of feeding J-tube.      MEDICATIONS   Scheduled   amLODIPine  10 mg Oral Daily    enoxparin  30 mg Subcutaneous Q24H (prophylaxis, 1700)    megestroL  600 mg Oral Daily    mirtazapine  7.5 mg Oral QHS    mupirocin    Topical (Top) BID    pantoprazole  40 mg Intravenous BID    sucralfate  1 g Oral QID     Continuous Infusions  None      PHYSICAL EXAM   VITALS: T 98.8 °F (37.1 °C)   /80   P 89   RR 18   O2 100 %    GENERAL: Awake and in NAD  LUNGS: CTA anteriorly  CVS: Normal rate  GI/: Soft, milldy TTP G-tube and J-tube both in place, bowel sounds hypoactive  EXTREMITIES: No peripheral edema  NEURO: AAOx3  PSYCH: Cooperative      LABS   CBC  Recent Labs     10/12/23  0458   WBC 11.04   RBC 3.46*   HGB 10.0*   HCT 31.1*   MCV 89.9   MCH 28.9   MCHC 32.2*   RDW 14.0        CHEM  Recent Labs     10/12/23  0458   *   K 4.7   CHLORIDE 109*   CO2 22   BUN 11.3   CREATININE 0.56   GLUCOSE 81   CALCIUM 7.8*   MG 1.90       ASSESSMENT   Severe protein calorie malnutrition  Non-functioning G-tube  GJ anastomosis ulcerations, healing  s/p extensively complicated gastric sleeve/bypass    PLAN   now s/p J-tube  Post-op management, initiation of tube feeds, per surgery  For now we continue with PPN  Otherwise continue current management and monitoring in the interim including PPI BID (and Carafate once ok for oral intake)      Prophylaxis: SC Lovenox        Froy Pfeiffer MD  [facility] Medicine

## 2023-10-12 NOTE — PROGRESS NOTES
Patient resting comfortably in bed     Minor bleeding around jejunostomy tube overnight, normotensive postop day 1, dressing removed, not recovered, no more bleeding  Patient is comfortable, postop pain alleviated with medication     She is awake and alert x4, no acute distress, vital signs stable afebrile   Regular rate and rhythm no rubs murmurs or gallops   Clear to auscultation bilaterally no wheezes rales or rhonchi  Abdomen is soft nontender nondistended bowel sounds present    Assessment and plan:  37-year-old female status post laparoscopic placement of feeding jejunostomy tube and lysis of adhesions    I recommend nutrition consult so that jejunal feeds may be initiated.    She may begin J-tube feeds today, advance to goal as tolerated     TPN may be stopped as she gets closer to her jejunostomy tube feeding goals     Patient may likely be discharged within a day or 2     Our goal is for her to gain weight and strength     I would recommend rehabilitation, patient claims to be very weak in his unable to ambulate well on her own or complete physical tasks that are typical for a woman of her age     She has severe deconditioning do her due to her severe chronic malnutrition and will need help with regaining her strength     As she grows stronger and her nutrition improves, we will consider revisional surgery, she will likely require esophago jejunostomy and revision of her current jejunostomy to reasonable more typical Kel-en-Y gastric bypass configuration with the hepatobiliary limb of approximately 30-40 cm in length     Hopefully she will regain the ability to eat, it appears that her duodenal ulcer has improved.

## 2023-10-12 NOTE — PROGRESS NOTES
[facility] - Observation Unit  Wound Care    Patient Name:  Chelsie Lee   MRN:  [id number]  Date: 10/12/2023  Diagnosis: Esophagitis    History:     Past Medical History:   Diagnosis Date    GERD (gastroesophageal reflux disease)     Hypertension        Social History     Socioeconomic History    Marital status: Unknown   Tobacco Use    Smoking status: Never    Smokeless tobacco: Never   Substance and Sexual Activity    Alcohol use: Never    Drug use: Never   Social History Narrative    ** Merged History Encounter **          Social Determinants of Health     Financial Resource Strain: Low Risk  (8/10/2023)    Overall Financial Resource Strain (CARDIA)     Difficulty of Paying Living Expenses: Not hard at all   Food Insecurity: No Food Insecurity (8/10/2023)    Hunger Vital Sign     Worried About Running Out of Food in the Last Year: Never true     Ran Out of Food in the Last Year: Never true   Transportation Needs: No Transportation Needs (8/10/2023)    PRAPARE - Transportation     Lack of Transportation (Medical): No     Lack of Transportation (Non-Medical): No   Physical Activity: Inactive (8/10/2023)    Exercise Vital Sign     Days of Exercise per Week: 0 days     Minutes of Exercise per Session: 0 min   Stress: No Stress Concern Present (8/10/2023)    [employer] [place] [employer] - Occupational Stress Questionnaire     Feeling of Stress : Only a little   Social Connections: Moderately Isolated (8/10/2023)    Social Connection and Isolation Panel [NHANES]     Frequency of Communication with Friends and Family: More than three times a week     Frequency of Social Gatherings with Friends and Family: More than three times a week     Attends [employer] Services: Never     Active Member of Clubs or Organizations: No     Attends Club or Organization Meetings: Never     Marital Status:    Housing Stability: Unknown (8/10/2023)    Housing Stability Vital Sign     Unable to Pay for  Housing in the Last Year: No     Number of Places Lived in the Last Year: 1       Precautions:     Allergies as of 10/05/2023 - Reviewed 10/05/2023   Allergen Reaction Noted    Penicillin Rash 08/08/2023    Penicillin Rash 08/23/2021       [facility] Assessment Details/Treatment     Initial visit regarding erythema around G-tube at midline abdomen, area cleansed and assessed and redressed. No leakage appreciated at this time.       10/12/23 0819   PRN Med Reassessment   PRN Med Reassessment (Excludes Pain Medications) Full relief obtained        Gastrostomy/Enterostomy  Percutaneous endoscopic gastrostomy (PEG) midline   Placement Date: (c)    Present Prior to Hospital Arrival?: Yes  Type: Percutaneous endoscopic gastrostomy (PEG)  Location: midline   Securement sutured to abdomen   Dressing moist  (drain sponge placed over bumper)   Site Care site cleansed w/ sterile normal saline;sterile precut T-slit dressing applied  (Bactroban kathrine applied, dressing over bumper secure with cloth tape.)     Recommendations made to primary team for local site care at G-tube . Orders placed.     10/12/2023

## 2023-10-13 PROCEDURE — 25000003 PHARM REV CODE 250: Performed by: INTERNAL MEDICINE

## 2023-10-13 PROCEDURE — C9113 INJ PANTOPRAZOLE SODIUM, VIA: HCPCS | Performed by: INTERNAL MEDICINE

## 2023-10-13 PROCEDURE — 63600175 PHARM REV CODE 636 W HCPCS: Performed by: INTERNAL MEDICINE

## 2023-10-13 PROCEDURE — 63600175 PHARM REV CODE 636 W HCPCS: Performed by: NURSE PRACTITIONER

## 2023-10-13 PROCEDURE — C1751 CATH, INF, PER/CENT/MIDLINE: HCPCS

## 2023-10-13 PROCEDURE — 25000003 PHARM REV CODE 250: Performed by: SURGERY

## 2023-10-13 PROCEDURE — 36569 INSJ PICC 5 YR+ W/O IMAGING: CPT

## 2023-10-13 PROCEDURE — 97161 PT EVAL LOW COMPLEX 20 MIN: CPT

## 2023-10-13 PROCEDURE — 11000001 HC ACUTE MED/SURG PRIVATE ROOM

## 2023-10-13 PROCEDURE — A4216 STERILE WATER/SALINE, 10 ML: HCPCS | Performed by: INTERNAL MEDICINE

## 2023-10-13 PROCEDURE — S0179 MEGESTROL 20 MG: HCPCS | Performed by: INTERNAL MEDICINE

## 2023-10-13 RX ORDER — SODIUM CHLORIDE 0.9 % (FLUSH) 0.9 %
10 SYRINGE (ML) INJECTION EVERY 6 HOURS
Status: DISCONTINUED | OUTPATIENT
Start: 2023-10-13 | End: 2023-10-17 | Stop reason: HOSPADM

## 2023-10-13 RX ORDER — SODIUM CHLORIDE 0.9 % (FLUSH) 0.9 %
10 SYRINGE (ML) INJECTION
Status: DISCONTINUED | OUTPATIENT
Start: 2023-10-13 | End: 2023-10-17 | Stop reason: HOSPADM

## 2023-10-13 RX ADMIN — LEUCINE, PHENYLALANINE, LYSINE, METHIONINE, ISOLEUCINE, VALINE, HISTIDINE, THREONINE, TRYPTOPHAN, ALANINE, GLYCINE, ARGININE, PROLINE, SERINE, TYROSINE, SODIUM ACETATE, DIBASIC POTASSIUM PHOSPHATE, MAGNESIUM CHLORIDE, SODIUM CHLORIDE, CALCIUM CHLORIDE, DEXTROSE
311; 238; 247; 170; 255; 247; 204; 179; 77; 880; 438; 489; 289; 213; 17; 297; 261; 51; 77; 33; 5 INJECTION INTRAVENOUS at 08:10

## 2023-10-13 RX ADMIN — SUCRALFATE 1 G: 1 TABLET ORAL at 01:10

## 2023-10-13 RX ADMIN — SUCRALFATE 1 G: 1 TABLET ORAL at 08:10

## 2023-10-13 RX ADMIN — SODIUM CHLORIDE, PRESERVATIVE FREE 10 ML: 5 INJECTION INTRAVENOUS at 04:10

## 2023-10-13 RX ADMIN — HYDROMORPHONE HYDROCHLORIDE 1 MG: 2 INJECTION INTRAMUSCULAR; INTRAVENOUS; SUBCUTANEOUS at 06:10

## 2023-10-13 RX ADMIN — ONDANSETRON 4 MG: 2 INJECTION INTRAMUSCULAR; INTRAVENOUS at 06:10

## 2023-10-13 RX ADMIN — HYDROMORPHONE HYDROCHLORIDE 1 MG: 2 INJECTION INTRAMUSCULAR; INTRAVENOUS; SUBCUTANEOUS at 10:10

## 2023-10-13 RX ADMIN — ALPRAZOLAM 2 MG: 0.5 TABLET ORAL at 04:10

## 2023-10-13 RX ADMIN — MIRTAZAPINE 7.5 MG: 7.5 TABLET ORAL at 08:10

## 2023-10-13 RX ADMIN — HYDROMORPHONE HYDROCHLORIDE 1 MG: 2 INJECTION INTRAMUSCULAR; INTRAVENOUS; SUBCUTANEOUS at 01:10

## 2023-10-13 RX ADMIN — SUCRALFATE 1 G: 1 TABLET ORAL at 04:10

## 2023-10-13 RX ADMIN — SODIUM CHLORIDE, PRESERVATIVE FREE 10 ML: 5 INJECTION INTRAVENOUS at 11:10

## 2023-10-13 RX ADMIN — Medication 10 ML: at 08:10

## 2023-10-13 RX ADMIN — LEUCINE, PHENYLALANINE, LYSINE, METHIONINE, ISOLEUCINE, VALINE, HISTIDINE, THREONINE, TRYPTOPHAN, ALANINE, GLYCINE, ARGININE, PROLINE, SERINE, TYROSINE, SODIUM ACETATE, DIBASIC POTASSIUM PHOSPHATE, MAGNESIUM CHLORIDE, SODIUM CHLORIDE, CALCIUM CHLORIDE, DEXTROSE
311; 238; 247; 170; 255; 247; 204; 179; 77; 880; 438; 489; 289; 213; 17; 297; 261; 51; 77; 33; 5 INJECTION INTRAVENOUS at 06:10

## 2023-10-13 RX ADMIN — MUPIROCIN: 20 OINTMENT TOPICAL at 08:10

## 2023-10-13 RX ADMIN — ONDANSETRON 4 MG: 2 INJECTION INTRAMUSCULAR; INTRAVENOUS at 04:10

## 2023-10-13 RX ADMIN — HYDROMORPHONE HYDROCHLORIDE 1 MG: 2 INJECTION INTRAMUSCULAR; INTRAVENOUS; SUBCUTANEOUS at 08:10

## 2023-10-13 RX ADMIN — AMLODIPINE BESYLATE 10 MG: 5 TABLET ORAL at 08:10

## 2023-10-13 RX ADMIN — SODIUM CHLORIDE, PRESERVATIVE FREE 10 ML: 5 INJECTION INTRAVENOUS at 06:10

## 2023-10-13 RX ADMIN — ENOXAPARIN SODIUM 30 MG: 40 INJECTION SUBCUTANEOUS at 04:10

## 2023-10-13 RX ADMIN — ALPRAZOLAM 2 MG: 0.5 TABLET ORAL at 06:10

## 2023-10-13 RX ADMIN — HYDROMORPHONE HYDROCHLORIDE 1 MG: 2 INJECTION INTRAMUSCULAR; INTRAVENOUS; SUBCUTANEOUS at 04:10

## 2023-10-13 RX ADMIN — ALPRAZOLAM 2 MG: 0.5 TABLET ORAL at 11:10

## 2023-10-13 RX ADMIN — PANTOPRAZOLE SODIUM 40 MG: 40 INJECTION, POWDER, FOR SOLUTION INTRAVENOUS at 08:10

## 2023-10-13 RX ADMIN — MEGESTROL ACETATE 600 MG: 400 SUSPENSION ORAL at 08:10

## 2023-10-13 RX ADMIN — ONDANSETRON 4 MG: 2 INJECTION INTRAMUSCULAR; INTRAVENOUS at 08:10

## 2023-10-13 RX ADMIN — ONDANSETRON 4 MG: 2 INJECTION INTRAMUSCULAR; INTRAVENOUS at 10:10

## 2023-10-13 RX ADMIN — Medication 6 MG: at 10:10

## 2023-10-13 RX ADMIN — ONDANSETRON 4 MG: 2 INJECTION INTRAMUSCULAR; INTRAVENOUS at 01:10

## 2023-10-13 NOTE — PROCEDURES
"Chelsie Lee is a 37 y.o. female patient.    Temp: 98.3 °F (36.8 °C) (10/13/23 0406)  Pulse: 89 (10/13/23 0406)  Resp: 18 (10/13/23 0853)  BP: 124/70 (10/13/23 0406)  SpO2: 99 % (10/13/23 0700)  Weight: 43.5 kg (96 lb) (10/05/23 2159)  Height: 5' 4" (162.6 cm) (10/05/23 2159)    PICC  Date/Time: 10/13/2023 11:34 AM  Performed by: Rehan Brewster, RN  Consent Done: Yes  Time out: Immediately prior to procedure a time out was called to verify the correct patient, procedure, equipment, support staff and site/side marked as required  Indications: med administration and vascular access  Anesthesia: local infiltration  Local anesthetic: lidocaine 1% without epinephrine  Anesthetic Total (mL): 5  Preparation: skin prepped with ChloraPrep  Skin prep agent dried: skin prep agent completely dried prior to procedure  Sterile barriers: all five maximum sterile barriers used - cap, mask, sterile gown, sterile gloves, and large sterile sheet  Hand hygiene: hand hygiene performed prior to central venous catheter insertion  Location details: right brachial  Catheter type: double lumen  Catheter size: 5 Fr  Catheter Length: 35cm    Ultrasound guidance: yes  Vessel Caliber: patent, compressibility normal  Vascular Doppler: not done  Needle advanced into vessel with real time Ultrasound guidance.  Guidewire confirmed in vessel.  Image recorded and saved.  Sterile sheath used.  no esophageal manometryNumber of attempts: 1  Post-procedure: blood return through all ports, sterile dressing applied and chlorhexidine patch    Assessment: placement verified by x-ray  Complications: none          Rehan Brewster rN  10/13/2023    "

## 2023-10-13 NOTE — ANESTHESIA POSTPROCEDURE EVALUATION
Anesthesia Post Evaluation    Patient: Chelsie Lee    Procedure(s) Performed: Procedure(s) (LRB):  INSERTION, JEJUNOSTOMY TUBE, LAPAROSCOPIC (N/A)  EGD (ESOPHAGOGASTRODUODENOSCOPY) (N/A)    Final Anesthesia Type: general      Patient location during evaluation: PACU  Patient participation: Yes- Able to Participate  Level of consciousness: awake and alert and oriented  Post-procedure vital signs: reviewed and stable  Pain management: adequate  Airway patency: patent  RAMIN mitigation strategies: Verification of full reversal of neuromuscular block  PONV status at discharge: No PONV  Anesthetic complications: no      Cardiovascular status: blood pressure returned to baseline and stable  Respiratory status: spontaneous ventilation and unassisted  Hydration status: euvolemic  Follow-up not needed.  Comments: [facility]          Vitals Value Taken Time   /70 10/13/23 0406   Temp 36.8 °C (98.3 °F) 10/13/23 0406   Pulse 89 10/13/23 0406   Resp 18 10/13/23 0853   SpO2 99 % 10/13/23 0700         Event Time   Out of Recovery 10/11/2023 15:01:00         Pain/Eran Score: Pain Rating Prior to Med Admin: 10 (10/13/2023  8:53 AM)  Pain Rating Post Med Admin: 0 (10/13/2023  4:38 AM)

## 2023-10-13 NOTE — PT/OT/SLP EVAL
Physical Therapy Evaluation and Discharge Note    Patient Name:  Chelsie Lee   MRN:  [id number]    Recommendations:     Discharge Recommendations: outpatient PT  Discharge Equipment Recommendations: none   Barriers to discharge: Ongoing medical needs    Assessment:     Chelsie Lee is a 37 y.o. female admitted with a medical diagnosis of Esophagitis. .  At this time, patient is functioning at their prior level of function and does not require further acute PT services. Pt educated to ambulate in hallways 3x/day to maintain mobility and was provided with LE HEP to improve strength while in acute setting. Pt would benefit from extensive outpatient PT for general BUE/BLE strengthening/conditioning to assist in return to PLOF.     Recent Surgery: Procedure(s) (LRB):  INSERTION, JEJUNOSTOMY TUBE, LAPAROSCOPIC (N/A)  EGD (ESOPHAGOGASTRODUODENOSCOPY) (N/A) 2 Days Post-Op    Plan:     During this hospitalization, patient does not require further acute PT services.  Please re-consult if situation changes.      Subjective     Chief Complaint: incisional pain  Patient/Family Comments/goals: to get stronger and increase independence   Pain/Comfort:  Pain Rating 1: 9/10  Location 1: abdomen  Pain Addressed 1: Pre-medicate for activity, Reposition, Distraction  Pain Rating Post-Intervention 1: 2/10    Patients cultural, spiritual, Scientology conflicts given the current situation:      Living Environment:  Pt lives with her family in a SLH with flat entry.   Prior to admission, patients level of function was normally I with mobility; however, significant decline over the past few months due to poor nutritional intake leading to decreased functional mobility.  Equipment used at home:  .  DME owned (not currently used): none.  Upon discharge, patient will have assistance from family.    Objective:     Communicated with RN prior to session.  Patient found HOB elevated with G/J tube, peripheral IV upon PT entry to  room.    General Precautions: Standard,      Orthopedic Precautions:N/A   Braces: N/A  Respiratory Status: Room air    Exams:  RLE Strength: WFL to allow for adequate ambulation; however significantly deconditioned/weak when compared to age group  LLE Strength: WFL to allow for adequate ambulation; however significantly deconditioned/weak when compared to age group    Functional Mobility:  Bed Mobility:     Supine to Sit: modified independence  Transfers:     Sit to Stand:  modified independence with no AD  Toilet Transfer: modified independence with  no AD  using  Step Transfer  Gait: Pt ambulated 1000' without AD SBA, no LOB. Slight SOB post ambulation but vitals remained stable.     AM-PAC 6 CLICK MOBILITY  Total Score:24       Treatment and Education:      Patient and family were provided with verbal education and handouts education regarding PT POC and eval/discharge status. Pt provided with HEP for LE strengthening while in acute care. Pt and family in agreement.  Understanding was verbalized.     Patient left up in chair with all lines intact, call button in reach, and family present.    GOALS:   Multidisciplinary Problems       Physical Therapy Goals       Not on file                    History:     Past Medical History:   Diagnosis Date    GERD (gastroesophageal reflux disease)     Hypertension        Past Surgical History:   Procedure Laterality Date    COLONOSCOPY N/A 8/11/2023    Procedure: COLON;  Surgeon: Wilmer Rossi MD;  Location: [facility] ENDOSCOPY;  Service: Gastroenterology;  Laterality: N/A;    EGD, WITH CLOSED BIOPSY  8/9/2023    Procedure: EGD, WITH CLOSED BIOPSY;  Surgeon: Wilmer Rossi MD;  Location: [facility] ENDOSCOPY;  Service: Gastroenterology;;    ESOPHAGOGASTRODUODENOSCOPY N/A 8/9/2023    Procedure: EGD;  Surgeon: Wilmer Rossi MD;  Location: [facility] ENDOSCOPY;  Service: Gastroenterology;  Laterality: N/A;    ESOPHAGOGASTRODUODENOSCOPY N/A 10/11/2023    Procedure: EGD  (ESOPHAGOGASTRODUODENOSCOPY);  Surgeon: Valeriano Riddle MD;  Location: [facility] OR;  Service: General;  Laterality: N/A;    HERNIA REPAIR      LAPAROSCOPIC GASTRIC BANDING      LAPAROSCOPIC INSERTION OF JEJUNOSTOMY TUBE N/A 10/11/2023    Procedure: INSERTION, JEJUNOSTOMY TUBE, LAPAROSCOPIC;  Surgeon: Valeriano Riddle MD;  Location: [facility] OR;  Service: General;  Laterality: N/A;       Time Tracking:     PT Received On: 10/13/23  PT Start Time: 1330     PT Stop Time: 1350  PT Total Time (min): 20 min     Billable Minutes: Evaluation 20      10/13/2023

## 2023-10-13 NOTE — PLAN OF CARE
Problem: Adult Inpatient Plan of Care  Goal: Plan of Care Review  Outcome: Ongoing, Progressing  Goal: Patient-Specific Goal (Individualized)  Outcome: Ongoing, Progressing  Goal: Absence of Hospital-Acquired Illness or Injury  Outcome: Ongoing, Progressing  Goal: Optimal Comfort and Wellbeing  Outcome: Ongoing, Progressing  Goal: Readiness for Transition of Care  Outcome: Ongoing, Progressing     Problem: Skin Injury Risk Increased  Goal: Skin Health and Integrity  Outcome: Ongoing, Progressing     Problem: Bowel Motility Impaired (Bariatric Surgery)  Goal: Effective Bowel Motility and Elimination  Outcome: Ongoing, Progressing     Problem: Fluid and Electrolyte Imbalance (Bariatric Surgery)  Goal: Fluid and Electrolyte Balance  Outcome: Ongoing, Progressing     Problem: Nausea and Vomiting  Goal: Fluid and Electrolyte Balance  Outcome: Ongoing, Progressing     Problem: Pain Acute  Goal: Acceptable Pain Control and Functional Ability  Outcome: Ongoing, Progressing

## 2023-10-13 NOTE — CONSULTS
Inpatient Nutrition Assessment    Admit Date: 10/5/2023   Total duration of encounter: 8 days     Nutrition Recommendation/Prescription       -Initiate tube feedings:  Start @ 10 mL/hr for 24 hrs, then increase as tolerated by 10 mL/hr q 12 hrs to goal rate as below:  Impact Peptide 1.5 @ goal rate of 55 mL/hr would provide:  1650 kcals (100% est needs)  101 g protein (116% protein)  847 mL fluid (49% est needs)  **based on TF running ~20 hrs per day**  -FWF rec: 45mL/hr that TF is running    -Wean PPN once pt tolerating EN @ half goal rate.   -Monitor magnesium and phosphorus for 4 days. Replete if needed.   -Advance diet as tolerated per MD. Goal Diet: Low Residue.  -Continue appetite stimulant as medically feasible.     -If TPN warranted, recommend Clinimix E 5/15 @ goal rate of 70mL/hr, to provide:  1407 kcal (93% est needs)  84 gm protein (100% est needs)  1680 mL fluid (97% est needs)  252 gm Dextrose  **recommend starting @ half rate for the first 24 hrs as pt may be at risk for refeeding**    Communication of Recommendations: reviewed with provider, reviewed with nurse, and reviewed with patient    Nutrition Assessment     Malnutrition Assessment/Nutrition-Focused Physical Exam    Malnutrition Context: chronic illness (10/06/23 1225)  Malnutrition Level: severe (10/06/23 1225)  Energy Intake (Malnutrition): less than 75% for greater than or equal to 1 month (10/06/23 1225)  Weight Loss (Malnutrition): greater than 10% in 6 months (10/06/23 1225)  Subcutaneous Fat (Malnutrition): severe depletion (10/06/23 1225)  Orbital Region (Subcutaneous Fat Loss): severe depletion  Upper Arm Region (Subcutaneous Fat Loss): severe depletion     Muscle Mass (Malnutrition): severe depletion (10/06/23 1225)  [place] Region (Muscle Loss): severe depletion     Clavicle and Acromion Bone Region (Muscle Loss): severe depletion     Dorsal Hand (Muscle Loss): severe depletion  Patellar Region (Muscle Loss): severe depletion      Posterior Calf Region (Muscle Loss): severe depletion           A minimum of two characteristics is recommended for diagnosis of either severe or non-severe malnutrition.    Chart Review    Reason Seen: physician consult for Tube Feeding recs and follow-up    Malnutrition Screening Tool Results   Have you recently lost weight without trying?: Yes: 2-13 lbs  Have you been eating poorly because of a decreased appetite?: No   MST Score: 1     Diagnosis:  Esophagitis, N/V/D, h/o Sleeve Gastrectomy in 2013 and Gastric Bypass in [place] in June 2022, h/o hiatal hernia repair 1/23, Leaking G-tube    Relevant Medical History:   Past Medical History:   Diagnosis Date    GERD (gastroesophageal reflux disease)     Hypertension      Past Surgical History:   Procedure Laterality Date    COLONOSCOPY N/A 8/11/2023    Procedure: COLON;  Surgeon: Wilmer Rossi MD;  Location: [facility] ENDOSCOPY;  Service: Gastroenterology;  Laterality: N/A;    EGD, WITH CLOSED BIOPSY  8/9/2023    Procedure: EGD, WITH CLOSED BIOPSY;  Surgeon: Wilmer Rossi MD;  Location: [facility] ENDOSCOPY;  Service: Gastroenterology;;    ESOPHAGOGASTRODUODENOSCOPY N/A 8/9/2023    Procedure: EGD;  Surgeon: Wilmer Rossi MD;  Location: [facility] ENDOSCOPY;  Service: Gastroenterology;  Laterality: N/A;    ESOPHAGOGASTRODUODENOSCOPY N/A 10/11/2023    Procedure: EGD (ESOPHAGOGASTRODUODENOSCOPY);  Surgeon: Valeriano Riddle MD;  Location: [facility];  Service: General;  Laterality: N/A;    HERNIA REPAIR      LAPAROSCOPIC GASTRIC BANDING      LAPAROSCOPIC INSERTION OF JEJUNOSTOMY TUBE N/A 10/11/2023    Procedure: INSERTION, JEJUNOSTOMY TUBE, LAPAROSCOPIC;  Surgeon: Valeriano Riddle MD;  Location: [facility];  Service: General;  Laterality: N/A;     Review of patient's allergies indicates:   Allergen Reactions    Penicillin Rash    Penicillin Rash        Nutrition-Related Medications:    amLODIPine  10 mg Oral Daily    enoxparin  30 mg Subcutaneous Q24H (prophylaxis,  "1700)    megestroL  600 mg Oral Daily    mirtazapine  7.5 mg Oral QHS    mupirocin   Topical (Top) BID    pantoprazole  40 mg Intravenous BID    sucralfate  1 g Oral QID       Amino acid 4.25% - dextrose 5% (CLINIMIX-E) solution (1L provides 42.5 gm AA, 50 gm CHO (170 kcal/L dextrose), Na 35, K 30, Mg 5, Ca 4.5, Acetate 70, Cl 39, Phos 15) 75 mL/hr at 10/12/23 1844    Amino acid 4.25% - dextrose 5% (CLINIMIX-E) solution (1L provides 42.5 gm AA, 50 gm CHO (170 kcal/L dextrose), Na 35, K 30, Mg 5, Ca 4.5, Acetate 70, Cl 39, Phos 15) 75 mL/hr at 10/13/23 0602      Calorie Containing IV Medications: no significant kcals from medications at this time    Nutrition-Related Labs:  No results found for: "HGBA1C"  10/5/2023: Phosphorus Level 4.1 mg/dL (Ref range: 2.3 - 4.7 mg/dL)  10/12/2023: Magnesium Level 1.90 mg/dL (Ref range: 1.60 - 2.60 mg/dL); Potassium Level 4.7 mmol/L (Ref range: 3.5 - 5.1 mmol/L); Sodium Level 135 mmol/L (L; Ref range: 136 - 145 mmol/L)   Recent Labs   Lab 10/09/23  0548 10/12/23  0458   WBC 8.10 11.04   RBC 3.72* 3.46*   HGB 10.9* 10.0*   HCT 33.2* 31.1*   MCV 89.2 89.9   MCH 29.3 28.9   MCHC 32.8* 32.2*       Recent Labs   Lab 10/09/23  0548 10/12/23  0458    135*   K 4.3 4.7   CO2 19* 22   BUN 9.6 11.3   CREATININE 0.56 0.56   CALCIUM 7.3* 7.8*   MG  --  1.90   ALBUMIN 1.6*  --          Diet/PN Order: Diet full liquid  Amino acid 4.25% - dextrose 5% (CLINIMIX-E) solution (1L provides 42.5 gm AA, 50 gm CHO (170 kcal/L dextrose), Na 35, K 30, Mg 5, Ca 4.5, Acetate 70, Cl 39, Phos 15)  Amino acid 4.25% - dextrose 5% (CLINIMIX-E) solution (1L provides 42.5 gm AA, 50 gm CHO (170 kcal/L dextrose), Na 35, K 30, Mg 5, Ca 4.5, Acetate 70, Cl 39, Phos 15)  Oral Supplement Order: none  Tube Feeding Order: none  Appetite/Oral Intake: fair/50-75% of meals  Factors Affecting Nutritional Intake: decreased appetite, diarrhea, early satiety, and nausea  Food/Jehovah[employer] witness/Cultural Preferences: none " "reported  Food Allergies: none reported    Skin Integrity: intact  Wound(s):   n/a    Comments    10/6/23: Pt reports poor appetite worsening over 6-12 months w/ GI complications, has been on G-tube feedings w/ Jevity 1.2 but has been experiencing progressively worse diarrhea she suspects due to the formula recently, she eats very little by mouth, often has n/v, has very poor appetite, the thought of food often makes her sick, has lost around 80 lbs over the past 14 months per EMR. It's possible that some of the weight loss was intentional given surgica hx, but sounds like all weight loss since January 2023 was unintentional. Doctors are discussing replacing G-tube w/ J-tube which I also think is worth trying. Pt and I did discuss that sugary drinks and too many artifical sweeteners can contribute to diarrhea, as it sounds like she drinks sports drinks regularly.     10/9/23: Pt now on full liquids and tolerating well; appetite/intake is fair/good; pt is eating some outside foods brought in by family. Pt is also on appetite stimulant; would benefit from J-tube placement (surgery discussing); noted per surgery note, also recommending TPN. Noted that PPN was ordered but no longer running and was d/c'ed this morning. Answered all pt's questions regarding diet.     10/13/23: MD consult for tube feeding recs; pt had J-tube placed; spoke with RN and will put in TF order. Pt currently on PPN.     Anthropometrics    Height: 5' 4" (162.6 cm) Height Method: Stated  Last Weight: 43.5 kg (96 lb) (10/05/23 2159) Weight Method: Standard Scale  BMI (Calculated): 16.5  BMI Classification: underweight (BMI less than 18.5)     Ideal Body Weight (IBW), Female: 120 lb     % Ideal Body Weight, Female (lb): 80 %                             Usual Weight Provided By: patient and EMR weight history    Wt Readings from Last 5 Encounters:   10/05/23 43.5 kg (96 lb)   08/09/23 44 kg (97 lb)   12/29/22 59 kg (130 lb)   11/04/22 64.4 kg (141 lb " 15.6 oz)   10/28/22 64.4 kg (142 lb)     Weight Change(s) Since Admission:  Admit Weight: 40.2 kg (88 lb 10 oz) (10/05/23 1332)  10/5/23: 43.5 kg    Estimated Needs    Weight Used For Calorie Calculations: 43.5 kg (95 lb 14.4 oz)  Energy Calorie Requirements (kcal): 0585-6961 kcals (35-40 kcals/kg)  Energy Need Method: Kcal/kg  Weight Used For Protein Calculations: 43.5 kg (95 lb 14.4 oz)  Protein Requirements: 78-87 g (1.8-2.0 g/kg)  Fluid Requirements (mL): 1740 mL (40 mL/kg)  Temp (24hrs), Av.5 °F (36.9 °C), Min:98 °F (36.7 °C), Max:99.1 °F (37.3 °C)       Enteral Nutrition    Patient not receiving enteral nutrition at this time.    Parenteral Nutrition    Standard Formula: Clinimix E 4.25/5  Custom Formula: not applicable  Additives: none  Rate/Volume: 75mL/hr  Lipids: none  Total Nutrition Provided by Parenteral Nutrition:  Calories Provided  616 kcal/d, 41% needs   Protein Provided  77 g/d, 99% needs   Dextrose Provided  90 g/d, GIR 1.44 mg CHO/kg/min   Fluid Provided  1800 ml/d, 103% needs       Evaluation of Received Nutrient Intake    Calories: not meeting estimated needs  Protein: meeting estimated needs    Patient Education    Not applicable.    Nutrition Diagnosis     PES: Malnutrition related to suboptimal protein/energy intake or absorption altered GI function as evidenced by less than 75% needs met for greater than 1 month, severe fat depletion, severe muscle depletion, and greater than 10% weight loss in 6 months in the context of multiple GI surgeries and post op complications. (continues)     Interventions/Goals     Intervention(s): modified composition of meals/snacks, modified composition of enteral nutrition, modified rate of enteral nutrition, and collaboration with other providers  Goal: Meet greater than 75% of nutritional needs by follow-up. (goal progressing)    Monitoring & Evaluation     Dietitian will monitor energy intake, enteral nutrition intake, weight, electrolyte/renal panel,  glucose/endocrine profile, and gastrointestinal profile.  Nutrition Risk/Follow-Up: high (follow-up in 1-4 days)   Please consult if re-assessment needed sooner.    Johanny Stahl RD   10/13/2023

## 2023-10-13 NOTE — PROGRESS NOTES
"Hospital Medicine  Progress Note    Patient Name: Chelsie Lee  MRN: [id number]  Status: IP- Inpatient   Admission Date: 10/5/2023  Length of Stay: 4  Date of Service: 10/13/2023       CC: hospital follow-up for severe malnutrition       SUBJECTIVE      37-year-old female with very complicated intra-abdominal history and multiple surgeries and complications, resulting in chronic malnutrition, presented to ED on 10/5 with ongoing nausea, vomiting, upper abdominal pain, with associated G-tube leakage.  Patient history is complicated, but started with sleeve gastrectomy in [place] back in 2013.  She then underwent an an additional procedure  in [place] in 2023.  It seems she ultimately required a Kel-en-Y gastric bypass with Dr. Lopez at [facility].  She had further complications including perforation, requiring further surgical interventions and placement of feeding gastrotomy tube within her gastic remnant.  However she poorly tolerated tube feeds, takes minimal oral intake and has become progressively more malnutritioned since earlier this year.  More recently she was hospitalized and underwent EGD noting ulceration at her GJ anastomosis site, for which she has been on PPI and Carafate.     Evaluation here included CT abdomen pelvis showing edematous appearance of the GE junction and proximal gastric pouch, G-tube balloon in the distal gastric body/biliopancreatic limb; mild edematous appearance of the small bowel.; no evidence of bowel obstruction or perforation.  She was afebrile with stable electrolytes and renal function, though she did appeared chronically malnutritioned.  Underwent laparoscopy with surgery on 10/11, which included placement of feeding J-tube.    Today: Patient seen and examined at bedside, and chart reviewed.  Reports "feeling better" today.  Still in patient but does not feel "sick".  Planning to start tube feedings today.      MEDICATIONS   Scheduled   amLODIPine  10 mg Oral Daily    enoxparin  " 30 mg Subcutaneous Q24H (prophylaxis, 1700)    megestroL  600 mg Oral Daily    mirtazapine  7.5 mg Oral QHS    mupirocin   Topical (Top) BID    pantoprazole  40 mg Intravenous BID    sodium chloride 0.9%  10 mL Intravenous Q6H    sucralfate  1 g Oral QID     Continuous Infusions   Amino acid 4.25% - dextrose 5% (CLINIMIX-E) solution (1L provides 42.5 gm AA, 50 gm CHO (170 kcal/L dextrose), Na 35, K 30, Mg 5, Ca 4.5, Acetate 70, Cl 39, Phos 15) 75 mL/hr at 10/12/23 1844    Amino acid 4.25% - dextrose 5% (CLINIMIX-E) solution (1L provides 42.5 gm AA, 50 gm CHO (170 kcal/L dextrose), Na 35, K 30, Mg 5, Ca 4.5, Acetate 70, Cl 39, Phos 15) 75 mL/hr at 10/13/23 0602         PHYSICAL EXAM   VITALS: T 99.1 °F (37.3 °C)   /71   P 105   RR 18   O2 100 %    GENERAL: Awake and in NAD  LUNGS: CTA anteriorly  CVS: Normal rate  GI/: Soft, milldy TTP G-tube and J-tube both in place, bowel sounds hypoactive  EXTREMITIES: No peripheral edema  NEURO: AAOx3  PSYCH: Cooperative      LABS   CBC  Recent Labs     10/12/23  0458   WBC 11.04   RBC 3.46*   HGB 10.0*   HCT 31.1*   MCV 89.9   MCH 28.9   MCHC 32.2*   RDW 14.0          CHEM  Recent Labs     10/12/23  0458   *   K 4.7   CHLORIDE 109*   CO2 22   BUN 11.3   CREATININE 0.56   GLUCOSE 81   CALCIUM 7.8*   MG 1.90         ASSESSMENT   Severe protein calorie malnutrition  Non-functioning G-tube  now s/p J-tube placement  GJ anastomosis ulcerations, healing  s/p extensively complicated gastric sleeve/bypass    PLAN   Post-op management, per surgery  Planning for  initiation of tube feeds today  For now we continue with PPN as well  Otherwise continue current management and monitoring in the interim including PPI BID  CM consulted for possible LTAC placement      Prophylaxis: SC Lovenox        Froy Pfeiffer MD  [facility]

## 2023-10-13 NOTE — PLAN OF CARE
Spoke to pt in detail yesterday re: discharge plans, she is in agreement with MD for LTAC.  [facility] for [facility], [facility].  Ned will co-ordinate referral.

## 2023-10-13 NOTE — PRE ADMISSION SCREENING
[facility]    Pre-Admission Patient Screening                    Pre-Screen type:  LTAC:  Reason for Admission:    POSTOPERATIVE COMPLICATIONS OF BARIATRIC PROCEDURE NOW S/P JTUBE W/ NON FUNCTIONING GTUBE   SEVERE PCM- ON PPN   SEVERE DECONDITIONING     LTACH Admission Criteria:    Management of at least one of the following complex medical conditions:  Patenteral Nutrition  Other WOUND CARE, CLOSE LAB MONITORING, TITRATING GTUBE FEEDS AND PPN, SEVERE PHYSICAL DECONDITIONING AND SEVERE PCM     Must meet at least three of the following concomitant treatments/intervention daily unless notes: (excludes PO meds unless notes)  IV medication per therapeutic regimen  Cardiac Monitoring  IV fluides greater than 50 cc/hr  Laboratory assessment and medication adjustment(s)  Rehab Therapy (PT/OT/ST) 1-3 hours a day greater than or equal to 5 days a week  Parenteral nutrition/Enteral feedings      LTACH more appropriate than other levels of care (eg, skilled nursing facility, home health care), as indicated by:    Clinical management of patient deemed too frequent and needed beyond the capabilities of alternative levels of care as evidence by: Continued use of IV analgesics and Frequency of IV medications greater than or equal to 2 times daily  Frequent diagnostic services needed on an inpatient basis, including clinical assessments, laboratory, and imaging as evidence by: Frequent monitoring and clinical assessments performed by a licensed RN to identify current and future patient needs by incorporating the recognition of normal vs abnormal body physiology, and to prompt recognition of pertinent changes to identify and prioritize appropriate interventions that can be performed within the acute inpatient setting.  and Frequent laboratory testing and/or imaging to aide in the improvement and effectiveness of patient's individualized treatment plan.   More intensive services, such as speciality nursing care,  and/or onsite physician assessments needed that are not available at a lower level of care as evidence by: Daily physician intervention , Collaboration between consulting and attending providers still deemed a necessity to aide in the improvement and effectiveness of patient's individualized treatment plan, which can be provided at an [facility] level of care. , and Therapy Services to be included in patient's treatment plan in an effort to restore/improve patient's modality status to a safe level of functioning prior to acute illness.      Patient is stable for transfer to [facility], as indicated by ALL of the following:      Hypotension Absent     Cardiovascular status stable     Stable chest findings     Renal function accepctable   Pain adequately managed    Intake acceptable       No acute significant hepatic dysfunction (eg, new encephalopathy)   No acute severe unstable neurologic abnormalities (eg, Altered mental status that is severe or persistent, or evidence of ongoing CNS embolization or ischemia, worsening hydrocephalus)   No active bleeding or unstable disorders of hemostasis (eg, no recent need for transfusion, severe thrombocytopenia with bleeding)   No need for respiratory or other isolation, OR manageable at LTACH level of care    Long-term enteral feeding (eg, PEG) and intravenous access established, not needed, OR to be placed at [facility] level of care      Anticipated Discharge Disposition:    Home with caregiver support or Home with Home Health    Facility Status: Accept     Referring Physician:  DR. FRITZ AND DR. BLOOD    Admitting Physician:  Coleman Le MD    Primary Care Physician:  Galdino Caceres MD    History         Patient Active Problem List    Diagnosis Date Noted    Chest pain 10/11/2023    Severe malnutrition 10/11/2023    Esophagitis 10/05/2023    Intractable nausea and vomiting 08/20/2023    Multiple gastric ulcers 11/05/2022    Intractable vomiting 10/29/2022    H/O gastric bypass 7/2022  10/29/2022    H/O gastric sleeve 2013 10/29/2022         Previous Specialties/Consulted physicians:      Gastrology, General Surgery , and Wound Care       Past and Current Medical History    Past Medical History:   Diagnosis Date    GERD (gastroesophageal reflux disease)     Hypertension            History of Present Illness     HPI:      37-year-old  female known to Dr. Stewart Blackwell who had a vertical sleeve gastrectomy for weight loss in 2013.  After the birth of her 3rd child she had trouble losing weight and went back to [place] and had a mini gastric bypass in summer 2022.  She had a known hiatal hernia, but the surgeon was not comfortable performing the repair.  She developed intractable nausea and vomiting with hematemesis.  EGD 11/03/2022 found esophageal hiatal hernia, grade D esophagitis compatible with ulcerative esophagitis, severe ulceration and inflammation of the small bowel, severe gastritis.  Patient underwent incarcerated hiatal hernia repair with mesh with Dr. Lopez 01/17/2023.  Postoperatively she developed sudden onset abdominal pain and CT was concerning for recurrence of hiatal hernia with questionable leak in the diaphragm.  Patient was taken back to surgery on 01/27/2023 for exploration and placement of gastrostomy tube.  There was no stigmata of leak found in the hernia remained intact but the stomach did slide up into the hiatus.  Patient had persistent abdominal pain and started leaking ingested blue dye from Penrose drain which suggested a gastric fistula.  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  Due to the size of the fistula, patient was taken back for lap on 02/07/2023 in order to repair the fistula.  She was found to have ischemic changes to the stomach resulting in a large 2 cm area of necrosis in the stomach.  Gastrojejunostomy was performed and gastrostomy tube was placed in the antrum.  Patient  "presented back to the ED with an acute abdomen in March.  CT abdomen did not reveal any obvious etiology.  She was taken back to surgery for ex lap on 03/23/2023 findings:  Thin yellowish exudate fluid in the lower abdomen, minimal contamination in the upper abdomen, G-tube site adherent to abdominal wall, colon looked normal, appendix involved in the pelvic complex and appeared irritated.  Appendectomy was performed and "jejunostomy tube" was placed.     These procedures have been complicated by severe malnutrition and weakness due to persistent abdominal pain, nausea, vomiting, inability to tolerate p.o. intake or J-tube feedings.  Patient states that at times she is good days where she has minimal pain in his able to eat without vomiting and tolerate her J-tube feedings continuously at 50 cc/hour, however other days she is unable to tolerate p.o. intake without nausea or vomiting and is unable to even tolerate her J-tube feedings as they make her severely nauseated and dry heave.  Most day she vomits at least once daily.  She has abdominal pain around the J-tube site; it was not constant but it is severe when it occurs.  She has lost 60 lb in the last 7 months and currently weighs around 90 lbs.  She is hungry and dreams about food but just cannot tolerate it. Due to the weakness, she feels she is unable to care for her children or complete certain ADLs  (such as driving or grocery shopping) in fear that she will "pass out."  She states she will randomly feel dizzy/lightheaded and then fall to the ground.  She also reports ankle swelling.  She is specifically asking about possible cardiac work-up.      Patient was most recently seen by our group here on 08/09/2023 with Abdominal pain, N/V.  S/p EGD 8/9/23:  LA grade B reflux esophagitis, Kel-en-Y gastrojejunostomy with gastrojejunal anastomosis characterized by congestion, erythema, and ulceration. proximal pouch showed edema and friability with mild stenosis " "around the prior sleeve site.  Upon entering the distal pouch past the stenosis, 90% of the gastric mucosa is ulcerated, including the GJ anastamosis. At 55cm, the jejunal limb branches into 2 areas of anastamosis, all healthy in apperance. Bxed taken for h.pylori.  Hospital course complicated by melena.  S/p colonoscopy 8/11/23:  Extensive amounts of semi liquid stool in the entire colon precluding visualization with scattered blood clots but no active bleeding seen.  Liquid brown stool seen exiting the ICV.  5 mm rectal polyp not resected.  Hemorrhoids.     During that admission, she did report J-tube leakage and requested it be changed.  Surgery was consulted (Dr. Grey) and placed 18Fr tube to see if it would seal.  Recommendations was that if it did not that it would have to be removed/revised.       CT C/A/P w IV contrast and bariatric protocol on 8/15/23: gastrostomy balloon in the retained distal gastric body. Contrast administered via gastrostomy is seen in the biliopancreatic limb which is not distended and not appreciably obstructed.     Bariatric surgery (Dr. Riddle) saw the patient and said that any gastric reconstruction would be problematic "if we were to reverse her gastric bypass, we would have very little remaining stomach to work with, she also has hiatal hernia would likely suffer from reflux and currently she does not have enough healthy gastric pouch to perform a safe anastomosis."  He did mention " I will consider taking her to the operating room and taking down her gastrostomy site and giving her a feeding jejunostomy tube."     Present to the ED on 10/5 with complaint of nausea, vomiting, upper abdominal pain, continued G-tube leakage.  Reported fever 101 at home.  G-tube leakage is bilious and tube feedings.  C/O diarrhea with tube feedings.  Also causes abdominal pain.  States it is too harsh for her stomach.  Never tired banana flakes.  Currently using Jevity.      On presentation, " patient afebrile.  Labs without leukocytosis, albumin 2.2, normal renal indices, and no anemia.  CT abd/pelv w IV contrast noted Edematous appearance of the distal esophagus, proximal gastric pouch and small bowel.  Correlate for esophagitis, gastritis and enteritis.  There is perienteric fat stranding in the left upper quadrant without appreciable free air/perforation.     GI was consulted to Consider changing G-tube to J-tube.  Currently tolerating full liquids.  TFs held.      Previous records reviewed in detail.      PCP:  Galdino Caceres MD    10/12/23 per DR. BLOOD (SURGERY)  Patient resting comfortably in bed      Minor bleeding around jejunostomy tube overnight, normotensive postop day 1, dressing removed, not recovered, no more bleeding  Patient is comfortable, postop pain alleviated with medication      She is awake and alert x4, no acute distress, vital signs stable afebrile   Regular rate and rhythm no rubs murmurs or gallops   Clear to auscultation bilaterally no wheezes rales or rhonchi  Abdomen is soft nontender nondistended bowel sounds present     Assessment and plan:  37-year-old female status post laparoscopic placement of feeding jejunostomy tube and lysis of adhesions     I recommend nutrition consult so that jejunal feeds may be initiated.     She may begin J-tube feeds today, advance to goal as tolerated      TPN may be stopped as she gets closer to her jejunostomy tube feeding goals      Patient may likely be discharged within a day or 2      Our goal is for her to gain weight and strength      I would recommend rehabilitation, patient claims to be very weak in his unable to ambulate well on her own or complete physical tasks that are typical for a woman of her age      She has severe deconditioning do her due to her severe chronic malnutrition and will need help with regaining her strength      As she grows stronger and her nutrition improves, we will consider revisional surgery, she will  likely require esophago jejunostomy and revision of her current jejunostomy to reasonable more typical Kel-en-Y gastric bypass configuration with the hepatobiliary limb of approximately 30-40 cm in length      Hopefully she will regain the ability to eat, it appears that her duodenal ulcer has improved.           10/12/23 PER HOSPITALIST :   SUBJECTIVE      37-year-old female with very complicated intra-abdominal history and multiple surgeries and complications, resulting in chronic malnutrition, presented to ED on 10/5 with ongoing nausea, vomiting, upper abdominal pain, with associated G-tube leakage.  Patient history is complicated, but started with sleeve gastrectomy in [place] back in 2013.  She then underwent an an additional procedure  in [place] in 2023.  It seems she ultimately required a Kel-en-Y gastric bypass with Dr. Lopez at [facility].  She had further complications including perforation, requiring further surgical interventions and placement of feeding gastrotomy tube within her gastic remnant.  However she poorly tolerated tube feeds, takes minimal oral intake and has become progressively more malnutritioned since earlier this year.  More recently she was hospitalized and underwent EGD noting ulceration at her GJ anastomosis site, for which she has been on PPI and Carafate.     Evaluation here included CT abdomen pelvis showing edematous appearance of the GE junction and proximal gastric pouch, G-tube balloon in the distal gastric body/biliopancreatic limb; mild edematous appearance of the small bowel.; no evidence of bowel obstruction or perforation.  She was afebrile with stable electrolytes and renal function, though she did appeared chronically malnutritioned     Today: Patient seen and examined at bedside, and chart reviewed.  Underwent laparoscopy with surgery yesterday including placement of feeding J-tube.     ASSESSMENT   Severe protein calorie malnutrition  Non-functioning G-tube  GJ anastomosis  ulcerations, healing  s/p extensively complicated gastric sleeve/bypass     PLAN   now s/p J-tube  Post-op management, initiation of tube feeds, per surgery  For now we continue with PPN  Otherwise continue current management and monitoring in the interim including PPI BID (and Carafate once ok for oral intake)     Patient Traveled outside of the [place] in the last 3 months? no     Patient discharged from this LTAC to SNF within the last 45 days? no    Patient discharged from this LTAC to Rehab within the last 27 days? no    Prior residence: home    Prior Post-Acute Services: home health    Allergies:   Review of patient's allergies indicates:   Allergen Reactions    Penicillin Rash    Penicillin Rash       Has patient received the current influenza vaccine (Oct 1 - March 31)? Unknown     Has patient received PPD skin test prior to admit? No    Code Status: Full Code    Orientation: Time, Place, Person, and Events    Speech: normal     Vital Signs:     Date     Blood Pressure     Pulse     Respiratory Rate     O2 Saturation     Temperature         Bowel/Bladder: continent of bladder and continent of bowel  Bowel/Bladder Appliance: N/A    Dialysis: N/A    PICC Double Lumen 10/13/23 1134 right brachial (Active)   $ PICC Line Charges (Upon insertion) Bedside Insertion Performed Pt > 5 Years Old (no subq port/pump or image guidance);Catheter - Double Lumen (Supply) 10/13/23 1133   Number of days: 0            Peripheral IV - Single Lumen 10/11/23 1140 20 G Anterior;Right Forearm (Active)   Site Assessment Clean;Dry;Intact;No redness 10/13/23 1200   Extremity Assessment Distal to IV No abnormal discoloration;No redness;No swelling;No warmth 10/13/23 0000   Line Status Infusing 10/13/23 1200   Dressing Status Clean;Dry;Intact 10/13/23 1200   Dressing Intervention Integrity maintained 10/13/23 1200   Number of days: 2            Gastrostomy/Enterostomy  Percutaneous endoscopic gastrostomy (PEG) midline (Active)   Securement  secured to abdomen 10/13/23 1200   Clamp Status/Tolerance clamped 10/13/23 1200   Feeding Action other (see comments) 10/12/23 2000   Dressing no dressing 10/13/23 1200   Site Care site cleansed w/ sterile normal saline 10/13/23 1200   Number of days:             Gastrostomy/Enterostomy Jejunostomy tube other (see comments) feeding (Active)   Securement secured to abdomen 10/13/23 1200   Clamp Status/Tolerance clamped 10/13/23 1200   Feeding Action other (see comments) 10/12/23 2000   Dressing dried drainage 10/13/23 1200   Insertion Site no swelling;no tenderness;no drainage;no warmth 10/13/23 1200   Number of days:        CBGs/Accuchecks: Yes     Precautions: Fall    Restraints: No     Isolation Precautions: N/A       Facility-Administered Medications as of 10/13/2023   Medication Dose Route Frequency Provider Last Rate Last Admin    acetaminophen tablet 500 mg  500 mg Oral Q6H PRN Coleman Le MD        ALPRAZolam tablet 2 mg  2 mg Oral TID PRN Coleman Le MD   2 mg at 10/13/23 0643    aluminum-magnesium hydroxide-simethicone 200-200-20 mg/5 mL suspension 30 mL  30 mL Oral QID PRN Coleman eL MD        [] Amino acid 4.25% - dextrose 5% (CLINIMIX-E) solution (1L provides 42.5 gm AA, 50 gm CHO (170 kcal/L dextrose), Na 35, K 30, Mg 5, Ca 4.5, Acetate 70, Cl 39, Phos 15)   Intravenous Continuous Marry Jenkins MD   Stopped at 10/10/23 0930    [] Amino acid 4.25% - dextrose 5% (CLINIMIX-E) solution (1L provides 42.5 gm AA, 50 gm CHO (170 kcal/L dextrose), Na 35, K 30, Mg 5, Ca 4.5, Acetate 70, Cl 39, Phos 15)   Intravenous Continuous Marry Jenkins MD 75 mL/hr at 10/10/23 1815 Rate Verify at 10/10/23 1815    [] Amino acid 4.25% - dextrose 5% (CLINIMIX-E) solution (1L provides 42.5 gm AA, 50 gm CHO (170 kcal/L dextrose), Na 35, K 30, Mg 5, Ca 4.5, Acetate 70, Cl 39, Phos 15)   Intravenous Continuous SrisailamMarry MD   Stopped at 10/13/23 0143    Amino acid 4.25% - dextrose 5%  (CLINIMIX-E) solution (1L provides 42.5 gm AA, 50 gm CHO (170 kcal/L dextrose), Na 35, K 30, Mg 5, Ca 4.5, Acetate 70, Cl 39, Phos 15)   Intravenous Continuous AntillFroy MD 75 mL/hr at 10/12/23 1844 New Bag at 10/12/23 1844    Amino acid 4.25% - dextrose 5% (CLINIMIX-E) solution (1L provides 42.5 gm AA, 50 gm CHO (170 kcal/L dextrose), Na 35, K 30, Mg 5, Ca 4.5, Acetate 70, Cl 39, Phos 15)   Intravenous Continuous Marry Jenkins MD 75 mL/hr at 10/13/23 0602 New Bag at 10/13/23 0602    amLODIPine tablet 10 mg  10 mg Oral Daily Coleman Le MD   10 mg at 10/13/23 0856    dicyclomine capsule 10 mg  10 mg Oral Q6H PRN Coleman Le MD        enoxaparin injection 30 mg  30 mg Subcutaneous Q24H (prophylaxis, 1700) Coleman Le MD   30 mg at 10/11/23 1832    [COMPLETED] HYDROmorphone (PF) injection 1 mg  1 mg Intravenous ED 1 Time Doreen Chacko FNP   1 mg at 10/05/23 1814    HYDROmorphone (PF) injection 1 mg  1 mg Intravenous Q4H PRN Kayla Loving FNP   1 mg at 10/13/23 1312    [COMPLETED] iohexoL (OMNIPAQUE 350) injection 150 mL  150 mL Oral ONCE PRN Marry Jenkins MD   150 mL at 10/08/23 1222    [COMPLETED] iopamidoL (ISOVUE-370) injection 100 mL  100 mL Intravenous ONCE PRN Mj Diaz FNP   100 mL at 10/05/23 1849    [COMPLETED] lactated ringers bolus 1,000 mL  1,000 mL Intravenous Once Doreen Chacko FNP   Stopped at 10/05/23 2000    megestroL 400 mg/10 mL (10 mL) suspension 600 mg  600 mg Oral Daily Coleman Le MD   600 mg at 10/13/23 0854    melatonin tablet 6 mg  6 mg Oral Nightly PRN Coleman Le MD   6 mg at 10/12/23 2054    [] midazolam (VERSED) 1 mg/mL injection 2 mg  2 mg Intravenous Q5 Min Jacques Clifford MD        mirtazapine tablet 7.5 mg  7.5 mg Oral QHS Coleman Le MD   7.5 mg at 10/12/23 2054    mupirocin 2 % ointment   Topical (Top) BID Valeriano Riddle MD   Given at 10/13/23 0857    [COMPLETED] ondansetron injection 4 mg  4 mg Intravenous ED 1 Time  Doreen Chacko, FNP   4 mg at 10/05/23 1552    ondansetron injection 4 mg  4 mg Intravenous Q4H PRN Coleman Le MD   4 mg at 10/13/23 1313    pantoprazole injection 40 mg  40 mg Intravenous BID Coleman Le MD   40 mg at 10/13/23 0854    [COMPLETED] pantoprazole injection 80 mg  80 mg Intravenous Once Coleman Le MD   80 mg at 10/05/23 2243    polyethylene glycol packet 17 g  17 g Oral BID PRN Coleman Le MD        prochlorperazine injection Soln 5 mg  5 mg Intravenous Q6H PRN Coleman Le MD        senna-docusate 8.6-50 mg per tablet 2 tablet  2 tablet Oral BID PRN Coleman Le MD        sodium chloride 0.9% flush 10 mL  10 mL Intravenous PRN Coleman Le MD        sodium chloride 0.9% flush 10 mL  10 mL Intravenous Q6H Froy Pfeiffer MD        And    sodium chloride 0.9% flush 10 mL  10 mL Intravenous PRN Froy Pfeiffer MD        sucralfate tablet 1 g  1 g Oral QID Coleman Le MD   1 g at 10/13/23 1318     Outpatient Medications as of 10/13/2023   Medication Sig Dispense Refill    ALPRAZolam (XANAX) 2 MG Tab Take 2 mg by mouth 3 (three) times daily as needed.      ergocalciferol (ERGOCALCIFEROL) 50,000 unit Cap Take 50,000 Units by mouth every 7 days.      hyoscyamine (LEVSIN/SL) 0.125 mg Subl Place under the tongue.      megestroL (MEGACE ES) 625 mg/5 mL (125 mg/mL) Susp Take 625 mg by mouth.      cyanocobalamin 1,000 mcg/mL injection Inject 1,000 mcg into the muscle every 30 days.      dicyclomine (BENTYL) 10 MG capsule Take 10 mg by mouth every 6 (six) hours as needed.          Cardiovascular:    Cardiovascular Review: Within definable limits (WDL)    Telemetry: Yes    Rhythm:  NSR/ ST    AICD: No      Respiratory:    Oxygen: N/A    CPT/Frequency: N/A    Incentive Spirometer/Frequency: N/A    CPAP/Settings: N/A    BiPAP/Settings: N/A    Oxygen Saturation:  %    Suction Frequency: N/A      Vent Settings:   Mode:   Rate:   TV:   FIO2:   PEEP:   PS:   iTime:    Trial/HS  "Settings:   Mode:   Rate:   TV:   FIO2:   PEEP:   PS:       Nutrition:      Ht Readings from Last 3 Encounters:   10/05/23 5' 4" (1.626 m)   08/09/23 5' 4" (1.626 m)   11/04/22 5' 4" (1.626 m)     Wt Readings from Last 1 Encounters:   10/05/23 2159 43.5 kg (96 lb)   10/05/23 1332 40.2 kg (88 lb 10 oz)       Feeding Status:       Current Diet: NPO   Supplements:N/A   Tube Feeding:  SEE ABOVE ORDER   Flushes:  SEE ABOVE ORDER  ON PPN CURRENTLY: PER NUTRITION:   -Initiate tube feedings:  Start @ 10 mL/hr for 24 hrs, then increase as tolerated by 10 mL/hr q 12 hrs to goal rate as below:  Impact Peptide 1.5 @ goal rate of 55 mL/hr would provide:  1650 kcals (100% est needs)  101 g protein (116% protein)  847 mL fluid (49% est needs)  **based on TF running ~20 hrs per day**  -FWF rec: 45mL/hr that TF is running     -Wean PPN once pt tolerating EN @ half goal rate.   -Monitor magnesium and phosphorus for 4 days. Replete if needed.   -Advance diet as tolerated per MD. Goal Diet: Low Residue.  -Continue appetite stimulant as medically feasible.        Integumentary:    Integumentary: Surgical Incisions              Incision/Site 10/11/23 1407 Abdomen Laparoscopic Punctures (specify) (Active)   10/11/23 1407   Present Prior to Hospital Arrival?:    Side:    Location: Abdomen   Orientation:    Incision Type: Laparoscopic Punctures (specify)   Closure Method: Steri-strips   Additional Comments:    Removal Indication and Assessment:    Wound Outcome:    Removal Indications:    Incision WDL WDL 10/13/23 0800   Dressing Appearance Open to air;Intact;Dried drainage 10/13/23 0800   Drainage Amount None 10/13/23 0800   Appearance Steri-strips intact 10/13/23 0800   Number of days: 2         Musculoskeletal:    Transfer assist: Activity did not occur    Weight Bearing Status: As Tolerated    Comments: N/A    ADL Assist: Activity did not occur    Special Equipment: N/A    Radiology:    Radiology (Last 168 hours)               10/13 " "1152 X-Ray Chest 1 View for Line/Tube Placement       10/08 1224 FL Upper GI Water Soluable to Include Esophagram                X-Ray Chest 1 View for Line/Tube Placement  Narrative: EXAMINATION:  XR CHEST 1 VIEW FOR LINE/TUBE PLACEMENT    CLINICAL HISTORY:  PICC placement;    TECHNIQUE:  One view    COMPARISON:  None available.    FINDINGS:  Right PICC line terminates within the superior vena cava.  Cardiopericardial silhouette is within normal limits. Lungs are without dense focal or segmental consolidation, congestive process, pleural effusions or pneumothorax.  Impression: NO ACUTE CARDIOPULMONARY PROCESS IDENTIFIED.    Electronically signed by: Edgardo [place]  Date:    10/13/2023  Time:    12:00      Lab/Cultures:    Blood Urea Nitrogen   Date Value Ref Range Status   10/12/2023 11.3 7.0 - 18.7 mg/dL Final   10/09/2023 9.6 7.0 - 18.7 mg/dL Final   07/31/2023 7 5 - 25 mg/dL Final   06/12/2023 6 5 - 25 mg/dL Final     Creatinine   Date Value Ref Range Status   10/12/2023 0.56 0.55 - 1.02 mg/dL Final   10/09/2023 0.56 0.55 - 1.02 mg/dL Final   07/31/2023 0.54 (L) 0.57 - 1.25 mg/dL Final   06/12/2023 0.63 0.57 - 1.25 mg/dL Final     WBC   Date Value Ref Range Status   10/12/2023 11.04 4.50 - 11.50 x10(3)/mcL Final   10/09/2023 8.10 4.50 - 11.50 x10(3)/mcL Final      CULTURE, BLOOD ([facility])   Date Value Ref Range Status   10/05/2023 No Growth at 5 days  Final   10/05/2023 No Growth at 5 days  Final     No results for input(s): "PH", "PCO2", "PO2", "HCO3", "POCSATURATED", "BE" in the last 72 hours.       "

## 2023-10-14 PROCEDURE — 25000003 PHARM REV CODE 250: Performed by: INTERNAL MEDICINE

## 2023-10-14 PROCEDURE — 11000001 HC ACUTE MED/SURG PRIVATE ROOM

## 2023-10-14 PROCEDURE — 94761 N-INVAS EAR/PLS OXIMETRY MLT: CPT

## 2023-10-14 PROCEDURE — 63600175 PHARM REV CODE 636 W HCPCS: Performed by: INTERNAL MEDICINE

## 2023-10-14 PROCEDURE — C9113 INJ PANTOPRAZOLE SODIUM, VIA: HCPCS | Performed by: INTERNAL MEDICINE

## 2023-10-14 PROCEDURE — S0179 MEGESTROL 20 MG: HCPCS | Performed by: INTERNAL MEDICINE

## 2023-10-14 PROCEDURE — A4216 STERILE WATER/SALINE, 10 ML: HCPCS | Performed by: INTERNAL MEDICINE

## 2023-10-14 PROCEDURE — 63600175 PHARM REV CODE 636 W HCPCS: Performed by: NURSE PRACTITIONER

## 2023-10-14 RX ADMIN — ALPRAZOLAM 2 MG: 0.5 TABLET ORAL at 08:10

## 2023-10-14 RX ADMIN — SUCRALFATE 1 G: 1 TABLET ORAL at 08:10

## 2023-10-14 RX ADMIN — SODIUM CHLORIDE, PRESERVATIVE FREE 10 ML: 5 INJECTION INTRAVENOUS at 05:10

## 2023-10-14 RX ADMIN — ENOXAPARIN SODIUM 30 MG: 40 INJECTION SUBCUTANEOUS at 05:10

## 2023-10-14 RX ADMIN — ALPRAZOLAM 2 MG: 0.5 TABLET ORAL at 06:10

## 2023-10-14 RX ADMIN — ONDANSETRON 4 MG: 2 INJECTION INTRAMUSCULAR; INTRAVENOUS at 01:10

## 2023-10-14 RX ADMIN — HYDROMORPHONE HYDROCHLORIDE 1 MG: 2 INJECTION INTRAMUSCULAR; INTRAVENOUS; SUBCUTANEOUS at 03:10

## 2023-10-14 RX ADMIN — Medication 10 ML: at 10:10

## 2023-10-14 RX ADMIN — HYDROMORPHONE HYDROCHLORIDE 1 MG: 2 INJECTION INTRAMUSCULAR; INTRAVENOUS; SUBCUTANEOUS at 08:10

## 2023-10-14 RX ADMIN — SODIUM CHLORIDE, PRESERVATIVE FREE 10 ML: 5 INJECTION INTRAVENOUS at 01:10

## 2023-10-14 RX ADMIN — MIRTAZAPINE 7.5 MG: 7.5 TABLET ORAL at 09:10

## 2023-10-14 RX ADMIN — SUCRALFATE 1 G: 1 TABLET ORAL at 05:10

## 2023-10-14 RX ADMIN — ONDANSETRON 4 MG: 2 INJECTION INTRAMUSCULAR; INTRAVENOUS at 03:10

## 2023-10-14 RX ADMIN — ONDANSETRON 4 MG: 2 INJECTION INTRAMUSCULAR; INTRAVENOUS at 09:10

## 2023-10-14 RX ADMIN — MEGESTROL ACETATE 600 MG: 400 SUSPENSION ORAL at 08:10

## 2023-10-14 RX ADMIN — HYDROMORPHONE HYDROCHLORIDE 1 MG: 2 INJECTION INTRAMUSCULAR; INTRAVENOUS; SUBCUTANEOUS at 01:10

## 2023-10-14 RX ADMIN — SUCRALFATE 1 G: 1 TABLET ORAL at 09:10

## 2023-10-14 RX ADMIN — ONDANSETRON 4 MG: 2 INJECTION INTRAMUSCULAR; INTRAVENOUS at 05:10

## 2023-10-14 RX ADMIN — HYDROMORPHONE HYDROCHLORIDE 1 MG: 2 INJECTION INTRAMUSCULAR; INTRAVENOUS; SUBCUTANEOUS at 09:10

## 2023-10-14 RX ADMIN — PANTOPRAZOLE SODIUM 40 MG: 40 INJECTION, POWDER, FOR SOLUTION INTRAVENOUS at 09:10

## 2023-10-14 RX ADMIN — HYDROMORPHONE HYDROCHLORIDE 1 MG: 2 INJECTION INTRAMUSCULAR; INTRAVENOUS; SUBCUTANEOUS at 05:10

## 2023-10-14 RX ADMIN — SUCRALFATE 1 G: 1 TABLET ORAL at 01:10

## 2023-10-14 RX ADMIN — MUPIROCIN: 20 OINTMENT TOPICAL at 09:10

## 2023-10-14 RX ADMIN — LEUCINE, PHENYLALANINE, LYSINE, METHIONINE, ISOLEUCINE, VALINE, HISTIDINE, THREONINE, TRYPTOPHAN, ALANINE, GLYCINE, ARGININE, PROLINE, SERINE, TYROSINE, SODIUM ACETATE, DIBASIC POTASSIUM PHOSPHATE, MAGNESIUM CHLORIDE, SODIUM CHLORIDE, CALCIUM CHLORIDE, DEXTROSE
365; 280; 290; 200; 300; 290; 240; 210; 90; 1035; 515; 575; 340; 250; 20; 340; 261; 51; 59; 33; 15 INJECTION INTRAVENOUS at 01:10

## 2023-10-14 RX ADMIN — AMLODIPINE BESYLATE 10 MG: 5 TABLET ORAL at 08:10

## 2023-10-14 RX ADMIN — Medication 6 MG: at 09:10

## 2023-10-14 RX ADMIN — PANTOPRAZOLE SODIUM 40 MG: 40 INJECTION, POWDER, FOR SOLUTION INTRAVENOUS at 08:10

## 2023-10-14 NOTE — PROGRESS NOTES
Hospital Medicine  Progress Note    Patient Name: Chelsie Lee  MRN: [id number]  Status: IP- Inpatient   Admission Date: 10/5/2023  Length of Stay: 5  Date of Service: 10/14/2023       CC: hospital follow-up for severe malnutrition       SUBJECTIVE      37-year-old female with very complicated intra-abdominal history and multiple surgeries and complications, resulting in chronic malnutrition, presented to ED on 10/5 with ongoing nausea, vomiting, upper abdominal pain, with associated G-tube leakage.  Patient history is complicated, but started with sleeve gastrectomy in [place] back in 2013.  She then underwent an an additional procedure  in [place] in 2023.  It seems she ultimately required a Kel-en-Y gastric bypass with Dr. Lopez at [facility].  She had further complications including perforation, requiring further surgical interventions and placement of feeding gastrotomy tube within her gastic remnant.  However she poorly tolerated tube feeds, takes minimal oral intake and has become progressively more malnutritioned since earlier this year.  More recently she was hospitalized and underwent EGD noting ulceration at her GJ anastomosis site, for which she has been on PPI and Carafate.     Evaluation here included CT abdomen pelvis showing edematous appearance of the GE junction and proximal gastric pouch, G-tube balloon in the distal gastric body/biliopancreatic limb; mild edematous appearance of the small bowel.; no evidence of bowel obstruction or perforation.  She was afebrile with stable electrolytes and renal function, though she did appeared chronically malnutritioned.  Underwent laparoscopy with surgery on 10/11, which included placement of feeding J-tube.    Today: Patient seen and examined at bedside, and chart reviewed.  Tolerating J-tube feeds at goal now.  Also eating orally; states she actually feels hungry for first time in forever. No new issues or complaints.      MEDICATIONS   Scheduled   amLODIPine   10 mg Oral Daily    enoxparin  30 mg Subcutaneous Q24H (prophylaxis, 1700)    megestroL  600 mg Oral Daily    mirtazapine  7.5 mg Oral QHS    mupirocin   Topical (Top) BID    pantoprazole  40 mg Intravenous BID    sodium chloride 0.9%  10 mL Intravenous Q6H    sucralfate  1 g Oral QID     Continuous Infusions   amino acid 5% in 15% dextrose (CLINIMIX-E) solution (1L provides 50gm AA, 150gm CHO (510 kcal/L dextrose), Na 35, K 30, Mg 5, Ca 4.5, Acetate 80, Cl 39, Phos 15) (710 total kcal/L) 75 mL/hr at 10/14/23 1319         PHYSICAL EXAM   VITALS: T 98.3 °F (36.8 °C)   /82   P 104   RR 16   O2 100 %    GENERAL: Awake and in NAD  LUNGS: CTA anteriorly  CVS: Normal rate  GI/: Soft, NT, G-tube and J-tube both in place, bowel sounds present  EXTREMITIES: No peripheral edema  NEURO: AAOx3  PSYCH: Cooperative      LABS   CBC  Recent Labs     10/12/23  0458   WBC 11.04   RBC 3.46*   HGB 10.0*   HCT 31.1*   MCV 89.9   MCH 28.9   MCHC 32.2*   RDW 14.0        CHEM  Recent Labs     10/12/23  0458   *   K 4.7   CHLORIDE 109*   CO2 22   BUN 11.3   CREATININE 0.56   GLUCOSE 81   CALCIUM 7.8*   MG 1.90       ASSESSMENT   Severe protein calorie malnutrition  Non-functioning G-tube  now s/p J-tube placement  GJ anastomosis ulcerations, healing  s/p extensively complicated gastric sleeve/bypass    PLAN   Post-op management per surgery  Continue tube feeds at goal  Will monitor nutritional intake, though if stable can likely d/c PPN and discharge home in next 24-48hrs.  Otherwise continue current management and monitoring in the interim including PPI BID  Discharge disposition pending above      Prophylaxis: JUDAH Pfeiffer MD  [facility]

## 2023-10-14 NOTE — PLAN OF CARE
Problem: Adult Inpatient Plan of Care  Goal: Plan of Care Review  Outcome: Ongoing, Progressing  Goal: Patient-Specific Goal (Individualized)  Outcome: Ongoing, Progressing  Goal: Absence of Hospital-Acquired Illness or Injury  Outcome: Ongoing, Progressing  Goal: Optimal Comfort and Wellbeing  Outcome: Ongoing, Progressing  Goal: Readiness for Transition of Care  Outcome: Ongoing, Progressing     Problem: Skin Injury Risk Increased  Goal: Skin Health and Integrity  Outcome: Ongoing, Progressing     Problem: Bowel Motility Impaired (Bariatric Surgery)  Goal: Effective Bowel Motility and Elimination  Outcome: Ongoing, Progressing     Problem: Fluid and Electrolyte Imbalance (Bariatric Surgery)  Goal: Fluid and Electrolyte Balance  Outcome: Ongoing, Progressing     Problem: Nausea and Vomiting  Goal: Fluid and Electrolyte Balance  Outcome: Ongoing, Progressing     Problem: Pain Acute  Goal: Acceptable Pain Control and Functional Ability  Outcome: Ongoing, Progressing     Problem: Infection  Goal: Absence of Infection Signs and Symptoms  Outcome: Ongoing, Progressing

## 2023-10-15 LAB
ALBUMIN SERPL-MCNC: 2.5 G/DL (ref 3.5–5)
ALBUMIN/GLOB SERPL: 1.1 RATIO (ref 1.1–2)
ALP SERPL-CCNC: 42 UNIT/L (ref 40–150)
ALT SERPL-CCNC: 13 UNIT/L (ref 0–55)
AST SERPL-CCNC: 12 UNIT/L (ref 5–34)
BILIRUB SERPL-MCNC: 0.4 MG/DL
BUN SERPL-MCNC: 15.6 MG/DL (ref 7–18.7)
CALCIUM SERPL-MCNC: 7.9 MG/DL (ref 8.4–10.2)
CHLORIDE SERPL-SCNC: 110 MMOL/L (ref 98–107)
CO2 SERPL-SCNC: 21 MMOL/L (ref 22–29)
CREAT SERPL-MCNC: 0.5 MG/DL (ref 0.55–1.02)
GFR SERPLBLD CREATININE-BSD FMLA CKD-EPI: >60 MLS/MIN/1.73/M2
GLOBULIN SER-MCNC: 2.2 GM/DL (ref 2.4–3.5)
GLUCOSE SERPL-MCNC: 104 MG/DL (ref 74–100)
MAGNESIUM SERPL-MCNC: 2 MG/DL (ref 1.6–2.6)
PHOSPHATE SERPL-MCNC: 2.6 MG/DL (ref 2.3–4.7)
POTASSIUM SERPL-SCNC: 4 MMOL/L (ref 3.5–5.1)
PROT SERPL-MCNC: 4.7 GM/DL (ref 6.4–8.3)
SODIUM SERPL-SCNC: 138 MMOL/L (ref 136–145)

## 2023-10-15 PROCEDURE — A4216 STERILE WATER/SALINE, 10 ML: HCPCS | Performed by: INTERNAL MEDICINE

## 2023-10-15 PROCEDURE — 63600175 PHARM REV CODE 636 W HCPCS: Performed by: NURSE PRACTITIONER

## 2023-10-15 PROCEDURE — 63600175 PHARM REV CODE 636 W HCPCS: Performed by: INTERNAL MEDICINE

## 2023-10-15 PROCEDURE — 80053 COMPREHEN METABOLIC PANEL: CPT | Performed by: INTERNAL MEDICINE

## 2023-10-15 PROCEDURE — 25000003 PHARM REV CODE 250: Performed by: INTERNAL MEDICINE

## 2023-10-15 PROCEDURE — 83735 ASSAY OF MAGNESIUM: CPT | Performed by: INTERNAL MEDICINE

## 2023-10-15 PROCEDURE — C9113 INJ PANTOPRAZOLE SODIUM, VIA: HCPCS | Performed by: INTERNAL MEDICINE

## 2023-10-15 PROCEDURE — 11000001 HC ACUTE MED/SURG PRIVATE ROOM

## 2023-10-15 PROCEDURE — 84100 ASSAY OF PHOSPHORUS: CPT | Performed by: INTERNAL MEDICINE

## 2023-10-15 PROCEDURE — S0179 MEGESTROL 20 MG: HCPCS | Performed by: INTERNAL MEDICINE

## 2023-10-15 RX ORDER — BISACODYL 5 MG
5 TABLET, DELAYED RELEASE (ENTERIC COATED) ORAL DAILY PRN
Status: DISCONTINUED | OUTPATIENT
Start: 2023-10-15 | End: 2023-10-17 | Stop reason: HOSPADM

## 2023-10-15 RX ORDER — BUTALBITAL, ACETAMINOPHEN AND CAFFEINE 50; 325; 40 MG/1; MG/1; MG/1
1 TABLET ORAL DAILY PRN
Status: DISCONTINUED | OUTPATIENT
Start: 2023-10-15 | End: 2023-10-17 | Stop reason: HOSPADM

## 2023-10-15 RX ORDER — ACETAMINOPHEN 500 MG
1000 TABLET ORAL EVERY 6 HOURS PRN
Status: DISCONTINUED | OUTPATIENT
Start: 2023-10-15 | End: 2023-10-17

## 2023-10-15 RX ORDER — TRAMADOL HYDROCHLORIDE 50 MG/1
50 TABLET ORAL EVERY 6 HOURS PRN
Status: DISCONTINUED | OUTPATIENT
Start: 2023-10-15 | End: 2023-10-17 | Stop reason: HOSPADM

## 2023-10-15 RX ADMIN — AMLODIPINE BESYLATE 10 MG: 5 TABLET ORAL at 07:10

## 2023-10-15 RX ADMIN — MUPIROCIN: 20 OINTMENT TOPICAL at 09:10

## 2023-10-15 RX ADMIN — ONDANSETRON 4 MG: 2 INJECTION INTRAMUSCULAR; INTRAVENOUS at 01:10

## 2023-10-15 RX ADMIN — TRAMADOL HYDROCHLORIDE 50 MG: 50 TABLET, COATED ORAL at 09:10

## 2023-10-15 RX ADMIN — SUCRALFATE 1 G: 1 TABLET ORAL at 07:10

## 2023-10-15 RX ADMIN — ACETAMINOPHEN 1000 MG: 500 TABLET ORAL at 02:10

## 2023-10-15 RX ADMIN — Medication 10 ML: at 12:10

## 2023-10-15 RX ADMIN — PANTOPRAZOLE SODIUM 40 MG: 40 INJECTION, POWDER, FOR SOLUTION INTRAVENOUS at 09:10

## 2023-10-15 RX ADMIN — SODIUM CHLORIDE, PRESERVATIVE FREE 10 ML: 5 INJECTION INTRAVENOUS at 05:10

## 2023-10-15 RX ADMIN — SUCRALFATE 1 G: 1 TABLET ORAL at 09:10

## 2023-10-15 RX ADMIN — ALPRAZOLAM 2 MG: 0.5 TABLET ORAL at 07:10

## 2023-10-15 RX ADMIN — SUCRALFATE 1 G: 1 TABLET ORAL at 05:10

## 2023-10-15 RX ADMIN — Medication 6 MG: at 09:10

## 2023-10-15 RX ADMIN — ONDANSETRON 4 MG: 2 INJECTION INTRAMUSCULAR; INTRAVENOUS at 07:10

## 2023-10-15 RX ADMIN — Medication 10 ML: at 09:10

## 2023-10-15 RX ADMIN — SODIUM CHLORIDE, PRESERVATIVE FREE 10 ML: 5 INJECTION INTRAVENOUS at 11:10

## 2023-10-15 RX ADMIN — MEGESTROL ACETATE 600 MG: 400 SUSPENSION ORAL at 07:10

## 2023-10-15 RX ADMIN — HYDROMORPHONE HYDROCHLORIDE 1 MG: 2 INJECTION INTRAMUSCULAR; INTRAVENOUS; SUBCUTANEOUS at 01:10

## 2023-10-15 RX ADMIN — HYDROMORPHONE HYDROCHLORIDE 1 MG: 2 INJECTION INTRAMUSCULAR; INTRAVENOUS; SUBCUTANEOUS at 07:10

## 2023-10-15 RX ADMIN — SODIUM CHLORIDE, PRESERVATIVE FREE 10 ML: 5 INJECTION INTRAVENOUS at 01:10

## 2023-10-15 RX ADMIN — ALPRAZOLAM 2 MG: 0.5 TABLET ORAL at 11:10

## 2023-10-15 RX ADMIN — MIRTAZAPINE 7.5 MG: 7.5 TABLET ORAL at 09:10

## 2023-10-15 RX ADMIN — SODIUM CHLORIDE, PRESERVATIVE FREE 10 ML: 5 INJECTION INTRAVENOUS at 12:10

## 2023-10-15 RX ADMIN — TRAMADOL HYDROCHLORIDE 50 MG: 50 TABLET, COATED ORAL at 02:10

## 2023-10-15 RX ADMIN — ALPRAZOLAM 2 MG: 0.5 TABLET ORAL at 02:10

## 2023-10-15 NOTE — PROGRESS NOTES
Hospital Medicine  Progress Note    Patient Name: Chelsie Lee  MRN: [id number]  Status: IP- Inpatient   Admission Date: 10/5/2023  Length of Stay: 6  Date of Service: 10/15/2023       CC: hospital follow-up for severe malnutrition       SUBJECTIVE      37-year-old female with very complicated intra-abdominal history and multiple surgeries and complications, resulting in chronic malnutrition, presented to ED on 10/5 with ongoing nausea, vomiting, upper abdominal pain, with associated G-tube leakage.  Patient history is complicated, but started with sleeve gastrectomy in [place] back in 2013.  She then underwent an an additional procedure  in [place] in 2023.  It seems she ultimately required a Kel-en-Y gastric bypass with Dr. Lopez at [facility].  She had further complications including perforation, requiring further surgical interventions and placement of feeding gastrotomy tube within her gastic remnant.  However she poorly tolerated tube feeds, takes minimal oral intake and has become progressively more malnutritioned since earlier this year.  More recently she was hospitalized and underwent EGD noting ulceration at her GJ anastomosis site, for which she has been on PPI and Carafate.     Evaluation here included CT abdomen pelvis showing edematous appearance of the GE junction and proximal gastric pouch, G-tube balloon in the distal gastric body/biliopancreatic limb; mild edematous appearance of the small bowel.; no evidence of bowel obstruction or perforation.  She was afebrile with stable electrolytes and renal function, though she did appeared chronically malnutritioned.  Underwent laparoscopy with surgery on 10/11, which included placement of feeding J-tube.    Today: Patient seen and examined at bedside, and chart reviewed.  Tolerating J-tube feeds at goal, as well as oral intake.      MEDICATIONS   Scheduled   amLODIPine  10 mg Oral Daily    enoxparin  30 mg Subcutaneous Q24H (prophylaxis, 1700)    megestroL   "600 mg Oral Daily    mirtazapine  7.5 mg Oral QHS    mupirocin   Topical (Top) BID    pantoprazole  40 mg Intravenous BID    sodium chloride 0.9%  10 mL Intravenous Q6H    sucralfate  1 g Oral QID     Continuous Infusions  None      PHYSICAL EXAM   VITALS: T 99.2 °F (37.3 °C)   BP (!) 138/94   P 103   RR 20   O2 100 %    GENERAL: Awake and in NAD  LUNGS: CTA anteriorly  CVS: Normal rate  GI/: Soft, NT, G-tube and J-tube both in place, bowel sounds present  EXTREMITIES: No peripheral edema  NEURO: AAOx3  PSYCH: Cooperative      LABS   CBC  No results for input(s): "WBC", "RBC", "HGB", "HCT", "MCV", "MCH", "MCHC", "RDW", "PLT", "RETIC", "ESR" in the last 72 hours.    CHEM  Recent Labs     10/15/23  0923      K 4.0   CHLORIDE 110*   CO2 21*   BUN 15.6   CREATININE 0.50*   GLUCOSE 104*   CALCIUM 7.9*   MG 2.00   PHOS 2.6   ALBUMIN 2.5*   GLOBULIN 2.2*   ALKPHOS 42   ALT 13   AST 12   BILITOT 0.4         ASSESSMENT   Severe protein calorie malnutrition  Non-functioning G-tube  now s/p J-tube placement  GJ anastomosis ulcerations, healing  s/p extensively complicated gastric sleeve/bypass    PLAN   Post-op management per surgery  Continue oral diet tube feeds (at goal)  Can  d/c PPN  Possible discharge home in next 24-48hrs, if stable  Otherwise continue current management and monitoring in the interim including PPI BID      Prophylaxis: SC Lovenox        Froy Pfeiffer MD  [facility]  "

## 2023-10-16 LAB
ANION GAP SERPL CALC-SCNC: 4 MEQ/L
BUN SERPL-MCNC: 18 MG/DL (ref 7–18.7)
CALCIUM SERPL-MCNC: 7.8 MG/DL (ref 8.4–10.2)
CHLORIDE SERPL-SCNC: 111 MMOL/L (ref 98–107)
CO2 SERPL-SCNC: 22 MMOL/L (ref 22–29)
CREAT SERPL-MCNC: 0.54 MG/DL (ref 0.55–1.02)
CREAT/UREA NIT SERPL: 33
GFR SERPLBLD CREATININE-BSD FMLA CKD-EPI: >60 MLS/MIN/1.73/M2
GLUCOSE SERPL-MCNC: 86 MG/DL (ref 74–100)
MAGNESIUM SERPL-MCNC: 1.8 MG/DL (ref 1.6–2.6)
PHOSPHATE SERPL-MCNC: 2.8 MG/DL (ref 2.3–4.7)
POTASSIUM SERPL-SCNC: 4.3 MMOL/L (ref 3.5–5.1)
SODIUM SERPL-SCNC: 137 MMOL/L (ref 136–145)

## 2023-10-16 PROCEDURE — S0179 MEGESTROL 20 MG: HCPCS | Performed by: INTERNAL MEDICINE

## 2023-10-16 PROCEDURE — 63600175 PHARM REV CODE 636 W HCPCS: Performed by: INTERNAL MEDICINE

## 2023-10-16 PROCEDURE — C9113 INJ PANTOPRAZOLE SODIUM, VIA: HCPCS | Performed by: INTERNAL MEDICINE

## 2023-10-16 PROCEDURE — 84100 ASSAY OF PHOSPHORUS: CPT | Performed by: INTERNAL MEDICINE

## 2023-10-16 PROCEDURE — 25000003 PHARM REV CODE 250: Performed by: INTERNAL MEDICINE

## 2023-10-16 PROCEDURE — 11000001 HC ACUTE MED/SURG PRIVATE ROOM

## 2023-10-16 PROCEDURE — 80048 BASIC METABOLIC PNL TOTAL CA: CPT | Performed by: INTERNAL MEDICINE

## 2023-10-16 PROCEDURE — A4216 STERILE WATER/SALINE, 10 ML: HCPCS | Performed by: INTERNAL MEDICINE

## 2023-10-16 PROCEDURE — 83735 ASSAY OF MAGNESIUM: CPT | Performed by: INTERNAL MEDICINE

## 2023-10-16 RX ADMIN — SUCRALFATE 1 G: 1 TABLET ORAL at 08:10

## 2023-10-16 RX ADMIN — SODIUM CHLORIDE, PRESERVATIVE FREE 10 ML: 5 INJECTION INTRAVENOUS at 05:10

## 2023-10-16 RX ADMIN — MUPIROCIN: 20 OINTMENT TOPICAL at 09:10

## 2023-10-16 RX ADMIN — ALPRAZOLAM 2 MG: 0.5 TABLET ORAL at 08:10

## 2023-10-16 RX ADMIN — Medication 6 MG: at 11:10

## 2023-10-16 RX ADMIN — MEGESTROL ACETATE 600 MG: 400 SUSPENSION ORAL at 09:10

## 2023-10-16 RX ADMIN — Medication 10 ML: at 09:10

## 2023-10-16 RX ADMIN — TRAMADOL HYDROCHLORIDE 50 MG: 50 TABLET, COATED ORAL at 04:10

## 2023-10-16 RX ADMIN — AMLODIPINE BESYLATE 10 MG: 5 TABLET ORAL at 08:10

## 2023-10-16 RX ADMIN — TRAMADOL HYDROCHLORIDE 50 MG: 50 TABLET, COATED ORAL at 11:10

## 2023-10-16 RX ADMIN — MIRTAZAPINE 7.5 MG: 7.5 TABLET ORAL at 09:10

## 2023-10-16 RX ADMIN — ALPRAZOLAM 2 MG: 0.5 TABLET ORAL at 11:10

## 2023-10-16 RX ADMIN — ALPRAZOLAM 2 MG: 0.5 TABLET ORAL at 04:10

## 2023-10-16 RX ADMIN — SUCRALFATE 1 G: 1 TABLET ORAL at 04:10

## 2023-10-16 RX ADMIN — ACETAMINOPHEN 1000 MG: 500 TABLET ORAL at 08:10

## 2023-10-16 RX ADMIN — SUCRALFATE 1 G: 1 TABLET ORAL at 09:10

## 2023-10-16 RX ADMIN — SODIUM CHLORIDE, PRESERVATIVE FREE 10 ML: 5 INJECTION INTRAVENOUS at 11:10

## 2023-10-16 RX ADMIN — TRAMADOL HYDROCHLORIDE 50 MG: 50 TABLET, COATED ORAL at 08:10

## 2023-10-16 RX ADMIN — ACETAMINOPHEN 1000 MG: 500 TABLET ORAL at 04:10

## 2023-10-16 RX ADMIN — PANTOPRAZOLE SODIUM 40 MG: 40 INJECTION, POWDER, FOR SOLUTION INTRAVENOUS at 09:10

## 2023-10-16 RX ADMIN — SUCRALFATE 1 G: 1 TABLET ORAL at 01:10

## 2023-10-16 RX ADMIN — PANTOPRAZOLE SODIUM 40 MG: 40 INJECTION, POWDER, FOR SOLUTION INTRAVENOUS at 08:10

## 2023-10-16 NOTE — PROGRESS NOTES
Hospital Medicine  Progress Note    Patient Name: Chelsie Lee  MRN: [id number]  Status: IP- Inpatient   Admission Date: 10/5/2023  Length of Stay: 7  Date of Service: 10/16/2023       CC: hospital follow-up for severe malnutrition       SUBJECTIVE      37-year-old female with very complicated intra-abdominal history and multiple surgeries and complications, resulting in chronic malnutrition, presented to ED on 10/5 with ongoing nausea, vomiting, upper abdominal pain, with associated G-tube leakage.  Patient history is complicated, but started with sleeve gastrectomy in [place] back in 2013.  She then underwent an an additional procedure  in [place] in 2023.  It seems she ultimately required a Kel-en-Y gastric bypass with Dr. Lopez at [facility].  She had further complications including perforation, requiring further surgical interventions and placement of feeding gastrotomy tube within her gastic remnant.  However she poorly tolerated tube feeds, takes minimal oral intake and has become progressively more malnutritioned since earlier this year.  More recently she was hospitalized and underwent EGD noting ulceration at her GJ anastomosis site, for which she has been on PPI and Carafate.     Evaluation here included CT abdomen pelvis showing edematous appearance of the GE junction and proximal gastric pouch, G-tube balloon in the distal gastric body/biliopancreatic limb; mild edematous appearance of the small bowel.; no evidence of bowel obstruction or perforation.  She was afebrile with stable electrolytes and renal function, though she did appeared chronically malnutritioned.  Underwent laparoscopy with surgery on 10/11, which included placement of feeding J-tube.    Today: Patient seen and examined at bedside, and chart reviewed.  Tolerating J-tube feeds, doing well.  Oral intake is picking up as well.      MEDICATIONS   Scheduled   amLODIPine  10 mg Oral Daily    enoxparin  30 mg Subcutaneous Q24H (prophylaxis, 1700)  "   megestroL  600 mg Oral Daily    mirtazapine  7.5 mg Oral QHS    mupirocin   Topical (Top) BID    pantoprazole  40 mg Intravenous BID    sodium chloride 0.9%  10 mL Intravenous Q6H    sucralfate  1 g Oral QID     Continuous Infusions  None      PHYSICAL EXAM   VITALS: T 98.9 °F (37.2 °C)   /66   P 96   RR 16   O2 98 %    GENERAL: Awake and in NAD  LUNGS: CTA anteriorly  CVS: Normal rate  GI/: Soft, NT, G-tube and J-tube both in place, bowel sounds present  EXTREMITIES: No peripheral edema  NEURO: AAOx3  PSYCH: Cooperative      LABS   CBC  No results for input(s): "WBC", "RBC", "HGB", "HCT", "MCV", "MCH", "MCHC", "RDW", "PLT", "RETIC", "ESR" in the last 72 hours.    CHEM  Recent Labs     10/15/23  0923 10/16/23  0612    137   K 4.0 4.3   CHLORIDE 110* 111*   CO2 21* 22   BUN 15.6 18.0   CREATININE 0.50* 0.54*   GLUCOSE 104* 86   CALCIUM 7.9* 7.8*   MG 2.00 1.80   PHOS 2.6 2.8   ALBUMIN 2.5*  --    GLOBULIN 2.2*  --    ALKPHOS 42  --    ALT 13  --    AST 12  --    BILITOT 0.4  --          ASSESSMENT   Severe protein calorie malnutrition  Non-functioning G-tube  now s/p J-tube placement  GJ anastomosis ulcerations, healing  s/p extensively complicated gastric sleeve/bypass    PLAN   Post-op management per surgery  Continue oral diet and tube feeds (at goal)  Plan for discharge in AM if stable  Otherwise continue current management and monitoring in the interim      Prophylaxis: SC Lovenox        Froy Pfeiffer MD  [facility]  "

## 2023-10-16 NOTE — CONSULTS
Inpatient Nutrition Assessment    Admit Date: 10/5/2023   Total duration of encounter: 11 days     Nutrition Recommendation/Prescription       Continue tube feedings as below:  Impact Peptide 1.5 @ goal rate of 55 mL/hr would provide:  1650 kcals (100% est needs)  101 g protein (116% protein)  847 mL fluid (49% est needs)  **based on TF running ~20 hrs per day**  -FWF rec: 45mL/hr that TF is running     -Continue Low Residue diet  -Continue appetite stimulant as medically feasible.     -I do not recommend switching to cyclical/night time feeds @ home. We have a long way to go in correcting patient's nutrition status. She is finally tolerating a regimen for the first time in quite a while, and I don't think it's worth rocking the boat until nutrition status is significantly improved.     Communication of Recommendations: reviewed with provider, reviewed with nurse, and reviewed with patient    Nutrition Assessment     Malnutrition Assessment/Nutrition-Focused Physical Exam    Malnutrition Context: chronic illness (10/06/23 1225)  Malnutrition Level: severe (10/06/23 1225)  Energy Intake (Malnutrition): less than 75% for greater than or equal to 1 month (10/06/23 1225)  Weight Loss (Malnutrition): greater than 10% in 6 months (10/06/23 1225)  Subcutaneous Fat (Malnutrition): severe depletion (10/06/23 1225)  Orbital Region (Subcutaneous Fat Loss): severe depletion  Upper Arm Region (Subcutaneous Fat Loss): severe depletion     Muscle Mass (Malnutrition): severe depletion (10/06/23 1225)  [place] Region (Muscle Loss): severe depletion     Clavicle and Acromion Bone Region (Muscle Loss): severe depletion     Dorsal Hand (Muscle Loss): severe depletion  Patellar Region (Muscle Loss): severe depletion     Posterior Calf Region (Muscle Loss): severe depletion           A minimum of two characteristics is recommended for diagnosis of either severe or non-severe malnutrition.    Chart Review    Reason Seen: physician consult  for Tube Feeding recs and follow-up    Malnutrition Screening Tool Results   Have you recently lost weight without trying?: Yes: 2-13 lbs  Have you been eating poorly because of a decreased appetite?: No   MST Score: 1     Diagnosis:  Esophagitis, N/V/D, h/o Sleeve Gastrectomy in 2013 and Gastric Bypass in [place] in June 2022, h/o hiatal hernia repair 1/23, Leaking G-tube    Relevant Medical History:   Past Medical History:   Diagnosis Date    GERD (gastroesophageal reflux disease)     Hypertension      Past Surgical History:   Procedure Laterality Date    COLONOSCOPY N/A 8/11/2023    Procedure: COLON;  Surgeon: Wilmer Rossi MD;  Location: [facility] ENDOSCOPY;  Service: Gastroenterology;  Laterality: N/A;    EGD, WITH CLOSED BIOPSY  8/9/2023    Procedure: EGD, WITH CLOSED BIOPSY;  Surgeon: Wilmer Rossi MD;  Location: [facility] ENDOSCOPY;  Service: Gastroenterology;;    ESOPHAGOGASTRODUODENOSCOPY N/A 8/9/2023    Procedure: EGD;  Surgeon: Wilmer Rossi MD;  Location: [facility] ENDOSCOPY;  Service: Gastroenterology;  Laterality: N/A;    ESOPHAGOGASTRODUODENOSCOPY N/A 10/11/2023    Procedure: EGD (ESOPHAGOGASTRODUODENOSCOPY);  Surgeon: Valeriano Riddle MD;  Location: [facility];  Service: General;  Laterality: N/A;    HERNIA REPAIR      LAPAROSCOPIC GASTRIC BANDING      LAPAROSCOPIC INSERTION OF JEJUNOSTOMY TUBE N/A 10/11/2023    Procedure: INSERTION, JEJUNOSTOMY TUBE, LAPAROSCOPIC;  Surgeon: Valeriano Riddle MD;  Location: [facility];  Service: General;  Laterality: N/A;     Review of patient's allergies indicates:   Allergen Reactions    Penicillin Rash    Penicillin Rash        Nutrition-Related Medications:    amLODIPine  10 mg Oral Daily    enoxparin  30 mg Subcutaneous Q24H (prophylaxis, 1700)    megestroL  600 mg Oral Daily    mirtazapine  7.5 mg Oral QHS    mupirocin   Topical (Top) BID    pantoprazole  40 mg Intravenous BID    sodium chloride 0.9%  10 mL Intravenous Q6H    sucralfate  1 g Oral QID          "  Calorie Containing IV Medications: no significant kcals from medications at this time    Nutrition-Related Labs:  No results found for: "HGBA1C"  10/16/2023: Magnesium Level 1.80 mg/dL (Ref range: 1.60 - 2.60 mg/dL); Phosphorus Level 2.8 mg/dL (Ref range: 2.3 - 4.7 mg/dL); Potassium Level 4.3 mmol/L (Ref range: 3.5 - 5.1 mmol/L); Sodium Level 137 mmol/L (Ref range: 136 - 145 mmol/L)   Recent Labs   Lab 10/12/23  0458   WBC 11.04   RBC 3.46*   HGB 10.0*   HCT 31.1*   MCV 89.9   MCH 28.9   MCHC 32.2*       Recent Labs   Lab 10/12/23  0458 10/15/23  0923 10/16/23  0612   * 138 137   K 4.7 4.0 4.3   CO2 22 21* 22   BUN 11.3 15.6 18.0   CREATININE 0.56 0.50* 0.54*   CALCIUM 7.8* 7.9* 7.8*   MG 1.90 2.00 1.80   ALBUMIN  --  2.5*  --    ALKPHOS  --  42  --    ALT  --  13  --    AST  --  12  --    BILITOT  --  0.4  --          Diet/PN Order: Diet low fiber/residue  Oral Supplement Order: none  Tube Feeding Order: none  Appetite/Oral Intake: fair/50-75% of meals  Factors Affecting Nutritional Intake: decreased appetite  Food/Sikhism/Cultural Preferences: none reported  Food Allergies: none reported    Skin Integrity: intact  Wound(s):   n/a    Comments    10/6/23: Pt reports poor appetite worsening over 6-12 months w/ GI complications, has been on G-tube feedings w/ Jevity 1.2 but has been experiencing progressively worse diarrhea she suspects due to the formula recently, she eats very little by mouth, often has n/v, has very poor appetite, the thought of food often makes her sick, has lost around 80 lbs over the past 14 months per EMR. It's possible that some of the weight loss was intentional given surgica hx, but sounds like all weight loss since January 2023 was unintentional. Doctors are discussing replacing G-tube w/ J-tube which I also think is worth trying. Pt and I did discuss that sugary drinks and too many artifical sweeteners can contribute to diarrhea, as it sounds like she drinks sports drinks " "regularly.     10/9/23: Pt now on full liquids and tolerating well; appetite/intake is fair/good; pt is eating some outside foods brought in by family. Pt is also on appetite stimulant; would benefit from J-tube placement (surgery discussing); noted per surgery note, also recommending TPN. Noted that PPN was ordered but no longer running and was d/c'ed this morning. Answered all pt's questions regarding diet.     10/13/23: MD consult for tube feeding recs; pt had J-tube placed; spoke with RN and will put in TF order. Pt currently on PPN.     10/16/23: Pt reports tolerating j-tube feeds + po diet, appetite is improving, stools much better formed, no nausea. Discussed home tf + po diet. Nurse reports DC tomorrow. Magnesium and phosphorus WNL, PPN DC'd.     Anthropometrics    Height: 5' 4" (162.6 cm) Height Method: Stated  Last Weight: 43.5 kg (96 lb) (10/05/23 9149) Weight Method: Standard Scale  BMI (Calculated): 16.5  BMI Classification: underweight (BMI less than 18.5)     Ideal Body Weight (IBW), Female: 120 lb     % Ideal Body Weight, Female (lb): 80 %                             Usual Weight Provided By: patient and EMR weight history    Wt Readings from Last 5 Encounters:   10/05/23 43.5 kg (96 lb)   23 44 kg (97 lb)   22 59 kg (130 lb)   22 64.4 kg (141 lb 15.6 oz)   10/28/22 64.4 kg (142 lb)     Weight Change(s) Since Admission:  Admit Weight: 40.2 kg (88 lb 10 oz) (10/05/23 1332)  10/5/23: 43.5 kg  10/16: no new weight     Estimated Needs    Weight Used For Calorie Calculations: 43.5 kg (95 lb 14.4 oz)  Energy Calorie Requirements (kcal): 3297-5630 kcals (35-40 kcals/kg)  Energy Need Method: Kcal/kg  Weight Used For Protein Calculations: 43.5 kg (95 lb 14.4 oz)  Protein Requirements: 78-87 g (1.8-2.0 g/kg)  Fluid Requirements (mL): 1740 mL (40 mL/kg)  Temp (24hrs), Av.7 °F (37.1 °C), Min:98 °F (36.7 °C), Max:99.1 °F (37.3 °C)       Enteral Nutrition    Formula: Impact Peptide 1.5 " Aramis  Rate/Volume: 55 mL/hr  Water Flushes: 45 mL/hr  Additives/Modulars: none at this time  Route: jejunostomy tube  Method: continuous  Total Nutrition Provided by Tube Feeding, Additives, and Flushes:  Calories Provided  1650 kcal/d, 100% needs   Protein Provided  101 g/d, 116% needs   Fluid Provided  1747 ml/d, 100% needs   Continuous feeding calculations based on estimated 20 hr/d run time unless otherwise stated.    Parenteral Nutrition    Not receiving      Evaluation of Received Nutrient Intake    Calories: meeting estimated needs  Protein: meeting estimated needs    Patient Education    Not applicable.    Nutrition Diagnosis     PES: Malnutrition related to suboptimal protein/energy intake or absorption altered GI function as evidenced by less than 75% needs met for greater than 1 month, severe fat depletion, severe muscle depletion, and greater than 10% weight loss in 6 months in the context of multiple GI surgeries and post op complications. (continues)     Interventions/Goals     Intervention(s): modified composition of meals/snacks, modified composition of enteral nutrition, modified rate of enteral nutrition, and collaboration with other providers  Goal: Meet greater than 75% of nutritional needs by follow-up. (goal progressing)    Monitoring & Evaluation     Dietitian will monitor energy intake, enteral nutrition intake, weight, electrolyte/renal panel, glucose/endocrine profile, and gastrointestinal profile.  Nutrition Risk/Follow-Up: high (follow-up in 1-4 days)   Please consult if re-assessment needed sooner.    Ron Taveras RD   10/16/2023

## 2023-10-17 VITALS
DIASTOLIC BLOOD PRESSURE: 76 MMHG | RESPIRATION RATE: 20 BRPM | OXYGEN SATURATION: 100 % | WEIGHT: 96 LBS | HEIGHT: 64 IN | BODY MASS INDEX: 16.39 KG/M2 | HEART RATE: 100 BPM | SYSTOLIC BLOOD PRESSURE: 120 MMHG | TEMPERATURE: 98 F

## 2023-10-17 LAB
MAGNESIUM SERPL-MCNC: 1.9 MG/DL (ref 1.6–2.6)
PHOSPHATE SERPL-MCNC: 2.8 MG/DL (ref 2.3–4.7)

## 2023-10-17 PROCEDURE — 90471 IMMUNIZATION ADMIN: CPT | Performed by: INTERNAL MEDICINE

## 2023-10-17 PROCEDURE — 84100 ASSAY OF PHOSPHORUS: CPT | Performed by: INTERNAL MEDICINE

## 2023-10-17 PROCEDURE — 90686 IIV4 VACC NO PRSV 0.5 ML IM: CPT | Performed by: INTERNAL MEDICINE

## 2023-10-17 PROCEDURE — 25000003 PHARM REV CODE 250: Performed by: INTERNAL MEDICINE

## 2023-10-17 PROCEDURE — C9113 INJ PANTOPRAZOLE SODIUM, VIA: HCPCS | Performed by: INTERNAL MEDICINE

## 2023-10-17 PROCEDURE — 63600175 PHARM REV CODE 636 W HCPCS: Performed by: INTERNAL MEDICINE

## 2023-10-17 PROCEDURE — 83735 ASSAY OF MAGNESIUM: CPT | Performed by: INTERNAL MEDICINE

## 2023-10-17 PROCEDURE — A4216 STERILE WATER/SALINE, 10 ML: HCPCS | Performed by: INTERNAL MEDICINE

## 2023-10-17 PROCEDURE — S0179 MEGESTROL 20 MG: HCPCS | Performed by: INTERNAL MEDICINE

## 2023-10-17 RX ORDER — MIRTAZAPINE 7.5 MG/1
7.5 TABLET, FILM COATED ORAL NIGHTLY
Qty: 30 TABLET | Refills: 0 | Status: SHIPPED | OUTPATIENT
Start: 2023-10-17 | End: 2023-11-30

## 2023-10-17 RX ORDER — ACETAMINOPHEN 500 MG
1000 TABLET ORAL EVERY 6 HOURS PRN
Status: DISCONTINUED | OUTPATIENT
Start: 2023-10-17 | End: 2023-10-17 | Stop reason: HOSPADM

## 2023-10-17 RX ORDER — ACETAMINOPHEN 500 MG
1000 TABLET ORAL EVERY 6 HOURS PRN
Refills: 0
Start: 2023-10-17

## 2023-10-17 RX ORDER — TRAMADOL HYDROCHLORIDE 50 MG/1
50 TABLET ORAL EVERY 6 HOURS PRN
Qty: 30 TABLET | Refills: 0 | Status: SHIPPED | OUTPATIENT
Start: 2023-10-17

## 2023-10-17 RX ORDER — MEGESTROL ACETATE 40 MG/ML
600 SUSPENSION ORAL DAILY
Qty: 450 ML | Refills: 0 | Status: SHIPPED | OUTPATIENT
Start: 2023-10-18 | End: 2023-11-30

## 2023-10-17 RX ADMIN — SODIUM CHLORIDE, PRESERVATIVE FREE 10 ML: 5 INJECTION INTRAVENOUS at 05:10

## 2023-10-17 RX ADMIN — AMLODIPINE BESYLATE 10 MG: 5 TABLET ORAL at 08:10

## 2023-10-17 RX ADMIN — INFLUENZA VIRUS VACCINE 0.5 ML: 15; 15; 15; 15 SUSPENSION INTRAMUSCULAR at 10:10

## 2023-10-17 RX ADMIN — ONDANSETRON 4 MG: 2 INJECTION INTRAMUSCULAR; INTRAVENOUS at 04:10

## 2023-10-17 RX ADMIN — ALPRAZOLAM 2 MG: 0.5 TABLET ORAL at 08:10

## 2023-10-17 RX ADMIN — PANTOPRAZOLE SODIUM 40 MG: 40 INJECTION, POWDER, FOR SOLUTION INTRAVENOUS at 09:10

## 2023-10-17 RX ADMIN — MEGESTROL ACETATE 600 MG: 400 SUSPENSION ORAL at 08:10

## 2023-10-17 RX ADMIN — SUCRALFATE 1 G: 1 TABLET ORAL at 08:10

## 2023-10-17 NOTE — PLAN OF CARE
Problem: Adult Inpatient Plan of Care  Goal: Plan of Care Review  Outcome: Ongoing, Progressing  Goal: Patient-Specific Goal (Individualized)  Outcome: Ongoing, Progressing  Goal: Absence of Hospital-Acquired Illness or Injury  Outcome: Ongoing, Progressing  Goal: Readiness for Transition of Care  Outcome: Ongoing, Progressing     Problem: Fluid and Electrolyte Imbalance (Bariatric Surgery)  Goal: Fluid and Electrolyte Balance  Outcome: Ongoing, Progressing     Problem: Nausea and Vomiting  Goal: Fluid and Electrolyte Balance  Outcome: Ongoing, Progressing     Problem: Pain Acute  Goal: Acceptable Pain Control and Functional Ability  Outcome: Ongoing, Progressing     Problem: Infection  Goal: Absence of Infection Signs and Symptoms  Outcome: Ongoing, Progressing

## 2023-10-17 NOTE — NURSING
Pt DC per MD order. PICC lined removed and covered with pressure dressing. Bleeding controlled. Prescriptions sent to preferred pharmacy. Pt educated on J tube care and TF administration. Cm to set up home TF and HH appt. Pt transported downstairs via hospital transport with mother at side. Pt in stable condition.

## 2023-10-17 NOTE — PLAN OF CARE
A catheter was exchanged for a (Cath Model D Pig 145 6f 110cm) catheter.  Pt is current with current with [facility] home health home and community based program.  I spoke to Brenda Russo rn. [phone] who provides the following info.  Pt's PCP is Galdino Caceres in [place] pt. Is non-compliant in keeping MD appts. Therefore MD will not sign forms for pt to rec. Her tube feedings.  Pt is paying out of pocket.    Pt states she uses [facility], however [place] and [facility] have no records of this pt.  I called  [facility] and spoke to Netta, who provided the following info.  Try [employer] [phone] to investigate if this is her vendor.  Pt. Knows very little about the services she is receiving.  Sec. Was able to arrange an appt. For pt. To see Dr. Caceres. I spoke to pt. In detail about her non-compliance and lack of interest is self-care and self-knowledge.  Vm left for [employer].  Above info given to Brenda russo to f/u.  Pt has supply of formula and supplies at home per pt. As she has been inpt x 9 days. She is discharged today all clinicals sent

## 2023-10-18 ENCOUNTER — PATIENT OUTREACH (OUTPATIENT)
Dept: ADMINISTRATIVE | Facility: CLINIC | Age: 38
End: 2023-10-18
Payer: COMMERCIAL

## 2023-10-18 ENCOUNTER — PATIENT MESSAGE (OUTPATIENT)
Dept: ADMINISTRATIVE | Facility: CLINIC | Age: 38
End: 2023-10-18
Payer: COMMERCIAL

## 2023-10-18 NOTE — PROGRESS NOTES
C3 nurse spoke with Chelsie Lee for a TCC post hospital discharge follow up call. The patient has a scheduled HOSFU appointment with Galdino Caceres MD on 10/19/23 @ 1:00pm.

## 2023-10-18 NOTE — PROGRESS NOTES
C3 nurse attempted to contact Chelsie Lee for a TCC post hospital discharge follow up call. No answer. Left voicemail with callback information. The patient has a scheduled HOSFU appointment with Galdino Caceres MD on 10/19/23 @ 1:00pm.

## 2023-10-18 NOTE — DISCHARGE SUMMARY
Hospital Medicine  Discharge Summary    Patient Name: Chelsie Lee  MRN: [id number]  Admit Date: 10/5/2023  Discharge Date: 10/17/2023   Status: IP- Inpatient   Length of Stay: 8      PHYSICIANS   Admitting Physician: Coleman Le MD  Discharging Physician: Froy Pfeiffer MD.  Primary Care Physician: Galdino Caceres MD  Consults: Gastroenterology and General Surgery      DISCHARGE DIAGNOSES   Severe protein calorie malnutrition  Non-functioning G-tube  now s/p J-tube placement  GJ anastomosis ulcerations, healing  s/p extensively complicated gastric sleeve/bypass      PROCEDURES   Laparoscopic placement of feeding jejunostomy tube, laparoscopic lysis of adhesions, esophagogastroduodenoscopy  - 10/11/2023 - Valeriano Riddle MD      HOSPITAL COURSE       37-year-old female with very complicated intra-abdominal history and multiple surgeries and complications, resulting in chronic malnutrition, presented to ED on 10/5 with ongoing nausea, vomiting, upper abdominal pain, with associated G-tube leakage.  Patient history is complicated, but started with sleeve gastrectomy in [place] back in 2013.  She then underwent an an additional procedure  in [place] in 2023.  It seems she ultimately required a Kel-en-Y gastric bypass with Dr. Lopez at [facility].  She had further complications including perforation, requiring further surgical interventions and placement of feeding gastrotomy tube within her gastic remnant.  However she poorly tolerated tube feeds, takes minimal oral intake and has become progressively more malnutritioned since earlier this year.  More recently she was hospitalized and underwent EGD noting ulceration at her GJ anastomosis site, for which she has been on PPI and Carafate.     Evaluation here included CT abdomen pelvis showing edematous appearance of the GE junction and proximal gastric pouch, G-tube balloon in the distal gastric body/biliopancreatic limb; mild edematous appearance of the small bowel.; no  evidence of bowel obstruction or perforation.  She was afebrile with stable electrolytes and renal function, though she did appeared chronically malnutritioned.  Underwent laparoscopy with surgery on 10/11, which included placement of feeding J-tube.  She did well post-op.  She was tolerating J-tube feeds; oral intake was up as well.  CM consulted to arrange with post-discharge needs.  Patient now otherwise stable for discharge.         STATUS  Improved    DISPOSITION  Discharge to home health    DIET  Oral intake as tolerated  Continuous J-tube feeds @ 55cc/hr with impact 1.5    ACTIVITY  Restrictions per surgery    FOLLOW-UP      [facility] Follow up.    Specialty: Home Health Services  Why: Home health agency, current with the HCBS department.  Contact information:  [address]   [facility] [facility] [address]  [phone]               Galdino Fierro MD Follow up on 10/19/2023.    Specialty: Family Medicine  Why: SCHEDULED FOLLOW-UP APPT. ON 10/19 @1PM W/ DR. FIERRO.  Contact information:  [address] [facility] [address]  [phone]               Valeriano Riddle MD. Schedule an appointment as soon as possible for a visit in 2 week(s).    Specialty: General Surgery  Why: Office will call with appt time  Contact information:  [address]   [address]  [facility] [address]  [phone]                 DISCHARGE MEDICATION RECONCILIATION     PRESCRIBED     acetaminophen 500 MG tablet  Commonly known as: TYLENOL  Take 2 tablets (1,000 mg total) by mouth every 6 (six) hours as needed for Pain.     megestroL 400 mg/10 mL (10 mL) Susp  Commonly known as: MEGACE  Take 15 mLs (600 mg total) by mouth once daily.     traMADoL 50 mg tablet  Commonly known as: ULTRAM  Take 1 tablet (50 mg total) by mouth every 6 (six) hours as needed for Pain.            CONTINUE      ALPRAZolam 2 MG Tab  Commonly known as: XANAX     amLODIPine 10 MG tablet  Commonly known as: NORVASC  Take 1 tablet (10 mg total) by  mouth once daily.     cyanocobalamin 1,000 mcg/mL injection     dicyclomine 10 MG capsule  Commonly known as: BENTYL     ergocalciferol 50,000 unit Cap  Commonly known as: ERGOCALCIFEROL     hyoscyamine 0.125 mg Subl  Commonly known as: LEVSIN/SL     mirtazapine 7.5 MG Tab  Commonly known as: REMERON  Take 1 tablet (7.5 mg total) by mouth every evening.     pantoprazole 40 MG tablet  Commonly known as: PROTONIX  Take 1 tablet (40 mg total) by mouth 2 (two) times daily before meals.            DISCONTINUE     DULoxetine 20 MG capsule  Commonly known as: CYMBALTA     megestroL 625 mg/5 mL (125 mg/mL) Susp  Commonly known as: MEGACE ES  Replaced by: megestroL 400 mg/10 mL (10 mL) Susp            These medications were sent to  [facility] [address] [place] [address]      Phone: [phone]   megestroL 400 mg/10 mL (10 mL) Susp  mirtazapine 7.5 MG Tab  traMADoL 50 mg tablet        PHYSICAL EXAM   VITALS: T 98.2 °F (36.8 °C)   /76   P 100   RR 20   O2 100 %    GENERAL: Awake and in NAD  LUNGS: CTA anteriorly  CVS: Normal rate  GI/: Soft, NT, G-tube and J-tube both in place, bowel sounds present  EXTREMITIES: No peripheral edema  NEURO: AAOx3  PSYCH: Cooperative      Discharge time: 33 minutes     Froy Pfeiffer MD  [facility]       DIAGNOSITCS   CBC:   Recent Labs   Lab 10/12/23  0458   WBC 11.04   HGB 10.0*   HCT 31.1*          CMP:   Recent Labs   Lab 10/12/23  0458 10/15/23  0923 10/16/23  0612 10/17/23  0448   CALCIUM 7.8* 7.9* 7.8*  --    ALBUMIN  --  2.5*  --   --    * 138 137  --    K 4.7 4.0 4.3  --    CO2 22 21* 22  --    BUN 11.3 15.6 18.0  --    CREATININE 0.56 0.50* 0.54*  --    ALKPHOS  --  42  --   --    ALT  --  13  --   --    AST  --  12  --   --    BILITOT  --  0.4  --   --    MG 1.90 2.00 1.80 1.90   PHOS  --  2.6 2.8 2.8     Estimated Creatinine Clearance: 96.1 mL/min (A) (based on SCr of 0.54 mg/dL (L)).      Microbiology Results (last 7  days)       Procedure Component Value Units Date/Time    Blood Culture [[id number]]  (Normal) Collected: 10/05/23 1359    Order Status: Completed Specimen: Blood Updated: 10/10/23 1600     CULTURE, BLOOD ([facility]) No Growth at 5 days    Blood Culture [[id number]]  (Normal) Collected: 10/05/23 1359    Order Status: Completed Specimen: Blood Updated: 10/10/23 1600     CULTURE, BLOOD ([facility]) No Growth at 5 days             FL Upper GI Water Soluable to Include Esophagram  Result Date: 10/8/2023  EXAMINATION: FL UPPER GI WATER SOLUABLE TO INCLUDE ESOPHAGRAM CLINICAL HISTORY: Complex revision bariatric surgery;Esophagitis, unspecified without bleeding TECHNIQUE: Patient performed several swallows with Ominipaque.  Multiple fluoroscopic images were performed of the esophagus and the stomach. Fluoroscopy time 1 minutes 24 seconds. Radiation does 59 mGy. DAP 1388 uGym2. Total of 93 fluoroscopic images were performed. COMPARISON: CT abdomen pelvis October 5, 2023 FINDINGS: Timing and emptying of contrast from the hypopharynx was unremarkable.  Unremarkable esophageal primary and secondary peristalsis and there were no tertiary waveforms..  Unremarkable distensibility of the esophagus without abnormal dilatation, stricture or diverticulum.  Contrast promptly transited into the stomach.  Stomach is of small volume with irregular configuration related to prior surgeries.  No contrast extravasation identified.  Contrast also promptly transited into the small bowel loops across the anastomosis.   Impression:  1. Small stomach with irregular configuration.   2. No contrast extravasation.   Electronically signed by: Edgardo Tyler Date:    10/08/2023 Time:    13:01    CT Abdomen Pelvis With Contrast  Result Date: 10/5/2023  EXAMINATION: CT ABDOMEN PELVIS WITH CONTRAST CLINICAL HISTORY: Abdominal pain, acute, nonlocalized; TECHNIQUE: Helically acquired images with axial, sagittal and coronal reformations were obtained from the lung bases to  the pubic symphysis after the IV administration of contrast. Automated tube current modulation, weight-based exposure dosing, and/or iterative reconstruction technique utilized to reach lowest reasonably achievable exposure rate. DLP: 351 mGy*cm COMPARISON: CT chest abdomen pelvis 08/15/2023 FINDINGS: HEART: Normal in size. No pericardial effusion. LUNG BASES: Well aerated. LIVER: Too small to characterize hypodensity at the left lobe of the liver.  Probable focal fatty infiltration adjacent to the falciform ligament. BILIARY: No calcified gallstones. PANCREAS: No inflammatory change. SPLEEN: Normal in size ADRENALS: No mass. KIDNEYS/URETERS: The kidneys enhance symmetrically.  No hydronephrosis. GI TRACT/MESENTERY: There are postsurgical changes of gastric bypass.  Edematous appearance of the GE junction and proximal gastric pouch.  There is fat stranding in the left upper quadrant.  There is a gastrostomy balloon at the retained distal gastric body of the biliopancreatic limb.  No evidence of bowel obstruction.  Mildly edematous appearance of the small bowel. PERITONEUM: No free fluid.No free air. LYMPH NODES: No enlarged lymph nodes by size criteria. VASCULATURE: No significant atherosclerosis or aneurysm. BLADDER: Nondistended bladder limits CT evaluation REPRODUCTIVE ORGANS: Physiologic left ovary corpus luteal cyst.  Resolved right ovarian cyst. SOFT TISSUES: Unremarkable. BONES: No acute osseous abnormality.   Impression:  Postsurgical changes of gastric bypass.  Edematous appearance of the distal esophagus, proximal gastric pouch and small bowel.  Correlate for esophagitis, gastritis and enteritis.  There is perienteric fat stranding without appreciable free air/perforation.   Electronically signed by: Ethel Bal Date:    10/05/2023 Time:    19:01

## 2023-10-26 ENCOUNTER — OFFICE VISIT (OUTPATIENT)
Dept: SURGERY | Facility: CLINIC | Age: 38
End: 2023-10-26
Payer: COMMERCIAL

## 2023-10-26 VITALS
HEIGHT: 64 IN | HEART RATE: 91 BPM | DIASTOLIC BLOOD PRESSURE: 93 MMHG | BODY MASS INDEX: 17.42 KG/M2 | SYSTOLIC BLOOD PRESSURE: 138 MMHG | WEIGHT: 102 LBS

## 2023-10-26 DIAGNOSIS — E46 MALNUTRITION DUE TO STARVATION: Primary | ICD-10-CM

## 2023-10-26 DIAGNOSIS — T73.0XXS MALNUTRITION DUE TO STARVATION: Primary | ICD-10-CM

## 2023-10-26 PROCEDURE — 99024 PR POST-OP FOLLOW-UP VISIT: ICD-10-PCS | Mod: ,,, | Performed by: SURGERY

## 2023-10-26 PROCEDURE — 1159F PR MEDICATION LIST DOCUMENTED IN MEDICAL RECORD: ICD-10-PCS | Mod: CPTII,,, | Performed by: SURGERY

## 2023-10-26 PROCEDURE — 3008F BODY MASS INDEX DOCD: CPT | Mod: CPTII,,, | Performed by: SURGERY

## 2023-10-26 PROCEDURE — 3080F PR MOST RECENT DIASTOLIC BLOOD PRESSURE >= 90 MM HG: ICD-10-PCS | Mod: CPTII,,, | Performed by: SURGERY

## 2023-10-26 PROCEDURE — 3008F PR BODY MASS INDEX (BMI) DOCUMENTED: ICD-10-PCS | Mod: CPTII,,, | Performed by: SURGERY

## 2023-10-26 PROCEDURE — 3075F SYST BP GE 130 - 139MM HG: CPT | Mod: CPTII,,, | Performed by: SURGERY

## 2023-10-26 PROCEDURE — 1111F PR DISCHARGE MEDS RECONCILED W/ CURRENT OUTPATIENT MED LIST: ICD-10-PCS | Mod: CPTII,,, | Performed by: SURGERY

## 2023-10-26 PROCEDURE — 1111F DSCHRG MED/CURRENT MED MERGE: CPT | Mod: CPTII,,, | Performed by: SURGERY

## 2023-10-26 PROCEDURE — 3075F PR MOST RECENT SYSTOLIC BLOOD PRESS GE 130-139MM HG: ICD-10-PCS | Mod: CPTII,,, | Performed by: SURGERY

## 2023-10-26 PROCEDURE — 99024 POSTOP FOLLOW-UP VISIT: CPT | Mod: ,,, | Performed by: SURGERY

## 2023-10-26 PROCEDURE — 3080F DIAST BP >= 90 MM HG: CPT | Mod: CPTII,,, | Performed by: SURGERY

## 2023-10-26 PROCEDURE — 1159F MED LIST DOCD IN RCRD: CPT | Mod: CPTII,,, | Performed by: SURGERY

## 2023-10-26 RX ORDER — PROMETHAZINE HYDROCHLORIDE 12.5 MG/1
25 TABLET ORAL EVERY 6 HOURS PRN
Status: ON HOLD | COMMUNITY
Start: 2023-09-11 | End: 2023-11-29 | Stop reason: HOSPADM

## 2023-10-27 NOTE — PROGRESS NOTES
Patient ID: [id number]     Chief Complaint:  Malnutrition, status jejunostomy tube placement      HPI:     Chelsie Lee is a 37 y.o. female here today for postop evaluation of her laparoscopic placement of feeding jejunostomy tube.  Two weeks ago she was taken to the operating room for placement of a feeding jejunostomy tube.  The patient and had a prior laparoscopic vertical sleeve gastrectomy in [place] valve 7 years ago, she went back to [place] due to severe reflux and had a hiatal hernia repair with mesh and many gastric bypass.  She then went to another local surgeon who performed conversion of many bypass to Kel-en-Y gastric bypass, I found during my laparoscopic jejunostomy tube placement that her Kel limb was 35 cm, the hepatobiliary limb was 150 cm, the, common channel was 300 cm.  I saw the patient in the hospital was consulted urgently for bariatric issues.  I reviewed all of her prior imaging and spoke with her local bariatric surgeon.  Patient has been unable to tolerate food, her feeding gastrostomy tube is nonfunctional in his in her small gastric remnant.    Past Medical History:   Diagnosis Date    Anxiety     Dysphagia     GERD (gastroesophageal reflux disease)     Hiatal hernia     Hypertension     Intestinal obstruction     Nausea with vomiting     Rectal hemorrhage     Von Willebrand's disease         Past Surgical History:   Procedure Laterality Date     SECTION      COLONOSCOPY N/A 2023    Procedure: COLON;  Surgeon: Wilmer Rossi MD;  Location: [facility] ENDOSCOPY;  Service: Gastroenterology;  Laterality: N/A;    EGD, WITH CLOSED BIOPSY  2023    Procedure: EGD, WITH CLOSED BIOPSY;  Surgeon: Wilmer Rossi MD;  Location: [facility] ENDOSCOPY;  Service: Gastroenterology;;    ESOPHAGOGASTRODUODENOSCOPY N/A 2023    Procedure: EGD;  Surgeon: Wilmer Rossi MD;  Location: [facility] ENDOSCOPY;  Service: Gastroenterology;  Laterality: N/A;     ESOPHAGOGASTRODUODENOSCOPY N/A 10/11/2023    Procedure: EGD (ESOPHAGOGASTRODUODENOSCOPY);  Surgeon: Valeriano Riddle MD;  Location: [facility] OR;  Service: General;  Laterality: N/A;    HERNIA REPAIR      history of sleeve gastrectomy  2014    HYSTERECTOMY      LAPAROSCOPIC GASTRIC BANDING      LAPAROSCOPIC INSERTION OF JEJUNOSTOMY TUBE N/A 10/11/2023    Procedure: INSERTION, JEJUNOSTOMY TUBE, LAPAROSCOPIC;  Surgeon: Valeriano Riddle MD;  Location: [facility] OR;  Service: General;  Laterality: N/A;    NASAL SEPTOPLASTY      RHINOPLASTY          Social History     Tobacco Use    Smoking status: Never    Smokeless tobacco: Never   Substance and Sexual Activity    Alcohol use: Not Currently    Drug use: Never    Sexual activity: Not on file        Current Outpatient Medications   Medication Instructions    acetaminophen (TYLENOL) 1,000 mg, Oral, Every 6 hours PRN    ALPRAZolam (XANAX) 2 mg, Oral, 3 times daily PRN    cyanocobalamin 1,000 mcg, Intramuscular, Every 30 days    ergocalciferol (ERGOCALCIFEROL) 50,000 Units, Oral, Every 7 days    megestroL (MEGACE) 600 mg, Oral, Daily    mirtazapine (REMERON) 7.5 mg, Oral, Nightly    pantoprazole (PROTONIX) 40 mg, Oral, 2 times daily before meals    promethazine (PHENERGAN) 25 mg, Oral, Every 6 hours PRN    traMADoL (ULTRAM) 50 mg, Oral, Every 6 hours PRN       Review of patient's allergies indicates:   Allergen Reactions    Penicillin Rash    Penicillin Rash        Patient Care Team:  Galdino Caceres MD as PCP - General (Family Medicine)  Galdino Caceres MD (Family Medicine)  Valeriano Riddle MD as Surgeon (General Surgery)     Subjective:     Review of Systems   Constitutional:  Positive for fatigue and unexpected weight change. Negative for appetite change, chills, diaphoresis and fever.   HENT:  Negative for congestion, dental problem and drooling.    Eyes:  Negative for pain, discharge and itching.   Respiratory:  Negative for apnea, choking and chest tightness.   "  Cardiovascular:  Negative for chest pain, palpitations and leg swelling.   Gastrointestinal:  Positive for abdominal pain. Negative for abdominal distention, anal bleeding, blood in stool and constipation.   Endocrine: Negative for polydipsia, polyphagia and polyuria.       12 point review of systems conducted, negative except as stated in the history of present illness. See HPI for details.    Objective:     Visit Vitals  BP (!) 138/93   Pulse 91   Ht 5' 4" (1.626 m)   Wt 46.3 kg (102 lb)   BMI 17.51 kg/m²       Physical Exam  Developed adult female   Patient has appearance of chronic malnutrition and is under weight  There is temporal temporal wasting, otherwise normocephalic atraumatic   Regular rate and rhythm no rubs murmurs or gallops  Clear to auscultation bilaterally no wheezes rales or rhonchi   Patient has a gastrostomy tube in place, she has jejunostomy tube in place, there was mild tenderness around the tube but no surrounding skin changes, thin abdomen  Assessment:     No diagnosis found.   Diagnosis:  Malnutrition  Complicated bariatric history  Plan:     We are clamping her feeding gastrostomy tube but we will continue jejunostomy tube feeds.      In 2 weeks we will likely remove her feeding gastrostomy tube as it is not necessary   Patient may ultimately require resection of her gastric jejunostomy and conversion to esophagus jejunostomy    Future Appointments   Date Time Provider Department Center   11/9/2023  1:30 PM Valeriano Riddle MD [facility] Kun Riddle MD     "

## 2023-10-31 ENCOUNTER — HOSPITAL ENCOUNTER (EMERGENCY)
Facility: HOSPITAL | Age: 38
Discharge: HOME | End: 2023-10-31
Attending: STUDENT IN AN ORGANIZED HEALTH CARE EDUCATION/TRAINING PROGRAM
Payer: COMMERCIAL

## 2023-10-31 VITALS
RESPIRATION RATE: 12 BRPM | SYSTOLIC BLOOD PRESSURE: 125 MMHG | BODY MASS INDEX: 17.07 KG/M2 | HEART RATE: 80 BPM | TEMPERATURE: 98 F | WEIGHT: 100 LBS | DIASTOLIC BLOOD PRESSURE: 86 MMHG | HEIGHT: 64 IN | OXYGEN SATURATION: 98 %

## 2023-10-31 DIAGNOSIS — R10.9 ABDOMINAL PAIN, UNSPECIFIED ABDOMINAL LOCATION: ICD-10-CM

## 2023-10-31 DIAGNOSIS — K94.19 JEJUNOSTOMY TUBE SITE PAIN: Primary | ICD-10-CM

## 2023-10-31 LAB
ALBUMIN SERPL-MCNC: 2.9 G/DL (ref 3.5–5)
ALBUMIN/GLOB SERPL: 1 RATIO (ref 1.1–2)
ALP SERPL-CCNC: 60 UNIT/L (ref 40–150)
ALT SERPL-CCNC: 11 UNIT/L (ref 0–55)
APPEARANCE UR: CLEAR
AST SERPL-CCNC: 12 UNIT/L (ref 5–34)
BACTERIA #/AREA URNS AUTO: ABNORMAL /HPF
BASOPHILS # BLD AUTO: 0.06 X10(3)/MCL
BASOPHILS NFR BLD AUTO: 0.8 %
BILIRUB SERPL-MCNC: 0.7 MG/DL
BILIRUB UR QL STRIP.AUTO: NEGATIVE
BUN SERPL-MCNC: 8.5 MG/DL (ref 7–18.7)
CALCIUM SERPL-MCNC: 8.2 MG/DL (ref 8.4–10.2)
CHLORIDE SERPL-SCNC: 105 MMOL/L (ref 98–107)
CO2 SERPL-SCNC: 23 MMOL/L (ref 22–29)
COLOR UR AUTO: ABNORMAL
CREAT SERPL-MCNC: 0.66 MG/DL (ref 0.55–1.02)
EOSINOPHIL # BLD AUTO: 0.52 X10(3)/MCL (ref 0–0.9)
EOSINOPHIL NFR BLD AUTO: 7.2 %
ERYTHROCYTE [DISTWIDTH] IN BLOOD BY AUTOMATED COUNT: 13.6 % (ref 11.5–17)
GFR SERPLBLD CREATININE-BSD FMLA CKD-EPI: >60 MLS/MIN/1.73/M2
GLOBULIN SER-MCNC: 2.9 GM/DL (ref 2.4–3.5)
GLUCOSE SERPL-MCNC: 107 MG/DL (ref 74–100)
GLUCOSE UR QL STRIP.AUTO: NORMAL
HCT VFR BLD AUTO: 35.6 % (ref 37–47)
HGB BLD-MCNC: 11.1 G/DL (ref 12–16)
IMM GRANULOCYTES # BLD AUTO: 0.02 X10(3)/MCL (ref 0–0.04)
IMM GRANULOCYTES NFR BLD AUTO: 0.3 %
KETONES UR QL STRIP.AUTO: NEGATIVE
LACTATE SERPL-SCNC: 1.3 MMOL/L (ref 0.5–2.2)
LACTATE SERPL-SCNC: 2.1 MMOL/L (ref 0.5–2.2)
LEUKOCYTE ESTERASE UR QL STRIP.AUTO: NEGATIVE
LIPASE SERPL-CCNC: 4 U/L
LYMPHOCYTES # BLD AUTO: 2.19 X10(3)/MCL (ref 0.6–4.6)
LYMPHOCYTES NFR BLD AUTO: 30.2 %
MCH RBC QN AUTO: 27.5 PG (ref 27–31)
MCHC RBC AUTO-ENTMCNC: 31.2 G/DL (ref 33–36)
MCV RBC AUTO: 88.3 FL (ref 80–94)
MONOCYTES # BLD AUTO: 0.67 X10(3)/MCL (ref 0.1–1.3)
MONOCYTES NFR BLD AUTO: 9.2 %
MUCOUS THREADS URNS QL MICRO: ABNORMAL /LPF
NEUTROPHILS # BLD AUTO: 3.8 X10(3)/MCL (ref 2.1–9.2)
NEUTROPHILS NFR BLD AUTO: 52.3 %
NITRITE UR QL STRIP.AUTO: NEGATIVE
NRBC BLD AUTO-RTO: 0 %
PH UR STRIP.AUTO: 6.5 [PH]
PLATELET # BLD AUTO: 370 X10(3)/MCL (ref 130–400)
PMV BLD AUTO: 10.9 FL (ref 7.4–10.4)
POTASSIUM SERPL-SCNC: 3.7 MMOL/L (ref 3.5–5.1)
PROT SERPL-MCNC: 5.8 GM/DL (ref 6.4–8.3)
PROT UR QL STRIP.AUTO: NEGATIVE
RBC # BLD AUTO: 4.03 X10(6)/MCL (ref 4.2–5.4)
RBC #/AREA URNS AUTO: ABNORMAL /HPF
RBC UR QL AUTO: NEGATIVE
SODIUM SERPL-SCNC: 134 MMOL/L (ref 136–145)
SP GR UR STRIP.AUTO: 1.02 (ref 1–1.03)
SQUAMOUS #/AREA URNS LPF: ABNORMAL /HPF
UROBILINOGEN UR STRIP-ACNC: NORMAL
WBC # SPEC AUTO: 7.26 X10(3)/MCL (ref 4.5–11.5)
WBC #/AREA URNS AUTO: ABNORMAL /HPF

## 2023-10-31 PROCEDURE — 96374 THER/PROPH/DIAG INJ IV PUSH: CPT

## 2023-10-31 PROCEDURE — 63600175 PHARM REV CODE 636 W HCPCS: Performed by: PHYSICIAN ASSISTANT

## 2023-10-31 PROCEDURE — 96361 HYDRATE IV INFUSION ADD-ON: CPT

## 2023-10-31 PROCEDURE — 87040 BLOOD CULTURE FOR BACTERIA: CPT | Performed by: PHYSICIAN ASSISTANT

## 2023-10-31 PROCEDURE — 81001 URINALYSIS AUTO W/SCOPE: CPT | Performed by: STUDENT IN AN ORGANIZED HEALTH CARE EDUCATION/TRAINING PROGRAM

## 2023-10-31 PROCEDURE — 83690 ASSAY OF LIPASE: CPT | Performed by: PHYSICIAN ASSISTANT

## 2023-10-31 PROCEDURE — 83605 ASSAY OF LACTIC ACID: CPT | Performed by: PHYSICIAN ASSISTANT

## 2023-10-31 PROCEDURE — 96375 TX/PRO/DX INJ NEW DRUG ADDON: CPT

## 2023-10-31 PROCEDURE — 85025 COMPLETE CBC W/AUTO DIFF WBC: CPT | Performed by: PHYSICIAN ASSISTANT

## 2023-10-31 PROCEDURE — 25500020 PHARM REV CODE 255: Performed by: STUDENT IN AN ORGANIZED HEALTH CARE EDUCATION/TRAINING PROGRAM

## 2023-10-31 PROCEDURE — 63600175 PHARM REV CODE 636 W HCPCS: Performed by: STUDENT IN AN ORGANIZED HEALTH CARE EDUCATION/TRAINING PROGRAM

## 2023-10-31 PROCEDURE — 80053 COMPREHEN METABOLIC PANEL: CPT | Performed by: PHYSICIAN ASSISTANT

## 2023-10-31 PROCEDURE — 99285 EMERGENCY DEPT VISIT HI MDM: CPT | Mod: 25

## 2023-10-31 RX ORDER — HYDROCODONE BITARTRATE AND ACETAMINOPHEN 5; 325 MG/1; MG/1
1 TABLET ORAL EVERY 6 HOURS PRN
Qty: 20 TABLET | Refills: 0 | Status: SHIPPED | OUTPATIENT
Start: 2023-10-31 | End: 2023-11-17 | Stop reason: ALTCHOICE

## 2023-10-31 RX ORDER — FAMOTIDINE 20 MG/1
40 TABLET, FILM COATED ORAL 2 TIMES DAILY
Qty: 120 TABLET | Refills: 11 | Status: SHIPPED | OUTPATIENT
Start: 2023-10-31 | End: 2024-10-30

## 2023-10-31 RX ORDER — ONDANSETRON 2 MG/ML
4 INJECTION INTRAMUSCULAR; INTRAVENOUS
Status: COMPLETED | OUTPATIENT
Start: 2023-10-31 | End: 2023-10-31

## 2023-10-31 RX ORDER — MORPHINE SULFATE 4 MG/ML
4 INJECTION, SOLUTION INTRAMUSCULAR; INTRAVENOUS
Status: COMPLETED | OUTPATIENT
Start: 2023-10-31 | End: 2023-10-31

## 2023-10-31 RX ORDER — MORPHINE SULFATE 4 MG/ML
4 INJECTION, SOLUTION INTRAMUSCULAR; INTRAVENOUS
Status: DISCONTINUED | OUTPATIENT
Start: 2023-10-31 | End: 2023-10-31

## 2023-10-31 RX ORDER — HYDROMORPHONE HYDROCHLORIDE 2 MG/ML
0.5 INJECTION, SOLUTION INTRAMUSCULAR; INTRAVENOUS; SUBCUTANEOUS
Status: COMPLETED | OUTPATIENT
Start: 2023-10-31 | End: 2023-10-31

## 2023-10-31 RX ADMIN — MORPHINE SULFATE 4 MG: 4 INJECTION, SOLUTION INTRAMUSCULAR; INTRAVENOUS at 01:10

## 2023-10-31 RX ADMIN — SODIUM CHLORIDE, POTASSIUM CHLORIDE, SODIUM LACTATE AND CALCIUM CHLORIDE 1000 ML: 600; 310; 30; 20 INJECTION, SOLUTION INTRAVENOUS at 12:10

## 2023-10-31 RX ADMIN — HYDROMORPHONE HYDROCHLORIDE 0.5 MG: 2 INJECTION INTRAMUSCULAR; INTRAVENOUS; SUBCUTANEOUS at 03:10

## 2023-10-31 RX ADMIN — ONDANSETRON 4 MG: 2 INJECTION INTRAMUSCULAR; INTRAVENOUS at 01:10

## 2023-10-31 RX ADMIN — IOPAMIDOL 100 ML: 755 INJECTION, SOLUTION INTRAVENOUS at 03:10

## 2023-10-31 NOTE — DISCHARGE INSTRUCTIONS
Thanks for letting use take care of you today! It is our goal to give you courteous care and to keep you comfortable and informed. If you have any questions before you leave I will be happy to try and answer them.     Advice after your visit:  Your visit in the emergency department is NOT definitive care - please follow-up with your primary care doctor and/or specialist within 1-2 days. If you do not have a primary care physician call [phone] to schedule an appointment. Please return if you have any worsening in your condition or if you have any other concerns.    Return to the emergency department if any worsening symptoms including fever, chest pain, difficulty breathing, weakness, numbness, tingling, nausea, vomiting, inability to eat, drink or take your medication, or any other new symptoms or concerns arise.      Please signup for MyChart as noted below in your paperwork to review all labwork, imaging results, and any other incidental findings from today's visit.     If you had radiology exams like an XRAY or CT in the emergency Department the interpreation on them may be preliminary - there may be less time sensitive findings on the reports please obtain these reports within 24 hours from the hospital or by using your out on your mobile phone to access records.  Bring these to your primary care doctor and/or specialist for further review of incidental findings.    Please review any LAB WORK from your visit today with your primary care physician.    If you were prescribed OPIATE PAIN MEDICATION - please understand of these medications can be addictive, you may fill less of the prescription was written for, you do not have to take the full prescription.  You may discard what you do not use.  Please seek help if you feel you are having problems with addiction.  Do not drive or operate heavy machinery if you are taking sedating medications.  Do not mix these medications with alcohol.      If you had a SPLINT  placed in the emergency department if you have severe pain numbness tingling or discoloration of year digits please remove the splint and return to the emergency department for further evaluation as this may represent a sign of compromise to the nerves or blood vessels due to swelling.    If you had SUTURES in the emergency department please have them removed in the prescribed time frame typically within 7-14 days.  You may shower but please do not bathe or swim.  Keep the wounds clean and dry and covered with a clean dressing.  Please return if he have any signs of infection like redness or drainage or pain at the suture site.    Please take the full course of  any ANTIBIOTICS you were prescribed - incomplete courses of antibiotics can cause resistance to antibiotics in the future which will make it difficult to treat any infections you may have.

## 2023-10-31 NOTE — ED PROVIDER NOTES
Encounter Date: 10/31/2023    SCRIBE #1 NOTE: I, Lorrie Frank, am scribing for, and in the presence of,  Joseluis Sultana IV, MD. I have scribed the following portions of the note - Other sections scribed: HPI, ROS, PE.       History     Chief Complaint   Patient presents with    Tube Problem     Reports had J tube placed 2 weeks ago. C/O pain to J tube and G tube site, burning with feedings, yellow pus-like drainage, and fevers at home. Placed by Janessa     37 year old female with history of anxiety, GERD, intestinal obstruction, hiatal hernia, Von Willebrand's disease, hernia repair, gastric sleeve, and appendectomy, and gastrectomy presents to the ED with complaints of pain to J tube site. Pt reports that she had a J tube placed on 10/11 by Dr. Riddle. For the past 3 days, the site has been irritated and she feels a burning sensation when doing feedings. She complains of nausea and fevers and denies vomiting. She notes that last night her fever was 103 F. Pt reports that she had a gastric sleeve in  and a hiatal hernia in 2019 that was removed last year. She notes that the mesh moved and caused her organs to shift, and the G tube was placed to stabilize her stomach.     The history is provided by the patient. No  was used.     Review of patient's allergies indicates:   Allergen Reactions    Penicillin Rash    Penicillin Rash     Past Medical History:   Diagnosis Date    Anxiety     Dysphagia     GERD (gastroesophageal reflux disease)     Hiatal hernia     Hypertension     Intestinal obstruction     Nausea with vomiting     Rectal hemorrhage     Von Willebrand's disease      Past Surgical History:   Procedure Laterality Date     SECTION      COLONOSCOPY N/A 2023    Procedure: COLON;  Surgeon: Wilmer Rossi MD;  Location: [facility] ENDOSCOPY;  Service: Gastroenterology;  Laterality: N/A;    EGD, WITH CLOSED BIOPSY  2023    Procedure: EGD, WITH CLOSED BIOPSY;  Surgeon:  Wilmer Rossi MD;  Location: [facility] ENDOSCOPY;  Service: Gastroenterology;;    ESOPHAGOGASTRODUODENOSCOPY N/A 08/09/2023    Procedure: EGD;  Surgeon: Wilmer Rossi MD;  Location: [facility] ENDOSCOPY;  Service: Gastroenterology;  Laterality: N/A;    ESOPHAGOGASTRODUODENOSCOPY N/A 10/11/2023    Procedure: EGD (ESOPHAGOGASTRODUODENOSCOPY);  Surgeon: Valeriano Riddle MD;  Location: [facility];  Service: General;  Laterality: N/A;    HERNIA REPAIR      history of sleeve gastrectomy  2014    HYSTERECTOMY      LAPAROSCOPIC GASTRIC BANDING      LAPAROSCOPIC INSERTION OF JEJUNOSTOMY TUBE N/A 10/11/2023    Procedure: INSERTION, JEJUNOSTOMY TUBE, LAPAROSCOPIC;  Surgeon: Valeriano Riddle MD;  Location: [facility];  Service: General;  Laterality: N/A;    NASAL SEPTOPLASTY      RHINOPLASTY       Family History   Problem Relation Age of Onset    Alzheimer's disease Paternal Grandmother      Social History     Tobacco Use    Smoking status: Never    Smokeless tobacco: Never   Substance Use Topics    Alcohol use: Not Currently    Drug use: Never     Review of Systems   Constitutional:  Positive for fever. Negative for chills.   HENT:  Negative for congestion, rhinorrhea and sore throat.    Eyes:  Negative for visual disturbance.   Respiratory:  Negative for cough and shortness of breath.    Cardiovascular:  Negative for chest pain and leg swelling.   Gastrointestinal:  Positive for abdominal pain and nausea. Negative for vomiting.   Genitourinary:  Negative for dysuria, hematuria, vaginal bleeding and vaginal discharge.   Musculoskeletal:  Negative for joint swelling.   Skin:  Negative for rash.   Neurological:  Negative for weakness.   Psychiatric/Behavioral:  Negative for confusion.        Physical Exam     Initial Vitals [10/31/23 1138]   BP Pulse Resp Temp SpO2   125/89 85 17 98.1 °F (36.7 °C) 100 %      MAP       --         Physical Exam    Nursing note and vitals reviewed.  Constitutional: She is not diaphoretic. No  distress.   Cardiovascular:  Normal rate and regular rhythm.           No murmur heard.  Pulmonary/Chest: Breath sounds normal. No respiratory distress. She has no wheezes. She has no rales.   Abdominal: Abdomen is soft. She exhibits no distension.   G tube in place with normal skin irritation surrounding it. J tube in place with minimal skin irritation, no significant erythema or induration, no purulent drainage, and mild tenderness.      Neurological: She is alert.   Psychiatric: She has a normal mood and affect.           ED Course   Procedures  Labs Reviewed   COMPREHENSIVE METABOLIC PANEL - Abnormal; Notable for the following components:       Result Value    Sodium Level 134 (*)     Glucose Level 107 (*)     Calcium Level Total 8.2 (*)     Protein Total 5.8 (*)     Albumin Level 2.9 (*)     Albumin/Globulin Ratio 1.0 (*)     All other components within normal limits   CBC WITH DIFFERENTIAL - Abnormal; Notable for the following components:    RBC 4.03 (*)     Hgb 11.1 (*)     Hct 35.6 (*)     MCHC 31.2 (*)     MPV 10.9 (*)     All other components within normal limits   URINALYSIS, REFLEX TO URINE CULTURE - Abnormal; Notable for the following components:    Mucous, UA Occasional (*)     All other components within normal limits   LIPASE - Normal   LACTIC ACID, PLASMA - Normal   LACTIC ACID, PLASMA - Normal   BLOOD CULTURE OLG   BLOOD CULTURE OLG   CBC W/ AUTO DIFFERENTIAL    Narrative:     The following orders were created for panel order CBC auto differential.  Procedure                               Abnormality         Status                     ---------                               -----------         ------                     CBC with Differential[[id number]]       Abnormal            Final result                 Please view results for these tests on the individual orders.          Imaging Results              CT Abdomen Pelvis With IV Contrast (Final result)  Result time 10/31/23 15:49:22      Final  result by Inga Caceres MD (10/31/23 15:49:22)                   Impression:      Postoperative changes of prior gastric bypass, with wall thickening of the distal esophagus and surrounding inflammatory changes, improved from the prior exam.  No fluid collection or free air.      Electronically signed by: Inga Caceres  Date:    10/31/2023  Time:    15:49               Narrative:    EXAMINATION:  CT ABDOMEN PELVIS WITH IV CONTRAST    CLINICAL HISTORY:  Nausea/vomiting;Abdominal pain, acute, nonlocalized;    TECHNIQUE:  CT imaging was performed of the abdomen and pelvis after the administration of intravenous contrast. Dose length product is 217 mGycm. Automatic exposure control, adjustment of mA/kV or iterative reconstruction technique was used to limit radiation dose.    COMPARISON:  CT abdomen pelvis dated 10/05/2023    FINDINGS:  Liver: No acute findings.    Gallbladder and biliary tree: No calcified gallstones. No intra or extrahepatic biliary ductal dilation.    Pancreas: Unremarkable.    Spleen: Unremarkable.    Adrenals: Normal.    Kidneys and ureters: No hydronephrosis.    Bladder: Normal.    Reproductive organs: No pelvic masses.    Stomach/bowel: There are postoperative changes of prior gastric bypass.  There is wall thickening of the distal esophagus, with surrounding inflammatory changes, improved from the prior exam.  Feeding tubes are seen within the duodenal bulb and jejunum.  There is no bowel obstruction.    Lymph nodes: No pathologically enlarged lymph node identified.    Peritoneum: No ascites or free air. No fluid collection.    Vessels: No abdominal aortic aneurysm.    Abdominal wall: Normal.    Lung bases: There is bibasilar subsegmental atelectasis.    Bones: No acute osseous findings.                                       Medications   lactated ringers bolus 1,000 mL (0 mLs Intravenous Stopped 10/31/23 1320)   ondansetron injection 4 mg (4 mg Intravenous Given 10/31/23 1332)    morphine injection 4 mg (4 mg Intravenous Given 10/31/23 1331)   iopamidoL (ISOVUE-370) injection 100 mL (100 mLs Intravenous Given 10/31/23 1533)   HYDROmorphone (PF) injection 0.5 mg (0.5 mg Intravenous Given 10/31/23 1550)     Medical Decision Making  38 yo with complex surgical history as above - presenting with pain/drainage from J tube site placed several weeks ago  No evidence of infectious process on workup as above  Discussed with Dr. Riddle, can follow up in clinic  Return precautions given     Differential diagnosis includes but is not limited to: abscess, cellulitis, dehydration, electrolyte derangement        Problems Addressed:  Abdominal pain, unspecified abdominal location: acute illness or injury that poses a threat to life or bodily functions  Jejunostomy tube site pain: acute illness or injury that poses a threat to life or bodily functions    Amount and/or Complexity of Data Reviewed  External Data Reviewed: notes.  Labs: ordered.  Radiology: ordered.  Discussion of management or test interpretation with external provider(s): Dr. Riddle - can follow up in clinic, refer to pain management     Risk  Prescription drug management.  Parenteral controlled substances.              Attending Attestation:           Physician Attestation for Scribe:  Physician Attestation Statement for Scribe #1: I, Joseluis Sultana IV, MD, reviewed documentation, as scribed by Lorrie Frank in my presence, and it is both accurate and complete.             ED Course as of 10/31/23 1833   Tue Oct 31, 2023   1647 Dr. Riddle has reviewed workup reports that he is familiar with this patient that she has some pain control issues that she needs to be referred to pain management.  She is afebrile has no leukocytosis her G and J-tube do not appear infected on exam or CT scan is negative for any intra-abdominal infectious process as well.  Will refer to pain management, give short course of pain meds discharge at this time [AC]       ED Course User Index  [AC] Joseluis Sultana IV, MD                    Clinical Impression:   Final diagnoses:  [K94.19] Jejunostomy tube site pain (Primary)  [R10.9] Abdominal pain, unspecified abdominal location        ED Disposition Condition    Discharge Stable          ED Prescriptions       Medication Sig Dispense Start Date End Date Auth. Provider    HYDROcodone-acetaminophen (NORCO) 5-325 mg per tablet Take 1 tablet by mouth every 6 (six) hours as needed for Pain. 20 tablet 10/31/2023 -- Joseluis Sultana IV, MD    famotidine (PEPCID) 20 MG tablet Take 2 tablets (40 mg total) by mouth 2 (two) times daily. 120 tablet 10/31/2023 10/30/2024 Joseluis Sultana IV, MD          Follow-up Information       Follow up With Specialties Details Why Contact Info    Galdino Caceres MD Family Medicine Schedule an appointment as soon as possible for a visit   [address] [address]  [phone]      Poncho Way MD Anesthesiology Schedule an appointment as soon as possible for a visit   [address]  [address]  [facility] [id number] [phone]      Valeriano Riddle MD General Surgery Schedule an appointment as soon as possible for a visit   [address]  [address]  [facility] [id number] [phone]               Joseluis Sultana IV, MD  10/31/23 9315

## 2023-10-31 NOTE — FIRST PROVIDER EVALUATION
"Medical screening examination initiated.  I have conducted a focused provider triage encounter, findings are as follows:    Brief history of present illness:  37-year-old female presents to ED for evaluation abdominal pain and drainage from J-tube.  Patient reports having J-tube for mount nutrition.  States it has been going on for the past 3 days. States temp of 103 last night. Took tylenol 1 hour prior to arrival. Patient reports that she was having issues with her J and G-tube. Follows with Dr. Riddle.     Vitals:    10/31/23 1138   BP: 125/89   Pulse: 85   Resp: 17   TempSrc: Oral   SpO2: 100%   Weight: 45.4 kg (100 lb)   Height: 5' 4" (1.626 m)       Pertinent physical exam:  Patient is awake and alert and oriented.  Ambulatory to triage.     Brief workup plan:  labs, UA, IV    Preliminary workup initiated; this workup will be continued and followed by the physician or advanced practice provider that is assigned to the patient when roomed.  "

## 2023-11-05 LAB
BACTERIA BLD CULT: NORMAL
BACTERIA BLD CULT: NORMAL

## 2023-11-06 ENCOUNTER — TELEPHONE (OUTPATIENT)
Dept: SURGERY | Facility: CLINIC | Age: 38
End: 2023-11-06
Payer: COMMERCIAL

## 2023-11-06 DIAGNOSIS — R10.9 ABDOMINAL PAIN, UNSPECIFIED ABDOMINAL LOCATION: Primary | ICD-10-CM

## 2023-11-06 NOTE — TELEPHONE ENCOUNTER
----- Message from Vernon Alcantar MA sent at 11/3/2023  9:45 AM CDT -----  Regarding: referral  Pt called to see if you sent in a referral to Dr. Poncho Way??

## 2023-11-06 NOTE — TELEPHONE ENCOUNTER
Spoke with pt, looks like someone already put in a referral for this however I sent an additional one just incase to ensure pt is seen, set reminder to fu on referral.

## 2023-11-09 ENCOUNTER — TELEPHONE (OUTPATIENT)
Dept: SURGERY | Facility: CLINIC | Age: 38
End: 2023-11-09

## 2023-11-17 ENCOUNTER — OFFICE VISIT (OUTPATIENT)
Dept: SURGERY | Facility: CLINIC | Age: 38
End: 2023-11-17
Payer: COMMERCIAL

## 2023-11-17 VITALS
SYSTOLIC BLOOD PRESSURE: 143 MMHG | HEIGHT: 64 IN | HEART RATE: 114 BPM | WEIGHT: 96.19 LBS | DIASTOLIC BLOOD PRESSURE: 95 MMHG | BODY MASS INDEX: 16.42 KG/M2

## 2023-11-17 DIAGNOSIS — E43 SEVERE PROTEIN-CALORIE MALNUTRITION: Primary | ICD-10-CM

## 2023-11-17 PROCEDURE — 99024 PR POST-OP FOLLOW-UP VISIT: ICD-10-PCS | Mod: ,,, | Performed by: SURGERY

## 2023-11-17 PROCEDURE — 3080F DIAST BP >= 90 MM HG: CPT | Mod: CPTII,,, | Performed by: SURGERY

## 2023-11-17 PROCEDURE — 3077F PR MOST RECENT SYSTOLIC BLOOD PRESSURE >= 140 MM HG: ICD-10-PCS | Mod: CPTII,,, | Performed by: SURGERY

## 2023-11-17 PROCEDURE — 3077F SYST BP >= 140 MM HG: CPT | Mod: CPTII,,, | Performed by: SURGERY

## 2023-11-17 PROCEDURE — 3008F BODY MASS INDEX DOCD: CPT | Mod: CPTII,,, | Performed by: SURGERY

## 2023-11-17 PROCEDURE — 3008F PR BODY MASS INDEX (BMI) DOCUMENTED: ICD-10-PCS | Mod: CPTII,,, | Performed by: SURGERY

## 2023-11-17 PROCEDURE — 3080F PR MOST RECENT DIASTOLIC BLOOD PRESSURE >= 90 MM HG: ICD-10-PCS | Mod: CPTII,,, | Performed by: SURGERY

## 2023-11-17 PROCEDURE — 1159F MED LIST DOCD IN RCRD: CPT | Mod: CPTII,,, | Performed by: SURGERY

## 2023-11-17 PROCEDURE — 99024 POSTOP FOLLOW-UP VISIT: CPT | Mod: ,,, | Performed by: SURGERY

## 2023-11-17 PROCEDURE — 1159F PR MEDICATION LIST DOCUMENTED IN MEDICAL RECORD: ICD-10-PCS | Mod: CPTII,,, | Performed by: SURGERY

## 2023-11-17 RX ORDER — OXYCODONE AND ACETAMINOPHEN 5; 325 MG/1; MG/1
1 TABLET ORAL EVERY 8 HOURS PRN
COMMUNITY
Start: 2023-07-07 | End: 2023-11-26

## 2023-11-17 RX ORDER — PNV NO.95/FERROUS FUM/FOLIC AC 28MG-0.8MG
100 TABLET ORAL
COMMUNITY
Start: 2022-10-21

## 2023-11-17 RX ORDER — AMLODIPINE BESYLATE 10 MG/1
1 TABLET ORAL DAILY
COMMUNITY
Start: 2023-08-20

## 2023-11-17 NOTE — PROGRESS NOTES
Patient ID: [id number]     Chief Complaint:  Here for gastric tube remove a      HPI:     Chelsie Lee is a 37 y.o. female here today for planned removal of her gastrostomy tube.  Is a 37-year-old female with a complex bariatric history, several weeks ago I placed a laparoscopic jejunostomy tube, patient has been operated on by other bariatric surgeons for who in the past performed gastric sleeve gastrectomy followed by mini gastric bypass which was later converted to a standard Kel-en-Y gastric bypass however she has a very long hepatobiliary limb proximally 150 cm in size with 300 cm common channel.  Patient has been fighting malnutrition for quite some time for this reason I placed a proximal jejunal feeding tube but left her gastrostomy tube in place.  The gastrostomy tube was needed which is why I removed today in the clinic.    Past Medical History:   Diagnosis Date    Anxiety     Dysphagia     GERD (gastroesophageal reflux disease)     Hiatal hernia     Hypertension     Intestinal obstruction     Nausea with vomiting     Rectal hemorrhage     Von Willebrand's disease     Malnutrition severe protein malnutrition    Past Surgical History:   Procedure Laterality Date     SECTION      COLONOSCOPY N/A 2023    Procedure: COLON;  Surgeon: Wilmer Rossi MD;  Location: [facility] ENDOSCOPY;  Service: Gastroenterology;  Laterality: N/A;    EGD, WITH CLOSED BIOPSY  2023    Procedure: EGD, WITH CLOSED BIOPSY;  Surgeon: Wilemr Rossi MD;  Location: [facility] ENDOSCOPY;  Service: Gastroenterology;;    ESOPHAGOGASTRODUODENOSCOPY N/A 2023    Procedure: EGD;  Surgeon: Wilmer Rossi MD;  Location: [facility] ENDOSCOPY;  Service: Gastroenterology;  Laterality: N/A;    ESOPHAGOGASTRODUODENOSCOPY N/A 10/11/2023    Procedure: EGD (ESOPHAGOGASTRODUODENOSCOPY);  Surgeon: Valeriano Riddle MD;  Location: [facility];  Service: General;  Laterality: N/A;    HERNIA REPAIR      history of sleeve  gastrectomy  2014    HYSTERECTOMY      LAPAROSCOPIC GASTRIC BANDING      LAPAROSCOPIC INSERTION OF JEJUNOSTOMY TUBE N/A 10/11/2023    Procedure: INSERTION, JEJUNOSTOMY TUBE, LAPAROSCOPIC;  Surgeon: Valeriano Riddle MD;  Location: [facility] OR;  Service: General;  Laterality: N/A;    NASAL SEPTOPLASTY      RHINOPLASTY          Social History     Tobacco Use    Smoking status: Never    Smokeless tobacco: Never   Substance and Sexual Activity    Alcohol use: Not Currently    Drug use: Never    Sexual activity: Not on file        Current Outpatient Medications   Medication Instructions    acetaminophen (TYLENOL) 1,000 mg, Oral, Every 6 hours PRN    ALPRAZolam (XANAX) 2 mg, Oral, 3 times daily PRN    amLODIPine (NORVASC) 10 MG tablet 1 tablet, Oral, Daily    cyanocobalamin (VITAMIN B-12) 100 mcg, Oral    cyanocobalamin 1,000 mcg, Intramuscular, Every 30 days    ergocalciferol (ERGOCALCIFEROL) 50,000 Units, Oral, Every 7 days    famotidine (PEPCID) 40 mg, Oral, 2 times daily    megestroL (MEGACE) 600 mg, Oral, Daily    mirtazapine (REMERON) 7.5 mg, Oral, Nightly    multivit-min/iron/folic acid/K (BARIATRIC MULTIVITAMINS ORAL) Oral    oxyCODONE-acetaminophen (PERCOCET) 5-325 mg per tablet 1 tablet, Oral, Every 8 hours PRN    pantoprazole (PROTONIX) 40 mg, Oral, 2 times daily before meals    promethazine (PHENERGAN) 25 mg, Oral, Every 6 hours PRN    traMADoL (ULTRAM) 50 mg, Oral, Every 6 hours PRN       Review of patient's allergies indicates:   Allergen Reactions    Penicillin Rash        Patient Care Team:  Galdino Caceres MD as PCP - General (Family Medicine)  Valeriano Riddle MD as Surgeon (General Surgery)     Subjective:     Review of Systems    12 point review of systems conducted, negative except as stated in the history of present illness. See HPI for details.  No headaches dizziness, mild weakness   No headaches or visual changes   Mild dyspnea on exertion, no chest pain or palpitations   Hemoptysis  No abdominal  "pain, problems with gastrostomy tube, being addressed today   Having difficulty gaining weight  Objective:     Visit Vitals  BP (!) 143/95   Pulse (!) 114   Ht 5' 4" (1.626 m)   Wt 43.6 kg (96 lb 3.2 oz)   BMI 16.51 kg/m²       Physical Exam    Assessment:     Superior protein malnutrition  Plan:     I removed patient's gastrostomy tube with the bedside, I am leaving her jejunostomy tube in place     If patient does not gain weight and is unable to put on weight with a feeding jejunostomy tube and oral diet, we will have to consider conversion of her gastric bypass to esophagus jejunostomy    No future appointments.     Valeriano Riddle MD    "

## 2023-11-20 ENCOUNTER — TELEPHONE (OUTPATIENT)
Dept: SURGERY | Facility: CLINIC | Age: 38
End: 2023-11-20
Payer: COMMERCIAL

## 2023-11-20 NOTE — TELEPHONE ENCOUNTER
----- Message from Gillian Braun MA sent at 11/6/2023 12:24 PM CST -----  Regarding: FU referral  Fu pain management referral to Dr.John Way

## 2023-11-20 NOTE — TELEPHONE ENCOUNTER
Attempted to contact pain management -  to fu on referral, Doug Ann the referral clerk. Will set another reminder to fu on this

## 2023-11-20 NOTE — TELEPHONE ENCOUNTER
Referral was received  is not accepting new patients at this time, they placed the referral on 's desk to be reviewed. Reminder set.

## 2023-11-26 ENCOUNTER — HOSPITAL ENCOUNTER (OUTPATIENT)
Facility: HOSPITAL | Age: 38
Discharge: HOME HEALTH | End: 2023-11-29
Attending: EMERGENCY MEDICINE | Admitting: INTERNAL MEDICINE
Payer: COMMERCIAL

## 2023-11-26 DIAGNOSIS — E46 MALNUTRITION, UNSPECIFIED TYPE: Primary | ICD-10-CM

## 2023-11-26 DIAGNOSIS — F11.93 OPIOID WITHDRAWAL: ICD-10-CM

## 2023-11-26 DIAGNOSIS — L24.B1 IRRITANT CONTACT DERMATITIS ASSOCIATED WITH DIGESTIVE STOMA: ICD-10-CM

## 2023-11-26 DIAGNOSIS — R62.7 FAILURE TO THRIVE IN ADULT: ICD-10-CM

## 2023-11-26 DIAGNOSIS — R11.0 NAUSEA: ICD-10-CM

## 2023-11-26 LAB
ALBUMIN SERPL-MCNC: 2.2 G/DL (ref 3.5–5)
ALBUMIN/GLOB SERPL: 0.7 RATIO (ref 1.1–2)
ALP SERPL-CCNC: 89 UNIT/L (ref 40–150)
ALT SERPL-CCNC: 8 UNIT/L (ref 0–55)
AMPHET UR QL SCN: NEGATIVE
AST SERPL-CCNC: 9 UNIT/L (ref 5–34)
BARBITURATE SCN PRESENT UR: NEGATIVE
BASOPHILS # BLD AUTO: 0.05 X10(3)/MCL
BASOPHILS NFR BLD AUTO: 0.6 %
BENZODIAZ UR QL SCN: POSITIVE
BILIRUB SERPL-MCNC: 0.6 MG/DL
BUN SERPL-MCNC: 4.6 MG/DL (ref 7–18.7)
CALCIUM SERPL-MCNC: 7.9 MG/DL (ref 8.4–10.2)
CANNABINOIDS UR QL SCN: POSITIVE
CHLORIDE SERPL-SCNC: 107 MMOL/L (ref 98–107)
CO2 SERPL-SCNC: 21 MMOL/L (ref 22–29)
COCAINE UR QL SCN: NEGATIVE
CREAT SERPL-MCNC: 0.71 MG/DL (ref 0.55–1.02)
EOSINOPHIL # BLD AUTO: 0.1 X10(3)/MCL (ref 0–0.9)
EOSINOPHIL NFR BLD AUTO: 1.2 %
ERYTHROCYTE [DISTWIDTH] IN BLOOD BY AUTOMATED COUNT: 14.5 % (ref 11.5–17)
FENTANYL UR QL SCN: NEGATIVE
GFR SERPLBLD CREATININE-BSD FMLA CKD-EPI: >60 MLS/MIN/1.73/M2
GLOBULIN SER-MCNC: 3.1 GM/DL (ref 2.4–3.5)
GLUCOSE SERPL-MCNC: 86 MG/DL (ref 74–100)
HCT VFR BLD AUTO: 35.6 % (ref 37–47)
HGB BLD-MCNC: 11.4 G/DL (ref 12–16)
IMM GRANULOCYTES # BLD AUTO: 0.02 X10(3)/MCL (ref 0–0.04)
IMM GRANULOCYTES NFR BLD AUTO: 0.2 %
LYMPHOCYTES # BLD AUTO: 1.49 X10(3)/MCL (ref 0.6–4.6)
LYMPHOCYTES NFR BLD AUTO: 17.8 %
MAGNESIUM SERPL-MCNC: 2 MG/DL (ref 1.6–2.6)
MCH RBC QN AUTO: 26.8 PG (ref 27–31)
MCHC RBC AUTO-ENTMCNC: 32 G/DL (ref 33–36)
MCV RBC AUTO: 83.6 FL (ref 80–94)
MDMA UR QL SCN: NEGATIVE
MONOCYTES # BLD AUTO: 0.56 X10(3)/MCL (ref 0.1–1.3)
MONOCYTES NFR BLD AUTO: 6.7 %
NEUTROPHILS # BLD AUTO: 6.13 X10(3)/MCL (ref 2.1–9.2)
NEUTROPHILS NFR BLD AUTO: 73.5 %
NRBC BLD AUTO-RTO: 0 %
OPIATES UR QL SCN: NEGATIVE
PCP UR QL: NEGATIVE
PH UR: 6.5 [PH] (ref 3–11)
PLATELET # BLD AUTO: 608 X10(3)/MCL (ref 130–400)
PMV BLD AUTO: 9.6 FL (ref 7.4–10.4)
POTASSIUM SERPL-SCNC: 4.4 MMOL/L (ref 3.5–5.1)
PROT SERPL-MCNC: 5.3 GM/DL (ref 6.4–8.3)
RBC # BLD AUTO: 4.26 X10(6)/MCL (ref 4.2–5.4)
SODIUM SERPL-SCNC: 134 MMOL/L (ref 136–145)
WBC # SPEC AUTO: 8.35 X10(3)/MCL (ref 4.5–11.5)

## 2023-11-26 PROCEDURE — 96374 THER/PROPH/DIAG INJ IV PUSH: CPT

## 2023-11-26 PROCEDURE — 96361 HYDRATE IV INFUSION ADD-ON: CPT

## 2023-11-26 PROCEDURE — G0378 HOSPITAL OBSERVATION PER HR: HCPCS

## 2023-11-26 PROCEDURE — 63600175 PHARM REV CODE 636 W HCPCS: Performed by: EMERGENCY MEDICINE

## 2023-11-26 PROCEDURE — 63600175 PHARM REV CODE 636 W HCPCS: Performed by: INTERNAL MEDICINE

## 2023-11-26 PROCEDURE — 99285 EMERGENCY DEPT VISIT HI MDM: CPT | Mod: 25

## 2023-11-26 PROCEDURE — 25000003 PHARM REV CODE 250: Performed by: EMERGENCY MEDICINE

## 2023-11-26 PROCEDURE — 96372 THER/PROPH/DIAG INJ SC/IM: CPT | Performed by: EMERGENCY MEDICINE

## 2023-11-26 PROCEDURE — 83735 ASSAY OF MAGNESIUM: CPT | Performed by: EMERGENCY MEDICINE

## 2023-11-26 PROCEDURE — 25000003 PHARM REV CODE 250: Performed by: INTERNAL MEDICINE

## 2023-11-26 PROCEDURE — 80307 DRUG TEST PRSMV CHEM ANLYZR: CPT | Performed by: INTERNAL MEDICINE

## 2023-11-26 PROCEDURE — 85025 COMPLETE CBC W/AUTO DIFF WBC: CPT | Performed by: EMERGENCY MEDICINE

## 2023-11-26 PROCEDURE — 96375 TX/PRO/DX INJ NEW DRUG ADDON: CPT

## 2023-11-26 PROCEDURE — 80053 COMPREHEN METABOLIC PANEL: CPT | Performed by: EMERGENCY MEDICINE

## 2023-11-26 RX ORDER — PNV NO.95/FERROUS FUM/FOLIC AC 28MG-0.8MG
100 TABLET ORAL DAILY
Status: DISCONTINUED | OUTPATIENT
Start: 2023-11-27 | End: 2023-11-29 | Stop reason: HOSPADM

## 2023-11-26 RX ORDER — ERGOCALCIFEROL 1.25 MG/1
50000 CAPSULE ORAL
Status: DISCONTINUED | OUTPATIENT
Start: 2023-11-27 | End: 2023-11-29 | Stop reason: HOSPADM

## 2023-11-26 RX ORDER — ALPRAZOLAM 0.5 MG/1
2 TABLET ORAL
Status: COMPLETED | OUTPATIENT
Start: 2023-11-26 | End: 2023-11-26

## 2023-11-26 RX ORDER — ALPRAZOLAM 0.5 MG/1
1 TABLET ORAL 3 TIMES DAILY PRN
Status: DISCONTINUED | OUTPATIENT
Start: 2023-11-26 | End: 2023-11-29 | Stop reason: HOSPADM

## 2023-11-26 RX ORDER — FAMOTIDINE 20 MG/1
40 TABLET, FILM COATED ORAL 2 TIMES DAILY
Status: DISCONTINUED | OUTPATIENT
Start: 2023-11-26 | End: 2023-11-29 | Stop reason: HOSPADM

## 2023-11-26 RX ORDER — PROMETHAZINE HYDROCHLORIDE 25 MG/ML
12.5 INJECTION, SOLUTION INTRAMUSCULAR; INTRAVENOUS EVERY 6 HOURS PRN
Status: DISCONTINUED | OUTPATIENT
Start: 2023-11-26 | End: 2023-11-29 | Stop reason: HOSPADM

## 2023-11-26 RX ORDER — ACETAMINOPHEN 500 MG
1000 TABLET ORAL
Status: COMPLETED | OUTPATIENT
Start: 2023-11-26 | End: 2023-11-26

## 2023-11-26 RX ORDER — PROMETHAZINE HYDROCHLORIDE 25 MG/ML
12.5 INJECTION, SOLUTION INTRAMUSCULAR; INTRAVENOUS
Status: COMPLETED | OUTPATIENT
Start: 2023-11-26 | End: 2023-11-26

## 2023-11-26 RX ORDER — TRAMADOL HYDROCHLORIDE 50 MG/1
50 TABLET ORAL EVERY 4 HOURS PRN
Status: DISCONTINUED | OUTPATIENT
Start: 2023-11-26 | End: 2023-11-29 | Stop reason: HOSPADM

## 2023-11-26 RX ORDER — KETOROLAC TROMETHAMINE 30 MG/ML
30 INJECTION, SOLUTION INTRAMUSCULAR; INTRAVENOUS
Status: COMPLETED | OUTPATIENT
Start: 2023-11-26 | End: 2023-11-26

## 2023-11-26 RX ORDER — ACETAMINOPHEN 500 MG
1000 TABLET ORAL EVERY 6 HOURS PRN
Status: DISCONTINUED | OUTPATIENT
Start: 2023-11-26 | End: 2023-11-29 | Stop reason: HOSPADM

## 2023-11-26 RX ORDER — ONDANSETRON 2 MG/ML
4 INJECTION INTRAMUSCULAR; INTRAVENOUS EVERY 6 HOURS PRN
Status: DISCONTINUED | OUTPATIENT
Start: 2023-11-26 | End: 2023-11-29 | Stop reason: HOSPADM

## 2023-11-26 RX ADMIN — ACETAMINOPHEN 1000 MG: 500 TABLET ORAL at 03:11

## 2023-11-26 RX ADMIN — ALPRAZOLAM 2 MG: 0.5 TABLET ORAL at 08:11

## 2023-11-26 RX ADMIN — ACETAMINOPHEN 1000 MG: 500 TABLET ORAL at 10:11

## 2023-11-26 RX ADMIN — FOLIC ACID: 5 INJECTION, SOLUTION INTRAMUSCULAR; INTRAVENOUS; SUBCUTANEOUS at 09:11

## 2023-11-26 RX ADMIN — ALPRAZOLAM 1 MG: 0.5 TABLET ORAL at 10:11

## 2023-11-26 RX ADMIN — PROMETHAZINE HYDROCHLORIDE 12.5 MG: 25 INJECTION INTRAMUSCULAR; INTRAVENOUS at 12:11

## 2023-11-26 RX ADMIN — ALPRAZOLAM 2 MG: 0.5 TABLET ORAL at 02:11

## 2023-11-26 RX ADMIN — KETOROLAC TROMETHAMINE 30 MG: 30 INJECTION, SOLUTION INTRAMUSCULAR at 03:11

## 2023-11-26 RX ADMIN — SODIUM CHLORIDE 1000 ML: 9 INJECTION, SOLUTION INTRAVENOUS at 12:11

## 2023-11-26 RX ADMIN — ONDANSETRON 4 MG: 2 INJECTION INTRAMUSCULAR; INTRAVENOUS at 05:11

## 2023-11-26 RX ADMIN — FAMOTIDINE 40 MG: 20 TABLET ORAL at 10:11

## 2023-11-26 RX ADMIN — TRAMADOL HYDROCHLORIDE 50 MG: 50 TABLET, COATED ORAL at 10:11

## 2023-11-26 RX ADMIN — TRAMADOL HYDROCHLORIDE 50 MG: 50 TABLET, COATED ORAL at 05:11

## 2023-11-26 NOTE — H&P
Hospital Medicine  History & Physical Examination       Patient: Chelsie Lee  MRN: [id number]  STATUS: OP- Observation   DOS: 2023   PCP: Galdino Caceres MD      CC: opioid withdrawal       HISTORY OF PRESENT ILLNESS   37-year-old female with very complicated intra-abdominal history and multiple surgeries and complications, resulting in chronic malnutrition, G-tube dependent, recently converted to J-tube on recent admission, discharged home with J-tube feeds, presented complaining of opioid withdrawal.  She apparently attempted voluntary admission to a detox unit, but was unable to get her J-tube feeds there. Work up in ED unremarkable. Patient was having leakage from gastrostomy site; seen by surgery and         REVIEW OF SYSTEMS     Except as documented, all other systems reviewed and negative       PAST MEDICAL HISTORY     Anxiety/depression  HTN  Chronic malnutrition  Von Willebrand's disease       PAST SURGICAL HISTORY      section    Colonoscopy 2023   Esophagogastroduodenoscopy 2023   Esophagogastroduodenoscopy 10/11/2023   Hernia repair    Sleeve gastrectomy 2014   Hysterectomy    Laparoscopic insertion of jejunostomy tube 10/11/2023   Nasal septoplasty    Rhinoplasty           FAMILY HISTORY     Reviewed, negative in relation to patient's current condition.      SOCIAL HISTORY     Denies alcohol or tobacco abuse, however has a long history of prescription drug abuse      ALLERGIES     Penicillins      HOME MEDICATIONS     acetaminophen (TYLENOL) 1,000 mg, Oral, Every 6 hours PRN   ALPRAZolam (XANAX) 2 mg, Oral, 3 times daily PRN   amLODIPine (NORVASC) 10 MG tablet 1 tablet, Oral, Daily   cyanocobalamin (VITAMIN B-12) 100 mcg, Oral   cyanocobalamin 1,000 mcg, Intramuscular, Every 30 days   ergocalciferol (ERGOCALCIFEROL) 50,000 Units, Oral, Every 7 days   famotidine (PEPCID) 40 mg, Oral, 2 times daily   megestroL (MEGACE) 600 mg, Oral, Daily   mirtazapine (REMERON) 7.5 mg,  Oral, Nightly   multivit-min/iron/folic acid/K (BARIATRIC MULTIVITAMINS ORAL) Oral   oxyCODONE-acetaminophen (PERCOCET) 5-325 mg per tablet 1 tablet, Oral, Every 8 hours PRN   pantoprazole (PROTONIX) 40 mg, Oral, 2 times daily before meals   promethazine (PHENERGAN) 25 mg, Oral, Every 6 hours PRN   traMADoL (ULTRAM) 50 mg, Oral, Every 6 hours PRN       PHYSICAL EXAM   VITALS: T 97.2 °F (36.2 °C)   /86   P 85   RR 20   O2 99 %    GENERAL: Awake and in NAD  HEENT: NC/AT, EOMI, PERRL.  NECK: Supple,  No JVD  LUNGS: CTA B/L  CVS: RRR, S1S2 positive  GI/: Soft, NT/ND, bowel sounds positive, J-tube in place.  EXTREMITIES: Peripheral pulses 2+, no peripheral edema  DERM: Warm, dry.  No rashes or lesions noted.  NEURO: AAOx3, no focal neurologic deficit  PSYCH: Cooperative, appropriate mood and affect       DIAGNOSTICS     Recent Labs     11/26/23  0839   WBC 8.35   RBC 4.26   HGB 11.4*   HCT 35.6*   MCV 83.6   MCH 26.8*   MCHC 32.0*   RDW 14.5   *        Recent Labs     11/26/23  0839   *   K 4.4   CHLORIDE 107   CO2 21*   BUN 4.6*   CREATININE 0.71   GLUCOSE 86   CALCIUM 7.9*   MG 2.00   ALBUMIN 2.2*   GLOBULIN 3.1   ALKPHOS 89   ALT 8   AST 9   BILITOT 0.6        CT Abdomen Pelvis With IV Contrast  Narrative:   Liver: No acute findings.  Gallbladder and biliary tree: No calcified gallstones. No intra or extrahepatic biliary ductal dilation.  Pancreas: Unremarkable.  Spleen: Unremarkable.  Adrenals: Normal.  Kidneys and ureters: No hydronephrosis.  Bladder: Normal.  Reproductive organs: No pelvic masses.  Stomach/bowel: There are postoperative changes of prior gastric bypass.  There is wall thickening of the distal esophagus, with surrounding inflammatory changes, improved from the prior exam.  Feeding tubes are seen within the duodenal bulb and jejunum.  There is no bowel obstruction.  Lymph nodes: No pathologically enlarged lymph node identified.  Peritoneum: No ascites or free air. No fluid  collection.  Vessels: No abdominal aortic aneurysm.  Abdominal wall: Normal.  Lung bases: There is bibasilar subsegmental atelectasis.  Bones: No acute osseous findings.  Impression:   Postoperative changes of prior gastric bypass, with wall thickening of the distal esophagus and surrounding inflammatory changes, improved from the prior exam.  No fluid collection or free air.  Electronically signed by: Inga Caceres  Date:    10/31/2023  Time:    15:49      ASSESSMENT   ? opioid withdrawal  Severe protein calorie malnutrition, s/p prior J-tube placement  s/p extensively complicated gastric sleeve/bypass  h/o anxiety    PLAN   Admit to observation  Check UDS, tramadol as needed for s/s opioid withdrawal  Otherwise symptomatic management  Will monitor overnight  If stable, could be discharge in next 24-48hrs  Continue with home J-tube feeds, oral diet as tolerated.      Prophylaxis: B/L SCDs  Code Status: Full      Froy Pfeiffer MD  Hospital Medicine        If the patient has been admitted under observation status, it is at my discretion and under our care with hospital medicine services. [TWA]

## 2023-11-26 NOTE — DISCHARGE INSTRUCTIONS
General   Normally, your urine or stool will empty directly into a small plastic pouch that is worn outside your body. The pouch protects you from odor and wetness. You will need to learn how to care for the pouch and your skin.  Taking Care of the Pouch   Empty the Pouch   Pour out the contents of the pouch when it is 1/3 to 1/2 full. Avoid emptying the pouch when it is near full.  Wash your hands before and after touching your pouch.  Sit on the toilet and place the pouch between your legs. Remove the opening clip of the pouch and empty the body waste into the toilet. Sometimes, it is easier to sit facing the back of the toilet.  Clean the end of the pouch with a moist paper towel or baby wipe. You may also rinse the pouch with water and drain the water. Close the end of the pouch by replacing the clamp.  You may want to empty your pouch first thing in the morning.  Change the Pouch   You will need to change the entire pouch system every 3 to 5 days. You may need to change it more often if there is leakage around the site.  It is often easier to change your pouch first thing in the morning, before you eat or drink, so your bowels have not yet begun to move.  Gather all your supplies and wash your hands.  Gently take off the old pouch and any protective barriers.  Look at the stoma. It should be red and moist looking. Gently clean the skin around it with warm water and pat dry.  The new barrier will not stick if your skin is moist. If needed, wipe with a skin prep pad or apply a small amount of skin barrier powder.  Use your measuring grid to measure the shape and size of the stoma. This will be used to galileo the opening into the pouch barrier. The size should be just where your skin and stoma meet.  Trace the correct size opening on the pouch barrier. Use scissors to cut an opening in the pouch barrier. Take extra care not to cut the pouch.  Center the new opening of the pouch over the stoma. Make sure it fits well  on all edges. Trim the barrier if needed.  Remove the protective backing. Place the barrier on the skin around the stoma and press down for 30 to 60 seconds with your hand or a warm, dry towel. You may want to save the protective backing as a template for the next pouch change.  Close the bottom of the drainage bag with a clamp.  Take Care of Your Skin   Wash your hands before and after touching the stoma. Clean the stoma and skin around it with warm water. Do not use products with oil or perfume to clean the stoma.  It is normal for the stoma to bleed slightly when you clean it.  If the skin around the stoma becomes sore, remove the pouch and clean the skin.  Dry the stoma before replacing the pouch.  Your doctor may suggest other creams, powders, or different bags if you are having problems with leaks.    When do I need to call the doctor?   Signs of infection. These include a fever of 100.4°F (38°C) or higher, chills, pain or numbness, redness and pus at the wound site.  Belly pain that keeps you from eating or sleeping  Change in skin color of the stoma  Rash or sores around the stoma  Stoma leaks more than usual  Very hard stools  Bowel movements suddenly stop  Black, tarry, or bloody stools  No urine or stool coming out  Helpful tips   Join support groups to get to know other people who have coped with the condition.  If you are going to travel, bring extra supplies to avoid problems.  Have extra clothing with you in case your ostomy bag leaks.

## 2023-11-26 NOTE — ED PROVIDER NOTES
Encounter Date: 2023       History     Chief Complaint   Patient presents with    Abdominal Pain     Pt. voluntary admit from [place] for pain and redness around J-tube which was placed x 1 month ago and also reports dizziness.      37-year-old female presents to the emergency department for evaluation of erythema and discomfort around her gastrostomy site.  She has a history of gastric bypass performed in [place] several years ago, multiple complications since that time.  Had gastrostomy placed due to malnutrition/malabsorption issues, subsequently replaced the jejunostomy.  Seen in clinic  and gastrostomy removed at that time.  She subsequently checked herself into a behavioral hospital to try to wean herself off of her chronic opioids that she is been on for pain control following her multiple surgeries.  [facility] since she has been there, has had copious drainage from her gastrostomy site.  [facility] does not have access to her own wound dressing materials there to be able to keep it clean.  [facility] drainage has been saturating her clothing.  Additionally[facility] has not had her jejunostomy feeds since Tuesday as the facility told her that they do not do that there. Denies fever.     The history is provided by the patient and medical records.   Abdominal Pain  The other symptoms of the illness include fatigue. The other symptoms of the illness do not include fever or shortness of breath.     Review of patient's allergies indicates:   Allergen Reactions    Penicillin Rash     Past Medical History:   Diagnosis Date    Anxiety     Dysphagia     GERD (gastroesophageal reflux disease)     Hiatal hernia     Hypertension     Intestinal obstruction     Nausea with vomiting     Rectal hemorrhage     Von Willebrand's disease      Past Surgical History:   Procedure Laterality Date     SECTION      COLONOSCOPY N/A 2023    Procedure: COLON;  Surgeon: Wilmer Rossi MD;  Location: [facility] ENDOSCOPY;   Service: Gastroenterology;  Laterality: N/A;    EGD, WITH CLOSED BIOPSY  08/09/2023    Procedure: EGD, WITH CLOSED BIOPSY;  Surgeon: Wilmer Rossi MD;  Location: [facility] ENDOSCOPY;  Service: Gastroenterology;;    ESOPHAGOGASTRODUODENOSCOPY N/A 08/09/2023    Procedure: EGD;  Surgeon: Wilmer Rossi MD;  Location: [facility] ENDOSCOPY;  Service: Gastroenterology;  Laterality: N/A;    ESOPHAGOGASTRODUODENOSCOPY N/A 10/11/2023    Procedure: EGD (ESOPHAGOGASTRODUODENOSCOPY);  Surgeon: Valeriano Riddle MD;  Location: [facility];  Service: General;  Laterality: N/A;    HERNIA REPAIR      history of sleeve gastrectomy  2014    HYSTERECTOMY      LAPAROSCOPIC GASTRIC BANDING      LAPAROSCOPIC INSERTION OF JEJUNOSTOMY TUBE N/A 10/11/2023    Procedure: INSERTION, JEJUNOSTOMY TUBE, LAPAROSCOPIC;  Surgeon: Valeriano Riddle MD;  Location: [facility];  Service: General;  Laterality: N/A;    NASAL SEPTOPLASTY      RHINOPLASTY       Family History   Problem Relation Age of Onset    Alzheimer's disease Paternal Grandmother      Social History     Tobacco Use    Smoking status: Never    Smokeless tobacco: Never   Substance Use Topics    Alcohol use: Not Currently    Drug use: Never     Review of Systems   Constitutional:  Positive for fatigue. Negative for fever.   Respiratory:  Negative for shortness of breath.    Gastrointestinal:  Positive for abdominal pain.   Skin:  Positive for rash and wound.       Physical Exam     Initial Vitals [11/26/23 0744]   BP Pulse Resp Temp SpO2   (!) 165/105 83 20 97.2 °F (36.2 °C) 100 %      MAP       --         Physical Exam    Nursing note and vitals reviewed.  Constitutional:   thin   HENT:   Mouth/Throat: Oropharynx is clear and moist.   Eyes: Conjunctivae are normal. No scleral icterus.   Cardiovascular:  Normal rate and regular rhythm.           Pulmonary/Chest: No respiratory distress.   Abdominal: Abdomen is soft. She exhibits no distension. There is no abdominal tenderness.   LUQ ostomy site  w/ yellow drainage, no purulence, marked erythema/irritation around the site, no induration or fluctuance  LLQ jejunostomy site c/d/I w/o drainage or erythema     Neurological: She is alert and oriented to person, place, and time. GCS score is 15. GCS eye subscore is 4. GCS verbal subscore is 5. GCS motor subscore is 6.   Skin: Skin is warm and dry. Rash noted.   As imaged below         ED Course   Procedures  Labs Reviewed   COMPREHENSIVE METABOLIC PANEL - Abnormal; Notable for the following components:       Result Value    Sodium Level 134 (*)     Carbon Dioxide 21 (*)     Blood Urea Nitrogen 4.6 (*)     Calcium Level Total 7.9 (*)     Protein Total 5.3 (*)     Albumin Level 2.2 (*)     Albumin/Globulin Ratio 0.7 (*)     All other components within normal limits   CBC WITH DIFFERENTIAL - Abnormal; Notable for the following components:    Hgb 11.4 (*)     Hct 35.6 (*)     MCH 26.8 (*)     MCHC 32.0 (*)     Platelet 608 (*)     All other components within normal limits   MAGNESIUM - Normal   CBC W/ AUTO DIFFERENTIAL    Narrative:     The following orders were created for panel order CBC auto differential.  Procedure                               Abnormality         Status                     ---------                               -----------         ------                     CBC with Differential[[id number]]       Abnormal            Final result                 Please view results for these tests on the individual orders.                 Medications   sodium chloride 0.9% 1,000 mL with mvi, (ADULT) no.4 with vit K 3,300 unit- 150 mcg/10 mL 10 mL, thiamine 100 mg, folic acid 1 mg infusion ( Intravenous New Bag 11/26/23 0933)   ALPRAZolam tablet 2 mg (2 mg Oral Given 11/26/23 0834)   promethazine injection 12.5 mg (12.5 mg Intramuscular Given 11/26/23 1226)   sodium chloride 0.9% bolus 1,000 mL 1,000 mL (0 mLs Intravenous Stopped 11/26/23 1330)   ALPRAZolam tablet 2 mg (2 mg Oral Given 11/26/23 1440)     Medical  Decision Making  Problems Addressed:  Failure to thrive in adult: chronic illness or injury with exacerbation, progression, or side effects of treatment  Irritant contact dermatitis associated with digestive stoma: acute illness or injury  Malnutrition, unspecified type: chronic illness or injury with exacerbation, progression, or side effects of treatment  Nausea: acute illness or injury  Opioid withdrawal: acute illness or injury    Amount and/or Complexity of Data Reviewed  Labs: ordered.    Risk  OTC drugs.  Prescription drug management.      ED assessment:    Ms. Lee presented for evaluation of generalized weakness, dizziness, adequate nutritional intake over the last several days since voluntary admission at psychiatric treatment facility, has not received her jejunostomy feeds in that time.  Additionally with erythema, irritation around her gastrostomy site, recently had tube removed in favor of utilizing jejunostomy only.     Differential diagnosis (including but not limited to):   Gastric cutaneous fistula, chemical dermatitis, skin irritation, cellulitis, malnutrition, dehydration, electrolyte derangements, acute kidney injury    ED management:   See ED course below      Amount and/or Complexity of Data Reviewed  Independent historian: none   Summary of history:   External data reviewed: notes from clinic visits  Summary of data reviewed:  Seen in clinic 11/17, gastrostomy tube removed at that time  Risk and benefits of testing: discussed   Labs: ordered and reviewed      Discussion of management or test interpretation with external provider(s): discussed with hospitalist physician and discussed with bariatic/general surgery consultant   Summary of discussion: as below    Risk  Shared decision making     Critical Care  none    I, Melisa Atkinson MD personally performed the history, PE, MDM, and procedures as documented above and agree with the scribe's documentation.              ED Course as of 11/26/23  1530   Sun Nov 26, 2023   0824 Discussed with Dr. Grey, on call for general surgery, will have resident evaluate at bedside.  [KS]   0900 Patient evaluated at bedside by General surgery.  They placed a small urostomy bag over the opening to help divert the drainage from the skin to alleviate the dermatitis.  Enteral nutrition both oral and her jejunostomy feed provided to the patient. Banana bag provided as well. Will reassess post fluids to determine if she wishes to return to her voluntary admission vs DC home. RN reaching out to case management for resources for ostomy care supplies at home. [KS]   1230 Patient reports still feeling nauseated, generally weak.  She was able to tolerate soup by mouth, current receiving Jevity feeds through her J-tube without difficulty.  She is asked if she could be admitted to the hospital because she does not feel well.  Advised no indication for inpatient admission at this time as nausea would be able to be controlled at home and, even in the absence of oral intake, would be able to do her Jevity feeds at home thus would not need inpatient admission.  Will give antiemetics, additional IV fluids.  Discussed, at length, whether she desired to return to her inpatient psychiatric treatment versus, home.  States she will likely prefer to go home as did not feel like she was receiving what she needed from the inpatient psychiatric admission [KS]   1434 Patient continues to complain of intractable nausea, body aches, feeling poorly.  Suspect partially related to her withdrawal from opioids; however, states that she is not in a state fit for discharge back home.  Desires detox; however, no facility available to treat with J-tube requirement.  Hospital medicine paged to discuss potential observation admission for ongoing symptom control, consideration of case management evaluation in the morning to find a detox/substance use facility that may be available to the patient. [KS]   3497  Patient complain of generalized pain.  Nonopioid analgesics ordered. [KS]   1530 Discussed with hospitalist NP who accepts for observation.  [KS]      ED Course User Index  [KS] Melisa Atkinson MD                          Clinical Impression:  Final diagnoses:  [L24.B1] Irritant contact dermatitis associated with digestive stoma  [F11.93] Opioid withdrawal  [R11.0] Nausea  [E46] Malnutrition, unspecified type  [R62.7] Failure to thrive in adult          ED Disposition Condition    Observation Stable                Melisa Atkinson MD  11/26/23 8626

## 2023-11-26 NOTE — CONSULTS
[facility]/General Surgery  CONSULT NOTE  Chief Complaint:   Leakage from removed Gtube site     History of Present Illness:  Chelsie Lee is a 37 y.o. female with remote history of vertical sleeve gastrectomy in [place] but suffered severe reflux postoperatively.  She ultimately had an additional procedure in [place], a mini gastric bypass which is essentially a Billroth II connected to a small gastric pouch, with hiatal hernia repair.  She had trouble postoperatively, and then had a conversion of a mini gastric bypass to a standard Kel-en-Y gastric bypass c/b perforation and ultimately had exploratory laparotomy with abdominal washout and placement of feeding gastrostomy tube in the gastric remnant.  She has chronically malnourished and has marginal ulceration of her gastric jejunostomy and her gastrostomy tube leaks rendering it effectively useless in terms of maintaining enteral feeds. 10/11 pt had lap J-tube placed and 11/17 G-tube removed because it was no longer being used.     Pt presenting to ED today due to persistent leakage from previous gastrostomy site. Pt checked herself into rehab and has not been getting tube feeds or wound care.     Review of Systems:  Negative other than stated in HPI on 10 point review of systems.     Allergies:  Review of patient's allergies indicates:   Allergen Reactions    Penicillin Rash       Home Medications:  No current facility-administered medications on file prior to encounter.     Current Outpatient Medications on File Prior to Encounter   Medication Sig    acetaminophen (TYLENOL) 500 MG tablet Take 2 tablets (1,000 mg total) by mouth every 6 (six) hours as needed for Pain.    ALPRAZolam (XANAX) 2 MG Tab Take 2 mg by mouth 3 (three) times daily as needed.    amLODIPine (NORVASC) 10 MG tablet Take 1 tablet by mouth once daily.    cyanocobalamin (VITAMIN B-12) 100 MCG tablet Take 100 mcg by mouth.    cyanocobalamin 1,000 mcg/mL injection Inject 1,000 mcg into the  muscle every 30 days.    ergocalciferol (ERGOCALCIFEROL) 50,000 unit Cap Take 50,000 Units by mouth every 7 days.    famotidine (PEPCID) 20 MG tablet Take 2 tablets (40 mg total) by mouth 2 (two) times daily.    megestroL (MEGACE) 400 mg/10 mL (10 mL) Susp Take 15 mLs (600 mg total) by mouth once daily.    mirtazapine (REMERON) 7.5 MG Tab Take 1 tablet (7.5 mg total) by mouth every evening.    multivit-min/iron/folic acid/K (BARIATRIC MULTIVITAMINS ORAL) Take by mouth.    oxyCODONE-acetaminophen (PERCOCET) 5-325 mg per tablet Take 1 tablet by mouth every 8 (eight) hours as needed.    pantoprazole (PROTONIX) 40 MG tablet Take 1 tablet (40 mg total) by mouth 2 (two) times daily before meals.    promethazine (PHENERGAN) 12.5 MG Tab Take 25 mg by mouth every 6 (six) hours as needed.    traMADoL (ULTRAM) 50 mg tablet Take 1 tablet (50 mg total) by mouth every 6 (six) hours as needed for Pain.       Past Medical History:  Past Medical History:   Diagnosis Date    Anxiety     Dysphagia     GERD (gastroesophageal reflux disease)     Hiatal hernia     Hypertension     Intestinal obstruction     Nausea with vomiting     Rectal hemorrhage     Von Willebrand's disease        Past Surgical History:  Past Surgical History:   Procedure Laterality Date     SECTION      COLONOSCOPY N/A 2023    Procedure: COLON;  Surgeon: Wilmer Rossi MD;  Location: [facility] ENDOSCOPY;  Service: Gastroenterology;  Laterality: N/A;    EGD, WITH CLOSED BIOPSY  2023    Procedure: EGD, WITH CLOSED BIOPSY;  Surgeon: Wilmer Rossi MD;  Location: [facility] ENDOSCOPY;  Service: Gastroenterology;;    ESOPHAGOGASTRODUODENOSCOPY N/A 2023    Procedure: EGD;  Surgeon: Wilmer Rossi MD;  Location: [facility] ENDOSCOPY;  Service: Gastroenterology;  Laterality: N/A;    ESOPHAGOGASTRODUODENOSCOPY N/A 10/11/2023    Procedure: EGD (ESOPHAGOGASTRODUODENOSCOPY);  Surgeon: Valeriano Riddle MD;  Location: [facility];  Service: General;   "Laterality: N/A;    HERNIA REPAIR      history of sleeve gastrectomy  2014    HYSTERECTOMY      LAPAROSCOPIC GASTRIC BANDING      LAPAROSCOPIC INSERTION OF JEJUNOSTOMY TUBE N/A 10/11/2023    Procedure: INSERTION, JEJUNOSTOMY TUBE, LAPAROSCOPIC;  Surgeon: Valeriano Riddle MD;  Location: [facility];  Service: General;  Laterality: N/A;    NASAL SEPTOPLASTY      RHINOPLASTY         Family History:   Family History   Problem Relation Age of Onset    Alzheimer's disease Paternal Grandmother        Social History:   Social History     Tobacco Use    Smoking status: Never    Smokeless tobacco: Never   Substance Use Topics    Alcohol use: Not Currently    Drug use: Never        Vital Signs:  Temp: 97.2 °F (36.2 °C) (11/26/23 0744)  Pulse: 77 (11/26/23 0855)  Resp: 20 (11/26/23 0744)  BP: (!) 165/105 (11/26/23 0744)  SpO2: 99 % (11/26/23 0855)    Physical Exam:   Gen: NAD, AAOx3, answering questions appropriately  HEENT: Atraumatic   CV: RR  Resp: NWOB  Abd: S/NT/ND  Skin/wounds:            Labs:  Renal:  No results for input(s): "BUN", "CREATININE" in the last 72 hours.  No results for input(s): "LACTIC" in the last 72 hours.  FENGI:  No results for input(s): "NA", "K", "CL", "CO2", "CALCIUM", "MG", "PHOS", "PROT", "ALBUMIN", "BILITOT", "AST", "ALKPHOS", "ALT" in the last 72 hours.  Heme:  Recent Labs     11/26/23 0839   HGB 11.4*   HCT 35.6*   *     ID:  Recent Labs     11/26/23 0839   WBC 8.35     CBG:  No results for input(s): "GLUCOSE" in the last 72 hours.   Cardiovascular:  No results for input(s): "TROPONINI", "CKTOTAL", "CKMB", "BNP" in the last 168 hours.  I have reviewed all pertinent lab results within the past 24 hours.    Imaging:  No orders to display      I have reviewed all pertinent imaging results/findings within the past 24 hours.    Micro/Path/Other:  Microbiology Results (last 7 days)       ** No results found for the last 168 hours. **           Specimen (168h ago, onward)      None         "         ASSESSMENT:     Chelsie Lee is a 37 y.o. female with leakage from removed gastrostomy site since on 11/17. Pinpoint size hole with surrounding erythema. Pt has not been able to bath or receive wound care at her rehab/detox center.     PLAN:     - urostomy bag placed on previous gastrostomy site   -Recommend local wound care and supplies to change bag weekly   -No indication for acute surgical intervention at this time   -Follow up with Dr. Riddle OP   -Dispo per SHAR Lou MD  [facility] General Surgery PGY-1  9:09 AM

## 2023-11-27 PROCEDURE — 63600175 PHARM REV CODE 636 W HCPCS: Performed by: INTERNAL MEDICINE

## 2023-11-27 PROCEDURE — 25000003 PHARM REV CODE 250: Performed by: NURSE PRACTITIONER

## 2023-11-27 PROCEDURE — 96375 TX/PRO/DX INJ NEW DRUG ADDON: CPT

## 2023-11-27 PROCEDURE — G0378 HOSPITAL OBSERVATION PER HR: HCPCS

## 2023-11-27 PROCEDURE — 25000003 PHARM REV CODE 250: Performed by: INTERNAL MEDICINE

## 2023-11-27 PROCEDURE — 96376 TX/PRO/DX INJ SAME DRUG ADON: CPT

## 2023-11-27 RX ORDER — MIRTAZAPINE 7.5 MG/1
7.5 TABLET, FILM COATED ORAL NIGHTLY
Status: DISCONTINUED | OUTPATIENT
Start: 2023-11-27 | End: 2023-11-29 | Stop reason: HOSPADM

## 2023-11-27 RX ORDER — KETOROLAC TROMETHAMINE 30 MG/ML
15 INJECTION, SOLUTION INTRAMUSCULAR; INTRAVENOUS EVERY 6 HOURS
Status: DISCONTINUED | OUTPATIENT
Start: 2023-11-27 | End: 2023-11-29

## 2023-11-27 RX ORDER — DIPHENHYDRAMINE HCL 25 MG
25 CAPSULE ORAL ONCE
Status: COMPLETED | OUTPATIENT
Start: 2023-11-27 | End: 2023-11-27

## 2023-11-27 RX ORDER — HYDRALAZINE HYDROCHLORIDE 20 MG/ML
20 INJECTION INTRAMUSCULAR; INTRAVENOUS EVERY 4 HOURS PRN
Status: DISCONTINUED | OUTPATIENT
Start: 2023-11-27 | End: 2023-11-29 | Stop reason: HOSPADM

## 2023-11-27 RX ADMIN — MIRTAZAPINE 7.5 MG: 7.5 TABLET, FILM COATED ORAL at 09:11

## 2023-11-27 RX ADMIN — ONDANSETRON 4 MG: 2 INJECTION INTRAMUSCULAR; INTRAVENOUS at 06:11

## 2023-11-27 RX ADMIN — ERGOCALCIFEROL 50000 UNITS: 1.25 CAPSULE ORAL at 09:11

## 2023-11-27 RX ADMIN — KETOROLAC TROMETHAMINE 15 MG: 30 INJECTION, SOLUTION INTRAMUSCULAR; INTRAVENOUS at 03:11

## 2023-11-27 RX ADMIN — TRAMADOL HYDROCHLORIDE 50 MG: 50 TABLET, COATED ORAL at 06:11

## 2023-11-27 RX ADMIN — ACETAMINOPHEN 1000 MG: 500 TABLET ORAL at 09:11

## 2023-11-27 RX ADMIN — KETOROLAC TROMETHAMINE 15 MG: 30 INJECTION, SOLUTION INTRAMUSCULAR; INTRAVENOUS at 09:11

## 2023-11-27 RX ADMIN — ALPRAZOLAM 1 MG: 0.5 TABLET ORAL at 09:11

## 2023-11-27 RX ADMIN — ALPRAZOLAM 1 MG: 0.5 TABLET ORAL at 06:11

## 2023-11-27 RX ADMIN — HYDRALAZINE HYDROCHLORIDE 20 MG: 20 INJECTION INTRAMUSCULAR; INTRAVENOUS at 05:11

## 2023-11-27 RX ADMIN — FAMOTIDINE 40 MG: 20 TABLET ORAL at 09:11

## 2023-11-27 RX ADMIN — DIPHENHYDRAMINE HYDROCHLORIDE 25 MG: 25 CAPSULE ORAL at 03:11

## 2023-11-27 RX ADMIN — TRAMADOL HYDROCHLORIDE 50 MG: 50 TABLET, COATED ORAL at 12:11

## 2023-11-27 RX ADMIN — Medication 100 MCG: at 09:11

## 2023-11-27 RX ADMIN — ALPRAZOLAM 1 MG: 0.5 TABLET ORAL at 03:11

## 2023-11-27 NOTE — PROGRESS NOTES
[facility]  Hospital Medicine Progress Note        CC: opioid withdrawal         HISTORY OF PRESENT ILLNESS   37-year-old female with very complicated intra-abdominal history and multiple surgeries and complications, resulting in chronic malnutrition, G-tube dependent, recently converted to J-tube on recent admission, discharged home with J-tube feeds, presented complaining of opioid withdrawal.  She apparently attempted voluntary admission to a detox unit, but was unable to get her J-tube feeds there. Work up in ED unremarkable. Patient was having leakage from gastrostomy site; seen by surgery and       Patient with a history of severe protein calorie malnutrition status post prior J-tube placement was admitted for opioid withdrawal    Today's information   Patient seen and examined at bedside, spouse at bedside   Patient telling [facility] and how she was not able to get her J-tube feeds and wound care.  Currently complaining of pain at site of J-tube insertion due to gastric juice leaking outwards      Exam  GENERAL: Awake and in NAD  HEENT: NC/AT, EOMI, PERRL.  NECK: Supple,  No JVD  LUNGS: CTA B/L  CVS: RRR, S1S2 positive  GI/: Soft, NT/ND, bowel sounds positive, J-tube in place.  EXTREMITIES: Peripheral pulses 2+, no peripheral edema  DERM: Warm, dry.  No rashes or lesions noted.  NEURO: AAOx3, no focal neurologic deficit  PSYCH: Cooperative, appropriate mood and affect        ASSESSMENT   ? opioid withdrawal  Severe protein calorie malnutrition, s/p prior J-tube placement  s/p extensively complicated gastric sleeve/bypass  h/o anxiety     PLAN   Admit to observation  Check UDS, tramadol as needed for s/s opioid withdrawal  Add on IV Toradol 15 mg q.6 for 3 days   Consult dietitian to begin J-tube feeds as continuous feeds  Wound Care  Otherwise symptomatic management  Continue with home J-tube feeds, oral diet as tolerated.        Prophylaxis:  B/L SCDs  Code Status: Full    Dispo- If stable, could be discharge in next 24-48hrs       VITAL SIGNS: 24 HRS MIN & MAX LAST   Temp  Min: 97.5 °F (36.4 °C)  Max: 98.6 °F (37 °C) 98.6 °F (37 °C)   BP  Min: 123/92  Max: 149/100 (!) 149/100   Pulse  Min: 74  Max: 87  80   Resp  Min: 16  Max: 20 18   SpO2  Min: 99 %  Max: 100 % 100 %     I have reviewed the following labs:  Recent Labs   Lab 11/26/23  0839   WBC 8.35   RBC 4.26   HGB 11.4*   HCT 35.6*   MCV 83.6   MCH 26.8*   MCHC 32.0*   RDW 14.5   *   MPV 9.6     Recent Labs   Lab 11/26/23  0839   *   K 4.4   CO2 21*   BUN 4.6*   CREATININE 0.71   CALCIUM 7.9*   MG 2.00   ALBUMIN 2.2*   ALKPHOS 89   ALT 8   AST 9   BILITOT 0.6     Microbiology Results (last 7 days)       ** No results found for the last 168 hours. **             See below for Radiology    Scheduled Med:   cyanocobalamin  100 mcg Oral Daily    ergocalciferol  50,000 Units Oral Q7 Days    famotidine  40 mg Oral BID    ketorolac  15 mg Intravenous Q6H      Continuous Infusions:     PRN Meds:  acetaminophen, ALPRAZolam, ondansetron, promethazine, traMADoL     Assessment/Plan:      VTE prophylaxis:     Patient condition:  Stable/Fair/Guarded/ Serious/ Critical    Anticipated discharge and Disposition:         All diagnosis and differential diagnosis have been reviewed; assessment and plan has been documented; I have personally reviewed the labs and test results that are presently available; I have reviewed the patients medication list; I have reviewed the consulting providers response and recommendations. I have reviewed or attempted to review medical records based upon their availability    All of the patient's questions have been  addressed and answered. Patient's is agreeable to the above stated plan. I will continue to monitor closely and make adjustments to medical management as needed.  _____________________________________________________________________    Nutrition  Status:    Radiology:  I have personally reviewed the following imaging and agree with the radiologist.     CT Abdomen Pelvis With IV Contrast  Narrative: EXAMINATION:  CT ABDOMEN PELVIS WITH IV CONTRAST    CLINICAL HISTORY:  Nausea/vomiting;Abdominal pain, acute, nonlocalized;    TECHNIQUE:  CT imaging was performed of the abdomen and pelvis after the administration of intravenous contrast. Dose length product is 217 mGycm. Automatic exposure control, adjustment of mA/kV or iterative reconstruction technique was used to limit radiation dose.    COMPARISON:  CT abdomen pelvis dated 10/05/2023    FINDINGS:  Liver: No acute findings.    Gallbladder and biliary tree: No calcified gallstones. No intra or extrahepatic biliary ductal dilation.    Pancreas: Unremarkable.    Spleen: Unremarkable.    Adrenals: Normal.    Kidneys and ureters: No hydronephrosis.    Bladder: Normal.    Reproductive organs: No pelvic masses.    Stomach/bowel: There are postoperative changes of prior gastric bypass.  There is wall thickening of the distal esophagus, with surrounding inflammatory changes, improved from the prior exam.  Feeding tubes are seen within the duodenal bulb and jejunum.  There is no bowel obstruction.    Lymph nodes: No pathologically enlarged lymph node identified.    Peritoneum: No ascites or free air. No fluid collection.    Vessels: No abdominal aortic aneurysm.    Abdominal wall: Normal.    Lung bases: There is bibasilar subsegmental atelectasis.    Bones: No acute osseous findings.  Impression: Postoperative changes of prior gastric bypass, with wall thickening of the distal esophagus and surrounding inflammatory changes, improved from the prior exam.  No fluid collection or free air.    Electronically signed by: Inga Caceres  Date:    10/31/2023  Time:    15:49      Loly Tejeda MD   11/27/2023

## 2023-11-27 NOTE — NURSING
Nurses Note -- 4 Eyes      11/27/2023   5:11 AM      Skin assessed during: Admit      [] No Altered Skin Integrity Present    []Prevention Measures Documented      [x] Yes- Altered Skin Integrity Present or Discovered   [x] LDA Added if Not in Epic (Describe Wound)   [] New Altered Skin Integrity was Present on Admit and Documented in LDA   [] Wound Image Taken    Wound Care Consulted? No    Attending Nurse:  Zohra Zheng RN    Second RN/Staff Member:  Kaylyn SOSA LPN

## 2023-11-27 NOTE — CONSULTS
Inpatient Nutrition Assessment    Admit Date: 11/26/2023   Total duration of encounter: 1 day   Patient Age: 37 y.o.    Nutrition Recommendation/Prescription     -TF recs:  Fibersource HN, start @ 25mL/hr and advance 10-15 mL every 4 hrs as tolerated to goal rate of 65mL/hr x 16 hrs per day. (Run continuously until goal rate is reached). This will provide:  1248 kcal (85% est needs)  56 gm protein (73% est needs)  842 mL free water (57% est needs)  -FWF: 40mL/hr that TF is running to meet fluid needs.   -Continue Regular Diet as tolerated.   -Consider a MVI as medically feasible.     Communication of Recommendations: reviewed with patient    Nutrition Assessment     Malnutrition Assessment/Nutrition-Focused Physical Exam    Malnutrition Context: chronic illness (11/27/23 1324)  Malnutrition Level: severe (11/27/23 1324)  Energy Intake (Malnutrition): less than or equal to 75% for greater than or equal to 1 month (11/27/23 1324)  Weight Loss (Malnutrition): other (see comments) (unable to evaluate) (11/27/23 1324)  Subcutaneous Fat (Malnutrition): severe depletion (11/27/23 1324)  Orbital Region (Subcutaneous Fat Loss): severe depletion  Upper Arm Region (Subcutaneous Fat Loss): severe depletion  Thoracic and Lumbar Region: severe depletion  Muscle Mass (Malnutrition): severe depletion (11/27/23 1324)  Orthodox Region (Muscle Loss): severe depletion  Clavicle Bone Region (Muscle Loss): severe depletion  Clavicle and Acromion Bone Region (Muscle Loss): severe depletion  Scapular Bone Region (Muscle Loss): severe depletion              Fluid Accumulation (Malnutrition): other (see comments) (does not meet criteria) (11/27/23 1324)  Hand  Strength, Left (Malnutrition): unable to evaluate (11/27/23 1324)  Hand  Strength, Right (Malnutrition): unable to evaluate (11/27/23 1324)  A minimum of two characteristics is recommended for diagnosis of either severe or non-severe malnutrition.    Chart Review    Reason Seen:  "physician consult for Tube Feeding    Malnutrition Screening Tool Results   Have you recently lost weight without trying?: Yes: 34 lbs or more  Have you been eating poorly because of a decreased appetite?: Yes   MST Score: 5   Diagnosis:  ? opioid withdrawal  Severe protein calorie malnutrition, s/p prior J-tube placement  s/p extensively complicated gastric sleeve/bypass  h/o anxiety    Relevant Medical History:   Anxiety/depression  HTN  Chronic malnutrition  Von Willebrand's disease     Nutrition-Related Medications: calcitriol, SSI, 15 units of detemir nightly, renal MVI  Calorie Containing IV Medications: no significant kcals from medications at this time    Nutrition-Related Labs:  11/27/23: Na 134, K 5.2, Glu 275, GFR 6    Nutrition Orders:   Diet Adult Regular  Tube Feedings/Formulas 237; Fibersource HN; Jejunostomy    Appetite/Oral Intake: fair/50-75% of meals    Factors Affecting Nutritional Intake: altered gastrointestinal function    Food/[place]/Cultural Preferences: none reported    Food Allergies: no known food allergies    Wound(s):   surgical incision    Last Bowel Movement: 11/24/23    Comments    11/27/23: Tube feeding ordered; not running or hanging; MD consult. Pt eating during rounds; reports that appetite has been fair to good but can only really tolerate certain foods; states that she was receiving Jevity 1.2 @ 65mL/hr for about 12 hrs per night before going into another facility where they were not running her TF at all.     Anthropometrics    Height: 5' 4" (162.6 cm)    Last Weight: 42 kg (92 lb 9.5 oz) (11/27/23 0210) Weight Method: Bed Scale  BMI (Calculated): 15.9  BMI Classification: underweight (BMI less than 18.5)     Ideal Body Weight (IBW), Female: 120 lb     % Ideal Body Weight, Female (lb): 77.16 %                             Usual Weight Provided By: EMR weight history    Wt Readings from Last 5 Encounters:   11/27/23 42 kg (92 lb 9.5 oz)   11/17/23 43.6 kg (96 lb 3.2 oz) "   10/31/23 45.4 kg (100 lb)   10/26/23 46.3 kg (102 lb)   10/05/23 43.5 kg (96 lb)     Weight Change(s) Since Admission:   Wt Readings from Last 1 Encounters:   23 0210 42 kg (92 lb 9.5 oz)   23 0744 40.4 kg (89 lb)   Admit Weight: 40.4 kg (89 lb) (23 07), Weight Method: Bed Scale    Estimated Needs    Weight Used For Calorie Calculations: 42 kg (92 lb 9.5 oz)  Energy Calorie Requirements (kcal): 2096-5575 kcal (35-40 kcal/kg0  Energy Need Method: Kcal/kg  Weight Used For Protein Calculations: 42 kg (92 lb 9.5 oz)  Protein Requirements: 77 gm (1.8g/kg)  Fluid Requirements (mL): 1470 mL  Temp (24hrs), Av.8 °F (36.6 °C), Min:97.5 °F (36.4 °C), Max:98.1 °F (36.7 °C)       Enteral Nutrition    Patient not receiving enteral nutrition at this time.    Parenteral Nutrition    Patient not receiving parenteral nutrition support at this time.    Evaluation of Received Nutrient Intake    Calories: not meeting estimated needs  Protein: not meeting estimated needs    Patient Education    Not applicable.    Nutrition Diagnosis     PES: Malnutrition related to chronic illness as evidenced by <75% est energy requirements met >/=1 month, physical evidence of severe fat and muscle wasting. (new)    Interventions/Goals     Intervention(s): general/healthful diet, modified composition of enteral nutrition, modified rate of enteral nutrition, multivitamin/mineral supplement therapy, and collaboration with other providers    Goal: Meet greater than 75% of nutritional needs by follow-up. (new)    Monitoring & Evaluation     Dietitian will monitor energy intake, enteral nutrition intake, and weight change.    Nutrition Risk/Follow-Up: high (follow-up in 1-4 days)   Please consult if re-assessment needed sooner.

## 2023-11-27 NOTE — PROGRESS NOTES
[facility] [address]  Wound Care    Patient Name:  Chelsie Lee   MRN:  [id number]  Date: 11/27/2023  Diagnosis: <principal problem not specified>    History:     Past Medical History:   Diagnosis Date    Anxiety     Dysphagia     GERD (gastroesophageal reflux disease)     Hiatal hernia     Hypertension     Intestinal obstruction     Nausea with vomiting     Rectal hemorrhage     Von Willebrand's disease        Social History     Socioeconomic History    Marital status: Unknown   Tobacco Use    Smoking status: Never    Smokeless tobacco: Never   Substance and Sexual Activity    Alcohol use: Not Currently    Drug use: Never   Social History Narrative    ** Merged History Encounter **          Social Determinants of Health     Financial Resource Strain: Low Risk  (8/10/2023)    Overall Financial Resource Strain (CARDIA)     Difficulty of Paying Living Expenses: Not hard at all   Food Insecurity: No Food Insecurity (8/10/2023)    Hunger Vital Sign     Worried About Running Out of Food in the Last Year: Never true     Ran Out of Food in the Last Year: Never true   Transportation Needs: No Transportation Needs (8/10/2023)    PRAPARE - Transportation     Lack of Transportation (Medical): No     Lack of Transportation (Non-Medical): No   Physical Activity: Inactive (8/10/2023)    Exercise Vital Sign     Days of Exercise per Week: 0 days     Minutes of Exercise per Session: 0 min   Stress: No Stress Concern Present (8/10/2023)    [place] [employer] - Occupational Stress Questionnaire     Feeling of Stress : Only a little   Social Connections: Moderately Isolated (8/10/2023)    Social Connection and Isolation Panel [NHANES]     Frequency of Communication with Friends and Family: More than three times a week     Frequency of Social Gatherings with Friends and Family: More than three times a week     Attends Baptism Services: Never     Active Member of Clubs or Organizations:  No     Attends Club or Organization Meetings: Never     Marital Status:    Housing Stability: Unknown (8/10/2023)    Housing Stability Vital Sign     Unable to Pay for Housing in the Last Year: No     Number of Places Lived in the Last Year: 1       Precautions:     Allergies as of 11/26/2023 - Reviewed 11/26/2023   Allergen Reaction Noted    Penicillin Rash 08/08/2023       WOC Assessment Details/Treatment   WOCN consulted for g-tube leakage. Discussed care plan with nurse Collins prior to visit. No family at bedside. Patient alert and sitting up in the bed. Introduced self and coworker SUSAN Hopkins and explained reason for visit, patient agreeable to be seen. Assessed g-tube site, it currently has a 2-piece urostomy output bag over area with about 20mLs of adam colored drainage. Discussed use of bag and care of bag, which included when to empty and how to empty the bag, which the patient is able to do herself. Will order supplies for maintenance and follow up.  11/27/2023

## 2023-11-27 NOTE — PLAN OF CARE
11/27/23 1304   Discharge Assessment   Assessment Type Discharge Planning Assessment   Confirmed/corrected address, phone number and insurance Yes   Confirmed Demographics Correct on Facesheet   Source of Information patient   When was your last doctors appointment?   (Patient reports 1 month ago.)   Communicated [place] with patient/caregiver Yes   Reason For Admission Opioid Withdrawl   People in Home spouse;child(mahamed), dependent   Do you expect to return to your current living situation? Yes   Do you have help at home or someone to help you manage your care at home? Yes   Who are your caregiver(s) and their phone number(s)? Mother: Melisa Lee: [phone]   Prior to hospitilization cognitive status: Unable to Assess   Current cognitive status: Alert/Oriented   Home Accessibility wheelchair accessible   Home Layout Able to live on 1st floor   Equipment Currently Used at Home none   Readmission within 30 days? No   Patient currently being followed by outpatient case management? No   Do you currently have service(s) that help you manage your care at home? Yes   Name and Contact number of agency [facility] Phone: [phone]   Is the pt/caregiver preference to resume services with current agency Yes   Do you take prescription medications? Yes   Do you have prescription coverage? Yes   Coverage BCBS   Do you have any problems affording any of your prescribed medications? No   Is the patient taking medications as prescribed? yes   Who is going to help you get home at discharge? Patient reports her spouse   How do you get to doctors appointments? car, drives self   Are you on dialysis? No   Do you take coumadin? No   DME Needed Upon Discharge  none   Discharge Plan discussed with: Patient   Transition of Care Barriers None   Discharge Plan A Home with family   Discharge Plan B Home Health   OTHER   Name(s) of People in Home Patient resides with her spouse and two minor children at home.        CM completed Discharge Assessment with patient at bedside.  Patient's PCP is Galdino Caceres. Patient asked CM not to notify Dr. Caceres of her current hospital stay. CM noted he will be notified if and when a follow up appointment is needed at discharge. Patient then stated she can see An Rossi in [place] instead. CM said okay.  Patient reports the address on her face sheet is her mother's address. Patient's address is [address] [place] [id number]. Patient plans to return home with her spouse and two dependent children.  Patient reports needing ostomy supplies. CM inquired about home health services and patient report she is current with [facility]. CM did contact [facility] Phone: [phone] and spoke with Nteta who stated patient is current however, they have had difficulty contacting patient. CM verbalized and noted pt is expected to be discharged home today or tomorrow. Netta stated patient is scheduled to be seen every Tuesday but patient has been noncompliant. [facility] reported last seeing patient on October 27, 2023. Patient is instructed to contact [facility] after discharge so they can assist with needed supplies/feeds/wound care.  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 Patient reports she is on sabbatical until January.   Patient with anxiety dx (medicated w. Xanax). Patient disclosed hx of seeing Yoana Alba for therapy but stopped due to frequent hospitalization. Patient stated she plans to seek counseling services after discharge.  Patient disclosed addiction and withdrawal from Lortab which she was prescribed after undergoing at least six surgeries (stomach area) this year alone.  Patient reports family support from her spouse,mother and friends.

## 2023-11-27 NOTE — PLAN OF CARE
Problem: Aspiration (Enteral Nutrition)  Goal: Absence of Aspiration Signs and Symptoms  Outcome: Ongoing, Progressing     Problem: Device-Related Complication Risk (Enteral Nutrition)  Goal: Safe, Effective Therapy Delivery  Outcome: Ongoing, Progressing     Problem: Feeding Intolerance (Enteral Nutrition)  Goal: Feeding Tolerance  Outcome: Ongoing, Progressing     Problem: Adult Inpatient Plan of Care  Goal: Plan of Care Review  Flowsheets (Taken 11/27/2023 8481)  Plan of Care Reviewed With: patient

## 2023-11-28 PROCEDURE — G0378 HOSPITAL OBSERVATION PER HR: HCPCS

## 2023-11-28 PROCEDURE — 96376 TX/PRO/DX INJ SAME DRUG ADON: CPT

## 2023-11-28 PROCEDURE — 25000003 PHARM REV CODE 250: Performed by: NURSE PRACTITIONER

## 2023-11-28 PROCEDURE — 63600175 PHARM REV CODE 636 W HCPCS: Performed by: INTERNAL MEDICINE

## 2023-11-28 PROCEDURE — 25000003 PHARM REV CODE 250: Performed by: INTERNAL MEDICINE

## 2023-11-28 RX ORDER — AMLODIPINE BESYLATE 5 MG/1
5 TABLET ORAL 2 TIMES DAILY
Status: DISCONTINUED | OUTPATIENT
Start: 2023-11-28 | End: 2023-11-29 | Stop reason: HOSPADM

## 2023-11-28 RX ADMIN — AMLODIPINE BESYLATE 5 MG: 5 TABLET ORAL at 08:11

## 2023-11-28 RX ADMIN — ALPRAZOLAM 1 MG: 0.5 TABLET ORAL at 09:11

## 2023-11-28 RX ADMIN — KETOROLAC TROMETHAMINE 15 MG: 30 INJECTION, SOLUTION INTRAMUSCULAR; INTRAVENOUS at 09:11

## 2023-11-28 RX ADMIN — TRAMADOL HYDROCHLORIDE 50 MG: 50 TABLET, COATED ORAL at 03:11

## 2023-11-28 RX ADMIN — FAMOTIDINE 40 MG: 20 TABLET ORAL at 10:11

## 2023-11-28 RX ADMIN — TRAMADOL HYDROCHLORIDE 50 MG: 50 TABLET, COATED ORAL at 11:11

## 2023-11-28 RX ADMIN — MIRTAZAPINE 7.5 MG: 7.5 TABLET, FILM COATED ORAL at 08:11

## 2023-11-28 RX ADMIN — ALPRAZOLAM 1 MG: 0.5 TABLET ORAL at 03:11

## 2023-11-28 RX ADMIN — ALPRAZOLAM 1 MG: 0.5 TABLET ORAL at 06:11

## 2023-11-28 RX ADMIN — FAMOTIDINE 40 MG: 20 TABLET ORAL at 08:11

## 2023-11-28 RX ADMIN — KETOROLAC TROMETHAMINE 15 MG: 30 INJECTION, SOLUTION INTRAMUSCULAR; INTRAVENOUS at 03:11

## 2023-11-28 RX ADMIN — KETOROLAC TROMETHAMINE 15 MG: 30 INJECTION, SOLUTION INTRAMUSCULAR; INTRAVENOUS at 07:11

## 2023-11-28 RX ADMIN — ONDANSETRON 4 MG: 2 INJECTION INTRAMUSCULAR; INTRAVENOUS at 08:11

## 2023-11-28 RX ADMIN — Medication 100 MCG: at 10:11

## 2023-11-28 RX ADMIN — TRAMADOL HYDROCHLORIDE 50 MG: 50 TABLET, COATED ORAL at 06:11

## 2023-11-28 RX ADMIN — AMLODIPINE BESYLATE 5 MG: 5 TABLET ORAL at 10:11

## 2023-11-29 VITALS
WEIGHT: 92.56 LBS | RESPIRATION RATE: 18 BRPM | BODY MASS INDEX: 15.8 KG/M2 | HEIGHT: 64 IN | TEMPERATURE: 98 F | HEART RATE: 92 BPM | OXYGEN SATURATION: 99 % | SYSTOLIC BLOOD PRESSURE: 136 MMHG | DIASTOLIC BLOOD PRESSURE: 99 MMHG

## 2023-11-29 PROCEDURE — 25000003 PHARM REV CODE 250: Performed by: INTERNAL MEDICINE

## 2023-11-29 PROCEDURE — 96376 TX/PRO/DX INJ SAME DRUG ADON: CPT

## 2023-11-29 PROCEDURE — G0378 HOSPITAL OBSERVATION PER HR: HCPCS

## 2023-11-29 PROCEDURE — 63600175 PHARM REV CODE 636 W HCPCS: Performed by: INTERNAL MEDICINE

## 2023-11-29 RX ORDER — ONDANSETRON 4 MG/1
4 TABLET, ORALLY DISINTEGRATING ORAL EVERY 6 HOURS PRN
Qty: 20 TABLET | Refills: 0 | Status: SHIPPED | OUTPATIENT
Start: 2023-11-29

## 2023-11-29 RX ADMIN — TRAMADOL HYDROCHLORIDE 50 MG: 50 TABLET, COATED ORAL at 10:11

## 2023-11-29 RX ADMIN — FAMOTIDINE 40 MG: 20 TABLET ORAL at 08:11

## 2023-11-29 RX ADMIN — Medication 100 MCG: at 08:11

## 2023-11-29 RX ADMIN — AMLODIPINE BESYLATE 5 MG: 5 TABLET ORAL at 08:11

## 2023-11-29 RX ADMIN — ALPRAZOLAM 1 MG: 0.5 TABLET ORAL at 06:11

## 2023-11-29 RX ADMIN — TRAMADOL HYDROCHLORIDE 50 MG: 50 TABLET, COATED ORAL at 06:11

## 2023-11-29 RX ADMIN — ONDANSETRON 4 MG: 2 INJECTION INTRAMUSCULAR; INTRAVENOUS at 09:11

## 2023-11-29 NOTE — PLAN OF CARE
D/C order noted - went to speak with pt and she states she has HH  services with [facility] ph# [phone].  Sent all d/c info via [facility] and [facility].  Called and spoke to nurse with [facility]- she states to inform pt that nurse Carleen will call to set up time to see pt in am.  Also they will arrange after eval/assess done to have pt's feedings and all supplies delivered.  Pt is aware that she will have to pay OOP.  Pt does have transportation home.

## 2023-11-29 NOTE — PROGRESS NOTES
[facility]        Chief Complaint: Inpatient Follow-up for opioid withdrawal     HPI:     37-year-old female with very complicated intra-abdominal surgical  history and multiple surgeries and complications following sleeve gastrectomy in 2014 resulting in chronic malnutrition, G-tube dependent, recently converted to J-tube on recent admission, discharged home with J-tube feeds, presented complaining of opioid withdrawal symptoms and constant leakage from recently removed G tube exit site.  She apparently attempted voluntary admission to a detox unit, but was unable to get her J-tube feeds there. Work up in ED unremarkable.     General surgery was consulted for G tube site leakage. They placed a small urostomy to allow for continued closure and to help with minimizing skin irritation and recommended  F/u with Dr. Riddle as outpatient     Interval Hx:   Pt is still complaining of G-tube site leakage   Urostomy in place for drainage   Tolerating oral and J-tube feeds  Withdrawal symptoms are improving     Vitals are stable  No AM labs today to review     Case was discussed with patient's nurse and  on the floor.    Objective/physical exam:  General: In no acute distress, afebrile, cachectic appearing   Chest: Clear to auscultation bilaterally  Heart: RRR, +S1, S2, no appreciable murmur  Abdomen: Soft, nontender, BS +, Urostomy bag in place where G tube was removed. J-tube in place.   MSK: Warm, no lower extremity edema, no clubbing or cyanosis  Neurologic: Alert and oriented x4, Cranial nerve II-XII intact, Strength 5/5 in all 4 extremities    VITAL SIGNS: 24 HRS MIN & MAX LAST   Temp  Min: 98 °F (36.7 °C)  Max: 99 °F (37.2 °C) 98.1 °F (36.7 °C)   BP  Min: 134/81  Max: 147/101 (!) 139/96   Pulse  Min: 81  Max: 92  84   Resp  Min: 18  Max: 18 18   SpO2  Min: 98 %  Max: 100 % 100 %     I have reviewed the following labs:  Recent Labs   Lab  11/26/23  0839   WBC 8.35   RBC 4.26   HGB 11.4*   HCT 35.6*   MCV 83.6   MCH 26.8*   MCHC 32.0*   RDW 14.5   *   MPV 9.6     Recent Labs   Lab 11/26/23  0839   *   K 4.4   CO2 21*   BUN 4.6*   CREATININE 0.71   CALCIUM 7.9*   MG 2.00   ALBUMIN 2.2*   ALKPHOS 89   ALT 8   AST 9   BILITOT 0.6     Microbiology Results (last 7 days)       ** No results found for the last 168 hours. **             See below for Radiology    Scheduled Med:   amLODIPine  5 mg Oral BID    cyanocobalamin  100 mcg Oral Daily    ergocalciferol  50,000 Units Oral Q7 Days    famotidine  40 mg Oral BID    ketorolac  15 mg Intravenous Q6H    mirtazapine  7.5 mg Oral QHS      Continuous Infusions:     PRN Meds:  acetaminophen, ALPRAZolam, hydrALAZINE, ondansetron, promethazine, traMADoL     Assessment/Plan:  Opioid withdrawal symptoms   Severe protein calorie malnutrition   S/P extensively complicated gastric sleeve/bypass  H/O anxiety  Under weight , BMI 15.8    Plan-   Continue supportive treatment   Continue Urostomy bag as G tube removal site to allow continued closure   Home meds are reviewed and renewed   Tramadol as needed to mitigate  opioid withdrawal     VTE prophylaxis: SCDs    Patient condition:  Fair    Anticipated discharge and Disposition:     Home with family    All diagnosis and differential diagnosis have been reviewed; assessment and plan has been documented; I have personally reviewed the labs and test results that are presently available; I have reviewed the patients medication list; I have reviewed the consulting providers response and recommendations. I have reviewed or attempted to review medical records based upon their availability    All of the patient's questions have been  addressed and answered. Patient's is agreeable to the above stated plan. I will continue to monitor closely and make adjustments to medical management as needed.  ______________    Ryley Wagner MD   11/27/2023

## 2023-11-30 ENCOUNTER — PATIENT OUTREACH (OUTPATIENT)
Dept: ADMINISTRATIVE | Facility: CLINIC | Age: 38
End: 2023-11-30
Payer: COMMERCIAL

## 2023-11-30 NOTE — PROGRESS NOTES
C3 nurse spoke with Chelsie Lee for a TCC post hospital discharge follow up call. The patient does not have a scheduled HOSFU appointment with Galdino Caceres MD within 5-7 days post hospital discharge date 11/29/23. C3 nurse was unable to schedule HOSFU appointment in [facility] and unable to route message to PCP.  Patient stated she will call to schedule an appt.

## 2023-12-04 ENCOUNTER — TELEPHONE (OUTPATIENT)
Dept: SURGERY | Facility: CLINIC | Age: 38
End: 2023-12-04
Payer: COMMERCIAL

## 2023-12-04 NOTE — TELEPHONE ENCOUNTER
Pt is currently in [facility] her bariatric surgeon is working he up for revert to sleeve   She is also getting seen for her g tube it is leaking a lot I will set a reminder to fu on records from hospital stay

## 2023-12-04 NOTE — TELEPHONE ENCOUNTER
----- Message from Gillian Braun MA sent at 11/20/2023  9:44 AM CST -----  Regarding: fu referral  Fu referral to  - 2nd reminder

## 2023-12-07 NOTE — DISCHARGE SUMMARY
[facility] Hospital Medicine Discharge Summary    Admit Date: 11/26/2023  Discharge Date and Time: 11/29/2023, 12:00 pm  Admitting Physician:  Team  Discharging Physician: Ryley Wagner MD.  Primary Care Physician: Galdino Caceres MD  Consults: General Surgery    Discharge Diagnoses:  Opioid withdrawal symptoms   Severe protein calorie malnutrition   S/P extensively complicated gastric sleeve/bypass  H/O anxiety  Under weight , BMI 15.8    Hospital Course:     37-year-old female with very complicated intra-abdominal surgical  history and multiple surgeries and complications following sleeve gastrectomy in 2014 resulting in chronic malnutrition, G-tube dependency, recently converted to J-tube on recent admission, discharged home with J-tube feeds, presented with c/o  of opioid withdrawal symptoms and constant leakage from recently removed G tube exit site.  She apparently attempted voluntary admission to a detox unit, but was unable to get her J-tube feeds there therefore presented to the ED at [facility].  Work up in ED was unremarkable.      General surgery was consulted for G tube site leakage. They placed a small urostomy to allow for continued closure and to help with minimizing skin irritation and recommended  F/u with Dr. Riddle as outpatient. Pt was observed closely and an order for Tramadol given to mitigate opoid withdrawal symptoms. However pt did well and did not need any intervention. J tube feeding continued at goal rate. Leakage was still present but was manageable with Urostomy bag that was placed per Surgery. At this point pt deemed stable for discharge to home to family care.            Pt was seen and examined on the day of discharge  Vitals:  VITAL SIGNS: 24 HRS MIN & MAX LAST   No data recorded 98.1 °F (36.7 °C)   No data recorded (!) 136/99   No data recorded  92   No data recorded 18   No data recorded 99 %       Physical Exam:    General: In no acute distress,  "afebrile, cachectic appearing   Chest: Clear to auscultation bilaterally  Heart: RRR, +S1, S2, no appreciable murmur  Abdomen: Soft, nontender, BS +, Urostomy bag in place where G tube was removed. J-tube in place.   MSK: Warm, no lower extremity edema, no clubbing or cyanosis  Neurologic: Alert and oriented x4, Cranial nerve II-XII intact, Strength 5/5 in all 4 extremities  Procedures Performed: No admission procedures for hospital encounter.     Significant Diagnostic Studies: See Full reports for all details    No results for input(s): "WBC", "RBC", "HGB", "HCT", "MCV", "MCH", "MCHC", "RDW", "PLT", "MPV", "GRAN", "LYMPH", "MONO", "BASO", "NRBC" in the last 168 hours.    No results for input(s): "NA", "K", "CL", "CO2", "ANIONGAP", "BUN", "CREATININE", "GLU", "CALCIUM", "PH", "MG", "ALBUMIN", "PROT", "ALKPHOS", "ALT", "AST", "BILITOT" in the last 168 hours.     Microbiology Results (last 7 days)       ** No results found for the last 168 hours. **             CT Abdomen Pelvis With IV Contrast  Narrative: EXAMINATION:  CT ABDOMEN PELVIS WITH IV CONTRAST    CLINICAL HISTORY:  Nausea/vomiting;Abdominal pain, acute, nonlocalized;    TECHNIQUE:  CT imaging was performed of the abdomen and pelvis after the administration of intravenous contrast. Dose length product is 217 mGycm. Automatic exposure control, adjustment of mA/kV or iterative reconstruction technique was used to limit radiation dose.    COMPARISON:  CT abdomen pelvis dated 10/05/2023    FINDINGS:  Liver: No acute findings.    Gallbladder and biliary tree: No calcified gallstones. No intra or extrahepatic biliary ductal dilation.    Pancreas: Unremarkable.    Spleen: Unremarkable.    Adrenals: Normal.    Kidneys and ureters: No hydronephrosis.    Bladder: Normal.    Reproductive organs: No pelvic masses.    Stomach/bowel: There are postoperative changes of prior gastric bypass.  There is wall thickening of the distal esophagus, with surrounding inflammatory " changes, improved from the prior exam.  Feeding tubes are seen within the duodenal bulb and jejunum.  There is no bowel obstruction.    Lymph nodes: No pathologically enlarged lymph node identified.    Peritoneum: No ascites or free air. No fluid collection.    Vessels: No abdominal aortic aneurysm.    Abdominal wall: Normal.    Lung bases: There is bibasilar subsegmental atelectasis.    Bones: No acute osseous findings.  Impression: Postoperative changes of prior gastric bypass, with wall thickening of the distal esophagus and surrounding inflammatory changes, improved from the prior exam.  No fluid collection or free air.    Electronically signed by: Inga Caceres  Date:    10/31/2023  Time:    15:49         Medication List        START taking these medications      ondansetron 4 MG Tbdl  Commonly known as: ZOFRAN-ODT  Take 1 tablet (4 mg total) by mouth every 6 (six) hours as needed (nausea and vomiting).            CONTINUE taking these medications      acetaminophen 500 MG tablet  Commonly known as: TYLENOL  Take 2 tablets (1,000 mg total) by mouth every 6 (six) hours as needed for Pain.     ALPRAZolam 2 MG Tab  Commonly known as: XANAX     amLODIPine 10 MG tablet  Commonly known as: NORVASC     BARIATRIC MULTIVITAMINS ORAL     * cyanocobalamin 100 MCG tablet  Commonly known as: VITAMIN B-12     * cyanocobalamin 1,000 mcg/mL injection     ergocalciferol 50,000 unit Cap  Commonly known as: ERGOCALCIFEROL     famotidine 20 MG tablet  Commonly known as: PEPCID  Take 2 tablets (40 mg total) by mouth 2 (two) times daily.     megestroL 400 mg/10 mL (10 mL) Susp  Commonly known as: MEGACE  Take 15 mLs (600 mg total) by mouth once daily.     mirtazapine 7.5 MG Tab  Commonly known as: REMERON  Take 1 tablet (7.5 mg total) by mouth every evening.     pantoprazole 40 MG tablet  Commonly known as: PROTONIX  Take 1 tablet (40 mg total) by mouth 2 (two) times daily before meals.     traMADoL 50 mg tablet  Commonly known  as: ULTRAM  Take 1 tablet (50 mg total) by mouth every 6 (six) hours as needed for Pain.           * This list has 2 medication(s) that are the same as other medications prescribed for you. Read the directions carefully, and ask your doctor or other care provider to review them with you.                STOP taking these medications      promethazine 12.5 MG Tab  Commonly known as: PHENERGAN               Where to Get Your Medications        These medications were sent to [facility] [address] [facility] [address] [facility]  [address] [address]      Phone: [phone]   ondansetron 4 MG Tbdl          Explained in detail to the patient about the discharge plan, medications, and follow-up visits. Pt understands and agrees with the treatment plan  [place] [facility] [place] Condition: stable  Diet-    Medications Per DC med rec  Activities as tolerated   Follow-up Information       Valeriano Riddle MD. Schedule an appointment as soon as possible for a visit .    Specialty: General Surgery  Contact information:  [address]   [address]  [facility] [id number] [phone]               [facility] - Emergency Dept .    Specialty: Emergency Medicine  Why: As needed, If symptoms worsen  Contact information:  [address] [facility] [address] [phone]             [facility] Follow up.    Why: Nurse Durant will call this afternoon or tomorrow am to schedule admit time tomorrow 11/30- they will then arrange for delivery of your formula and supplies.  Contact information:  # [phone]                         For further questions contact hospitalist office    Discharge time 33 minutes    For worsening symptoms, chest pain, shortness of breath, increased abdominal pain, high grade fever, stroke or stroke like symptoms, immediately go to the nearest Emergency Room or call 911 as soon as  possible.      Ryley Sams M.D on 11/29/2023, 12:00 pm

## 2024-05-09 ENCOUNTER — HOSPITAL ENCOUNTER (EMERGENCY)
Facility: HOSPITAL | Age: 39
Discharge: PSYCHIATRIC OR SUBSTANCE USE FACILITY | End: 2024-05-09
Attending: STUDENT IN AN ORGANIZED HEALTH CARE EDUCATION/TRAINING PROGRAM
Payer: COMMERCIAL

## 2024-05-09 ENCOUNTER — HOSPITAL ENCOUNTER (INPATIENT)
Facility: HOSPITAL | Age: 39
LOS: 7 days | Discharge: HOME | DRG: 885 | End: 2024-05-16
Attending: PSYCHIATRY & NEUROLOGY | Admitting: PSYCHIATRY & NEUROLOGY
Payer: COMMERCIAL

## 2024-05-09 VITALS
WEIGHT: 125 LBS | SYSTOLIC BLOOD PRESSURE: 158 MMHG | HEIGHT: 64 IN | DIASTOLIC BLOOD PRESSURE: 100 MMHG | BODY MASS INDEX: 21.34 KG/M2 | TEMPERATURE: 98 F | RESPIRATION RATE: 18 BRPM | OXYGEN SATURATION: 99 % | HEART RATE: 86 BPM

## 2024-05-09 DIAGNOSIS — T14.8XXA STAB WOUND: Primary | ICD-10-CM

## 2024-05-09 DIAGNOSIS — R45.851 SUICIDAL IDEATION: ICD-10-CM

## 2024-05-09 DIAGNOSIS — T14.91XA SUICIDE ATTEMPT: ICD-10-CM

## 2024-05-09 DIAGNOSIS — F32.9 MAJOR DEPRESSIVE DISORDER: ICD-10-CM

## 2024-05-09 DIAGNOSIS — S31.119A STAB WOUND OF ABDOMINAL WALL, INITIAL ENCOUNTER: ICD-10-CM

## 2024-05-09 LAB
ALBUMIN SERPL-MCNC: 2.8 G/DL (ref 3.5–5)
ALBUMIN/GLOB SERPL: 1 RATIO (ref 1.1–2)
ALP SERPL-CCNC: 76 UNIT/L (ref 40–150)
ALT SERPL-CCNC: 18 UNIT/L (ref 0–55)
AMPHET UR QL SCN: NEGATIVE
APTT PPP: 25.6 SECONDS (ref 23.2–33.7)
AST SERPL-CCNC: 36 UNIT/L (ref 5–34)
B-HCG UR QL: NEGATIVE
BACTERIA #/AREA URNS AUTO: NORMAL /HPF
BARBITURATE SCN PRESENT UR: NEGATIVE
BASOPHILS # BLD AUTO: 0.11 X10(3)/MCL
BASOPHILS NFR BLD AUTO: 1.4 %
BENZODIAZ UR QL SCN: POSITIVE
BILIRUB SERPL-MCNC: 0.3 MG/DL
BILIRUB UR QL STRIP.AUTO: NEGATIVE
BUN SERPL-MCNC: 7.3 MG/DL (ref 7–18.7)
CALCIUM SERPL-MCNC: 7.7 MG/DL (ref 8.4–10.2)
CANNABINOIDS UR QL SCN: NEGATIVE
CHLORIDE SERPL-SCNC: 112 MMOL/L (ref 98–107)
CLARITY UR: CLEAR
CO2 SERPL-SCNC: 18 MMOL/L (ref 22–29)
COCAINE UR QL SCN: NEGATIVE
COLOR UR AUTO: COLORLESS
CREAT SERPL-MCNC: 0.57 MG/DL (ref 0.55–1.02)
EOSINOPHIL # BLD AUTO: 0.47 X10(3)/MCL (ref 0–0.9)
EOSINOPHIL NFR BLD AUTO: 6.1 %
ERYTHROCYTE [DISTWIDTH] IN BLOOD BY AUTOMATED COUNT: 17.2 % (ref 11.5–17)
ETHANOL SERPL-MCNC: 123 MG/DL
FENTANYL UR QL SCN: NEGATIVE
GFR SERPLBLD CREATININE-BSD FMLA CKD-EPI: >60 ML/MIN/1.73/M2
GLOBULIN SER-MCNC: 2.9 GM/DL (ref 2.4–3.5)
GLUCOSE SERPL-MCNC: 88 MG/DL (ref 74–100)
GLUCOSE UR QL STRIP: NORMAL
GROUP & RH: NORMAL
HCT VFR BLD AUTO: 28 % (ref 37–47)
HGB BLD-MCNC: 8.3 G/DL (ref 12–16)
HGB UR QL STRIP: NEGATIVE
IMM GRANULOCYTES # BLD AUTO: 0.03 X10(3)/MCL (ref 0–0.04)
IMM GRANULOCYTES NFR BLD AUTO: 0.4 %
INDIRECT COOMBS: NORMAL
INR PPP: 1.1
KETONES UR QL STRIP: NEGATIVE
LACTATE SERPL-SCNC: 1.8 MMOL/L (ref 0.5–2.2)
LEUKOCYTE ESTERASE UR QL STRIP: NEGATIVE
LYMPHOCYTES # BLD AUTO: 2.22 X10(3)/MCL (ref 0.6–4.6)
LYMPHOCYTES NFR BLD AUTO: 28.6 %
MCH RBC QN AUTO: 23.2 PG (ref 27–31)
MCHC RBC AUTO-ENTMCNC: 29.6 G/DL (ref 33–36)
MCV RBC AUTO: 78.2 FL (ref 80–94)
MDMA UR QL SCN: NEGATIVE
MONOCYTES # BLD AUTO: 0.57 X10(3)/MCL (ref 0.1–1.3)
MONOCYTES NFR BLD AUTO: 7.4 %
NEUTROPHILS # BLD AUTO: 4.35 X10(3)/MCL (ref 2.1–9.2)
NEUTROPHILS NFR BLD AUTO: 56.1 %
NITRITE UR QL STRIP: NEGATIVE
NRBC BLD AUTO-RTO: 0 %
OPIATES UR QL SCN: NEGATIVE
PCP UR QL: NEGATIVE
PH UR STRIP: 7 [PH]
PH UR: 7 [PH] (ref 3–11)
PLATELET # BLD AUTO: 271 X10(3)/MCL (ref 130–400)
PMV BLD AUTO: 10.3 FL (ref 7.4–10.4)
POTASSIUM SERPL-SCNC: 3.6 MMOL/L (ref 3.5–5.1)
PROT SERPL-MCNC: 5.7 GM/DL (ref 6.4–8.3)
PROT UR QL STRIP: NEGATIVE
PROTHROMBIN TIME: 13.7 SECONDS (ref 12.5–14.5)
RBC # BLD AUTO: 3.58 X10(6)/MCL (ref 4.2–5.4)
RBC #/AREA URNS AUTO: NORMAL /HPF
SARS-COV-2 RDRP RESP QL NAA+PROBE: NEGATIVE
SODIUM SERPL-SCNC: 139 MMOL/L (ref 136–145)
SP GR UR STRIP.AUTO: 1.03 (ref 1–1.03)
SPECIFIC GRAVITY, URINE AUTO (.000) (OHS): 1.03 (ref 1–1.03)
SPECIMEN OUTDATE: NORMAL
SQUAMOUS #/AREA URNS LPF: NORMAL /HPF
T3FREE SERPL-MCNC: 2.33 PG/ML (ref 1.58–3.91)
T4 FREE SERPL-MCNC: 0.79 NG/DL (ref 0.7–1.48)
TSH SERPL-ACNC: 0.25 UIU/ML (ref 0.35–4.94)
UROBILINOGEN UR STRIP-ACNC: NORMAL
WBC # SPEC AUTO: 7.75 X10(3)/MCL (ref 4.5–11.5)
WBC #/AREA URNS AUTO: NORMAL /HPF

## 2024-05-09 PROCEDURE — 25500020 PHARM REV CODE 255: Performed by: STUDENT IN AN ORGANIZED HEALTH CARE EDUCATION/TRAINING PROGRAM

## 2024-05-09 PROCEDURE — 84439 ASSAY OF FREE THYROXINE: CPT | Performed by: STUDENT IN AN ORGANIZED HEALTH CARE EDUCATION/TRAINING PROGRAM

## 2024-05-09 PROCEDURE — 25000003 PHARM REV CODE 250: Performed by: STUDENT IN AN ORGANIZED HEALTH CARE EDUCATION/TRAINING PROGRAM

## 2024-05-09 PROCEDURE — 82077 ASSAY SPEC XCP UR&BREATH IA: CPT | Performed by: STUDENT IN AN ORGANIZED HEALTH CARE EDUCATION/TRAINING PROGRAM

## 2024-05-09 PROCEDURE — 25000003 PHARM REV CODE 250: Performed by: PSYCHIATRY & NEUROLOGY

## 2024-05-09 PROCEDURE — 80307 DRUG TEST PRSMV CHEM ANLYZR: CPT | Performed by: STUDENT IN AN ORGANIZED HEALTH CARE EDUCATION/TRAINING PROGRAM

## 2024-05-09 PROCEDURE — 12001 RPR S/N/AX/GEN/TRNK 2.5CM/<: CPT

## 2024-05-09 PROCEDURE — 81001 URINALYSIS AUTO W/SCOPE: CPT | Mod: XB | Performed by: STUDENT IN AN ORGANIZED HEALTH CARE EDUCATION/TRAINING PROGRAM

## 2024-05-09 PROCEDURE — 36415 COLL VENOUS BLD VENIPUNCTURE: CPT | Performed by: STUDENT IN AN ORGANIZED HEALTH CARE EDUCATION/TRAINING PROGRAM

## 2024-05-09 PROCEDURE — 96361 HYDRATE IV INFUSION ADD-ON: CPT

## 2024-05-09 PROCEDURE — 90715 TDAP VACCINE 7 YRS/> IM: CPT | Performed by: STUDENT IN AN ORGANIZED HEALTH CARE EDUCATION/TRAINING PROGRAM

## 2024-05-09 PROCEDURE — 86901 BLOOD TYPING SEROLOGIC RH(D): CPT | Performed by: STUDENT IN AN ORGANIZED HEALTH CARE EDUCATION/TRAINING PROGRAM

## 2024-05-09 PROCEDURE — 85025 COMPLETE CBC W/AUTO DIFF WBC: CPT | Performed by: STUDENT IN AN ORGANIZED HEALTH CARE EDUCATION/TRAINING PROGRAM

## 2024-05-09 PROCEDURE — 96365 THER/PROPH/DIAG IV INF INIT: CPT | Mod: 59

## 2024-05-09 PROCEDURE — 85730 THROMBOPLASTIN TIME PARTIAL: CPT | Performed by: STUDENT IN AN ORGANIZED HEALTH CARE EDUCATION/TRAINING PROGRAM

## 2024-05-09 PROCEDURE — 96375 TX/PRO/DX INJ NEW DRUG ADDON: CPT | Mod: 59

## 2024-05-09 PROCEDURE — 63600175 PHARM REV CODE 636 W HCPCS: Performed by: STUDENT IN AN ORGANIZED HEALTH CARE EDUCATION/TRAINING PROGRAM

## 2024-05-09 PROCEDURE — G0390 TRAUMA RESPONS W/HOSP CRITI: HCPCS

## 2024-05-09 PROCEDURE — 85610 PROTHROMBIN TIME: CPT | Performed by: STUDENT IN AN ORGANIZED HEALTH CARE EDUCATION/TRAINING PROGRAM

## 2024-05-09 PROCEDURE — 83605 ASSAY OF LACTIC ACID: CPT | Performed by: STUDENT IN AN ORGANIZED HEALTH CARE EDUCATION/TRAINING PROGRAM

## 2024-05-09 PROCEDURE — 25000003 PHARM REV CODE 250: Performed by: PEDIATRICS

## 2024-05-09 PROCEDURE — 99285 EMERGENCY DEPT VISIT HI MDM: CPT | Mod: 25

## 2024-05-09 PROCEDURE — 80053 COMPREHEN METABOLIC PANEL: CPT | Performed by: STUDENT IN AN ORGANIZED HEALTH CARE EDUCATION/TRAINING PROGRAM

## 2024-05-09 PROCEDURE — 90471 IMMUNIZATION ADMIN: CPT | Performed by: STUDENT IN AN ORGANIZED HEALTH CARE EDUCATION/TRAINING PROGRAM

## 2024-05-09 PROCEDURE — 11400000 HC PSYCH PRIVATE ROOM

## 2024-05-09 PROCEDURE — 81025 URINE PREGNANCY TEST: CPT | Performed by: STUDENT IN AN ORGANIZED HEALTH CARE EDUCATION/TRAINING PROGRAM

## 2024-05-09 PROCEDURE — 87635 SARS-COV-2 COVID-19 AMP PRB: CPT | Performed by: STUDENT IN AN ORGANIZED HEALTH CARE EDUCATION/TRAINING PROGRAM

## 2024-05-09 PROCEDURE — 84443 ASSAY THYROID STIM HORMONE: CPT | Performed by: STUDENT IN AN ORGANIZED HEALTH CARE EDUCATION/TRAINING PROGRAM

## 2024-05-09 PROCEDURE — 84481 FREE ASSAY (FT-3): CPT | Performed by: STUDENT IN AN ORGANIZED HEALTH CARE EDUCATION/TRAINING PROGRAM

## 2024-05-09 PROCEDURE — 25000003 PHARM REV CODE 250

## 2024-05-09 RX ORDER — LORAZEPAM 0.5 MG/1
0.5 TABLET ORAL 3 TIMES DAILY
Status: COMPLETED | OUTPATIENT
Start: 2024-05-13 | End: 2024-05-14

## 2024-05-09 RX ORDER — ONDANSETRON HYDROCHLORIDE 2 MG/ML
4 INJECTION, SOLUTION INTRAVENOUS
Status: COMPLETED | OUTPATIENT
Start: 2024-05-09 | End: 2024-05-09

## 2024-05-09 RX ORDER — DIPHENHYDRAMINE HYDROCHLORIDE 50 MG/ML
50 INJECTION INTRAMUSCULAR; INTRAVENOUS EVERY 4 HOURS PRN
Status: DISCONTINUED | OUTPATIENT
Start: 2024-05-09 | End: 2024-05-09 | Stop reason: HOSPADM

## 2024-05-09 RX ORDER — LORAZEPAM 1 MG/1
2 TABLET ORAL 3 TIMES DAILY
Status: DISPENSED | OUTPATIENT
Start: 2024-05-09 | End: 2024-05-11

## 2024-05-09 RX ORDER — DROPERIDOL 2.5 MG/ML
1.25 INJECTION, SOLUTION INTRAMUSCULAR; INTRAVENOUS
Status: COMPLETED | OUTPATIENT
Start: 2024-05-09 | End: 2024-05-09

## 2024-05-09 RX ORDER — DULOXETIN HYDROCHLORIDE 30 MG/1
60 CAPSULE, DELAYED RELEASE ORAL NIGHTLY
Status: DISCONTINUED | OUTPATIENT
Start: 2024-05-09 | End: 2024-05-16 | Stop reason: HOSPADM

## 2024-05-09 RX ORDER — ACETAMINOPHEN 325 MG/1
650 TABLET ORAL EVERY 6 HOURS PRN
Status: DISCONTINUED | OUTPATIENT
Start: 2024-05-09 | End: 2024-05-16 | Stop reason: HOSPADM

## 2024-05-09 RX ORDER — HALOPERIDOL 5 MG/ML
10 INJECTION INTRAMUSCULAR EVERY 4 HOURS PRN
Status: DISCONTINUED | OUTPATIENT
Start: 2024-05-09 | End: 2024-05-16 | Stop reason: HOSPADM

## 2024-05-09 RX ORDER — ONDANSETRON 4 MG/1
4 TABLET, ORALLY DISINTEGRATING ORAL EVERY 6 HOURS PRN
Status: DISCONTINUED | OUTPATIENT
Start: 2024-05-09 | End: 2024-05-16 | Stop reason: HOSPADM

## 2024-05-09 RX ORDER — CEFAZOLIN SODIUM 1 G/3ML
INJECTION, POWDER, FOR SOLUTION INTRAMUSCULAR; INTRAVENOUS
Status: COMPLETED
Start: 2024-05-09 | End: 2024-05-09

## 2024-05-09 RX ORDER — HALOPERIDOL 5 MG/1
5 TABLET ORAL EVERY 8 HOURS PRN
Status: DISCONTINUED | OUTPATIENT
Start: 2024-05-09 | End: 2024-05-09 | Stop reason: HOSPADM

## 2024-05-09 RX ORDER — CLONIDINE HYDROCHLORIDE 0.1 MG/1
0.1 TABLET ORAL EVERY 8 HOURS PRN
Status: DISCONTINUED | OUTPATIENT
Start: 2024-05-09 | End: 2024-05-16 | Stop reason: HOSPADM

## 2024-05-09 RX ORDER — DIPHENHYDRAMINE HCL 25 MG
50 CAPSULE ORAL EVERY 4 HOURS PRN
Status: DISCONTINUED | OUTPATIENT
Start: 2024-05-09 | End: 2024-05-09 | Stop reason: HOSPADM

## 2024-05-09 RX ORDER — DIPHENHYDRAMINE HYDROCHLORIDE 50 MG/ML
50 INJECTION INTRAMUSCULAR; INTRAVENOUS EVERY 4 HOURS PRN
Status: DISCONTINUED | OUTPATIENT
Start: 2024-05-09 | End: 2024-05-16 | Stop reason: HOSPADM

## 2024-05-09 RX ORDER — CEFAZOLIN SODIUM 2 G/50ML
SOLUTION INTRAVENOUS
Status: COMPLETED | OUTPATIENT
Start: 2024-05-09 | End: 2024-05-09

## 2024-05-09 RX ORDER — LORAZEPAM 1 MG/1
2 TABLET ORAL EVERY 4 HOURS PRN
Status: DISCONTINUED | OUTPATIENT
Start: 2024-05-09 | End: 2024-05-09 | Stop reason: HOSPADM

## 2024-05-09 RX ORDER — HALOPERIDOL 5 MG/ML
5 INJECTION INTRAMUSCULAR EVERY 4 HOURS PRN
Status: DISCONTINUED | OUTPATIENT
Start: 2024-05-09 | End: 2024-05-09 | Stop reason: HOSPADM

## 2024-05-09 RX ORDER — LORAZEPAM 1 MG/1
1 TABLET ORAL 3 TIMES DAILY
Status: COMPLETED | OUTPATIENT
Start: 2024-05-11 | End: 2024-05-12

## 2024-05-09 RX ORDER — DIPHENHYDRAMINE HCL 50 MG
50 CAPSULE ORAL EVERY 4 HOURS PRN
Status: DISCONTINUED | OUTPATIENT
Start: 2024-05-09 | End: 2024-05-16 | Stop reason: HOSPADM

## 2024-05-09 RX ORDER — HALOPERIDOL 5 MG/1
10 TABLET ORAL EVERY 4 HOURS PRN
Status: DISCONTINUED | OUTPATIENT
Start: 2024-05-09 | End: 2024-05-16 | Stop reason: HOSPADM

## 2024-05-09 RX ORDER — LORAZEPAM 1 MG/1
2 TABLET ORAL EVERY 4 HOURS PRN
Status: DISCONTINUED | OUTPATIENT
Start: 2024-05-09 | End: 2024-05-16 | Stop reason: HOSPADM

## 2024-05-09 RX ORDER — CLONIDINE HYDROCHLORIDE 0.1 MG/1
0.2 TABLET ORAL EVERY 8 HOURS PRN
Status: DISCONTINUED | OUTPATIENT
Start: 2024-05-09 | End: 2024-05-16 | Stop reason: HOSPADM

## 2024-05-09 RX ORDER — LORAZEPAM 2 MG/ML
2 INJECTION INTRAMUSCULAR EVERY 4 HOURS PRN
Status: DISCONTINUED | OUTPATIENT
Start: 2024-05-09 | End: 2024-05-16 | Stop reason: HOSPADM

## 2024-05-09 RX ORDER — HYDROXYZINE HYDROCHLORIDE 50 MG/1
50 TABLET, FILM COATED ORAL EVERY 4 HOURS PRN
Status: DISCONTINUED | OUTPATIENT
Start: 2024-05-09 | End: 2024-05-16 | Stop reason: HOSPADM

## 2024-05-09 RX ORDER — MORPHINE SULFATE 4 MG/ML
4 INJECTION, SOLUTION INTRAMUSCULAR; INTRAVENOUS
Status: COMPLETED | OUTPATIENT
Start: 2024-05-09 | End: 2024-05-09

## 2024-05-09 RX ORDER — ALUMINUM HYDROXIDE, MAGNESIUM HYDROXIDE, AND SIMETHICONE 1200; 120; 1200 MG/30ML; MG/30ML; MG/30ML
30 SUSPENSION ORAL EVERY 6 HOURS PRN
Status: DISCONTINUED | OUTPATIENT
Start: 2024-05-09 | End: 2024-05-16 | Stop reason: HOSPADM

## 2024-05-09 RX ORDER — TRAZODONE HYDROCHLORIDE 100 MG/1
100 TABLET ORAL NIGHTLY PRN
Status: DISCONTINUED | OUTPATIENT
Start: 2024-05-09 | End: 2024-05-16 | Stop reason: HOSPADM

## 2024-05-09 RX ORDER — LIDOCAINE HYDROCHLORIDE 10 MG/ML
5 INJECTION INFILTRATION; PERINEURAL
Status: COMPLETED | OUTPATIENT
Start: 2024-05-09 | End: 2024-05-09

## 2024-05-09 RX ORDER — IBUPROFEN 200 MG
1 TABLET ORAL DAILY
Status: DISCONTINUED | OUTPATIENT
Start: 2024-05-09 | End: 2024-05-16 | Stop reason: HOSPADM

## 2024-05-09 RX ADMIN — SODIUM CHLORIDE, POTASSIUM CHLORIDE, SODIUM LACTATE AND CALCIUM CHLORIDE 1000 ML: 600; 310; 30; 20 INJECTION, SOLUTION INTRAVENOUS at 02:05

## 2024-05-09 RX ADMIN — DULOXETINE HYDROCHLORIDE 60 MG: 30 CAPSULE, DELAYED RELEASE ORAL at 08:05

## 2024-05-09 RX ADMIN — CLONIDINE HYDROCHLORIDE 0.1 MG: 0.1 TABLET ORAL at 11:05

## 2024-05-09 RX ADMIN — TETANUS TOXOID, REDUCED DIPHTHERIA TOXOID AND ACELLULAR PERTUSSIS VACCINE, ADSORBED 0.5 ML: 5; 2.5; 8; 8; 2.5 SUSPENSION INTRAMUSCULAR at 01:05

## 2024-05-09 RX ADMIN — ONDANSETRON 4 MG: 4 TABLET, ORALLY DISINTEGRATING ORAL at 11:05

## 2024-05-09 RX ADMIN — MORPHINE SULFATE 4 MG: 4 INJECTION INTRAVENOUS at 02:05

## 2024-05-09 RX ADMIN — LORAZEPAM 2 MG: 1 TABLET ORAL at 08:05

## 2024-05-09 RX ADMIN — IOHEXOL 100 ML: 350 INJECTION, SOLUTION INTRAVENOUS at 01:05

## 2024-05-09 RX ADMIN — CEFAZOLIN SODIUM 2 G: 2 SOLUTION INTRAVENOUS at 01:05

## 2024-05-09 RX ADMIN — DROPERIDOL 1.25 MG: 2.5 INJECTION, SOLUTION INTRAMUSCULAR; INTRAVENOUS at 02:05

## 2024-05-09 RX ADMIN — ONDANSETRON 4 MG: 2 INJECTION INTRAMUSCULAR; INTRAVENOUS at 02:05

## 2024-05-09 RX ADMIN — HYDROXYZINE HYDROCHLORIDE 50 MG: 50 TABLET, FILM COATED ORAL at 06:05

## 2024-05-09 RX ADMIN — LIDOCAINE HYDROCHLORIDE 5 ML: 10 INJECTION, SOLUTION INFILTRATION; PERINEURAL at 02:05

## 2024-05-09 RX ADMIN — LORAZEPAM 2 MG: 1 TABLET ORAL at 11:05

## 2024-05-09 NOTE — ED NOTES
Called [facility] accepting RN is aware that pt is being transferred she will call back for full reports she is aware that SPD will be set up now.

## 2024-05-09 NOTE — PLAN OF CARE
Problem: Adult Behavioral Health Plan of Care  Goal: Plan of Care Review  Outcome: Not Progressing  Goal: Patient-Specific Goal (Individualization)  Outcome: Not Progressing  Goal: Adheres to Safety Considerations for Self and Others  Outcome: Not Progressing  Goal: Absence of New-Onset Illness or Injury  Outcome: Not Progressing  Goal: Optimized Coping Skills in Response to Life Stressors  Outcome: Not Progressing  Goal: Develops/Participates in Therapeutic [place] to Support Successful Transition  Outcome: Not Progressing  Goal: Rounds/Family Conference  Outcome: Not Progressing     Problem: Depressive Signs/Symptoms  Goal: Optimized Energy Level (Depressive Signs/Symptoms)  Outcome: Not Progressing  Goal: Optimized Cognitive Function (Depressive Signs/Symptoms)  Outcome: Not Progressing  Goal: Increased Participation and Engagement (Depressive Signs/Symptoms)  Outcome: Not Progressing  Goal: Enhanced Self-Esteem and Confidence (Depressive Signs/Symptoms)  Outcome: Not Progressing  Goal: Improved Mood Symptoms (Depressive Signs/Symptoms)  Outcome: Not Progressing  Goal: Optimized Nutrition Intake (Depressive Signs/Symptoms)  Outcome: Not Progressing  Goal: Improved Psychomotor Symptoms (Depressive Signs/Symptoms)  Outcome: Not Progressing  Goal: Improved Sleep (Depressive Signs/Symptoms)  Outcome: Not Progressing  Goal: Enhanced Social, Occupational or Functional Skills (Depressive Signs/Symptoms)  Outcome: Not Progressing     Problem: Excessive Substance Use  Goal: Optimized Energy Level (Excessive Substance Use)  Outcome: Not Progressing  Goal: Improved Behavioral Control (Excessive Substance Use)  Outcome: Not Progressing  Goal: Increased Participation and Engagement (Excessive Substance Use)  Outcome: Not Progressing  Goal: Improved Physiologic Symptoms (Excessive Substance Use)  Outcome: Not Progressing  Goal: Enhanced Social, Occupational or Functional Skills (Excessive Substance Use)  Outcome: Not  Progressing

## 2024-05-09 NOTE — NURSING
Patient complained of nausea and was given PRN Zofran  4 MG odt at this time. Will continue to monitor
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
## 2024-05-09 NOTE — DISCHARGE INSTRUCTIONS
You will need your staples taken out approximately 10 days.      Return to the emergency department if any fever, worsening pain, nausea, vomiting, or any other concerns.

## 2024-05-09 NOTE — PROGRESS NOTES
05/09/24 1000   [facility] Group Therapy   Group Name Therapeutic Recreation   Specific Interventions Skilled Activity Mild Exercises   Participation Level   (excused)   Participation Quality Conflict w/ Other;Services
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
## 2024-05-09 NOTE — NURSING
PRN clonidine 0.1 given for 176/110. Will continue to monitor. Patient states that the zofran helped earlier that she is not nauseous anymore. Will continue to monitor
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
## 2024-05-09 NOTE — ED NOTES
Log roll performed with no step off or deformity noted.  No changes to neuro after log roll noted.
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
## 2024-05-09 NOTE — PROGRESS NOTES
05/09/24 1500   [facility] Group Therapy   Group Name Therapeutic Recreation   Specific Interventions Skilled Activity Creative Expression   Participation Level Supportive;Attentive;Sharing   Participation Quality Cooperative;Drowsy   Insight/Motivation Limited   Affect/Mood Display Blunted;Appropriate   Cognition Pre-Occupied   Psychomotor WNL

## 2024-05-10 LAB
ALBUMIN SERPL-MCNC: 3.1 G/DL (ref 3.5–5)
ALBUMIN/GLOB SERPL: 1 RATIO (ref 1.1–2)
ALP SERPL-CCNC: 84 UNIT/L (ref 40–150)
ALT SERPL-CCNC: 20 UNIT/L (ref 0–55)
AST SERPL-CCNC: 20 UNIT/L (ref 5–34)
BASOPHILS # BLD AUTO: 0.11 X10(3)/MCL
BASOPHILS NFR BLD AUTO: 1.6 %
BILIRUB SERPL-MCNC: 1 MG/DL
BUN SERPL-MCNC: 6.4 MG/DL (ref 7–18.7)
CALCIUM SERPL-MCNC: 8.6 MG/DL (ref 8.4–10.2)
CHLORIDE SERPL-SCNC: 104 MMOL/L (ref 98–107)
CHOLEST SERPL-MCNC: 225 MG/DL
CHOLEST/HDLC SERPL: 3 {RATIO} (ref 0–5)
CO2 SERPL-SCNC: 25 MMOL/L (ref 22–29)
CREAT SERPL-MCNC: 0.6 MG/DL (ref 0.55–1.02)
EOSINOPHIL # BLD AUTO: 0.61 X10(3)/MCL (ref 0–0.9)
EOSINOPHIL NFR BLD AUTO: 8.7 %
ERYTHROCYTE [DISTWIDTH] IN BLOOD BY AUTOMATED COUNT: 17.2 % (ref 11.5–17)
EST. AVERAGE GLUCOSE BLD GHB EST-MCNC: 93.9 MG/DL
GFR SERPLBLD CREATININE-BSD FMLA CKD-EPI: >60 ML/MIN/1.73/M2
GLOBULIN SER-MCNC: 3.1 GM/DL (ref 2.4–3.5)
GLUCOSE SERPL-MCNC: 81 MG/DL (ref 74–100)
HBA1C MFR BLD: 4.9 %
HCT VFR BLD AUTO: 31.6 % (ref 37–47)
HDLC SERPL-MCNC: 79 MG/DL (ref 35–60)
HGB BLD-MCNC: 9.6 G/DL (ref 12–16)
IMM GRANULOCYTES # BLD AUTO: 0.02 X10(3)/MCL (ref 0–0.04)
IMM GRANULOCYTES NFR BLD AUTO: 0.3 %
LDLC SERPL CALC-MCNC: 126 MG/DL (ref 50–140)
LYMPHOCYTES # BLD AUTO: 2.05 X10(3)/MCL (ref 0.6–4.6)
LYMPHOCYTES NFR BLD AUTO: 29.1 %
MCH RBC QN AUTO: 23.2 PG (ref 27–31)
MCHC RBC AUTO-ENTMCNC: 30.4 G/DL (ref 33–36)
MCV RBC AUTO: 76.3 FL (ref 80–94)
MONOCYTES # BLD AUTO: 0.58 X10(3)/MCL (ref 0.1–1.3)
MONOCYTES NFR BLD AUTO: 8.2 %
NEUTROPHILS # BLD AUTO: 3.68 X10(3)/MCL (ref 2.1–9.2)
NEUTROPHILS NFR BLD AUTO: 52.1 %
NRBC BLD AUTO-RTO: 0 %
PLATELET # BLD AUTO: 353 X10(3)/MCL (ref 130–400)
PMV BLD AUTO: 10.8 FL (ref 7.4–10.4)
POTASSIUM SERPL-SCNC: 4.6 MMOL/L (ref 3.5–5.1)
PROT SERPL-MCNC: 6.2 GM/DL (ref 6.4–8.3)
RBC # BLD AUTO: 4.14 X10(6)/MCL (ref 4.2–5.4)
SODIUM SERPL-SCNC: 136 MMOL/L (ref 136–145)
T PALLIDUM AB SER QL: NONREACTIVE
TRIGL SERPL-MCNC: 102 MG/DL (ref 37–140)
TSH SERPL-ACNC: 0.85 UIU/ML (ref 0.35–4.94)
VLDLC SERPL CALC-MCNC: 20 MG/DL
WBC # SPEC AUTO: 7.05 X10(3)/MCL (ref 4.5–11.5)

## 2024-05-10 PROCEDURE — 85025 COMPLETE CBC W/AUTO DIFF WBC: CPT | Performed by: PSYCHIATRY & NEUROLOGY

## 2024-05-10 PROCEDURE — 80053 COMPREHEN METABOLIC PANEL: CPT | Performed by: PSYCHIATRY & NEUROLOGY

## 2024-05-10 PROCEDURE — 36415 COLL VENOUS BLD VENIPUNCTURE: CPT | Performed by: PSYCHIATRY & NEUROLOGY

## 2024-05-10 PROCEDURE — 11400000 HC PSYCH PRIVATE ROOM

## 2024-05-10 PROCEDURE — 25000003 PHARM REV CODE 250: Performed by: PSYCHIATRY & NEUROLOGY

## 2024-05-10 PROCEDURE — 80061 LIPID PANEL: CPT | Performed by: PSYCHIATRY & NEUROLOGY

## 2024-05-10 PROCEDURE — 25000003 PHARM REV CODE 250: Performed by: PEDIATRICS

## 2024-05-10 PROCEDURE — 84443 ASSAY THYROID STIM HORMONE: CPT | Performed by: PSYCHIATRY & NEUROLOGY

## 2024-05-10 PROCEDURE — 83036 HEMOGLOBIN GLYCOSYLATED A1C: CPT | Performed by: PSYCHIATRY & NEUROLOGY

## 2024-05-10 PROCEDURE — 86780 TREPONEMA PALLIDUM: CPT | Performed by: PSYCHIATRY & NEUROLOGY

## 2024-05-10 PROCEDURE — 25000003 PHARM REV CODE 250

## 2024-05-10 RX ADMIN — LORAZEPAM 2 MG: 1 TABLET ORAL at 08:05

## 2024-05-10 RX ADMIN — TRAZODONE HYDROCHLORIDE 100 MG: 100 TABLET ORAL at 08:05

## 2024-05-10 RX ADMIN — LORAZEPAM 2 MG: 1 TABLET ORAL at 03:05

## 2024-05-10 RX ADMIN — ONDANSETRON 4 MG: 4 TABLET, ORALLY DISINTEGRATING ORAL at 03:05

## 2024-05-10 RX ADMIN — DULOXETINE HYDROCHLORIDE 60 MG: 30 CAPSULE, DELAYED RELEASE ORAL at 08:05

## 2024-05-10 NOTE — PLAN OF CARE
Problem: Adult Behavioral Health Plan of Care  Goal: Plan of Care Review  Outcome: Not Progressing  Goal: Patient-Specific Goal (Individualization)  Outcome: Not Progressing  Goal: Adheres to Safety Considerations for Self and Others  Outcome: Not Progressing  Goal: Absence of New-Onset Illness or Injury  Outcome: Not Progressing  Goal: Optimized Coping Skills in Response to Life Stressors  Outcome: Not Progressing  Goal: Develops/Participates in Therapeutic [place] to Support Successful Transition  Outcome: Not Progressing  Goal: Rounds/Family Conference  Outcome: Not Progressing     Problem: Depressive Signs/Symptoms  Goal: Optimized Energy Level (Depressive Signs/Symptoms)  Outcome: Not Progressing  Goal: Optimized Cognitive Function (Depressive Signs/Symptoms)  Outcome: Not Progressing  Goal: Increased Participation and Engagement (Depressive Signs/Symptoms)  Outcome: Not Progressing  Goal: Enhanced Self-Esteem and Confidence (Depressive Signs/Symptoms)  Outcome: Not Progressing  Goal: Improved Mood Symptoms (Depressive Signs/Symptoms)  Outcome: Not Progressing  Goal: Optimized Nutrition Intake (Depressive Signs/Symptoms)  Outcome: Not Progressing  Goal: Improved Psychomotor Symptoms (Depressive Signs/Symptoms)  Outcome: Not Progressing  Goal: Improved Sleep (Depressive Signs/Symptoms)  Outcome: Not Progressing  Goal: Enhanced Social, Occupational or Functional Skills (Depressive Signs/Symptoms)  Outcome: Not Progressing     Problem: Excessive Substance Use  Goal: Optimized Energy Level (Excessive Substance Use)  Outcome: Not Progressing  Goal: Improved Behavioral Control (Excessive Substance Use)  Outcome: Not Progressing  Goal: Increased Participation and Engagement (Excessive Substance Use)  Outcome: Not Progressing  Goal: Improved Physiologic Symptoms (Excessive Substance Use)  Outcome: Not Progressing  Goal: Enhanced Social, Occupational or Functional Skills (Excessive Substance Use)  Outcome: Not  Progressing     Problem: Suicide Risk  Goal: Absence of Self-Harm  Outcome: Not Progressing     Problem: Depressive Signs/Symptoms  Goal: Optimized Energy Level (Depressive Signs/Symptoms)  Outcome: Not Progressing  Goal: Optimized Cognitive Function (Depressive Signs/Symptoms)  Outcome: Not Progressing  Goal: Increased Participation and Engagement (Depressive Signs/Symptoms)  Outcome: Not Progressing  Goal: Enhanced Self-Esteem and Confidence (Depressive Signs/Symptoms)  Outcome: Not Progressing  Goal: Improved Mood Symptoms (Depressive Signs/Symptoms)  Outcome: Not Progressing  Goal: Optimized Nutrition Intake (Depressive Signs/Symptoms)  Outcome: Not Progressing  Goal: Improved Psychomotor Symptoms (Depressive Signs/Symptoms)  Outcome: Not Progressing  Goal: Improved Sleep (Depressive Signs/Symptoms)  Outcome: Not Progressing  Goal: Enhanced Social, Occupational or Functional Skills (Depressive Signs/Symptoms)  Outcome: Not Progressing     Problem: Excessive Substance Use  Goal: Optimized Energy Level (Excessive Substance Use)  Outcome: Not Progressing  Goal: Improved Behavioral Control (Excessive Substance Use)  Outcome: Not Progressing  Goal: Increased Participation and Engagement (Excessive Substance Use)  Outcome: Not Progressing  Goal: Improved Physiologic Symptoms (Excessive Substance Use)  Outcome: Not Progressing  Goal: Enhanced Social, Occupational or Functional Skills (Excessive Substance Use)  Outcome: Not Progressing     Problem: Suicide Risk  Goal: Absence of Self-Harm  Outcome: Not Progressing     Patient  is guarded but interacts with peers. She minimizes and denies issues. Tolerating  Ativan taper.

## 2024-05-10 NOTE — PLAN OF CARE
Problem: Adult Behavioral Health Plan of Care  Goal: Plan of Care Review  Outcome: Progressing  Flowsheets (Taken 5/9/2024 2039)  Patient Agreement with Plan of Care: agrees  Plan of Care Reviewed With: patient  Goal: Patient-Specific Goal (Individualization)  Outcome: Progressing  Flowsheets (Taken 5/9/2024 2039)  Patient Personal Strengths:   appropriate judgment/decision-making   insight into illness/situation   medication/treatment adherence  Patient Vulnerabilities: limited support system  Goal: Adheres to Safety Considerations for Self and Others  Outcome: Progressing  Goal: Absence of New-Onset Illness or Injury  Outcome: Progressing  Goal: Optimized Coping Skills in Response to Life Stressors  Outcome: Progressing  Goal: Develops/Participates in Therapeutic [place] to Support Successful Transition  Outcome: Progressing  Goal: Rounds/Family Conference  Outcome: Progressing     Problem: Excessive Substance Use  Goal: Optimized Energy Level (Excessive Substance Use)  Outcome: Progressing  Goal: Improved Behavioral Control (Excessive Substance Use)  Outcome: Progressing  Goal: Increased Participation and Engagement (Excessive Substance Use)  Outcome: Progressing  Goal: Improved Physiologic Symptoms (Excessive Substance Use)  Outcome: Progressing  Goal: Enhanced Social, Occupational or Functional Skills (Excessive Substance Use)  Outcome: Progressing     Problem: Suicide Risk  Goal: Absence of Self-Harm  Outcome: Progressing   Remains anxious with flat affect.  Admits to being depressed.  Compliant with meds and cooperative with staff.
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
## 2024-05-10 NOTE — PROGRESS NOTES
05/10/24 1000   [facility] Group Therapy   Group Name Therapeutic Recreation   Specific Interventions Skilled Activity Mild Exercises   Participation Level Active;Supportive;Sharing   Participation Quality Cooperative;Drowsy   Insight/Motivation Limited   Affect/Mood Display Blunted   Cognition Pre-Occupied;Confused   Psychomotor WNL

## 2024-05-11 PROCEDURE — 25000003 PHARM REV CODE 250: Performed by: PSYCHIATRY & NEUROLOGY

## 2024-05-11 PROCEDURE — 11400000 HC PSYCH PRIVATE ROOM

## 2024-05-11 PROCEDURE — 25000003 PHARM REV CODE 250: Performed by: PEDIATRICS

## 2024-05-11 PROCEDURE — 25000003 PHARM REV CODE 250

## 2024-05-11 PROCEDURE — 25000003 PHARM REV CODE 250: Performed by: FAMILY MEDICINE

## 2024-05-11 RX ORDER — TERBINAFINE HYDROCHLORIDE 250 MG/1
250 TABLET ORAL DAILY
Status: DISCONTINUED | OUTPATIENT
Start: 2024-05-12 | End: 2024-05-16 | Stop reason: HOSPADM

## 2024-05-11 RX ORDER — AMLODIPINE BESYLATE 5 MG/1
5 TABLET ORAL DAILY
Status: DISCONTINUED | OUTPATIENT
Start: 2024-05-11 | End: 2024-05-16 | Stop reason: HOSPADM

## 2024-05-11 RX ADMIN — AMLODIPINE BESYLATE 5 MG: 5 TABLET ORAL at 04:05

## 2024-05-11 RX ADMIN — LORAZEPAM 1 MG: 1 TABLET ORAL at 04:05

## 2024-05-11 RX ADMIN — LORAZEPAM 1 MG: 1 TABLET ORAL at 08:05

## 2024-05-11 RX ADMIN — ONDANSETRON 4 MG: 4 TABLET, ORALLY DISINTEGRATING ORAL at 12:05

## 2024-05-11 RX ADMIN — CLONIDINE HYDROCHLORIDE 0.1 MG: 0.1 TABLET ORAL at 09:05

## 2024-05-11 RX ADMIN — TRAZODONE HYDROCHLORIDE 100 MG: 100 TABLET ORAL at 08:05

## 2024-05-11 RX ADMIN — DULOXETINE HYDROCHLORIDE 60 MG: 30 CAPSULE, DELAYED RELEASE ORAL at 08:05

## 2024-05-11 NOTE — NURSING
"PRN Administration Note:    Behavior:    Patient (Chelsie Lee is a 38 y.o. female, : 1985, MRN: [id number])     Allergies: Isa Holguin's  height is 5' 4" (1.626 m) and weight is 58.8 kg (129 lb 9.6 oz). Her oral temperature is 98.2 °F (36.8 °C). Her blood pressure is 144/90 (abnormal) and her pulse is 75. Her respiration is 18 and oxygen saturation is 98%.     Reason for PRN Administration: sleep__________.    Intervention:    Administered trazadone_______ per physician order to Chelsie       Response:    Chelsie tolerated administration well.      Plan:     Continue to monitor per MD/PA/APRN orders; and reevaluate medication effectiveness within 30 minutes.   "

## 2024-05-11 NOTE — NURSING
"PRN Medication Follow-up Note:    Behavior:    Patient (Chelsie Lee is a 38 y.o. female, : 1985, MRN: [id number])     Allergies: Isa Holguin's  height is 5' 4" (1.626 m) and weight is 58.8 kg (129 lb 9.6 oz). Her oral temperature is 98.2 °F (36.8 °C). Her blood pressure is 144/90 (abnormal) and her pulse is 75. Her respiration is 18 and oxygen saturation is 98%.     Administered trazadone_____ per physician order to Chelsie       Intervention:    Intervention to Chelsie's response: none needed_________.       Response:    Chelsie's response: no further c/o voiced_________      Plan:     Continue to monitor per MD/PA/APRN orders; and reevaluate medication effectiveness within 30 minutes.   "

## 2024-05-11 NOTE — CONSULTS
Progress Note    Admit Date: 5/9/2024   LOS: 2 days     SUBJECTIVE:     Consulted to see for elevated bp. Staff reports she has needed prn clonidine dosing a few times.   Patient states she feels fine.   She also c/o toenail fungus and requests meds for this     Scheduled Meds:   DULoxetine  60 mg Oral QHS    [START ON 5/13/2024] LORazepam  0.5 mg Oral TID    LORazepam  1 mg Oral TID    nicotine  1 patch Transdermal Daily     Continuous Infusions:  PRN Meds:  Current Facility-Administered Medications:     acetaminophen, 650 mg, Oral, Q6H PRN    aluminum-magnesium hydroxide-simethicone, 30 mL, Oral, Q6H PRN    cloNIDine, 0.1 mg, Oral, Q8H PRN    cloNIDine, 0.2 mg, Oral, Q8H PRN    haloperidoL, 10 mg, Oral, Q4H PRN **AND** diphenhydrAMINE, 50 mg, Oral, Q4H PRN **AND** LORazepam, 2 mg, Oral, Q4H PRN **AND** haloperidol lactate, 10 mg, Intramuscular, Q4H PRN **AND** diphenhydrAMINE, 50 mg, Intramuscular, Q4H PRN **AND** lorazepam, 2 mg, Intramuscular, Q4H PRN    hydrOXYzine HCL, 50 mg, Oral, Q4H PRN    ondansetron, 4 mg, Oral, Q6H PRN    traZODone, 100 mg, Oral, Nightly PRN    Review of patient's allergies indicates:   Allergen Reactions    Penicillin Rash       Review of Systems  Review of Systems   Constitutional: Negative.    HENT: Negative.     Eyes: Negative.    Respiratory: Negative.     Cardiovascular: Negative.    Gastrointestinal: Negative.    Neurological:  Negative for headaches.        OBJECTIVE:     Vital Signs (Most Recent)  Temp: 98.1 °F (36.7 °C) (05/11/24 1100)  Pulse: 88 (05/11/24 1100)  Resp: 18 (05/11/24 1100)  BP: 118/82 (05/11/24 1100)  SpO2: 98 % (05/11/24 1100)    Vital Signs Range (Last 24H):  Temp:  [97.9 °F (36.6 °C)-98.4 °F (36.9 °C)]   Pulse:  [84-88]   Resp:  [18-19]   BP: (118-144)/()   SpO2:  [98 %]     I & O (Last 24H):No intake or output data in the 24 hours ending 05/11/24 1516  Physical Exam:  Physical Exam  Vitals reviewed.   Constitutional:       General: She is not in acute  distress.     Appearance: Normal appearance.   HENT:      Head: Normocephalic.   Eyes:      Conjunctiva/sclera: Conjunctivae normal.   Cardiovascular:      Rate and Rhythm: Normal rate and regular rhythm.      Heart sounds: No murmur heard.  Pulmonary:      Effort: Pulmonary effort is normal.      Breath sounds: Normal breath sounds. No wheezing or rhonchi.   Musculoskeletal:      Right lower leg: No edema.      Left lower leg: No edema.   Skin:     Comments: Positive nail fungus to toenails   Neurological:      Mental Status: She is alert.          Laboratory:  No results found for this or any previous visit (from the past 24 hour(s)).         ASSESSMENT/PLAN:     Htn- she has had some elevated readings, will start low dose norvasc for bp  2. Nail fungus - begin oral lamisil, f/u with pcp on discharge

## 2024-05-11 NOTE — PLAN OF CARE
Problem: Adult Behavioral Health Plan of Care  Goal: Plan of Care Review  Outcome: Progressing  Flowsheets (Taken 5/10/2024 2130)  Patient Agreement with Plan of Care: agrees  Plan of Care Reviewed With: patient  Goal: Patient-Specific Goal (Individualization)  Outcome: Progressing  Goal: Adheres to Safety Considerations for Self and Others  Outcome: Progressing  Flowsheets (Taken 5/10/2024 2130)  Adheres to Safety Considerations for Self and Others: making progress toward outcome  Goal: Absence of New-Onset Illness or Injury  Outcome: Progressing  Goal: Optimized Coping Skills in Response to Life Stressors  Outcome: Progressing  Flowsheets (Taken 5/10/2024 2130)  Optimized Coping Skills in Response to Life Stressors: making progress toward outcome  Goal: Develops/Participates in Therapeutic [place] to Support Successful Transition  Outcome: Progressing  Flowsheets (Taken 5/10/2024 2130)  Develops/Participates in Therapeutic [place] to Support Successful Transition: making progress toward outcome  Goal: Rounds/Family Conference  Outcome: Progressing     Problem: Depressive Signs/Symptoms  Goal: Optimized Energy Level (Depressive Signs/Symptoms)  Outcome: Progressing  Flowsheets (Taken 5/10/2024 2130)  Mutually Determined Action Steps (Optimized Energy Level): dresses/ready for morning activity  Goal: Optimized Cognitive Function (Depressive Signs/Symptoms)  Outcome: Progressing  Flowsheets (Taken 5/10/2024 2130)  Mutually Determined Action Steps (Optimized Cognitive Function): remains focused during activity  Goal: Increased Participation and Engagement (Depressive Signs/Symptoms)  Outcome: Progressing  Flowsheets (Taken 5/10/2024 2130)  Mutually Determined Action Steps (Increased Participation and Engagement): initiates interaction with others  Goal: Enhanced Self-Esteem and Confidence (Depressive Signs/Symptoms)  Outcome: Progressing  Flowsheets (Taken 5/10/2024 2130)  Mutually Determined Action Steps  (Enhanced Self-Esteem and Confidence): verbalizes successes  Goal: Improved Mood Symptoms (Depressive Signs/Symptoms)  Outcome: Progressing  Flowsheets (Taken 5/10/2024 2130)  Mutually Determined Action Steps (Improved Mood Symptoms): acknowledges progress  Goal: Optimized Nutrition Intake (Depressive Signs/Symptoms)  Outcome: Progressing  Goal: Improved Psychomotor Symptoms (Depressive Signs/Symptoms)  Outcome: Progressing  Goal: Improved Sleep (Depressive Signs/Symptoms)  Outcome: Progressing  Goal: Enhanced Social, Occupational or Functional Skills (Depressive Signs/Symptoms)  Outcome: Progressing     Problem: Excessive Substance Use  Goal: Optimized Energy Level (Excessive Substance Use)  Outcome: Progressing  Goal: Improved Behavioral Control (Excessive Substance Use)  Outcome: Progressing  Goal: Increased Participation and Engagement (Excessive Substance Use)  Outcome: Progressing  Goal: Improved Physiologic Symptoms (Excessive Substance Use)  Outcome: Progressing  Goal: Enhanced Social, Occupational or Functional Skills (Excessive Substance Use)  Outcome: Progressing   Remains anxious and depressed.  Flat affect and unkept.  Compliant with meds and cooperative with staff.

## 2024-05-11 NOTE — NURSING
Patient complained of nausea at 12:15 and was given Prn zofran. @1245 patient verbalized her nausea was better. No other needs verbalized at present

## 2024-05-12 PROCEDURE — 25000003 PHARM REV CODE 250: Performed by: PSYCHIATRY & NEUROLOGY

## 2024-05-12 PROCEDURE — 11400000 HC PSYCH PRIVATE ROOM

## 2024-05-12 PROCEDURE — 25000003 PHARM REV CODE 250: Performed by: FAMILY MEDICINE

## 2024-05-12 PROCEDURE — 25000003 PHARM REV CODE 250

## 2024-05-12 RX ADMIN — AMLODIPINE BESYLATE 5 MG: 5 TABLET ORAL at 08:05

## 2024-05-12 RX ADMIN — DULOXETINE HYDROCHLORIDE 60 MG: 30 CAPSULE, DELAYED RELEASE ORAL at 08:05

## 2024-05-12 RX ADMIN — LORAZEPAM 1 MG: 1 TABLET ORAL at 08:05

## 2024-05-12 RX ADMIN — TERBINAFINE HYDROCHLORIDE 250 MG: 250 TABLET ORAL at 08:05

## 2024-05-12 RX ADMIN — TRAZODONE HYDROCHLORIDE 100 MG: 100 TABLET ORAL at 08:05

## 2024-05-12 RX ADMIN — LORAZEPAM 1 MG: 1 TABLET ORAL at 02:05

## 2024-05-12 NOTE — GROUP NOTE
Group Psychotherapy       Group Focus: Disease Model of Addiction   Patient will be able to identify positive and negative character traits.  Patient, peers, and nurse will engage in a helathy conversation surrounding the topic of self-reflection.     Number of patients in attendance: 8    Group Start Time: 1500  Group End Time:  1600  Groups Date: 5/12/2024  Group Topic:  Behavioral Health  Group Department: [facility]  Group Facilitators:  Lesly Jones RN  _____________________________________________________________________    Patient Name: Chelsie Lee  MRN: [id number]  Patient Class: IP- Psych   Admission Date\Time: 5/9/2024  9:12 AM  Hospital Length of Stay: 3  Primary Care Provider: Galdino Caceres MD     Referred by: Behavioral Medicine Unit Treatment Team     Target symptoms: Alcohol Abuse and Substance Abuse     Patient's response to treatment: Active Listening     Progress toward goals: Progressing adequately          Plan: Continue treatment on BMU

## 2024-05-12 NOTE — NURSING
"PRN Administration Note:    Behavior:    Patient (Chelsie Lee is a 38 y.o. female, : 1985, MRN: [id number])     Allergies: Isa Holguin's  height is 5' 4" (1.626 m) and weight is 58.8 kg (129 lb 9.6 oz). Her temperature is 98.1 °F (36.7 °C). Her blood pressure is 101/70 and her pulse is 84. Her respiration is 20 and oxygen saturation is 100%.     Reason for PRN Administration: sleep__________.    Intervention:    Administered trazadone_______ per physician order to Chelsie       Response:    Chelsie tolerated administration well.      Plan:     Continue to monitor per MD/PA/APRN orders; and reevaluate medication effectiveness within 30 minutes.   "

## 2024-05-12 NOTE — PLAN OF CARE
Problem: Adult Behavioral Health Plan of Care  Goal: Plan of Care Review  Outcome: Progressing  Flowsheets (Taken 5/11/2024 1939)  Patient Agreement with Plan of Care: agrees  Plan of Care Reviewed With: patient  Goal: Patient-Specific Goal (Individualization)  Outcome: Progressing  Flowsheets (Taken 5/11/2024 1939)  Patient Personal Strengths: motivated for treatment  Patient Vulnerabilities: limited support system  Goal: Adheres to Safety Considerations for Self and Others  Outcome: Progressing  Flowsheets (Taken 5/11/2024 1939)  Adheres to Safety Considerations for Self and Others: making progress toward outcome  Goal: Absence of New-Onset Illness or Injury  Outcome: Progressing  Goal: Optimized Coping Skills in Response to Life Stressors  Outcome: Progressing  Flowsheets (Taken 5/11/2024 1939)  Optimized Coping Skills in Response to Life Stressors: making progress toward outcome  Goal: Develops/Participates in Therapeutic [place] to Support Successful Transition  Outcome: Progressing  Flowsheets (Taken 5/11/2024 1939)  Develops/Participates in Therapeutic [place] to Support Successful Transition: making progress toward outcome  Goal: Rounds/Family Conference  Outcome: Progressing     Problem: Depressive Signs/Symptoms  Goal: Optimized Energy Level (Depressive Signs/Symptoms)  Outcome: Progressing  Flowsheets (Taken 5/11/2024 1939)  Mutually Determined Action Steps (Optimized Energy Level): dresses/ready for morning activity  Goal: Optimized Cognitive Function (Depressive Signs/Symptoms)  Outcome: Progressing  Flowsheets (Taken 5/11/2024 1939)  Mutually Determined Action Steps (Optimized Cognitive Function): remains focused during activity  Goal: Increased Participation and Engagement (Depressive Signs/Symptoms)  Outcome: Progressing  Flowsheets (Taken 5/11/2024 1939)  Mutually Determined Action Steps (Increased Participation and Engagement): initiates interaction with others  Goal: Enhanced Self-Esteem and  Confidence (Depressive Signs/Symptoms)  Outcome: Progressing  Goal: Improved Mood Symptoms (Depressive Signs/Symptoms)  Outcome: Progressing  Goal: Optimized Nutrition Intake (Depressive Signs/Symptoms)  Outcome: Progressing  Goal: Improved Psychomotor Symptoms (Depressive Signs/Symptoms)  Outcome: Progressing  Goal: Improved Sleep (Depressive Signs/Symptoms)  Outcome: Progressing  Goal: Enhanced Social, Occupational or Functional Skills (Depressive Signs/Symptoms)  Outcome: Progressing   Remains anxious and depressed with a flat affect.  Compliant with meds and cooperative with staff.

## 2024-05-12 NOTE — NURSING
"PRN Medication Follow-up Note:    Behavior:    Patient (Chelsie Lee is a 38 y.o. female, : 1985, MRN: [id number])     Allergies: Isa Holguin's  height is 5' 4" (1.626 m) and weight is 58.8 kg (129 lb 9.6 oz). Her temperature is 98.1 °F (36.7 °C). Her blood pressure is 101/70 and her pulse is 84. Her respiration is 20 and oxygen saturation is 100%.     Administered trazadone_______ per physician order to Chelsie       Intervention:    Intervention to Chelsie's response: none needed_________.       Response:    Chelsie's response: no further c/o voiced_________      Plan:     Continue to monitor per MD/PA/APRN orders; and reevaluate medication effectiveness within 30 minutes.   "

## 2024-05-13 PROCEDURE — 25000003 PHARM REV CODE 250

## 2024-05-13 PROCEDURE — 25000003 PHARM REV CODE 250: Performed by: PSYCHIATRY & NEUROLOGY

## 2024-05-13 PROCEDURE — 11400000 HC PSYCH PRIVATE ROOM

## 2024-05-13 PROCEDURE — 25000003 PHARM REV CODE 250: Performed by: FAMILY MEDICINE

## 2024-05-13 RX ADMIN — TRAZODONE HYDROCHLORIDE 100 MG: 100 TABLET ORAL at 08:05

## 2024-05-13 RX ADMIN — LORAZEPAM 0.5 MG: 0.5 TABLET ORAL at 02:05

## 2024-05-13 RX ADMIN — LORAZEPAM 0.5 MG: 0.5 TABLET ORAL at 08:05

## 2024-05-13 RX ADMIN — HYDROXYZINE HYDROCHLORIDE 50 MG: 50 TABLET, FILM COATED ORAL at 12:05

## 2024-05-13 RX ADMIN — DULOXETINE HYDROCHLORIDE 60 MG: 30 CAPSULE, DELAYED RELEASE ORAL at 08:05

## 2024-05-13 RX ADMIN — LORAZEPAM 0.5 MG: 0.5 TABLET ORAL at 09:05

## 2024-05-13 RX ADMIN — AMLODIPINE BESYLATE 5 MG: 5 TABLET ORAL at 09:05

## 2024-05-13 RX ADMIN — TERBINAFINE HYDROCHLORIDE 250 MG: 250 TABLET ORAL at 09:05

## 2024-05-13 NOTE — NURSING
"PRN Medication Follow-up Note:    Behavior:    Patient (Chelsie Lee is a 38 y.o. female, : 1985, MRN: [id number])     Allergies: Isa Holguin's  height is 5' 4" (1.626 m) and weight is 56.2 kg (123 lb 14.4 oz). Her temperature is 98.1 °F (36.7 °C). Her blood pressure is 114/72 and her pulse is 80. Her respiration is 20 and oxygen saturation is 100%.     Administered trazadone_______ per physician order to Chelsie       Intervention:    Intervention to Chelsie's response: none needed_________.       Response:    Chelsie's response:no further c/o voiced_________      Plan:     Continue to monitor per MD/PA/APRN orders; and reevaluate medication effectiveness within 30 minutes.   "

## 2024-05-13 NOTE — NURSING
"PRN Administration Note:    Behavior:    Patient (Chelsie Lee is a 38 y.o. female, : 1985, MRN: [id number])     Allergies: Isa Holguin's  height is 5' 4" (1.626 m) and weight is 56.2 kg (123 lb 14.4 oz). Her temperature is 98.1 °F (36.7 °C). Her blood pressure is 114/72 and her pulse is 80. Her respiration is 20 and oxygen saturation is 100%.     Reason for PRN Administration: sleep__________.    Intervention:    Administered trazadone_______ per physician order to Chelsie       Response:    Chelsie tolerated administration well.      Plan:     Continue to monitor per MD/PA/APRN orders; and reevaluate medication effectiveness within 30 minutes.   "

## 2024-05-13 NOTE — PROGRESS NOTES
05/13/24 1000   [facility] Group Therapy   Group Name Therapeutic Recreation   Specific Interventions Skilled Activity Mild Exercises   Participation Level Active;Supportive;Appropriate;Attentive;Sharing   Participation Quality Cooperative;Social   Insight/Motivation Improved   Affect/Mood Display Appropriate;Bright   Cognition Oriented;Alert   Psychomotor WNL

## 2024-05-13 NOTE — PLAN OF CARE
Problem: Adult Behavioral Health Plan of Care  Goal: Plan of Care Review  Outcome: Progressing  Flowsheets (Taken 5/13/2024 0928)  Patient Agreement with Plan of Care: agrees  Plan of Care Reviewed With: patient  Goal: Patient-Specific Goal (Individualization)  Outcome: Progressing  Flowsheets (Taken 5/13/2024 0928)  Patient Personal Strengths: medication/treatment adherence  Patient Vulnerabilities: limited support system  Goal: Adheres to Safety Considerations for Self and Others  Outcome: Progressing  Flowsheets (Taken 5/13/2024 0928)  Adheres to Safety Considerations for Self and Others: making progress toward outcome  Intervention: Develop and Maintain Individualized Safety Plan  Flowsheets (Taken 5/13/2024 0928)  Safety Measures: safety rounds completed  Goal: Absence of New-Onset Illness or Injury  Outcome: Progressing  Intervention: Identify and Manage Fall Risk  Flowsheets (Taken 5/13/2024 0928)  Safety Measures: safety rounds completed  Intervention: Prevent VTE (Venous Thromboembolism)  Flowsheets (Taken 5/13/2024 0928)  VTE Prevention/Management: fluids promoted  Intervention: Prevent Infection  Flowsheets (Taken 5/13/2024 0928)  Infection Prevention: hand hygiene promoted  Goal: Optimized Coping Skills in Response to Life Stressors  Outcome: Progressing  Flowsheets (Taken 5/13/2024 0928)  Optimized Coping Skills in Response to Life Stressors: making progress toward outcome  Intervention: Promote Effective Coping Strategies  Flowsheets (Taken 5/13/2024 0928)  Supportive Measures: relaxation techniques promoted  Goal: Develops/Participates in Therapeutic [place] to Support Successful Transition  Outcome: Progressing  Flowsheets (Taken 5/13/2024 0928)  Develops/Participates in Therapeutic [place] to Support Successful Transition: making progress toward outcome  Intervention: Foster Therapeutic [place]  Flowsheets (Taken 5/13/2024 0928)  Trust Relationship/Rapport: care explained  Goal: Rounds/Family  Conference  Outcome: Progressing  Flowsheets (Taken 5/13/2024 0928)  Participants:   nursing   milieu/psych techs     Problem: Depressive Signs/Symptoms  Goal: Optimized Energy Level (Depressive Signs/Symptoms)  Outcome: Progressing  Flowsheets (Taken 5/13/2024 0928)  Mutually Determined Action Steps (Optimized Energy Level): dresses/ready for morning activity  Intervention: Optimize Energy Level  Flowsheets (Taken 5/13/2024 0928)  Activity (Behavioral Health): up ad be  Patient Performed Hygiene: teeth brushed  Diversional Activity: television  Goal: Optimized Cognitive Function (Depressive Signs/Symptoms)  Outcome: Progressing  Flowsheets (Taken 5/13/2024 0928)  Mutually Determined Action Steps (Optimized Cognitive Function): remains focused during activity  Goal: Increased Participation and Engagement (Depressive Signs/Symptoms)  Outcome: Progressing  Flowsheets (Taken 5/13/2024 0928)  Mutually Determined Action Steps (Increased Participation and Engagement): initiates interaction with others  Intervention: Facilitate Participation and Engagement  Flowsheets (Taken 5/13/2024 0928)  Supportive Measures: relaxation techniques promoted  Diversional Activity: television  Goal: Enhanced Self-Esteem and Confidence (Depressive Signs/Symptoms)  Outcome: Progressing  Flowsheets (Taken 5/13/2024 0928)  Mutually Determined Action Steps (Enhanced Self-Esteem and Confidence): verbalizes successes  Intervention: Promote Confidence and Self-Esteem  Flowsheets (Taken 5/13/2024 0928)  Supportive Measures: relaxation techniques promoted  Goal: Improved Mood Symptoms (Depressive Signs/Symptoms)  Outcome: Progressing  Flowsheets (Taken 5/13/2024 0928)  Mutually Determined Action Steps (Improved Mood Symptoms): acknowledges progress  Intervention: Promote Mood Improvement  Flowsheets (Taken 5/13/2024 0928)  Supportive Measures: relaxation techniques promoted  Diversional Activity: television  Goal: Optimized Nutrition Intake  (Depressive Signs/Symptoms)  Outcome: Progressing  Flowsheets (Taken 5/13/2024 0928)  Mutually Determined Action Steps (Optimized Nutrition Intake): eats at meal time  Intervention: Promote Optimal Nutrition Intake  Flowsheets (Taken 5/13/2024 0928)  Nutrition Interventions: food preferences provided  Bowel Elimination Promotion: ambulation promoted  Goal: Improved Psychomotor Symptoms (Depressive Signs/Symptoms)  Outcome: Progressing  Flowsheets (Taken 5/13/2024 0928)  Mutually Determined Action Steps (Improved Psychomotor Symptoms): adheres to medication regimen  Intervention: Manage Psychomotor Movement  Flowsheets (Taken 5/13/2024 0928)  Activity (Behavioral Health): up ad be  Patient Performed Hygiene: teeth brushed  Diversional Activity: television  Goal: Improved Sleep (Depressive Signs/Symptoms)  Outcome: Progressing  Flowsheets (Taken 5/13/2024 0928)  Mutually Determined Action Steps (Improved Sleep): sleeps 4-6 hours at night  Intervention: Promote Healthy Sleep Hygiene  Flowsheets (Taken 5/13/2024 0928)  Sleep Hygiene Promotion: regular sleep pattern promoted  Goal: Enhanced Social, Occupational or Functional Skills (Depressive Signs/Symptoms)  Outcome: Progressing  Flowsheets (Taken 5/13/2024 0928)  Mutually Determined Action Steps (Enhanced Social, Occupational or Functional Skills): identifies personal strengths  Intervention: Promote Social, Occupational and Functional Ability  Flowsheets (Taken 5/13/2024 0928)  Social Functional Ability Promotion: autonomy promoted     Problem: Excessive Substance Use  Goal: Optimized Energy Level (Excessive Substance Use)  Outcome: Progressing  Flowsheets (Taken 5/13/2024 0928)  Mutually Determined Action Steps (Optimized Energy Level): dresses/ready for morning activity  Intervention: Optimize Energy Level  Flowsheets (Taken 5/13/2024 0928)  Activity (Behavioral Health): up ad be  Patient Performed Hygiene: teeth brushed  Diversional Activity: television  Goal:  Improved Behavioral Control (Excessive Substance Use)  Outcome: Progressing  Flowsheets (Taken 5/13/2024 0928)  Mutually Determined Action Steps (Improved Behavioral Control): identifies major stressors  Intervention: Promote Behavior and Impulse Control  Flowsheets (Taken 5/13/2024 0928)  Behavior Management: behavioral plan reviewed  Goal: Increased Participation and Engagement (Excessive Substance Use)  Outcome: Progressing  Flowsheets (Taken 5/13/2024 0928)  Mutually Determined Action Steps (Increased Participation and Engagement): identifies support resources  Intervention: Facilitate Participation and Engagement  Flowsheets (Taken 5/13/2024 0928)  Supportive Measures: relaxation techniques promoted  Diversional Activity: television  Goal: Improved Physiologic Symptoms (Excessive Substance Use)  Outcome: Progressing  Flowsheets (Taken 5/13/2024 0928)  Mutually Determined Action Steps (Improved Physiologic Symptoms): discusses use pattern  Intervention: Optimize Physiologic Function  Flowsheets (Taken 5/13/2024 0928)  Nutrition Interventions: food preferences provided  Goal: Enhanced Social, Occupational or Functional Skills (Excessive Substance Use)  Outcome: Progressing  Flowsheets (Taken 5/13/2024 0928)  Mutually Determined Action Steps (Enhanced Social, Occupational or Functional Skills): identifies personal strengths  Intervention: Promote Social, Occupational and Functional Ability  Flowsheets (Taken 5/13/2024 0928)  Trust Relationship/Rapport: care explained  Social Functional Ability Promotion: autonomy promoted     Problem: Suicide Risk  Goal: Absence of Self-Harm  Outcome: Progressing  Intervention: Assess Risk to Self and Maintain Safety  Flowsheets (Taken 5/13/2024 0928)  Behavior Management: behavioral plan reviewed  Enhanced Safety Measures: monitored by video  Self-Harm Prevention: environmental self-harm risks assessed  Intervention: Promote Psychosocial Wellbeing  Flowsheets (Taken 5/13/2024  0928)  Sleep/Rest Enhancement: relaxation techniques promoted  Supportive Measures: relaxation techniques promoted  Family/Support System Care: self-care encouraged  Intervention: Establish Safety Plan and Continuity of Care  Flowsheets (Taken 5/13/2024 3353)  Safe Transition Promotion: personal safety plan developed    She is AAO X 4. At present time, she denies depression, SI, HI, hallucinations, and delusions. Mild anxiety noted. Minimal self disclosure offered. Good eye contact noted. Speech normal tone and speed. Interacts with selected patients and staff. Continue plan of care and provide a safe and therapeutic environment. Continue to monitor every fifteen minutes for safety.

## 2024-05-13 NOTE — PROGRESS NOTES
"5/13/2024  Chelsie Lee   1985   [id number]        Psychiatry Progress Note     Chief Complaint: "I'm doing great"    SUBJECTIVE:   Chelsie Lee is a 38 y.o. female [place] [facility] [place] with a kitchen knife attempting to harm herself. She stated to the ED that she feels like a burden to her family.      Reports an improvement in her mood.  However, staff reports that she has been minimizing symptoms during her stay.  Was on 4.5mg of Xanax daily prescribed by her FNP.  Discussed the need to be followed by a mental health provider for her medications.  Also discussed the need to refrain from alcohol use with her current medications.    On benzodiazepine taper.  Will continue this and will monitor for the need to augment care.    UDS: (+)benzodiazepines  Blood alcohol: 123    Current Medications:   Scheduled Meds:    amLODIPine  5 mg Oral Daily    DULoxetine  60 mg Oral QHS    LORazepam  0.5 mg Oral TID    nicotine  1 patch Transdermal Daily    terbinafine HCL  250 mg Oral Daily      PRN Meds:   Current Facility-Administered Medications:     acetaminophen, 650 mg, Oral, Q6H PRN    aluminum-magnesium hydroxide-simethicone, 30 mL, Oral, Q6H PRN    cloNIDine, 0.1 mg, Oral, Q8H PRN    cloNIDine, 0.2 mg, Oral, Q8H PRN    haloperidoL, 10 mg, Oral, Q4H PRN **AND** diphenhydrAMINE, 50 mg, Oral, Q4H PRN **AND** LORazepam, 2 mg, Oral, Q4H PRN **AND** haloperidol lactate, 10 mg, Intramuscular, Q4H PRN **AND** diphenhydrAMINE, 50 mg, Intramuscular, Q4H PRN **AND** lorazepam, 2 mg, Intramuscular, Q4H PRN    hydrOXYzine HCL, 50 mg, Oral, Q4H PRN    ondansetron, 4 mg, Oral, Q6H PRN    traZODone, 100 mg, Oral, Nightly PRN   Psychotherapeutics (From admission, onward)      Start     Stop Route Frequency Ordered    05/13/24 0900  LORazepam tablet 0.5 mg         05/15/24 0859 Oral 3 times daily 05/09/24 1045    05/09/24 2100  DULoxetine DR capsule 60 mg         -- Oral Nightly 05/09/24 1044    " "05/09/24 1100  LORazepam tablet 2 mg         05/11/24 0859 Oral 3 times daily 05/09/24 1045    05/09/24 0918  haloperidoL tablet 10 mg  (Med - Acute  Behavioral Management)        Placed in "And" Linked Group    -- Oral Every 4 hours PRN 05/09/24 0918    05/09/24 0918  LORazepam tablet 2 mg  (Med - Acute  Behavioral Management)        Placed in "And" Linked Group    -- Oral Every 4 hours PRN 05/09/24 0918    05/09/24 0918  haloperidol lactate injection 10 mg  (Med - Acute  Behavioral Management)        Placed in "And" Linked Group    -- IM Every 4 hours PRN 05/09/24 0918    05/09/24 0918  LORazepam injection 2 mg  (Med - Acute  Behavioral Management)        Placed in "And" Linked Group    -- IM Every 4 hours PRN 05/09/24 0918    05/09/24 0918  traZODone tablet 100 mg         -- Oral Nightly PRN 05/09/24 0918            Allergies:   Review of patient's allergies indicates:   Allergen Reactions    Penicillin Rash        OBJECTIVE:   Vitals   Vitals:    05/12/24 1800   BP: 114/72   Pulse: 80   Resp: 20   Temp: 98.1 °F (36.7 °C)        Labs/Imaging/Studies:   No results found for this or any previous visit (from the past 36 hour(s)).       Medical Review Of Systems:  Constitutional: negative  Respiratory: negative  Cardiovascular: negative  Gastrointestinal: abdominal discomfort  Genitourinary:negative  Musculoskeletal:negative  Neurological: negative       Psychiatric Mental Status Exam:  General Appearance: appears stated age, well-developed, well-nourished  Arousal: alert  Behavior: minimizing  Movements and Motor Activity: no abnormal involuntary movements noted  Orientation: oriented to person, place, time, and situation  Speech: normal rate, normal rhythm, normal volume, normal tone  Mood: "Ok"  Affect: constricted  Thought Process: linear  Associations: intact  Thought Content and Perceptions: suicidal ideation improving, no homicidal ideation, no auditory hallucinations, no visual hallucinations, no paranoid " ideation, no ideas of reference  Recent and Remote Memory: recent memory intact, remote memory intact; per interview/observation with patient  Attention and Concentration: intact, attentive to conversation; per interview/observation with patient  Fund of Knowledge: intact, aware of current events, vocabulary appropriate; based on history, vocabulary, fund of knowledge, syntax, grammar, and content  Insight: questionable; based on understanding of severity of illness and HPI  Judgment: questionable; based on patient's behavior and HPI     ASSESSMENT/PLAN:   Problems Addressed/Diagnoses:  Major Depressive Disorder, 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)  Panic Disorder without Agoraphobia (F41.0)   Generalized Anxiety Disorder (F41.1)   Alcohol use disorder, severe, with withdrawal (F10.230)    Past Medical History:   Diagnosis Date    Anxiety     Dysphagia     GERD (gastroesophageal reflux disease)     Hiatal hernia     Hypertension     Intestinal obstruction     Nausea with vomiting     Rectal hemorrhage     Von Willebrand's disease         Plan:  Depression, chronic with acute exacerbation  -Continue Cymbalta    Panic disorder, chronic  -Continue Cymbalta  -PRN hydroxyzine    Anxiety, chronic  -Continue Cymbalta  -PRN hydroxyzine    Alcohol withdrawal, acute  -Continue taper  -Group/Individual psychotherapy     Expected Disposition Plan: Home        Jewel Tabor M.D.

## 2024-05-14 PROCEDURE — 25000003 PHARM REV CODE 250

## 2024-05-14 PROCEDURE — 25000003 PHARM REV CODE 250: Performed by: PSYCHIATRY & NEUROLOGY

## 2024-05-14 PROCEDURE — 11400000 HC PSYCH PRIVATE ROOM

## 2024-05-14 PROCEDURE — 25000003 PHARM REV CODE 250: Performed by: FAMILY MEDICINE

## 2024-05-14 RX ADMIN — AMLODIPINE BESYLATE 5 MG: 5 TABLET ORAL at 08:05

## 2024-05-14 RX ADMIN — TRAZODONE HYDROCHLORIDE 100 MG: 100 TABLET ORAL at 08:05

## 2024-05-14 RX ADMIN — LORAZEPAM 0.5 MG: 0.5 TABLET ORAL at 08:05

## 2024-05-14 RX ADMIN — LORAZEPAM 0.5 MG: 0.5 TABLET ORAL at 03:05

## 2024-05-14 RX ADMIN — DULOXETINE HYDROCHLORIDE 60 MG: 30 CAPSULE, DELAYED RELEASE ORAL at 08:05

## 2024-05-14 RX ADMIN — TERBINAFINE HYDROCHLORIDE 250 MG: 250 TABLET ORAL at 08:05

## 2024-05-14 NOTE — PLAN OF CARE
Problem: Adult Behavioral Health Plan of Care  Goal: Plan of Care Review  Outcome: Progressing  Flowsheets (Taken 5/14/2024 0005)  Patient Agreement with Plan of Care: agrees  Plan of Care Reviewed With: patient  Goal: Patient-Specific Goal (Individualization)  Outcome: Progressing  Flowsheets (Taken 5/14/2024 0005)  Patient Personal Strengths:   medication/treatment adherence   no history of violence  Patient Vulnerabilities:   limited social skills   poor impulse control  Goal: Adheres to Safety Considerations for Self and Others  Outcome: Progressing  Flowsheets (Taken 5/14/2024 0005)  Adheres to Safety Considerations for Self and Others: making progress toward outcome  Intervention: Develop and Maintain Individualized Safety Plan  Flowsheets (Taken 5/14/2024 0005)  Safety Measures:   monitored by video   safety plan reviewed   suicide assessment completed  Goal: Absence of New-Onset Illness or Injury  Outcome: Progressing  Intervention: Identify and Manage Fall Risk  Flowsheets (Taken 5/14/2024 0005)  Safety Measures:   monitored by video   safety plan reviewed   suicide assessment completed  Intervention: Prevent VTE (Venous Thromboembolism)  Flowsheets (Taken 5/14/2024 0005)  VTE Prevention/Management:   fluids promoted   ambulation promoted  Intervention: Prevent Infection  Flowsheets (Taken 5/14/2024 0005)  Infection Prevention:   rest/sleep promoted   hand hygiene promoted  Goal: Optimized Coping Skills in Response to Life Stressors  Outcome: Progressing  Flowsheets (Taken 5/14/2024 0005)  Optimized Coping Skills in Response to Life Stressors: making progress toward outcome  Intervention: Promote Effective Coping Strategies  Flowsheets (Taken 5/14/2024 0005)  Supportive Measures:   active listening utilized   verbalization of feelings encouraged  Goal: Develops/Participates in Therapeutic [place] to Support Successful Transition  Outcome: Progressing  Flowsheets (Taken 5/14/2024  0005)  Develops/Participates in Therapeutic [place] to Support Successful Transition: making progress toward outcome  Intervention: Foster Therapeutic [place]  Flowsheets (Taken 5/14/2024 0005)  Trust Relationship/Rapport:   questions encouraged   care explained   questions answered  Intervention: Mutually Develop Transition Plan  Flowsheets (Taken 5/14/2024 0005)  Outpatient/Agency/Support Group Needs:   outpatient medication management   outpatient psychiatric care (specify)  Anticipated Discharge Disposition: home or self-care  Patient/Family Anticipated Services at Transition: mental health services  Concerns to be Addressed:   mental health   medication  Goal: Rounds/Family Conference  Outcome: Progressing  Flowsheets (Taken 5/14/2024 0005)  Participants:   milieu/psych techs   nursing     Problem: Depressive Signs/Symptoms  Goal: Optimized Energy Level (Depressive Signs/Symptoms)  Outcome: Progressing  Flowsheets (Taken 5/14/2024 0005)  Mutually Determined Action Steps (Optimized Energy Level): dresses/ready for morning activity  Intervention: Optimize Energy Level  Flowsheets (Taken 5/14/2024 0005)  Activity (Behavioral Health): up ad be  Goal: Optimized Cognitive Function (Depressive Signs/Symptoms)  Outcome: Progressing  Goal: Increased Participation and Engagement (Depressive Signs/Symptoms)  Outcome: Progressing  Flowsheets (Taken 5/14/2024 0005)  Mutually Determined Action Steps (Increased Participation and Engagement): initiates interaction with others  Intervention: Facilitate Participation and Engagement  Flowsheets (Taken 5/14/2024 0005)  Supportive Measures:   active listening utilized   verbalization of feelings encouraged  Goal: Enhanced Self-Esteem and Confidence (Depressive Signs/Symptoms)  Outcome: Progressing  Intervention: Promote Confidence and Self-Esteem  Flowsheets (Taken 5/14/2024 0005)  Supportive Measures:   active listening utilized   verbalization of feelings encouraged  Goal:  Improved Mood Symptoms (Depressive Signs/Symptoms)  Outcome: Progressing  Intervention: Promote Mood Improvement  Flowsheets (Taken 5/14/2024 0005)  Supportive Measures:   active listening utilized   verbalization of feelings encouraged  Goal: Optimized Nutrition Intake (Depressive Signs/Symptoms)  Outcome: Progressing  Flowsheets (Taken 5/14/2024 0005)  Mutually Determined Action Steps (Optimized Nutrition Intake): eats at meal time  Intervention: Promote Optimal Nutrition Intake  Flowsheets (Taken 5/14/2024 0005)  Nutrition Interventions: food preferences provided  Bowel Elimination Promotion:   adequate fluid intake promoted   ambulation promoted  Goal: Improved Psychomotor Symptoms (Depressive Signs/Symptoms)  Outcome: Progressing  Flowsheets (Taken 5/14/2024 0005)  Mutually Determined Action Steps (Improved Psychomotor Symptoms): adheres to medication regimen  Intervention: Manage Psychomotor Movement  Flowsheets (Taken 5/14/2024 0005)  Activity (Behavioral Health): up ad be  Goal: Improved Sleep (Depressive Signs/Symptoms)  Outcome: Progressing  Flowsheets (Taken 5/14/2024 0005)  Mutually Determined Action Steps (Improved Sleep): sleeps 4-6 hours at night  Intervention: Promote Healthy Sleep Hygiene  Flowsheets (Taken 5/14/2024 0005)  Sleep Hygiene Promotion:   regular sleep pattern promoted   awakenings minimized   noise level reduced  Goal: Enhanced Social, Occupational or Functional Skills (Depressive Signs/Symptoms)  Outcome: Progressing  Intervention: Promote Social, Occupational and Functional Ability  Flowsheets (Taken 5/14/2024 0005)  Social Functional Ability Promotion: social interaction promoted     Problem: Excessive Substance Use  Goal: Optimized Energy Level (Excessive Substance Use)  Outcome: Progressing  Flowsheets (Taken 5/14/2024 0005)  Mutually Determined Action Steps (Optimized Energy Level): dresses/ready for morning activity  Intervention: Optimize Energy Level  Flowsheets (Taken  5/14/2024 0005)  Activity (Behavioral Health): up ad be  Goal: Improved Behavioral Control (Excessive Substance Use)  Outcome: Progressing  Goal: Increased Participation and Engagement (Excessive Substance Use)  Outcome: Progressing  Intervention: Facilitate Participation and Engagement  Flowsheets (Taken 5/14/2024 0005)  Supportive Measures:   active listening utilized   verbalization of feelings encouraged  Goal: Improved Physiologic Symptoms (Excessive Substance Use)  Outcome: Progressing  Intervention: Optimize Physiologic Function  Flowsheets (Taken 5/14/2024 0005)  Nutrition Interventions: food preferences provided  Goal: Enhanced Social, Occupational or Functional Skills (Excessive Substance Use)  Outcome: Progressing  Intervention: Promote Social, Occupational and Functional Ability  Flowsheets (Taken 5/14/2024 0005)  Trust Relationship/Rapport:   questions encouraged   care explained   questions answered  Social Functional Ability Promotion: social interaction promoted     Problem: Suicide Risk  Goal: Absence of Self-Harm  Outcome: Progressing  Intervention: Assess Risk to Self and Maintain Safety  Flowsheets (Taken 5/14/2024 0005)  Enhanced Safety Measures: monitored by video  Intervention: Promote Psychosocial Wellbeing  Flowsheets (Taken 5/14/2024 0005)  Supportive Measures:   active listening utilized   verbalization of feelings encouraged   AAOx4. Ambulating in the [place], interacting with selected staff and peers. Flat affect and anxious and depressed mood. Pt reports that her mood has improved. Denies suicidal ideations and hallucinations. Reports that she is eating and sleeping well. Clean appearance, showered and changed clothes tonight. No agitation or aggression noted.  Continue with plan of care and q 15 minute safety checks.   2006- Trazodone 100 mg po given for sleep.   2036- Resting quietly with eyes closed.

## 2024-05-14 NOTE — PROGRESS NOTES
05/14/24 1000   [facility] Group Therapy   Group Name Therapeutic Recreation   Specific Interventions Skilled Activity Mild Exercises   Participation Level Active;Supportive;Appropriate;Attentive;Sharing   Participation Quality Cooperative;Social   Insight/Motivation Improved   Affect/Mood Display Appropriate;Bright   Cognition Oriented;Alert   Psychomotor WNL

## 2024-05-14 NOTE — PROGRESS NOTES
7/13/2022         RE: Lacy Eugene  [facility]  [address] [place] [address]        Dear Colleague,    Thank you for referring your patient, Lacy Eugene, to the [facility] [place]. Please see a copy of my visit note below.    NEUROSURGERY CLINIC PROGRESS NOTE    DATE OF VISIT: 7/13/2022    HPI:     Lacy Eugene is a pleasant 79 year old female who presents to the clinic today for a  five-week post-operative follow-up visit to have the remainder of her retention sutures removed. Unfortunately, somebody else had already removed them prior to her arrival here. On 06/08/2022 the patient underwent a removal of thoracic 10 to pelvis instrumentation, closure of thoracic wound with Dr. Villa.    Today, the patient reports that she is doing quite well and has no complaints.     Current Outpatient Medications   Medication     acetaminophen (TYLENOL) 500 MG tablet     busPIRone HCl (BUSPAR) 15 MG tablet     calcium citrate (CITRACAL) 950 (200 Ca) MG tablet     cefTRIAXone (ROCEPHIN) 1 GM vial     cholecalciferol (VITAMIN D3) 125 mcg (5000 units) capsule     clonazePAM (KLONOPIN) 0.5 MG tablet     cyanocobalamin (VITAMIN B-12) 1000 MCG tablet     dronabinol (MARINOL) 5 MG capsule     famotidine (PEPCID) 20 MG tablet     ferrous sulfate (FE TABS) 325 (65 Fe) MG EC tablet     fluticasone (FLONASE) 50 MCG/ACT nasal spray     fluvoxaMINE (LUVOX) 50 MG tablet     folic acid (FOLVITE) 400 MCG tablet     gabapentin (NEURONTIN) 300 MG capsule     hydrOXYzine (ATARAX) 10 MG tablet     Hypromellose (NATURAL BALANCE TEARS OP)     ketoconazole (NIZORAL) 2 % external cream     levothyroxine (SYNTHROID/LEVOTHROID) 50 MCG tablet     Lutein 20 MG TABS     magic mouthwash suspension, diphenhydrAMINE, lidocaine, aluminum-magnesium & simethicone, (FIRST-MOUTHWASH BLM) compounding kit     magnesium gluconate (MAGONATE) 500 (27 Mg) MG tablet     megestrol (MEGACE) 40 MG/ML suspension      "5/14/2024  Chelsie Lee   1985   [id number]        Psychiatry Progress Note     Chief Complaint: "I'm doing great"    SUBJECTIVE:   Chelsie Lee is a 38 y.o. female [place] [facility] [place] with a kitchen knife attempting to harm herself. She stated to the ED that she feels like a burden to her family.      Today patient states that she is feeling much better and "back to my normal self". Reports an improvement in her mood.  However, staff reports that she has remained flat, withdrawn, and depressed. Patient was calm and cooperative during this exam. Her affect is greatly improved though she still minimizes her symptoms.      On benzodiazepine taper.  Will continue this and will monitor for the need to augment care.    UDS: (+)benzodiazepines  Blood alcohol: 123    Current Medications:   Scheduled Meds:    amLODIPine  5 mg Oral Daily    DULoxetine  60 mg Oral QHS    LORazepam  0.5 mg Oral TID    nicotine  1 patch Transdermal Daily    terbinafine HCL  250 mg Oral Daily      PRN Meds:   Current Facility-Administered Medications:     acetaminophen, 650 mg, Oral, Q6H PRN    aluminum-magnesium hydroxide-simethicone, 30 mL, Oral, Q6H PRN    cloNIDine, 0.1 mg, Oral, Q8H PRN    cloNIDine, 0.2 mg, Oral, Q8H PRN    haloperidoL, 10 mg, Oral, Q4H PRN **AND** diphenhydrAMINE, 50 mg, Oral, Q4H PRN **AND** LORazepam, 2 mg, Oral, Q4H PRN **AND** haloperidol lactate, 10 mg, Intramuscular, Q4H PRN **AND** diphenhydrAMINE, 50 mg, Intramuscular, Q4H PRN **AND** lorazepam, 2 mg, Intramuscular, Q4H PRN    hydrOXYzine HCL, 50 mg, Oral, Q4H PRN    ondansetron, 4 mg, Oral, Q6H PRN    traZODone, 100 mg, Oral, Nightly PRN   Psychotherapeutics (From admission, onward)      Start     Stop Route Frequency Ordered    05/13/24 0900  LORazepam tablet 0.5 mg         05/15/24 0859 Oral 3 times daily 05/09/24 1045    05/09/24 2100  DULoxetine DR capsule 60 mg         -- Oral Nightly 05/09/24 1044    05/09/24 " "1100  LORazepam tablet 2 mg         05/11/24 0859 Oral 3 times daily 05/09/24 1045    05/09/24 0918  haloperidoL tablet 10 mg  (Med - Acute  Behavioral Management)        Placed in "And" Linked Group    -- Oral Every 4 hours PRN 05/09/24 0918    05/09/24 0918  LORazepam tablet 2 mg  (Med - Acute  Behavioral Management)        Placed in "And" Linked Group    -- Oral Every 4 hours PRN 05/09/24 0918    05/09/24 0918  haloperidol lactate injection 10 mg  (Med - Acute  Behavioral Management)        Placed in "And" Linked Group    -- IM Every 4 hours PRN 05/09/24 0918    05/09/24 0918  LORazepam injection 2 mg  (Med - Acute  Behavioral Management)        Placed in "And" Linked Group    -- IM Every 4 hours PRN 05/09/24 0918    05/09/24 0918  traZODone tablet 100 mg         -- Oral Nightly PRN 05/09/24 0918            Allergies:   Review of patient's allergies indicates:   Allergen Reactions    Penicillin Rash        OBJECTIVE:   Vitals   Vitals:    05/14/24 0837   BP: 129/89   Pulse:    Resp:    Temp:         Labs/Imaging/Studies:   No results found for this or any previous visit (from the past 36 hour(s)).       Medical Review Of Systems:  Constitutional: negative  Respiratory: negative  Cardiovascular: negative  Gastrointestinal: abdominal discomfort  Genitourinary:negative  Musculoskeletal:negative  Neurological: negative       Psychiatric Mental Status Exam:  General Appearance: appears stated age, well-developed, well-nourished  Arousal: alert  Behavior: minimizing  Movements and Motor Activity: no abnormal involuntary movements noted  Orientation: oriented to person, place, time, and situation  Speech: normal rate, normal rhythm, normal volume, normal tone  Mood: "Much better"  Affect: constricted  Thought Process: linear  Associations: intact  Thought Content and Perceptions: suicidal ideation improving, no homicidal ideation, no auditory hallucinations, no visual hallucinations, no paranoid ideation, no ideas of " methocarbamol (ROBAXIN) 750 MG tablet     multivitamin w/minerals (THERA-VIT-M) tablet     order for DME     order for DME     order for DME     order for DME     order for DME     order for DME     oxyCODONE (ROXICODONE) 5 MG tablet     pramipexole (MIRAPEX) 0.25 MG tablet     Probiotic Product (PROBIOTIC ADVANCED) CAPS     progesterone (PROMETRIUM) 100 MG capsule     pyridOXINE (VITAMIN B6) 100 MG TABS     senna-docusate (SENOKOT-S/PERICOLACE) 8.6-50 MG tablet     Urea (URE-NA) 15 g PACK packet     vancomycin (VANCOCIN) 125 MG capsule     vancomycin     venlafaxine (EFFEXOR-XR) 150 MG 24 hr capsule     vitamin C (ASCORBIC ACID) 1000 MG TABS     vitamin C (ASCORBIC ACID) 1000 MG TABS     zinc gluconate 50 MG tablet     zinc oxide (DESITIN) 40 % external ointment     No current facility-administered medications for this visit.       Allergies   Allergen Reactions     Chlorhexidine Itching     Per pt, ok to use Chlorprep and Biopatch to PICC/Midline's.        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    By biopsy November 2004 Previously seen by Dentistry     Macular degeneration      Microscopic colitis      Noninfectious ileitis     hx colitis     Obsessive-compulsive personality disorder (H)      Osteoarthritis of left shoulder      Other and unspecified nonspecific immunological findings      Other chronic pain      RLS (restless legs syndrome)      Scoliosis      Sicca syndrome (H)      Thyroid disease        Review Of Systems    Skin:  "negative  Eyes: negative  Ears/Nose/Throat: negative  Respiratory: No shortness of breath, dyspnea on exertion, cough, or hemoptysis  Cardiovascular: negative  Gastrointestinal: negative  Musculoskeletal: negative  Neurologic: negative  Psychiatric: negative  Hematologic/Lymphatic/Immunologic: negative  Endocrine: negative    OBJECTIVE:    /76   Pulse 82   Temp 98.2  F (36.8  C)   Ht 5' 3\" (1.6 m)   Wt 87 lb (39.5 kg)   LMP  (LMP Unknown)   SpO2 95%   BMI 15.41 kg/m      Exam:    Patient appears comfortable and in no apparent distress. Moving all extremities.  Gait is non-antalgic.  CN II-XII grossly intact, alert and appropriate with conversation and following  commands  Bilateral upper extremities with full strength including hand intrinsics and grasp.  Sensation intact throughout.  Bilateral lower extremities 5/5 strength including plantar and dorsiflexion.  Normal sensation throughout bilaterally.  Thoracic incision edges well approximated. Absent for edema, erythema, or drainage.    PLAN:    Lacy Eugene is five weeks out from a removal of thoracic 10 to pelvis instrumentation, closure of thoracic wound with Dr. Villa on 06/08/2022. She is here today to have the remainder of her retention sutures removed. Unfortunately, somebody else had already removed them prior to her arrival here. Her incision overall looks good. Today the patient reports that she is doing quite well and has no complaints.     The patient has remained compliant with the post-operative restrictions which included  not lifting anything greater than 5-10 lbs. Today we encouraged continuing to avoid excessive bending, twisting, and turning at the waist and to avoid jostling and jarring activities.     We will cancel her six week appointment for next week and have her follow-up for her 12 week appointment with Dr. Villa on 09/12/2022.     The patient gave verbal understanding and is in agreement with the above plan. She will call or " reference  Recent and Remote Memory: recent memory intact, remote memory intact; per interview/observation with patient  Attention and Concentration: intact, attentive to conversation; per interview/observation with patient  Fund of Knowledge: intact, aware of current events, vocabulary appropriate; based on history, vocabulary, fund of knowledge, syntax, grammar, and content  Insight: questionable; based on understanding of severity of illness and HPI  Judgment: questionable; based on patient's behavior and HPI     ASSESSMENT/PLAN:   Problems Addressed/Diagnoses:  Major Depressive Disorder, 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)  Panic Disorder without Agoraphobia (F41.0)   Generalized Anxiety Disorder (F41.1)   Alcohol use disorder, severe, with withdrawal (F10.230)    Past Medical History:   Diagnosis Date    Anxiety     Dysphagia     GERD (gastroesophageal reflux disease)     Hiatal hernia     Hypertension     Intestinal obstruction     Nausea with vomiting     Rectal hemorrhage     Von Willebrand's disease         Plan:  Depression, chronic with acute exacerbation  -Continue Cymbalta    Panic disorder, chronic  -Continue Cymbalta  -PRN hydroxyzine    Anxiety, chronic  -Continue Cymbalta  -PRN hydroxyzine    Alcohol withdrawal, acute  -Continue taper  -Group/Individual psychotherapy     Expected Disposition Plan: Home        LICHA Weaver-BC   return to the clinic for any worsening or changes in symptoms.    Respectfully,     STANLEY Muhammad PA-C      Again, thank you for allowing me to participate in the care of your patient.        Sincerely,        Daniel Rivera PA-C

## 2024-05-14 NOTE — PROGRESS NOTES
05/14/24 1400   [facility] Group Therapy   Group Name Therapeutic Recreation   Specific Interventions Values Clarification   Participation Level Active;Supportive;Appropriate;Attentive;Sharing   Participation Quality Cooperative;Social   Insight/Motivation Improved   Affect/Mood Display Appropriate;Bright   Cognition Oriented;Alert   Psychomotor WNL

## 2024-05-14 NOTE — PROGRESS NOTES
05/13/24 1500   [facility] Group Therapy   Group Name Therapeutic Recreation   Specific Interventions Skilled Activity Leisure Education and Awareness   Participation Level Active;Supportive;Appropriate;Attentive;Sharing   Participation Quality Cooperative;Social   Insight/Motivation Improved   Affect/Mood Display Appropriate   Cognition Oriented;Alert   Psychomotor WNL

## 2024-05-15 PROBLEM — F33.2 MAJOR DEPRESSIVE DISORDER, RECURRENT, SEVERE WITHOUT PSYCHOTIC FEATURES: Status: ACTIVE | Noted: 2024-05-15

## 2024-05-15 PROBLEM — F41.0 PANIC DISORDER WITHOUT AGORAPHOBIA: Status: ACTIVE | Noted: 2024-05-15

## 2024-05-15 PROBLEM — F41.1 GENERALIZED ANXIETY DISORDER: Status: ACTIVE | Noted: 2024-05-15

## 2024-05-15 PROBLEM — F10.230 ALCOHOL DEPENDENCE WITH UNCOMPLICATED WITHDRAWAL: Status: ACTIVE | Noted: 2024-05-15

## 2024-05-15 PROCEDURE — 25000003 PHARM REV CODE 250: Performed by: PEDIATRICS

## 2024-05-15 PROCEDURE — 11400000 HC PSYCH PRIVATE ROOM

## 2024-05-15 PROCEDURE — 25000003 PHARM REV CODE 250

## 2024-05-15 PROCEDURE — 25000003 PHARM REV CODE 250: Performed by: FAMILY MEDICINE

## 2024-05-15 PROCEDURE — 25000003 PHARM REV CODE 250: Performed by: PSYCHIATRY & NEUROLOGY

## 2024-05-15 RX ORDER — AMLODIPINE BESYLATE 5 MG/1
5 TABLET ORAL DAILY
Qty: 30 TABLET | Refills: 0 | Status: SHIPPED | OUTPATIENT
Start: 2024-05-16 | End: 2025-05-16

## 2024-05-15 RX ORDER — TERBINAFINE HYDROCHLORIDE 250 MG/1
250 TABLET ORAL DAILY
Qty: 30 TABLET | Refills: 0 | Status: SHIPPED | OUTPATIENT
Start: 2024-05-16 | End: 2024-06-15

## 2024-05-15 RX ORDER — DULOXETIN HYDROCHLORIDE 60 MG/1
60 CAPSULE, DELAYED RELEASE ORAL NIGHTLY
Qty: 30 CAPSULE | Refills: 0 | Status: SHIPPED | OUTPATIENT
Start: 2024-05-15 | End: 2025-05-15

## 2024-05-15 RX ADMIN — DULOXETINE HYDROCHLORIDE 60 MG: 30 CAPSULE, DELAYED RELEASE ORAL at 08:05

## 2024-05-15 RX ADMIN — TRAZODONE HYDROCHLORIDE 100 MG: 100 TABLET ORAL at 08:05

## 2024-05-15 RX ADMIN — TERBINAFINE HYDROCHLORIDE 250 MG: 250 TABLET ORAL at 10:05

## 2024-05-15 RX ADMIN — ONDANSETRON 4 MG: 4 TABLET, ORALLY DISINTEGRATING ORAL at 09:05

## 2024-05-15 RX ADMIN — AMLODIPINE BESYLATE 5 MG: 5 TABLET ORAL at 08:05

## 2024-05-15 RX ADMIN — ONDANSETRON 4 MG: 4 TABLET, ORALLY DISINTEGRATING ORAL at 04:05

## 2024-05-15 NOTE — NURSING
"PRN Administration Note:    Behavior:    Patient (Chelsie Lee is a 38 y.o. female, : 1985, MRN: [id number])     Allergies: Isa Holguin's  height is 5' 4" (1.626 m) and weight is 56.2 kg (123 lb 14.4 oz). Her temperature is 97.9 °F (36.6 °C). Her blood pressure is 121/81 and her pulse is 65. Her respiration is 18 and oxygen saturation is 100%.     Reason for PRN Administration: __ Complaints of nausea ________.    Intervention:    Administered _ Zofran 4 mg.,______ per physician order to Chelsie       Response:    Chelsie tolerated administration well.      Plan:     Continue to monitor per MD/PA/APRN orders; and reevaluate medication effectiveness within 30 minutes.    "

## 2024-05-15 NOTE — NURSING
"PRN Medication Follow-up Note:    Behavior:    Patient (Chelsie Lee is a 38 y.o. female, : 1985, MRN: [id number])     Allergies: Isa Holguin's  height is 5' 4" (1.626 m) and weight is 56.2 kg (123 lb 14.4 oz). Her temperature is 97.9 °F (36.6 °C). Her blood pressure is 121/81 and her pulse is 65. Her respiration is 18 and oxygen saturation is 100%.     Administered __ Zofran 4 mg.,_____ per physician order to Chelsie       Intervention:    Intervention to Chelsie's response: Administer medications as ordered. ________.       Response:    Chelsie's response: _ No further complaints of nausea.________      Plan:     Continue to monitor per MD/PA/APRN orders; and reevaluate medication effectiveness within 30 minutes.    "

## 2024-05-15 NOTE — NURSING
Zofran 4 me SL for complaints of nausea.    1000: No further complaints of nausea.  Medication effective.

## 2024-05-15 NOTE — PROGRESS NOTES
"5/15/2024  Chelsie Lee   1985   [id number]        Psychiatry Progress Note     Chief Complaint: "Doing great"    SUBJECTIVE:   Chelsie Lee is a 38 y.o. female [place] under [place] [facility] [place] herself with a kitchen knife attempting to harm herself. She stated to the ED that she feels like a burden to her family.      Attending and participating in groups per staff.  Due for discharge tomorrow.  Intrusive at times, however.  Tolerating current medication regimen without issues.  Denies thoughts of self-harm or harm to others.  No overt behavioral issues reported by staff.  Will continue current treatment plan and will proceed with discharge tomorrow.    UDS: (+)benzodiazepines  Blood alcohol: 123    Current Medications:   Scheduled Meds:    amLODIPine  5 mg Oral Daily    DULoxetine  60 mg Oral QHS    nicotine  1 patch Transdermal Daily    terbinafine HCL  250 mg Oral Daily      PRN Meds:   Current Facility-Administered Medications:     acetaminophen, 650 mg, Oral, Q6H PRN    aluminum-magnesium hydroxide-simethicone, 30 mL, Oral, Q6H PRN    cloNIDine, 0.1 mg, Oral, Q8H PRN    cloNIDine, 0.2 mg, Oral, Q8H PRN    haloperidoL, 10 mg, Oral, Q4H PRN **AND** diphenhydrAMINE, 50 mg, Oral, Q4H PRN **AND** LORazepam, 2 mg, Oral, Q4H PRN **AND** haloperidol lactate, 10 mg, Intramuscular, Q4H PRN **AND** diphenhydrAMINE, 50 mg, Intramuscular, Q4H PRN **AND** lorazepam, 2 mg, Intramuscular, Q4H PRN    hydrOXYzine HCL, 50 mg, Oral, Q4H PRN    ondansetron, 4 mg, Oral, Q6H PRN    traZODone, 100 mg, Oral, Nightly PRN   Psychotherapeutics (From admission, onward)      Start     Stop Route Frequency Ordered    05/09/24 2100  DULoxetine DR capsule 60 mg         -- Oral Nightly 05/09/24 1044    05/09/24 1100  LORazepam tablet 2 mg         05/11/24 0859 Oral 3 times daily 05/09/24 1045    05/09/24 0918  haloperidoL tablet 10 mg  (Med - Acute  Behavioral Management)        Placed in "And" Linked Group    -- Oral " "Every 4 hours PRN 05/09/24 0918    05/09/24 0918  LORazepam tablet 2 mg  (Med - Acute  Behavioral Management)        Placed in "And" Linked Group    -- Oral Every 4 hours PRN 05/09/24 0918    05/09/24 0918  haloperidol lactate injection 10 mg  (Med - Acute  Behavioral Management)        Placed in "And" Linked Group    -- IM Every 4 hours PRN 05/09/24 0918    05/09/24 0918  LORazepam injection 2 mg  (Med - Acute  Behavioral Management)        Placed in "And" Linked Group    -- IM Every 4 hours PRN 05/09/24 0918    05/09/24 0918  traZODone tablet 100 mg         -- Oral Nightly PRN 05/09/24 0918            Allergies:   Review of patient's allergies indicates:   Allergen Reactions    Penicillin Rash        OBJECTIVE:   Vitals   Vitals:    05/15/24 0829   BP: 121/81   Pulse:    Resp:    Temp:         Labs/Imaging/Studies:   No results found for this or any previous visit (from the past 36 hour(s)).       Medical Review Of Systems:  Constitutional: negative  Respiratory: negative  Cardiovascular: negative  Gastrointestinal: abdominal discomfort  Genitourinary:negative  Musculoskeletal:negative  Neurological: negative       Psychiatric Mental Status Exam:  General Appearance: appears stated age, well-developed, well-nourished  Arousal: alert  Behavior: minimizing  Movements and Motor Activity: no abnormal involuntary movements noted  Orientation: oriented to person, place, time, and situation  Speech: normal rate, normal rhythm, normal volume, normal tone  Mood: "Ok"  Affect: constricted  Thought Process: linear  Associations: intact  Thought Content and Perceptions: denies suicidal ideation, no homicidal ideation, no auditory hallucinations, no visual hallucinations, no paranoid ideation, no ideas of reference  Recent and Remote Memory: recent memory intact, remote memory intact; per interview/observation with patient  Attention and Concentration: intact, attentive to conversation; per interview/observation with " patient  Fund of Knowledge: intact, aware of current events, vocabulary appropriate; based on history, vocabulary, fund of knowledge, syntax, grammar, and content  Insight: questionable; based on understanding of severity of illness and HPI  Judgment: questionable; based on patient's behavior and HPI     ASSESSMENT/PLAN:   Problems Addressed/Diagnoses:  Major Depressive Disorder, 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)  Panic Disorder without Agoraphobia (F41.0)   Generalized Anxiety Disorder (F41.1)   Alcohol use disorder, severe, with withdrawal (F10.230)    Past Medical History:   Diagnosis Date    Anxiety     Dysphagia     GERD (gastroesophageal reflux disease)     Hiatal hernia     Hypertension     Intestinal obstruction     Nausea with vomiting     Rectal hemorrhage     Von Willebrand's disease         Plan:  Depression, chronic with acute exacerbation  -Continue Cymbalta    Panic disorder, chronic  -Continue Cymbalta  -PRN hydroxyzine    Anxiety, chronic  -Continue Cymbalta  -PRN hydroxyzine    Alcohol withdrawal, acute  -Continue taper  -Group/Individual psychotherapy     Expected Disposition Plan: Home        Jewel Tabor M.D.

## 2024-05-15 NOTE — PLAN OF CARE
Chelsie attends TR groups, is cooperative but impulsive, intrusive, and muddled thought process, interacts with and is intrusive with select peers with care taking behaviors, attends her ADL's, with questionable insight, and is slowly progressing towards her treatment goals.    Chelsie attended treatment team, was cooperative, and reporting improvement.

## 2024-05-15 NOTE — NURSING
"PRN Medication Follow-up Note:    Behavior:    Patient (Chelsie Lee is a 38 y.o. female, : 1985, MRN: [id number])     Allergies: Isa Holguin's  height is 5' 4" (1.626 m) and weight is 56.2 kg (123 lb 14.4 oz). Her temperature is 98.2 °F (36.8 °C). Her blood pressure is 129/89 and her pulse is 79. Her respiration is 18 and oxygen saturation is 100%.     Administered trazadone_______ per physician order to Chelsie       Intervention:    Intervention to Chelsie's response: none needed_________.       Response:    Chelsie's response: no further c/o voiced_________      Plan:     Continue to monitor per MD/PA/APRN orders; and reevaluate medication effectiveness within 30 minutes.   "

## 2024-05-15 NOTE — PROGRESS NOTES
05/15/24 1500   [facility] Group Therapy   Group Name Therapeutic Recreation   Specific Interventions Relaxation Training   Participation Level Active;Supportive;Appropriate;Attentive;Sharing   Participation Quality Cooperative;Social   Insight/Motivation Improved;Applies New Skills   Affect/Mood Display Appropriate;Bright;Impulsive   Cognition Oriented;Alert   Psychomotor WNL

## 2024-05-15 NOTE — NURSING
"PRN Administration Note:    Behavior:    Patient (Chelsie Lee is a 38 y.o. female, : 1985, MRN: [id number])     Allergies: Isa Holguin's  height is 5' 4" (1.626 m) and weight is 56.2 kg (123 lb 14.4 oz). Her temperature is 98.2 °F (36.8 °C). Her blood pressure is 129/89 and her pulse is 79. Her respiration is 18 and oxygen saturation is 100%.     Reason for PRN Administration: sleep__________.    Intervention:    Administered trazadone______ per physician order to Chelsie       Response:    Chelsie tolerated administration well.      Plan:     Continue to monitor per MD/PA/APRN orders; and reevaluate medication effectiveness within 30 minutes.   "

## 2024-05-15 NOTE — CARE UPDATE
Treatment Team    Pt seem for treatment team today with interdisciplinary team.  Pt is Cooperative with Tx team. Pt denies symptoms at this time. MD did not change pt meds at this time. Treatment teams goals Met at this time. Pt DC plan is home. DC date scheduled for 5.16.24. Pt agreed to follow up with [facility] in [place], [place] at discharge.  will pick her up at discharge.

## 2024-05-15 NOTE — PLAN OF CARE
Problem: Adult Behavioral Health Plan of Care  Goal: Plan of Care Review  Outcome: Met  Goal: Patient-Specific Goal (Individualization)  Outcome: Met  Goal: Adheres to Safety Considerations for Self and Others  Outcome: Met  Goal: Absence of New-Onset Illness or Injury  Outcome: Met  Goal: Optimized Coping Skills in Response to Life Stressors  Outcome: Met  Goal: Develops/Participates in Therapeutic [place] to Support Successful Transition  Outcome: Met  Goal: Rounds/Family Conference  Outcome: Met     Problem: Depressive Signs/Symptoms  Goal: Optimized Energy Level (Depressive Signs/Symptoms)  Outcome: Not Progressing  Goal: Optimized Cognitive Function (Depressive Signs/Symptoms)  Outcome: Not Progressing  Goal: Increased Participation and Engagement (Depressive Signs/Symptoms)  Outcome: Met  Goal: Enhanced Self-Esteem and Confidence (Depressive Signs/Symptoms)  Outcome: Met  Goal: Improved Mood Symptoms (Depressive Signs/Symptoms)  Outcome: Met  Goal: Optimized Nutrition Intake (Depressive Signs/Symptoms)  Outcome: Not Progressing  Goal: Improved Psychomotor Symptoms (Depressive Signs/Symptoms)  Outcome: Not Progressing  Goal: Improved Sleep (Depressive Signs/Symptoms)  Outcome: Not Progressing  Goal: Enhanced Social, Occupational or Functional Skills (Depressive Signs/Symptoms)  Outcome: Progressing     Problem: Excessive Substance Use  Goal: Improved Behavioral Control (Excessive Substance Use)  Outcome: Met  Intervention: Promote Behavior and Impulse Control  Flowsheets (Taken 5/15/2024 1120)  Behavior Management: behavioral plan developed

## 2024-05-15 NOTE — PROGRESS NOTES
05/15/24 1400   [facility] Group Therapy   Group Name Therapeutic Recreation   Specific Interventions Skilled Activity Leisure Education and Awareness   Participation Level Active;Supportive;Appropriate;Attentive;Sharing   Participation Quality Cooperative;Social   Insight/Motivation Improved;Applies New Skills   Affect/Mood Display Appropriate;Bright;Impulsive   Cognition Oriented;Alert   Psychomotor WNL

## 2024-05-15 NOTE — PLAN OF CARE
Problem: Adult Behavioral Health Plan of Care  Goal: Plan of Care Review  Outcome: Progressing  Flowsheets (Taken 5/14/2024 2038)  Patient Agreement with Plan of Care: agrees  Plan of Care Reviewed With: patient  Goal: Patient-Specific Goal (Individualization)  Outcome: Progressing  Flowsheets (Taken 5/14/2024 2038)  Patient Personal Strengths: medication/treatment adherence  Patient Vulnerabilities: limited support system  Goal: Adheres to Safety Considerations for Self and Others  Outcome: Progressing  Goal: Absence of New-Onset Illness or Injury  Outcome: Progressing  Goal: Optimized Coping Skills in Response to Life Stressors  Outcome: Progressing  Flowsheets (Taken 5/14/2024 2038)  Optimized Coping Skills in Response to Life Stressors: making progress toward outcome  Goal: Develops/Participates in Therapeutic Whitman to Support Successful Transition  Outcome: Progressing  Flowsheets (Taken 5/14/2024 2038)  Develops/Participates in Therapeutic Whitman to Support Successful Transition: making progress toward outcome  Goal: Rounds/Family Conference  Outcome: Progressing     Problem: Depressive Signs/Symptoms  Goal: Optimized Energy Level (Depressive Signs/Symptoms)  Outcome: Progressing  Flowsheets (Taken 5/14/2024 2038)  Mutually Determined Action Steps (Optimized Energy Level): dresses/ready for morning activity  Goal: Optimized Cognitive Function (Depressive Signs/Symptoms)  Outcome: Progressing  Flowsheets (Taken 5/14/2024 2038)  Mutually Determined Action Steps (Optimized Cognitive Function): remains focused during activity  Goal: Increased Participation and Engagement (Depressive Signs/Symptoms)  Outcome: Progressing  Flowsheets (Taken 5/14/2024 2038)  Mutually Determined Action Steps (Increased Participation and Engagement): participates in one or more activity  Goal: Enhanced Self-Esteem and Confidence (Depressive Signs/Symptoms)  Outcome: Progressing  Goal: Improved Mood Symptoms (Depressive  Signs/Symptoms)  Outcome: Progressing  Flowsheets (Taken 5/14/2024 2038)  Mutually Determined Action Steps (Improved Mood Symptoms): acknowledges progress  Goal: Optimized Nutrition Intake (Depressive Signs/Symptoms)  Outcome: Progressing  Goal: Improved Psychomotor Symptoms (Depressive Signs/Symptoms)  Outcome: Progressing  Flowsheets (Taken 5/14/2024 2038)  Mutually Determined Action Steps (Improved Psychomotor Symptoms): adheres to medication regimen  Goal: Improved Sleep (Depressive Signs/Symptoms)  Outcome: Progressing  Goal: Enhanced Social, Occupational or Functional Skills (Depressive Signs/Symptoms)  Outcome: Progressing     Problem: Excessive Substance Use  Goal: Optimized Energy Level (Excessive Substance Use)  Outcome: Progressing  Goal: Improved Behavioral Control (Excessive Substance Use)  Outcome: Progressing  Goal: Increased Participation and Engagement (Excessive Substance Use)  Outcome: Progressing  Goal: Improved Physiologic Symptoms (Excessive Substance Use)  Outcome: Progressing  Goal: Enhanced Social, Occupational or Functional Skills (Excessive Substance Use)  Outcome: Progressing     Problem: Suicide Risk  Goal: Absence of Self-Harm  Outcome: Progressing   Remains anxious with a flat affect.  Compliant with meds and cooperative with staff.

## 2024-05-16 VITALS
SYSTOLIC BLOOD PRESSURE: 115 MMHG | HEIGHT: 64 IN | OXYGEN SATURATION: 100 % | BODY MASS INDEX: 21.15 KG/M2 | TEMPERATURE: 99 F | HEART RATE: 67 BPM | DIASTOLIC BLOOD PRESSURE: 71 MMHG | WEIGHT: 123.88 LBS | RESPIRATION RATE: 18 BRPM

## 2024-05-16 PROCEDURE — 25000003 PHARM REV CODE 250: Performed by: FAMILY MEDICINE

## 2024-05-16 RX ADMIN — TERBINAFINE HYDROCHLORIDE 250 MG: 250 TABLET ORAL at 08:05

## 2024-05-16 RX ADMIN — AMLODIPINE BESYLATE 5 MG: 5 TABLET ORAL at 08:05

## 2024-05-16 NOTE — PLAN OF CARE
Chelsie met reported treatment goals of coping skills.  CTRS Discharge Recommendations:  Encouraged Pt. to actively utilize available community resources to increase leisure involvement to decrease signs and symptoms of illness.  Encouraged Pt. to utilize coping skills on a regular basis to reduce the risk of decomposition and re-hospitalization.

## 2024-05-16 NOTE — PLAN OF CARE
Problem: Depressive Signs/Symptoms  Goal: Optimized Energy Level (Depressive Signs/Symptoms)  5/15/2024 2224 by Kasie Samuel RN  Outcome: Progressing  Flowsheets (Taken 5/15/2024 2224)  Mutually Determined Action Steps (Optimized Energy Level): dresses/ready for morning activity  5/15/2024 2223 by Kasie Samuel RN  Outcome: Progressing  Goal: Optimized Cognitive Function (Depressive Signs/Symptoms)  5/15/2024 2224 by Kasie Samuel RN  Outcome: Progressing  Flowsheets (Taken 5/15/2024 2224)  Mutually Determined Action Steps (Optimized Cognitive Function): remains focused during activity  5/15/2024 2223 by Kasie Samuel RN  Outcome: Progressing  Goal: Optimized Nutrition Intake (Depressive Signs/Symptoms)  5/15/2024 2224 by Kasie Samuel RN  Outcome: Progressing  5/15/2024 2223 by Kasie Samuel RN  Outcome: Progressing  Goal: Improved Psychomotor Symptoms (Depressive Signs/Symptoms)  5/15/2024 2224 by Kasie Samuel RN  Outcome: Progressing  5/15/2024 2223 by Kasie Samuel RN  Outcome: Progressing  Goal: Improved Sleep (Depressive Signs/Symptoms)  5/15/2024 2224 by Kasie Samuel RN  Outcome: Progressing  5/15/2024 2223 by Kasie Samuel RN  Outcome: Progressing  Goal: Enhanced Social, Occupational or Functional Skills (Depressive Signs/Symptoms)  5/15/2024 2224 by Kasie Samuel RN  Outcome: Progressing  5/15/2024 2223 by Kasie Samuel RN  Outcome: Progressing     Problem: Excessive Substance Use  Goal: Optimized Energy Level (Excessive Substance Use)  5/15/2024 2224 by Kasie Samuel RN  Outcome: Progressing  5/15/2024 2223 by Kasie Samuel RN  Outcome: Progressing  Goal: Increased Participation and Engagement (Excessive Substance Use)  5/15/2024 2224 by Kasie Samuel RN  Outcome: Progressing  5/15/2024 2223 by Kasie Samuel RN  Outcome: Progressing  Goal: Improved Physiologic Symptoms (Excessive Substance Use)  5/15/2024 2224 by Kasie Samuel RN  Outcome: Progressing  5/15/2024 3583  by Kasie Samuel, RN  Outcome: Progressing  Goal: Enhanced Social, Occupational or Functional Skills (Excessive Substance Use)  5/15/2024 2224 by Kasie Samuel, RN  Outcome: Progressing  5/15/2024 2223 by Kasie Samuel, RN  Outcome: Progressing   Remains anxious with a flat affect. Compliant with meds and cooperative with staff.

## 2024-05-16 NOTE — NURSING
Discharge Note:    Chelsie Lee is a 38 y.o. female, : 1985, MRN: [id number], admitted on 2024 for Jewel Tabor MD with a diagnosis of Major depressive disorder [F32.9].    Patient discharged on 2024 per physician orders in stable condition. Patient denied suicidal ideation, homicidal ideation, or hallucinations. Patient was discharged with valuables, personal belongings, prescriptions, discharge instructions, and an educational handout explaining the diagnosis and prescribed medications. Patient verbalized understanding of the discharge instructions and importance of follow-up visits. Patient was escorted out of the facility by [facility] and placed into a private vehicle to be transported to home.     Patient discharged on the following medications:     Medication List        START taking these medications      DULoxetine 60 MG capsule  Commonly known as: CYMBALTA  Take 1 capsule (60 mg total) by mouth every evening.     terbinafine  mg tablet  Commonly known as: LAMISIL  Take 1 tablet (250 mg total) by mouth once daily.            CHANGE how you take these medications      amLODIPine 5 MG tablet  Commonly known as: NORVASC  Take 1 tablet (5 mg total) by mouth once daily.  What changed:   medication strength  how much to take  when to take this            STOP taking these medications      acetaminophen 500 MG tablet  Commonly known as: TYLENOL     ALPRAZolam 2 MG Tab  Commonly known as: XANAX     BARIATRIC MULTIVITAMINS ORAL     cyanocobalamin 1,000 mcg/mL injection     cyanocobalamin 100 MCG tablet  Commonly known as: VITAMIN B-12     ergocalciferol 50,000 unit Cap  Commonly known as: ERGOCALCIFEROL     famotidine 20 MG tablet  Commonly known as: PEPCID     megestroL 400 mg/10 mL (10 mL) Susp  Commonly known as: MEGACE     mirtazapine 7.5 MG Tab  Commonly known as: REMERON     ondansetron 4 MG Tbdl  Commonly known as: ZOFRAN-ODT     pantoprazole 40 MG  tablet  Commonly known as: PROTONIX     traMADoL 50 mg tablet  Commonly known as: ULTRAM               Where to Get Your Medications        You can get these medications from any pharmacy    Bring a paper prescription for each of these medications  amLODIPine 5 MG tablet  DULoxetine 60 MG capsule  terbinafine  mg tablet

## 2024-05-16 NOTE — PLAN OF CARE
Problem: Depressive Signs/Symptoms  Goal: Optimized Energy Level (Depressive Signs/Symptoms)  Outcome: Met  Goal: Optimized Cognitive Function (Depressive Signs/Symptoms)  Outcome: Met  Goal: Optimized Nutrition Intake (Depressive Signs/Symptoms)  Outcome: Met  Goal: Improved Psychomotor Symptoms (Depressive Signs/Symptoms)  Outcome: Met  Goal: Improved Sleep (Depressive Signs/Symptoms)  Outcome: Met  Goal: Enhanced Social, Occupational or Functional Skills (Depressive Signs/Symptoms)  Outcome: Met     Problem: Excessive Substance Use  Goal: Optimized Energy Level (Excessive Substance Use)  Outcome: Met  Goal: Increased Participation and Engagement (Excessive Substance Use)  Outcome: Met  Goal: Improved Physiologic Symptoms (Excessive Substance Use)  Outcome: Met  Goal: Enhanced Social, Occupational or Functional Skills (Excessive Substance Use)  Outcome: Met     Problem: Suicide Risk  Goal: Absence of Self-Harm  Outcome: Met

## 2024-05-16 NOTE — NURSING
"PRN Medication Follow-up Note:    Behavior:    Patient (Chelsie Lee is a 38 y.o. female, : 1985, MRN: [id number])     Allergies: Isa Holguin's  height is 5' 4" (1.626 m) and weight is 56.2 kg (123 lb 14.4 oz). Her temperature is 97.9 °F (36.6 °C). Her blood pressure is 121/81 and her pulse is 65. Her respiration is 18 and oxygen saturation is 100%.     Administered trazadone_______ per physician order to Chelsie       Intervention:    Intervention to Chelsie's response: none needed_________.       Response:    Chelsie's response: no further c/o voiced_________      Plan:     Continue to monitor per MD/PA/APRN orders; and reevaluate medication effectiveness within 30 minutes.   "
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
## 2024-05-16 NOTE — NURSING
"PRN Administration Note:    Behavior:    Patient (Chelsie Lee is a 38 y.o. female, : 1985, MRN: [id number])     Allergies: Isa Holguin's  height is 5' 4" (1.626 m) and weight is 56.2 kg (123 lb 14.4 oz). Her temperature is 97.9 °F (36.6 °C). Her blood pressure is 121/81 and her pulse is 65. Her respiration is 18 and oxygen saturation is 100%.     Reason for PRN Administration: sleep__________.    Intervention:    Administered trazadone_______ per physician order to Chelsie       Response:    Chelsie tolerated administration well.      Plan:     Continue to monitor per MD/PA/APRN orders; and reevaluate medication effectiveness within 30 minutes.   "
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
## 2024-08-28 ENCOUNTER — HOSPITAL ENCOUNTER (INPATIENT)
Facility: HOSPITAL | Age: 39
LOS: 2 days | Discharge: HOME | DRG: 493 | End: 2024-08-30
Attending: STUDENT IN AN ORGANIZED HEALTH CARE EDUCATION/TRAINING PROGRAM | Admitting: STUDENT IN AN ORGANIZED HEALTH CARE EDUCATION/TRAINING PROGRAM
Payer: COMMERCIAL

## 2024-08-28 DIAGNOSIS — S82.892A CLOSED FRACTURE OF LEFT ANKLE, INITIAL ENCOUNTER: Primary | ICD-10-CM
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
## 2024-08-28 DIAGNOSIS — S22.20XA STERNAL FRACTURE: ICD-10-CM

## 2024-08-28 DIAGNOSIS — G40.909 SEIZURE DISORDER: ICD-10-CM

## 2024-08-28 DIAGNOSIS — S22.49XA MULTIPLE RIB FRACTURES: ICD-10-CM

## 2024-08-28 LAB
ALBUMIN SERPL-MCNC: 3.7 G/DL (ref 3.5–5)
ALBUMIN/GLOB SERPL: 1.3 RATIO (ref 1.1–2)
ALP SERPL-CCNC: 76 UNIT/L (ref 40–150)
ALT SERPL-CCNC: 14 UNIT/L (ref 0–55)
ANION GAP SERPL CALC-SCNC: 11 MEQ/L
AST SERPL-CCNC: 22 UNIT/L (ref 5–34)
BASOPHILS # BLD AUTO: 0.05 X10(3)/MCL
BASOPHILS NFR BLD AUTO: 0.5 %
BILIRUB SERPL-MCNC: 2 MG/DL
BUN SERPL-MCNC: 6.3 MG/DL (ref 7–18.7)
CALCIUM SERPL-MCNC: 8.6 MG/DL (ref 8.4–10.2)
CHLORIDE SERPL-SCNC: 105 MMOL/L (ref 98–107)
CO2 SERPL-SCNC: 20 MMOL/L (ref 22–29)
CREAT SERPL-MCNC: 0.57 MG/DL (ref 0.55–1.02)
CREAT/UREA NIT SERPL: 11
EOSINOPHIL # BLD AUTO: 0.02 X10(3)/MCL (ref 0–0.9)
EOSINOPHIL NFR BLD AUTO: 0.2 %
ERYTHROCYTE [DISTWIDTH] IN BLOOD BY AUTOMATED COUNT: 18.2 % (ref 11.5–17)
GFR SERPLBLD CREATININE-BSD FMLA CKD-EPI: >60 ML/MIN/1.73/M2
GLOBULIN SER-MCNC: 2.9 GM/DL (ref 2.4–3.5)
GLUCOSE SERPL-MCNC: 97 MG/DL (ref 74–100)
HCT VFR BLD AUTO: 31.6 % (ref 37–47)
HGB BLD-MCNC: 9.6 G/DL (ref 12–16)
IMM GRANULOCYTES # BLD AUTO: 0.07 X10(3)/MCL (ref 0–0.04)
IMM GRANULOCYTES NFR BLD AUTO: 0.6 %
LYMPHOCYTES # BLD AUTO: 1.14 X10(3)/MCL (ref 0.6–4.6)
LYMPHOCYTES NFR BLD AUTO: 10.4 %
MCH RBC QN AUTO: 23.1 PG (ref 27–31)
MCHC RBC AUTO-ENTMCNC: 30.4 G/DL (ref 33–36)
MCV RBC AUTO: 76.1 FL (ref 80–94)
MONOCYTES # BLD AUTO: 1.06 X10(3)/MCL (ref 0.1–1.3)
MONOCYTES NFR BLD AUTO: 9.7 %
NEUTROPHILS # BLD AUTO: 8.63 X10(3)/MCL (ref 2.1–9.2)
NEUTROPHILS NFR BLD AUTO: 78.6 %
NRBC BLD AUTO-RTO: 0 %
PLATELET # BLD AUTO: 233 X10(3)/MCL (ref 130–400)
PMV BLD AUTO: 10.5 FL (ref 7.4–10.4)
POTASSIUM SERPL-SCNC: 3.8 MMOL/L (ref 3.5–5.1)
PROT SERPL-MCNC: 6.6 GM/DL (ref 6.4–8.3)
RBC # BLD AUTO: 4.15 X10(6)/MCL (ref 4.2–5.4)
SODIUM SERPL-SCNC: 136 MMOL/L (ref 136–145)
TROPONIN I SERPL-MCNC: <0.01 NG/ML (ref 0–0.04)
WBC # BLD AUTO: 10.97 X10(3)/MCL (ref 4.5–11.5)

## 2024-08-28 PROCEDURE — 80053 COMPREHEN METABOLIC PANEL: CPT | Performed by: STUDENT IN AN ORGANIZED HEALTH CARE EDUCATION/TRAINING PROGRAM

## 2024-08-28 PROCEDURE — 84484 ASSAY OF TROPONIN QUANT: CPT | Performed by: STUDENT IN AN ORGANIZED HEALTH CARE EDUCATION/TRAINING PROGRAM

## 2024-08-28 PROCEDURE — 63600175 PHARM REV CODE 636 W HCPCS

## 2024-08-28 PROCEDURE — 93010 ELECTROCARDIOGRAM REPORT: CPT | Mod: ,,, | Performed by: INTERNAL MEDICINE

## 2024-08-28 PROCEDURE — 99285 EMERGENCY DEPT VISIT HI MDM: CPT | Mod: 25

## 2024-08-28 PROCEDURE — 99223 1ST HOSP IP/OBS HIGH 75: CPT | Mod: ,,, | Performed by: STUDENT IN AN ORGANIZED HEALTH CARE EDUCATION/TRAINING PROGRAM

## 2024-08-28 PROCEDURE — 11000001 HC ACUTE MED/SURG PRIVATE ROOM

## 2024-08-28 PROCEDURE — 85025 COMPLETE CBC W/AUTO DIFF WBC: CPT | Performed by: STUDENT IN AN ORGANIZED HEALTH CARE EDUCATION/TRAINING PROGRAM

## 2024-08-28 PROCEDURE — 93005 ELECTROCARDIOGRAM TRACING: CPT

## 2024-08-28 RX ORDER — METHOCARBAMOL 500 MG/1
500 TABLET, FILM COATED ORAL EVERY 8 HOURS
Status: DISCONTINUED | OUTPATIENT
Start: 2024-08-29 | End: 2024-08-30 | Stop reason: HOSPADM

## 2024-08-28 RX ORDER — ADHESIVE BANDAGE
30 BANDAGE TOPICAL DAILY PRN
Status: DISCONTINUED | OUTPATIENT
Start: 2024-08-29 | End: 2024-08-30 | Stop reason: HOSPADM

## 2024-08-28 RX ORDER — DOCUSATE SODIUM 100 MG/1
100 CAPSULE, LIQUID FILLED ORAL 2 TIMES DAILY
Status: DISCONTINUED | OUTPATIENT
Start: 2024-08-28 | End: 2024-08-30 | Stop reason: HOSPADM

## 2024-08-28 RX ORDER — LIDOCAINE 50 MG/G
1 PATCH TOPICAL
Status: DISCONTINUED | OUTPATIENT
Start: 2024-08-29 | End: 2024-08-30 | Stop reason: HOSPADM

## 2024-08-28 RX ORDER — POLYETHYLENE GLYCOL 3350 17 G/17G
17 POWDER, FOR SOLUTION ORAL 2 TIMES DAILY
Status: DISCONTINUED | OUTPATIENT
Start: 2024-08-28 | End: 2024-08-30 | Stop reason: HOSPADM

## 2024-08-28 RX ORDER — ACETAMINOPHEN 325 MG/1
650 TABLET ORAL EVERY 4 HOURS
Status: DISCONTINUED | OUTPATIENT
Start: 2024-08-29 | End: 2024-08-30 | Stop reason: HOSPADM

## 2024-08-28 RX ORDER — SODIUM CHLORIDE, SODIUM LACTATE, POTASSIUM CHLORIDE, CALCIUM CHLORIDE 600; 310; 30; 20 MG/100ML; MG/100ML; MG/100ML; MG/100ML
INJECTION, SOLUTION INTRAVENOUS CONTINUOUS
Status: DISCONTINUED | OUTPATIENT
Start: 2024-08-29 | End: 2024-08-30

## 2024-08-28 RX ORDER — ONDANSETRON HYDROCHLORIDE 2 MG/ML
4 INJECTION, SOLUTION INTRAVENOUS
Status: COMPLETED | OUTPATIENT
Start: 2024-08-28 | End: 2024-08-28

## 2024-08-28 RX ORDER — OXYCODONE HYDROCHLORIDE 10 MG/1
10 TABLET ORAL EVERY 4 HOURS PRN
Status: DISCONTINUED | OUTPATIENT
Start: 2024-08-29 | End: 2024-08-30 | Stop reason: HOSPADM

## 2024-08-28 RX ORDER — MUPIROCIN 20 MG/G
OINTMENT TOPICAL 2 TIMES DAILY
Status: DISCONTINUED | OUTPATIENT
Start: 2024-08-29 | End: 2024-08-30 | Stop reason: HOSPADM

## 2024-08-28 RX ORDER — TALC
6 POWDER (GRAM) TOPICAL NIGHTLY PRN
Status: DISCONTINUED | OUTPATIENT
Start: 2024-08-29 | End: 2024-08-30 | Stop reason: HOSPADM

## 2024-08-28 RX ORDER — GABAPENTIN 300 MG/1
300 CAPSULE ORAL 3 TIMES DAILY
Status: DISCONTINUED | OUTPATIENT
Start: 2024-08-29 | End: 2024-08-30 | Stop reason: HOSPADM

## 2024-08-28 RX ORDER — OXYCODONE HYDROCHLORIDE 5 MG/1
5 TABLET ORAL EVERY 4 HOURS PRN
Status: DISCONTINUED | OUTPATIENT
Start: 2024-08-29 | End: 2024-08-30 | Stop reason: HOSPADM

## 2024-08-28 RX ORDER — HYDROMORPHONE HYDROCHLORIDE 2 MG/ML
0.5 INJECTION, SOLUTION INTRAMUSCULAR; INTRAVENOUS; SUBCUTANEOUS
Status: COMPLETED | OUTPATIENT
Start: 2024-08-28 | End: 2024-08-28

## 2024-08-28 RX ORDER — ENOXAPARIN SODIUM 100 MG/ML
40 INJECTION SUBCUTANEOUS EVERY 12 HOURS
Status: DISCONTINUED | OUTPATIENT
Start: 2024-08-28 | End: 2024-08-30 | Stop reason: HOSPADM

## 2024-08-28 RX ADMIN — HYDROMORPHONE HYDROCHLORIDE 0.5 MG: 2 INJECTION INTRAMUSCULAR; INTRAVENOUS; SUBCUTANEOUS at 11:08

## 2024-08-28 RX ADMIN — ONDANSETRON 4 MG: 2 INJECTION INTRAMUSCULAR; INTRAVENOUS at 11:08

## 2024-08-29 ENCOUNTER — ANESTHESIA EVENT (OUTPATIENT)
Dept: SURGERY | Facility: HOSPITAL | Age: 39
End: 2024-08-29
Payer: COMMERCIAL

## 2024-08-29 ENCOUNTER — ANESTHESIA (OUTPATIENT)
Dept: SURGERY | Facility: HOSPITAL | Age: 39
End: 2024-08-29
Payer: COMMERCIAL

## 2024-08-29 PROBLEM — S27.321A CONTUSION OF RIGHT LUNG: Status: ACTIVE | Noted: 2024-08-29
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
## 2024-08-29 PROBLEM — S22.43XA CLOSED FRACTURE OF MULTIPLE RIBS OF BOTH SIDES: Status: ACTIVE | Noted: 2024-08-29

## 2024-08-29 PROBLEM — J93.9 PNEUMOTHORAX ON RIGHT: Status: ACTIVE | Noted: 2024-08-29

## 2024-08-29 PROBLEM — S22.22XA CLOSED FRACTURE OF BODY OF STERNUM: Status: ACTIVE | Noted: 2024-08-29

## 2024-08-29 LAB
ALBUMIN SERPL-MCNC: 3.3 G/DL (ref 3.5–5)
ALBUMIN/GLOB SERPL: 1.1 RATIO (ref 1.1–2)
ALP SERPL-CCNC: 73 UNIT/L (ref 40–150)
ALT SERPL-CCNC: 13 UNIT/L (ref 0–55)
ANION GAP SERPL CALC-SCNC: 14 MEQ/L
AST SERPL-CCNC: 19 UNIT/L (ref 5–34)
BASOPHILS # BLD AUTO: 0.06 X10(3)/MCL
BASOPHILS NFR BLD AUTO: 0.7 %
BILIRUB DIRECT SERPL-MCNC: 0.5 MG/DL (ref 0–?)
BILIRUB SERPL-MCNC: 1.7 MG/DL
BILIRUB SERPL-MCNC: 2.2 MG/DL
BILIRUBIN DIRECT+TOT PNL SERPL-MCNC: 1.2 MG/DL (ref 0–0.8)
BUN SERPL-MCNC: 6 MG/DL (ref 7–18.7)
CALCIUM SERPL-MCNC: 8.4 MG/DL (ref 8.4–10.2)
CHLORIDE SERPL-SCNC: 105 MMOL/L (ref 98–107)
CO2 SERPL-SCNC: 15 MMOL/L (ref 22–29)
CREAT SERPL-MCNC: 0.6 MG/DL (ref 0.55–1.02)
CREAT/UREA NIT SERPL: 10
CRP SERPL-MCNC: 11.1 MG/L
EOSINOPHIL # BLD AUTO: 0.09 X10(3)/MCL (ref 0–0.9)
EOSINOPHIL NFR BLD AUTO: 1.1 %
ERYTHROCYTE [DISTWIDTH] IN BLOOD BY AUTOMATED COUNT: 18.2 % (ref 11.5–17)
FOLATE SERPL-MCNC: 12.8 NG/ML (ref 7–31.4)
GFR SERPLBLD CREATININE-BSD FMLA CKD-EPI: >60 ML/MIN/1.73/M2
GLOBULIN SER-MCNC: 3 GM/DL (ref 2.4–3.5)
GLUCOSE SERPL-MCNC: 80 MG/DL (ref 74–100)
HCT VFR BLD AUTO: 32.2 % (ref 37–47)
HGB BLD-MCNC: 9.7 G/DL (ref 12–16)
IMM GRANULOCYTES # BLD AUTO: 0.03 X10(3)/MCL (ref 0–0.04)
IMM GRANULOCYTES NFR BLD AUTO: 0.4 %
IRON SATN MFR SERPL: 23 % (ref 20–50)
IRON SERPL-MCNC: 78 UG/DL (ref 50–170)
LYMPHOCYTES # BLD AUTO: 1.94 X10(3)/MCL (ref 0.6–4.6)
LYMPHOCYTES NFR BLD AUTO: 23 %
MAGNESIUM SERPL-MCNC: 1.8 MG/DL (ref 1.6–2.6)
MCH RBC QN AUTO: 22.8 PG (ref 27–31)
MCHC RBC AUTO-ENTMCNC: 30.1 G/DL (ref 33–36)
MCV RBC AUTO: 75.6 FL (ref 80–94)
MONOCYTES # BLD AUTO: 0.92 X10(3)/MCL (ref 0.1–1.3)
MONOCYTES NFR BLD AUTO: 10.9 %
NEUTROPHILS # BLD AUTO: 5.38 X10(3)/MCL (ref 2.1–9.2)
NEUTROPHILS NFR BLD AUTO: 63.9 %
NRBC BLD AUTO-RTO: 0 %
OHS QRS DURATION: 90 MS
OHS QTC CALCULATION: 472 MS
PHOSPHATE SERPL-MCNC: 3.4 MG/DL (ref 2.3–4.7)
PLATELET # BLD AUTO: 204 X10(3)/MCL (ref 130–400)
PMV BLD AUTO: 11.2 FL (ref 7.4–10.4)
POTASSIUM SERPL-SCNC: 3.6 MMOL/L (ref 3.5–5.1)
PREALB SERPL-MCNC: 18.7 MG/DL (ref 16–38)
PROT SERPL-MCNC: 6.3 GM/DL (ref 6.4–8.3)
RBC # BLD AUTO: 4.26 X10(6)/MCL (ref 4.2–5.4)
SODIUM SERPL-SCNC: 134 MMOL/L (ref 136–145)
TIBC SERPL-MCNC: 267 UG/DL (ref 70–310)
TIBC SERPL-MCNC: 345 UG/DL (ref 250–450)
TRANSFERRIN SERPL-MCNC: 309 MG/DL (ref 180–382)
VIT B12 SERPL-MCNC: 403 PG/ML (ref 213–816)
WBC # BLD AUTO: 8.42 X10(3)/MCL (ref 4.5–11.5)

## 2024-08-29 PROCEDURE — 80053 COMPREHEN METABOLIC PANEL: CPT

## 2024-08-29 PROCEDURE — 27201423 OPTIME MED/SURG SUP & DEVICES STERILE SUPPLY: Performed by: ORTHOPAEDIC SURGERY

## 2024-08-29 PROCEDURE — 85025 COMPLETE CBC W/AUTO DIFF WBC: CPT

## 2024-08-29 PROCEDURE — 11000001 HC ACUTE MED/SURG PRIVATE ROOM

## 2024-08-29 PROCEDURE — 63600175 PHARM REV CODE 636 W HCPCS: Performed by: ORTHOPAEDIC SURGERY

## 2024-08-29 PROCEDURE — C1769 GUIDE WIRE: HCPCS | Performed by: ORTHOPAEDIC SURGERY

## 2024-08-29 PROCEDURE — 63600175 PHARM REV CODE 636 W HCPCS

## 2024-08-29 PROCEDURE — 71000015 HC POSTOP RECOV 1ST HR: Performed by: ORTHOPAEDIC SURGERY

## 2024-08-29 PROCEDURE — 82247 BILIRUBIN TOTAL: CPT

## 2024-08-29 PROCEDURE — 63600175 PHARM REV CODE 636 W HCPCS: Performed by: NURSE PRACTITIONER

## 2024-08-29 PROCEDURE — 0QSH04Z REPOSITION LEFT TIBIA WITH INTERNAL FIXATION DEVICE, OPEN APPROACH: ICD-10-PCS | Performed by: ORTHOPAEDIC SURGERY

## 2024-08-29 PROCEDURE — 37000008 HC ANESTHESIA 1ST 15 MINUTES: Performed by: ORTHOPAEDIC SURGERY

## 2024-08-29 PROCEDURE — 36000708 HC OR TIME LEV III 1ST 15 MIN: Performed by: ORTHOPAEDIC SURGERY

## 2024-08-29 PROCEDURE — 83540 ASSAY OF IRON: CPT

## 2024-08-29 PROCEDURE — 82607 VITAMIN B-12: CPT

## 2024-08-29 PROCEDURE — 84134 ASSAY OF PREALBUMIN: CPT

## 2024-08-29 PROCEDURE — 71000033 HC RECOVERY, INTIAL HOUR: Performed by: ORTHOPAEDIC SURGERY

## 2024-08-29 PROCEDURE — 63600175 PHARM REV CODE 636 W HCPCS: Performed by: ANESTHESIOLOGY

## 2024-08-29 PROCEDURE — 36415 COLL VENOUS BLD VENIPUNCTURE: CPT

## 2024-08-29 PROCEDURE — 36000709 HC OR TIME LEV III EA ADD 15 MIN: Performed by: ORTHOPAEDIC SURGERY

## 2024-08-29 PROCEDURE — 25000003 PHARM REV CODE 250

## 2024-08-29 PROCEDURE — 99231 SBSQ HOSP IP/OBS SF/LOW 25: CPT | Mod: ,,, | Performed by: STUDENT IN AN ORGANIZED HEALTH CARE EDUCATION/TRAINING PROGRAM

## 2024-08-29 PROCEDURE — 63600175 PHARM REV CODE 636 W HCPCS: Performed by: NURSE ANESTHETIST, CERTIFIED REGISTERED

## 2024-08-29 PROCEDURE — 97166 OT EVAL MOD COMPLEX 45 MIN: CPT

## 2024-08-29 PROCEDURE — C1713 ANCHOR/SCREW BN/BN,TIS/BN: HCPCS | Performed by: ORTHOPAEDIC SURGERY

## 2024-08-29 PROCEDURE — 83735 ASSAY OF MAGNESIUM: CPT

## 2024-08-29 PROCEDURE — 71000016 HC POSTOP RECOV ADDL HR: Performed by: ORTHOPAEDIC SURGERY

## 2024-08-29 PROCEDURE — 25000003 PHARM REV CODE 250: Performed by: NURSE ANESTHETIST, CERTIFIED REGISTERED

## 2024-08-29 PROCEDURE — 84100 ASSAY OF PHOSPHORUS: CPT

## 2024-08-29 PROCEDURE — 86140 C-REACTIVE PROTEIN: CPT

## 2024-08-29 PROCEDURE — 37000009 HC ANESTHESIA EA ADD 15 MINS: Performed by: ORTHOPAEDIC SURGERY

## 2024-08-29 PROCEDURE — 97162 PT EVAL MOD COMPLEX 30 MIN: CPT

## 2024-08-29 PROCEDURE — 82746 ASSAY OF FOLIC ACID SERUM: CPT

## 2024-08-29 PROCEDURE — 99223 1ST HOSP IP/OBS HIGH 75: CPT | Mod: ,,, | Performed by: ORTHOPAEDIC SURGERY

## 2024-08-29 PROCEDURE — 71000039 HC RECOVERY, EACH ADD'L HOUR: Performed by: ORTHOPAEDIC SURGERY

## 2024-08-29 DEVICE — IMPLANTABLE DEVICE: Type: IMPLANTABLE DEVICE | Site: ANKLE | Status: FUNCTIONAL

## 2024-08-29 RX ORDER — DIPHENHYDRAMINE HYDROCHLORIDE 50 MG/ML
25 INJECTION INTRAMUSCULAR; INTRAVENOUS EVERY 6 HOURS PRN
Status: DISCONTINUED | OUTPATIENT
Start: 2024-08-29 | End: 2024-08-29 | Stop reason: HOSPADM

## 2024-08-29 RX ORDER — LIDOCAINE HYDROCHLORIDE 10 MG/ML
1 INJECTION, SOLUTION EPIDURAL; INFILTRATION; INTRACAUDAL; PERINEURAL ONCE
OUTPATIENT
Start: 2024-08-29 | End: 2024-08-29

## 2024-08-29 RX ORDER — METOCLOPRAMIDE HYDROCHLORIDE 5 MG/ML
10 INJECTION INTRAMUSCULAR; INTRAVENOUS EVERY 10 MIN PRN
Status: COMPLETED | OUTPATIENT
Start: 2024-08-29 | End: 2024-08-29

## 2024-08-29 RX ORDER — MEPERIDINE HYDROCHLORIDE 25 MG/ML
12.5 INJECTION INTRAMUSCULAR; INTRAVENOUS; SUBCUTANEOUS ONCE
OUTPATIENT
Start: 2024-08-29 | End: 2024-08-29

## 2024-08-29 RX ORDER — SODIUM CHLORIDE 9 MG/ML
INJECTION, SOLUTION INTRAVENOUS CONTINUOUS
OUTPATIENT
Start: 2024-08-29

## 2024-08-29 RX ORDER — ONDANSETRON HYDROCHLORIDE 2 MG/ML
4 INJECTION, SOLUTION INTRAVENOUS
Status: COMPLETED | OUTPATIENT
Start: 2024-08-29 | End: 2024-08-29

## 2024-08-29 RX ORDER — IPRATROPIUM BROMIDE AND ALBUTEROL SULFATE 2.5; .5 MG/3ML; MG/3ML
3 SOLUTION RESPIRATORY (INHALATION) ONCE AS NEEDED
Status: DISCONTINUED | OUTPATIENT
Start: 2024-08-29 | End: 2024-08-29 | Stop reason: HOSPADM

## 2024-08-29 RX ORDER — ONDANSETRON HYDROCHLORIDE 2 MG/ML
4 INJECTION, SOLUTION INTRAVENOUS EVERY 6 HOURS PRN
Status: DISCONTINUED | OUTPATIENT
Start: 2024-08-29 | End: 2024-08-30 | Stop reason: HOSPADM

## 2024-08-29 RX ORDER — DEXAMETHASONE SODIUM PHOSPHATE 4 MG/ML
INJECTION, SOLUTION INTRA-ARTICULAR; INTRALESIONAL; INTRAMUSCULAR; INTRAVENOUS; SOFT TISSUE
Status: DISCONTINUED | OUTPATIENT
Start: 2024-08-29 | End: 2024-08-29

## 2024-08-29 RX ORDER — CEFAZOLIN SODIUM 1 G/3ML
INJECTION, POWDER, FOR SOLUTION INTRAMUSCULAR; INTRAVENOUS
Status: DISCONTINUED | OUTPATIENT
Start: 2024-08-29 | End: 2024-08-29

## 2024-08-29 RX ORDER — HYDROMORPHONE HYDROCHLORIDE 2 MG/ML
0.5 INJECTION, SOLUTION INTRAMUSCULAR; INTRAVENOUS; SUBCUTANEOUS EVERY 4 HOURS PRN
Status: DISPENSED | OUTPATIENT
Start: 2024-08-29 | End: 2024-08-30

## 2024-08-29 RX ORDER — METOCLOPRAMIDE HYDROCHLORIDE 5 MG/ML
10 INJECTION INTRAMUSCULAR; INTRAVENOUS ONCE
OUTPATIENT
Start: 2024-08-29 | End: 2024-08-29

## 2024-08-29 RX ORDER — MIDAZOLAM HYDROCHLORIDE 1 MG/ML
INJECTION INTRAMUSCULAR; INTRAVENOUS
Status: DISCONTINUED | OUTPATIENT
Start: 2024-08-29 | End: 2024-08-29

## 2024-08-29 RX ORDER — FAMOTIDINE 10 MG/ML
20 INJECTION INTRAVENOUS ONCE
OUTPATIENT
Start: 2024-08-29 | End: 2024-08-29

## 2024-08-29 RX ORDER — KETOROLAC TROMETHAMINE 30 MG/ML
INJECTION, SOLUTION INTRAMUSCULAR; INTRAVENOUS
Status: DISCONTINUED | OUTPATIENT
Start: 2024-08-29 | End: 2024-08-29

## 2024-08-29 RX ORDER — HYDROCODONE BITARTRATE AND ACETAMINOPHEN 5; 325 MG/1; MG/1
1 TABLET ORAL
Status: DISCONTINUED | OUTPATIENT
Start: 2024-08-29 | End: 2024-08-29 | Stop reason: HOSPADM

## 2024-08-29 RX ORDER — HYDROMORPHONE HYDROCHLORIDE 2 MG/ML
0.2 INJECTION, SOLUTION INTRAMUSCULAR; INTRAVENOUS; SUBCUTANEOUS EVERY 5 MIN PRN
Status: DISCONTINUED | OUTPATIENT
Start: 2024-08-29 | End: 2024-08-29 | Stop reason: HOSPADM

## 2024-08-29 RX ORDER — VANCOMYCIN HYDROCHLORIDE 1 G/20ML
INJECTION, POWDER, LYOPHILIZED, FOR SOLUTION INTRAVENOUS
Status: DISCONTINUED | OUTPATIENT
Start: 2024-08-29 | End: 2024-08-29 | Stop reason: HOSPADM

## 2024-08-29 RX ORDER — LIDOCAINE HYDROCHLORIDE 20 MG/ML
INJECTION, SOLUTION EPIDURAL; INFILTRATION; INTRACAUDAL; PERINEURAL
Status: DISCONTINUED | OUTPATIENT
Start: 2024-08-29 | End: 2024-08-29

## 2024-08-29 RX ORDER — METHOCARBAMOL 100 MG/ML
1000 INJECTION, SOLUTION INTRAMUSCULAR; INTRAVENOUS ONCE
Status: COMPLETED | OUTPATIENT
Start: 2024-08-29 | End: 2024-08-29

## 2024-08-29 RX ORDER — HYDROMORPHONE HYDROCHLORIDE 2 MG/ML
0.5 INJECTION, SOLUTION INTRAMUSCULAR; INTRAVENOUS; SUBCUTANEOUS EVERY 5 MIN PRN
Status: DISCONTINUED | OUTPATIENT
Start: 2024-08-29 | End: 2024-08-29 | Stop reason: HOSPADM

## 2024-08-29 RX ORDER — PHENYLEPHRINE HYDROCHLORIDE 10 MG/ML
INJECTION INTRAVENOUS
Status: DISCONTINUED | OUTPATIENT
Start: 2024-08-29 | End: 2024-08-29

## 2024-08-29 RX ORDER — ROCURONIUM BROMIDE 10 MG/ML
INJECTION, SOLUTION INTRAVENOUS
Status: DISCONTINUED | OUTPATIENT
Start: 2024-08-29 | End: 2024-08-29

## 2024-08-29 RX ORDER — ONDANSETRON HYDROCHLORIDE 2 MG/ML
4 INJECTION, SOLUTION INTRAVENOUS ONCE
Status: COMPLETED | OUTPATIENT
Start: 2024-08-29 | End: 2024-08-29

## 2024-08-29 RX ORDER — ACETAMINOPHEN 10 MG/ML
INJECTION, SOLUTION INTRAVENOUS
Status: DISCONTINUED | OUTPATIENT
Start: 2024-08-29 | End: 2024-08-29

## 2024-08-29 RX ORDER — MIDAZOLAM HYDROCHLORIDE 2 MG/2ML
2 INJECTION, SOLUTION INTRAMUSCULAR; INTRAVENOUS ONCE AS NEEDED
OUTPATIENT
Start: 2024-08-29 | End: 2036-01-25

## 2024-08-29 RX ORDER — CEFAZOLIN SODIUM 2 G/50ML
2 SOLUTION INTRAVENOUS
Status: COMPLETED | OUTPATIENT
Start: 2024-08-29 | End: 2024-08-30

## 2024-08-29 RX ORDER — METOCLOPRAMIDE HYDROCHLORIDE 5 MG/ML
5 INJECTION INTRAMUSCULAR; INTRAVENOUS EVERY 6 HOURS PRN
Status: DISCONTINUED | OUTPATIENT
Start: 2024-08-29 | End: 2024-08-30 | Stop reason: HOSPADM

## 2024-08-29 RX ORDER — PANTOPRAZOLE SODIUM 40 MG/10ML
40 INJECTION, POWDER, LYOPHILIZED, FOR SOLUTION INTRAVENOUS DAILY
Status: DISCONTINUED | OUTPATIENT
Start: 2024-08-29 | End: 2024-08-30 | Stop reason: HOSPADM

## 2024-08-29 RX ORDER — ONDANSETRON HYDROCHLORIDE 2 MG/ML
INJECTION, SOLUTION INTRAVENOUS
Status: DISCONTINUED | OUTPATIENT
Start: 2024-08-29 | End: 2024-08-29

## 2024-08-29 RX ORDER — ACETAMINOPHEN 500 MG
1000 TABLET ORAL ONCE
OUTPATIENT
Start: 2024-08-29 | End: 2024-08-29

## 2024-08-29 RX ORDER — LEVETIRACETAM 500 MG/5ML
500 INJECTION, SOLUTION, CONCENTRATE INTRAVENOUS EVERY 12 HOURS
Status: DISCONTINUED | OUTPATIENT
Start: 2024-08-29 | End: 2024-08-30 | Stop reason: HOSPADM

## 2024-08-29 RX ORDER — PROPOFOL 10 MG/ML
VIAL (ML) INTRAVENOUS
Status: DISCONTINUED | OUTPATIENT
Start: 2024-08-29 | End: 2024-08-29

## 2024-08-29 RX ORDER — FENTANYL CITRATE 50 UG/ML
INJECTION, SOLUTION INTRAMUSCULAR; INTRAVENOUS
Status: DISCONTINUED | OUTPATIENT
Start: 2024-08-29 | End: 2024-08-29

## 2024-08-29 RX ADMIN — PANTOPRAZOLE SODIUM 40 MG: 40 INJECTION, POWDER, FOR SOLUTION INTRAVENOUS at 10:08

## 2024-08-29 RX ADMIN — HYDROMORPHONE HYDROCHLORIDE 0.5 MG: 2 INJECTION, SOLUTION INTRAMUSCULAR; INTRAVENOUS; SUBCUTANEOUS at 09:08

## 2024-08-29 RX ADMIN — GABAPENTIN 300 MG: 300 CAPSULE ORAL at 03:08

## 2024-08-29 RX ADMIN — ONDANSETRON 4 MG: 2 INJECTION INTRAMUSCULAR; INTRAVENOUS at 10:08

## 2024-08-29 RX ADMIN — OXYCODONE HYDROCHLORIDE 10 MG: 5 TABLET ORAL at 09:08

## 2024-08-29 RX ADMIN — ACETAMINOPHEN 325MG 650 MG: 325 TABLET ORAL at 06:08

## 2024-08-29 RX ADMIN — LIDOCAINE HYDROCHLORIDE 4 ML: 20 INJECTION, SOLUTION INTRAVENOUS at 08:08

## 2024-08-29 RX ADMIN — MIDAZOLAM HYDROCHLORIDE 2 MG: 1 INJECTION, SOLUTION INTRAMUSCULAR; INTRAVENOUS at 08:08

## 2024-08-29 RX ADMIN — PHENYLEPHRINE HYDROCHLORIDE 100 MCG: 10 INJECTION INTRAVENOUS at 09:08

## 2024-08-29 RX ADMIN — LEVETIRACETAM 500 MG: 100 INJECTION, SOLUTION INTRAVENOUS at 10:08

## 2024-08-29 RX ADMIN — ACETAMINOPHEN 325MG 650 MG: 325 TABLET ORAL at 09:08

## 2024-08-29 RX ADMIN — OXYCODONE HYDROCHLORIDE 10 MG: 5 TABLET ORAL at 03:08

## 2024-08-29 RX ADMIN — ONDANSETRON 4 MG: 2 INJECTION INTRAMUSCULAR; INTRAVENOUS at 09:08

## 2024-08-29 RX ADMIN — HYDROMORPHONE HYDROCHLORIDE 0.5 MG: 2 INJECTION INTRAMUSCULAR; INTRAVENOUS; SUBCUTANEOUS at 12:08

## 2024-08-29 RX ADMIN — ONDANSETRON 4 MG: 2 INJECTION INTRAMUSCULAR; INTRAVENOUS at 12:08

## 2024-08-29 RX ADMIN — HYDROMORPHONE HYDROCHLORIDE 0.5 MG: 2 INJECTION, SOLUTION INTRAMUSCULAR; INTRAVENOUS; SUBCUTANEOUS at 10:08

## 2024-08-29 RX ADMIN — ACETAMINOPHEN 325MG 650 MG: 325 TABLET ORAL at 03:08

## 2024-08-29 RX ADMIN — SODIUM CHLORIDE, POTASSIUM CHLORIDE, SODIUM LACTATE AND CALCIUM CHLORIDE: 600; 310; 30; 20 INJECTION, SOLUTION INTRAVENOUS at 12:08

## 2024-08-29 RX ADMIN — LEVETIRACETAM 500 MG: 100 INJECTION, SOLUTION INTRAVENOUS at 09:08

## 2024-08-29 RX ADMIN — DEXAMETHASONE SODIUM PHOSPHATE 8 MG: 4 INJECTION, SOLUTION INTRA-ARTICULAR; INTRALESIONAL; INTRAMUSCULAR; INTRAVENOUS; SOFT TISSUE at 09:08

## 2024-08-29 RX ADMIN — ENOXAPARIN SODIUM 40 MG: 40 INJECTION SUBCUTANEOUS at 12:08

## 2024-08-29 RX ADMIN — METHOCARBAMOL 500 MG: 500 TABLET ORAL at 09:08

## 2024-08-29 RX ADMIN — ROCURONIUM BROMIDE 50 MG: 10 SOLUTION INTRAVENOUS at 08:08

## 2024-08-29 RX ADMIN — SODIUM CHLORIDE, SODIUM GLUCONATE, SODIUM ACETATE, POTASSIUM CHLORIDE AND MAGNESIUM CHLORIDE: 526; 502; 368; 37; 30 INJECTION, SOLUTION INTRAVENOUS at 08:08

## 2024-08-29 RX ADMIN — CEFAZOLIN SODIUM 2 G: 2 SOLUTION INTRAVENOUS at 06:08

## 2024-08-29 RX ADMIN — ACETAMINOPHEN 325MG 650 MG: 325 TABLET ORAL at 12:08

## 2024-08-29 RX ADMIN — LIDOCAINE 1 PATCH: 700 PATCH TOPICAL at 12:08

## 2024-08-29 RX ADMIN — ENOXAPARIN SODIUM 40 MG: 40 INJECTION SUBCUTANEOUS at 09:08

## 2024-08-29 RX ADMIN — HYDROMORPHONE HYDROCHLORIDE 0.5 MG: 2 INJECTION INTRAMUSCULAR; INTRAVENOUS; SUBCUTANEOUS at 04:08

## 2024-08-29 RX ADMIN — GABAPENTIN 300 MG: 300 CAPSULE ORAL at 09:08

## 2024-08-29 RX ADMIN — CEFAZOLIN 2 G: 330 INJECTION, POWDER, FOR SOLUTION INTRAMUSCULAR; INTRAVENOUS at 09:08

## 2024-08-29 RX ADMIN — FENTANYL CITRATE 100 MCG: 50 INJECTION, SOLUTION INTRAMUSCULAR; INTRAVENOUS at 08:08

## 2024-08-29 RX ADMIN — METHOCARBAMOL 1000 MG: 100 INJECTION, SOLUTION INTRAMUSCULAR; INTRAVENOUS at 10:08

## 2024-08-29 RX ADMIN — METOCLOPRAMIDE 10 MG: 5 INJECTION, SOLUTION INTRAMUSCULAR; INTRAVENOUS at 10:08

## 2024-08-29 RX ADMIN — HYDROMORPHONE HYDROCHLORIDE 0.5 MG: 2 INJECTION INTRAMUSCULAR; INTRAVENOUS; SUBCUTANEOUS at 05:08

## 2024-08-29 RX ADMIN — Medication 6 MG: at 09:08

## 2024-08-29 RX ADMIN — PROPOFOL 150 MG: 10 INJECTION, EMULSION INTRAVENOUS at 08:08

## 2024-08-29 RX ADMIN — ACETAMINOPHEN 1000 MG: 10 INJECTION, SOLUTION INTRAVENOUS at 09:08

## 2024-08-29 RX ADMIN — SUGAMMADEX 200 MG: 100 INJECTION, SOLUTION INTRAVENOUS at 09:08

## 2024-08-29 RX ADMIN — PROPOFOL 50 MG: 10 INJECTION, EMULSION INTRAVENOUS at 08:08

## 2024-08-29 RX ADMIN — KETOROLAC TROMETHAMINE 30 MG: 30 INJECTION, SOLUTION INTRAMUSCULAR; INTRAVENOUS at 09:08
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
## 2024-08-29 NOTE — ANESTHESIA PROCEDURE NOTES
Intubation    Date/Time: 8/29/2024 8:52 AM    Performed by: Skip Shi CRNA  Authorized by: Taj Barrett DO    Intubation:     Induction:  Intravenous    Intubated:  Postinduction    Mask Ventilation:  Easy mask    Attempts:  1    Attempted By:  CRNA    Method of Intubation:  Direct (saleem mello)    Blade:  Mello 2    Laryngeal View Grade: Grade I - full view of cords      Difficult Airway Encountered?: No      Complications:  None    Airway Device:  Oral endotracheal tube    Airway Device Size:  7.0    Style/Cuff Inflation:  Cuffed (inflated to minimal occlusive pressure)    Tube secured:  21    Secured at:  The lips    Placement Verified By:  Capnometry    Complicating Factors:  None    Findings Post-Intubation:  BS equal bilateral and atraumatic/condition of teeth unchanged

## 2024-08-29 NOTE — ANESTHESIA POSTPROCEDURE EVALUATION
Anesthesia Post Evaluation    Patient: Chelsie Lee    Procedure(s) Performed: Procedure(s) (LRB):  ORIF, ANKLE (Left)    [facility] Anesthesia Post Op Evaluation      Vitals Value Taken Time   /90 08/29/24 1001   Temp 36.5 °C (97.7 °F) 08/29/24 0942   Pulse 81 08/29/24 1001   Resp 15 08/29/24 1001   SpO2 100 % 08/29/24 1001   Vitals shown include unfiled device data.      No case tracking events are documented in the log.      Pain/Eran Score: Pain Rating Prior to Med Admin: 9 (8/29/2024  4:54 AM)  Pain Rating Post Med Admin: 0 (8/29/2024  7:12 AM)

## 2024-08-29 NOTE — PLAN OF CARE
Problem: Physical Therapy  Goal: Physical Therapy Goal  Description: Goals to be met by: d/c     Patient will increase functional independence with mobility by performin. Supine to sit with Modified [place]  2. Sit to supine with Modified [place]  3. Sit to stand transfer with Modified [place]  4. Bed to chair transfer with [place] using Least Restrictive Assistive Device  5. Gait  x 300 feet with Modified [place] using Least Restrictive Assistive Device.     Outcome: Progressing
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
## 2024-08-29 NOTE — NURSING
Nurses Note -- 4 Eyes      8/29/2024   6:24 PM      Skin assessed during: Q Shift Change      [x] No Altered Skin Integrity Present    [x]Prevention Measures Documented      [] Yes- Altered Skin Integrity Present or Discovered   [] LDA Added if Not in Epic (Describe Wound)   [] New Altered Skin Integrity was Present on Admit and Documented in LDA   [] Wound Image Taken    Wound Care Consulted? No    Attending Nurse:  Zac Birmingham RN/Staff Member:   Claudia

## 2024-08-29 NOTE — TRANSFER OF CARE
"Anesthesia Transfer of Care Note    Patient: Chelsie Lee    Procedure(s) Performed: Procedure(s) (LRB):  ORIF, ANKLE (Left)    Patient location: PACU    Anesthesia Type: general    Transport from OR: Transported from OR on room air with adequate spontaneous ventilation    Post pain: adequate analgesia    Post assessment: no apparent anesthetic complications and tolerated procedure well    Post vital signs: stable    Level of consciousness: sedated and responds to stimulation    Nausea/Vomiting: no nausea/vomiting    Complications: none    Transfer of care protocol was followed      Last vitals: Visit Vitals  /71 (BP Location: Left arm, Patient Position: Lying)   Pulse 68   Temp 36.5 °C (97.7 °F) (Skin)   Resp 18   Ht 5' 4" (1.626 m)   Wt 56.2 kg (124 lb)   SpO2 100%   Breastfeeding No   BMI 21.28 kg/m²     "

## 2024-08-29 NOTE — ANESTHESIA POSTPROCEDURE EVALUATION
Anesthesia Post Evaluation    Patient: Chelsie Lee    Procedure(s) Performed: Procedure(s) (LRB):  ORIF, ANKLE (Left)    Final Anesthesia Type: general      Patient location during evaluation: PACU  Patient participation: Yes- Able to Participate  Level of consciousness: awake and alert  Post-procedure vital signs: reviewed and stable  Pain management: adequate  Airway patency: patent  RAMIN mitigation strategies: Multimodal analgesia  PONV status at discharge: No PONV  Anesthetic complications: no      Cardiovascular status: hemodynamically stable  Respiratory status: unassisted  Hydration status: euvolemic  Follow-up not needed.              Vitals Value Taken Time   /90 08/29/24 1001   Temp 36.5 °C (97.7 °F) 08/29/24 0942   Pulse 81 08/29/24 1001   Resp 15 08/29/24 1001   SpO2 100 % 08/29/24 1001   Vitals shown include unfiled device data.      No case tracking events are documented in the log.      Pain/Eran Score: Pain Rating Prior to Med Admin: 9 (8/29/2024  4:54 AM)  Pain Rating Post Med Admin: 0 (8/29/2024  7:12 AM)
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
## 2024-08-30 VITALS
OXYGEN SATURATION: 98 % | HEART RATE: 67 BPM | HEIGHT: 64 IN | TEMPERATURE: 99 F | DIASTOLIC BLOOD PRESSURE: 92 MMHG | RESPIRATION RATE: 18 BRPM | WEIGHT: 124 LBS | BODY MASS INDEX: 21.17 KG/M2 | SYSTOLIC BLOOD PRESSURE: 134 MMHG

## 2024-08-30 LAB
ALBUMIN SERPL-MCNC: 2.9 G/DL (ref 3.5–5)
ALBUMIN/GLOB SERPL: 1.2 RATIO (ref 1.1–2)
ALP SERPL-CCNC: 60 UNIT/L (ref 40–150)
ALT SERPL-CCNC: 9 UNIT/L (ref 0–55)
ANION GAP SERPL CALC-SCNC: 11 MEQ/L
AST SERPL-CCNC: 13 UNIT/L (ref 5–34)
BASOPHILS # BLD AUTO: 0.04 X10(3)/MCL
BASOPHILS NFR BLD AUTO: 0.5 %
BILIRUB SERPL-MCNC: 0.8 MG/DL
BUN SERPL-MCNC: 3.5 MG/DL (ref 7–18.7)
CALCIUM SERPL-MCNC: 8.3 MG/DL (ref 8.4–10.2)
CHLORIDE SERPL-SCNC: 107 MMOL/L (ref 98–107)
CO2 SERPL-SCNC: 15 MMOL/L (ref 22–29)
CREAT SERPL-MCNC: 0.61 MG/DL (ref 0.55–1.02)
CREAT/UREA NIT SERPL: 6
EOSINOPHIL # BLD AUTO: 0.08 X10(3)/MCL (ref 0–0.9)
EOSINOPHIL NFR BLD AUTO: 1 %
ERYTHROCYTE [DISTWIDTH] IN BLOOD BY AUTOMATED COUNT: 18.2 % (ref 11.5–17)
GFR SERPLBLD CREATININE-BSD FMLA CKD-EPI: >60 ML/MIN/1.73/M2
GLOBULIN SER-MCNC: 2.5 GM/DL (ref 2.4–3.5)
GLUCOSE SERPL-MCNC: 79 MG/DL (ref 74–100)
HCT VFR BLD AUTO: 28.9 % (ref 37–47)
HGB BLD-MCNC: 8.3 G/DL (ref 12–16)
IMM GRANULOCYTES # BLD AUTO: 0.02 X10(3)/MCL (ref 0–0.04)
IMM GRANULOCYTES NFR BLD AUTO: 0.2 %
LYMPHOCYTES # BLD AUTO: 1.91 X10(3)/MCL (ref 0.6–4.6)
LYMPHOCYTES NFR BLD AUTO: 23.3 %
MCH RBC QN AUTO: 22.4 PG (ref 27–31)
MCHC RBC AUTO-ENTMCNC: 28.7 G/DL (ref 33–36)
MCV RBC AUTO: 78.1 FL (ref 80–94)
MONOCYTES # BLD AUTO: 0.87 X10(3)/MCL (ref 0.1–1.3)
MONOCYTES NFR BLD AUTO: 10.6 %
NEUTROPHILS # BLD AUTO: 5.28 X10(3)/MCL (ref 2.1–9.2)
NEUTROPHILS NFR BLD AUTO: 64.4 %
NRBC BLD AUTO-RTO: 0 %
PLATELET # BLD AUTO: 218 X10(3)/MCL (ref 130–400)
PLATELET # BLD EST: NORMAL 10*3/UL
PMV BLD AUTO: 11.3 FL (ref 7.4–10.4)
POTASSIUM SERPL-SCNC: 3.9 MMOL/L (ref 3.5–5.1)
PROT SERPL-MCNC: 5.4 GM/DL (ref 6.4–8.3)
RBC # BLD AUTO: 3.7 X10(6)/MCL (ref 4.2–5.4)
RBC MORPH BLD: NORMAL
SODIUM SERPL-SCNC: 133 MMOL/L (ref 136–145)
WBC # BLD AUTO: 8.2 X10(3)/MCL (ref 4.5–11.5)

## 2024-08-30 PROCEDURE — 25000003 PHARM REV CODE 250: Performed by: STUDENT IN AN ORGANIZED HEALTH CARE EDUCATION/TRAINING PROGRAM

## 2024-08-30 PROCEDURE — 63600175 PHARM REV CODE 636 W HCPCS: Performed by: NURSE PRACTITIONER

## 2024-08-30 PROCEDURE — 36415 COLL VENOUS BLD VENIPUNCTURE: CPT

## 2024-08-30 PROCEDURE — 85025 COMPLETE CBC W/AUTO DIFF WBC: CPT

## 2024-08-30 PROCEDURE — 25000003 PHARM REV CODE 250

## 2024-08-30 PROCEDURE — 97116 GAIT TRAINING THERAPY: CPT | Mod: CQ

## 2024-08-30 PROCEDURE — 80053 COMPREHEN METABOLIC PANEL: CPT

## 2024-08-30 PROCEDURE — 63600175 PHARM REV CODE 636 W HCPCS

## 2024-08-30 RX ORDER — GABAPENTIN 300 MG/1
300 CAPSULE ORAL 3 TIMES DAILY
Qty: 30 CAPSULE | Refills: 0 | Status: ON HOLD | OUTPATIENT
Start: 2024-08-30 | End: 2024-09-04 | Stop reason: HOSPADM

## 2024-08-30 RX ORDER — OXYCODONE HYDROCHLORIDE 10 MG/1
10 TABLET ORAL EVERY 6 HOURS PRN
Qty: 16 TABLET | Refills: 0 | Status: ON HOLD | OUTPATIENT
Start: 2024-08-30 | End: 2024-09-02 | Stop reason: CLARIF

## 2024-08-30 RX ORDER — ASPIRIN 81 MG/1
81 TABLET ORAL 2 TIMES DAILY
Qty: 90 TABLET | Refills: 0 | Status: ON HOLD | OUTPATIENT
Start: 2024-08-30 | End: 2024-09-04 | Stop reason: HOSPADM

## 2024-08-30 RX ORDER — LEVETIRACETAM 500 MG/1
500 TABLET ORAL 2 TIMES DAILY
Qty: 60 TABLET | Refills: 0 | Status: SHIPPED | OUTPATIENT
Start: 2024-08-30 | End: 2024-09-29

## 2024-08-30 RX ORDER — SCOLOPAMINE TRANSDERMAL SYSTEM 1 MG/1
1 PATCH, EXTENDED RELEASE TRANSDERMAL
Status: DISCONTINUED | OUTPATIENT
Start: 2024-08-30 | End: 2024-08-30 | Stop reason: HOSPADM

## 2024-08-30 RX ORDER — METHOCARBAMOL 500 MG/1
500 TABLET, FILM COATED ORAL 3 TIMES DAILY
Qty: 30 TABLET | Refills: 0 | Status: ON HOLD | OUTPATIENT
Start: 2024-08-30 | End: 2024-09-04 | Stop reason: HOSPADM

## 2024-08-30 RX ADMIN — ONDANSETRON 4 MG: 2 INJECTION INTRAMUSCULAR; INTRAVENOUS at 04:08

## 2024-08-30 RX ADMIN — ENOXAPARIN SODIUM 40 MG: 40 INJECTION SUBCUTANEOUS at 09:08

## 2024-08-30 RX ADMIN — ONDANSETRON 4 MG: 2 INJECTION INTRAMUSCULAR; INTRAVENOUS at 10:08

## 2024-08-30 RX ADMIN — MUPIROCIN: 20 OINTMENT TOPICAL at 09:08

## 2024-08-30 RX ADMIN — ACETAMINOPHEN 325MG 650 MG: 325 TABLET ORAL at 09:08

## 2024-08-30 RX ADMIN — OXYCODONE HYDROCHLORIDE 10 MG: 5 TABLET ORAL at 12:08

## 2024-08-30 RX ADMIN — SODIUM CHLORIDE, POTASSIUM CHLORIDE, SODIUM LACTATE AND CALCIUM CHLORIDE: 600; 310; 30; 20 INJECTION, SOLUTION INTRAVENOUS at 05:08

## 2024-08-30 RX ADMIN — CEFAZOLIN SODIUM 2 G: 2 SOLUTION INTRAVENOUS at 09:08

## 2024-08-30 RX ADMIN — LEVETIRACETAM 500 MG: 100 INJECTION, SOLUTION INTRAVENOUS at 10:08

## 2024-08-30 RX ADMIN — CEFAZOLIN SODIUM 2 G: 2 SOLUTION INTRAVENOUS at 12:08

## 2024-08-30 RX ADMIN — METOCLOPRAMIDE 5 MG: 5 INJECTION, SOLUTION INTRAMUSCULAR; INTRAVENOUS at 12:08

## 2024-08-30 RX ADMIN — GABAPENTIN 300 MG: 300 CAPSULE ORAL at 09:08

## 2024-08-30 RX ADMIN — HYDROMORPHONE HYDROCHLORIDE 0.5 MG: 2 INJECTION INTRAMUSCULAR; INTRAVENOUS; SUBCUTANEOUS at 04:08

## 2024-08-30 RX ADMIN — LIDOCAINE 1 PATCH: 700 PATCH TOPICAL at 12:08

## 2024-08-30 RX ADMIN — SCOPOLAMINE 1 PATCH: 1 PATCH TRANSDERMAL at 05:08

## 2024-08-30 RX ADMIN — METHOCARBAMOL 500 MG: 500 TABLET ORAL at 05:08

## 2024-08-30 RX ADMIN — OXYCODONE HYDROCHLORIDE 10 MG: 5 TABLET ORAL at 07:08

## 2024-08-30 RX ADMIN — PANTOPRAZOLE SODIUM 40 MG: 40 INJECTION, POWDER, FOR SOLUTION INTRAVENOUS at 09:08

## 2024-08-30 NOTE — NURSING
Nurses Note -- 4 Eyes      8/29/2024   10:52 PM      Skin assessed during: Q Shift Change      [x] No Altered Skin Integrity Present    []Prevention Measures Documented      [] Yes- Altered Skin Integrity Present or Discovered   [] LDA Added if Not in Epic (Describe Wound)   [] New Altered Skin Integrity was Present on Admit and Documented in LDA   [] Wound Image Taken    Wound Care Consulted? No    Attending Nurse:  Lorrie Birmingham RN/Staff Member:   edmund chow
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
## 2024-08-30 NOTE — PLAN OF CARE
Problem: Wound  Goal: Absence of Infection Signs and Symptoms  Outcome: Progressing  Intervention: Prevent or Manage Infection  Flowsheets (Taken 8/30/2024 1243)  Fever Reduction/Comfort Measures: fluid intake increased  Isolation Precautions: precautions initiated  Goal: Skin Health and Integrity  Outcome: Progressing  Intervention: Optimize Skin Protection  Flowsheets (Taken 8/30/2024 1243)  Activity Management: Ambulated -L4  Head of Bed (HOB) Positioning: HOB at 30 degrees  Goal: Optimal Wound Healing  Outcome: Progressing  Intervention: Promote Wound Healing  Flowsheets (Taken 8/30/2024 1243)  Sleep/Rest Enhancement: awakenings minimized

## 2024-08-30 NOTE — PLAN OF CARE
08/30/24 1333   Final Note   Assessment Type Final Discharge Note   Anticipated Discharge Disposition Home   Hospital Resources/Appts/Education Provided Post-Acute resouces added to AVS   Post-Acute Status   Post-Acute Authorization HME   HME Status Set-up Complete/Auth obtained   Discharge Delays None known at this time     Pt to d/c home. OP therapy referral given to pt at bedside, per pt request. RW from [facility] delivered to pt at bedside. RW order faxed to Daren Robledo LCSW

## 2024-08-30 NOTE — NURSING
Discharge instructions given to patient and family at bedside. All questions answered. Verbalized understanding. IV taken out, vitals stable. Medications delivered to bedside. Neurology referall sent and discussed with patient. Patient discharge home with family

## 2024-08-30 NOTE — DISCHARGE INSTRUCTIONS
Ortho Follow up instructions  **All questions or concerns should be handled at your surgeon's office in follow up ([phone]). If it is a weekend or after hours, you will get the surgeon on call. **    BLOOD CLOT PREVENTION:  Aspirin 81mg twice a day for 4 weeks     ACTIVITY:   Weight bearing precautions as follows: eight Bearing as tolerated to Left leg in the boot. OK to remove for range of motion exercises.   Move around at least every 1-2 hours maintaining weight bearing precautions and switch positions throughout the day.     WOUND CARE:   Start dressing change on 8/30. Change dressing daily and as needed for soiling.   NOTIFY YOUR SURGEON OF EXCESSIVE WOUND DRAINAGE- You may have some bloody drainage from the incision over the first few days  DO NOT WET WOUND or apply any ointments, creams, lotions or antiseptics.  No showers until post op day 7. Do not submerge. Do not apply ointments or creams to incisions.     INFECTION PREVENTION:  Wound care instructions as above.   DO NOT WET YOUR DRESSING/WOUND(S).   NOTIFY YOUR SURGEON OF EXCESSIVE WOUND DRAINAGE.  Practice good handwashing before and after dressing changes.  No pets in bed or near wound (s)/dressing(s).  No Alcohol, smoking or tobacco products  IF YOU ARE DIABETIC, maintain good blood sugar control throughout your recovery.    Call your SURGEON'S OFFICE if you experience the following signs and symptoms of infection:   Unusual redness, swelling, excessive, cloudy or foul smelling drainage at the incision site.   Persistent temp greater than 101.5 F, unrelieved by Tylenol  Pain at surgical site, unrelieved by pain meds    For emergencies, please report to OUR ([facility] [place]) Emergency department       Medications sent to [facility]   - gabapentin (nerve pain)   - keppra (seizure)  - robaxin (muscle relaxer)   - oxycodone (pain)   - aspirin (blood clot prevention)     A referral has been sent to [facility] neurology clinic for you to  follow up for your seizures - their office should call with more information

## 2024-08-30 NOTE — PT/OT/SLP PROGRESS
Physical Therapy Treatment    Patient Name:  Chelsie Lee   MRN:  [id number]    Recommendations:     Discharge therapy intensity: No Therapy Indicated   Discharge Equipment Recommendations: walker, rolling (pending progress, pt has crutches at home so if can progress to crutches would not need any equipment)  Barriers to discharge: None    Assessment:     Chelsie Lee is a 38 y.o. female.  She presents with the following impairments/functional limitations: weakness, impaired functional mobility, impaired endurance, gait instability, impaired balance, decreased lower extremity function.    Rehab Prognosis: Good; patient would benefit from acute skilled PT services to address these deficits and reach maximum level of function.    Recent Surgery: Procedure(s) (LRB):  ORIF, ANKLE (Left) 1 Day Post-Op    Plan:     During this hospitalization, patient would benefit from acute PT services 6 x/week to address the identified rehab impairments via gait training, therapeutic activities, therapeutic exercises and progress toward the following goals:    Plan of Care Expires:  10/05/24    Subjective     Chief Complaint: no complaints.    Objective:     Communicated with nurse prior to session.  Patient found supine with blood pressure cuff, peripheral IV, PureWick, pulse ox (continuous), telemetry upon PT entry to room.     General Precautions: Standard, fall (comfort pxn only for sternum)  Orthopedic Precautions: LLE weight bearing as tolerated (CAM boot)  Braces:  (CAM boot)  Respiratory Status: Room air  Blood Pressure:   Skin Integrity: Visible skin intact      Functional Mobility:  Independent with bed mobility and transfers  Gait 50 ft RW SBA WBAT with CAB boot.   RW being ordered upon discharge for home use.     Education Provided:  Role and goals of PT, transfer training, bed mobility, gait training, balance training, safety awareness, assistive device, strengthening exercises, and importance of participating in  PT to return to PLOF.    Patient left supine with call button in reach    GOALS:   Multidisciplinary Problems       Physical Therapy Goals          Problem: Physical Therapy    Goal Priority Disciplines Outcome Goal Variances Interventions   Physical Therapy Goal     PT, PT/OT Progressing     Description: Goals to be met by: d/c     Patient will increase functional independence with mobility by performin. Supine to sit with Modified [place]  2. Sit to supine with Modified [place]  3. Sit to stand transfer with Modified [place]  4. Bed to chair transfer with [place] using Least Restrictive Assistive Device  5. Gait  x 300 feet with Modified [place] using Least Restrictive Assistive Device.                          Time Tracking:     Billable Minutes: Gait Training 10    Treatment Type: Treatment  PT/PTA: PTA     Number of PTA visits since last PT visit: 2024
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
## 2024-08-30 NOTE — PLAN OF CARE
Prior Ct demonstrated mild irregularity of the superior endplate T5, T6, T7 which may relate to mild degenerative change or a subtle age-indeterminate compression fracture.    Evaluated the patient at bedside for middle t-spine tenderness. She had no tenderness in her middle t-spine. She has some mild TTP of her lower thoracic, upper lumbar spine without deformity or stepoff. CT from previous hospital with spine alignment maintained and vertebral body heights maintained. Patient completed PT and OT sessions yesterday and able to sit up and walk with appropriate DME for her broken left ankle. She has appropriate strength and sensation to all four extremities.      Sterling Sanders M.D.  PGY1 [facility]'

## 2024-09-02 ENCOUNTER — HOSPITAL ENCOUNTER (INPATIENT)
Facility: HOSPITAL | Age: 39
LOS: 2 days | Discharge: HOME | DRG: 641 | End: 2024-09-04
Attending: STUDENT IN AN ORGANIZED HEALTH CARE EDUCATION/TRAINING PROGRAM | Admitting: INTERNAL MEDICINE
Payer: COMMERCIAL

## 2024-09-02 DIAGNOSIS — E87.20 METABOLIC ACIDOSIS, NORMAL ANION GAP (NAG): Primary | ICD-10-CM

## 2024-09-02 DIAGNOSIS — E87.29 STARVATION KETOACIDOSIS: ICD-10-CM

## 2024-09-02 DIAGNOSIS — E83.42 HYPOMAGNESEMIA: ICD-10-CM

## 2024-09-02 DIAGNOSIS — R07.9 CHEST PAIN: ICD-10-CM

## 2024-09-02 DIAGNOSIS — R11.2 NAUSEA & VOMITING: ICD-10-CM

## 2024-09-02 DIAGNOSIS — T73.0XXA STARVATION KETOACIDOSIS: ICD-10-CM

## 2024-09-02 LAB
ALBUMIN SERPL-MCNC: 3.3 G/DL (ref 3.5–5)
ALBUMIN/GLOB SERPL: 0.9 RATIO (ref 1.1–2)
ALP SERPL-CCNC: 69 UNIT/L (ref 40–150)
ALT SERPL-CCNC: 13 UNIT/L (ref 0–55)
AMPHET UR QL SCN: NEGATIVE
ANION GAP SERPL CALC-SCNC: 12 MEQ/L
AST SERPL-CCNC: 25 UNIT/L (ref 5–34)
B-OH-BUTYR SERPL-MCNC: 5.4 MMOL/L
BACTERIA #/AREA URNS AUTO: ABNORMAL /HPF
BARBITURATE SCN PRESENT UR: NEGATIVE
BASOPHILS # BLD AUTO: 0.03 X10(3)/MCL
BASOPHILS NFR BLD AUTO: 0.4 %
BENZODIAZ UR QL SCN: NEGATIVE
BILIRUB SERPL-MCNC: 1 MG/DL
BILIRUB UR QL STRIP.AUTO: NEGATIVE
BUN SERPL-MCNC: 3.5 MG/DL (ref 7–18.7)
CALCIUM SERPL-MCNC: 8.8 MG/DL (ref 8.4–10.2)
CANNABINOIDS UR QL SCN: NEGATIVE
CHLORIDE SERPL-SCNC: 109 MMOL/L (ref 98–107)
CLARITY UR: CLEAR
CO2 SERPL-SCNC: 9 MMOL/L (ref 22–29)
COCAINE UR QL SCN: NEGATIVE
COLOR UR AUTO: ABNORMAL
CREAT SERPL-MCNC: 0.62 MG/DL (ref 0.55–1.02)
CREAT/UREA NIT SERPL: 6
EOSINOPHIL # BLD AUTO: 0.04 X10(3)/MCL (ref 0–0.9)
EOSINOPHIL NFR BLD AUTO: 0.5 %
ERYTHROCYTE [DISTWIDTH] IN BLOOD BY AUTOMATED COUNT: 18.3 % (ref 11.5–17)
ETHANOL SERPL-MCNC: <10 MG/DL
FENTANYL UR QL SCN: NEGATIVE
FIO2: 21
GFR SERPLBLD CREATININE-BSD FMLA CKD-EPI: >60 ML/MIN/1.73/M2
GLOBULIN SER-MCNC: 3.6 GM/DL (ref 2.4–3.5)
GLUCOSE SERPL-MCNC: 98 MG/DL (ref 74–100)
GLUCOSE UR QL STRIP: NEGATIVE
HCO3 UR-SCNC: 8 MMOL/L (ref 24–28)
HCT VFR BLD AUTO: 36.5 % (ref 37–47)
HGB BLD-MCNC: 10.6 G/DL (ref 12–16)
HGB UR QL STRIP: NEGATIVE
IMM GRANULOCYTES # BLD AUTO: 0.05 X10(3)/MCL (ref 0–0.04)
IMM GRANULOCYTES NFR BLD AUTO: 0.7 %
KETONES UR QL STRIP: >=80
LACTATE SERPL-SCNC: 1.4 MMOL/L (ref 0.5–2.2)
LEUKOCYTE ESTERASE UR QL STRIP: NEGATIVE
LIPASE SERPL-CCNC: 11 U/L
LYMPHOCYTES # BLD AUTO: 0.75 X10(3)/MCL (ref 0.6–4.6)
LYMPHOCYTES NFR BLD AUTO: 10.2 %
MAGNESIUM SERPL-MCNC: 1.5 MG/DL (ref 1.6–2.6)
MCH RBC QN AUTO: 22.8 PG (ref 27–31)
MCHC RBC AUTO-ENTMCNC: 29 G/DL (ref 33–36)
MCV RBC AUTO: 78.7 FL (ref 80–94)
MDMA UR QL SCN: NEGATIVE
MONOCYTES # BLD AUTO: 0.52 X10(3)/MCL (ref 0.1–1.3)
MONOCYTES NFR BLD AUTO: 7 %
NEUTROPHILS # BLD AUTO: 5.99 X10(3)/MCL (ref 2.1–9.2)
NEUTROPHILS NFR BLD AUTO: 81.2 %
NITRITE UR QL STRIP: NEGATIVE
NRBC BLD AUTO-RTO: 0 %
OPIATES UR QL SCN: NEGATIVE
PCO2 BLDA: 17.5 MMHG (ref 35–45)
PCP UR QL: NEGATIVE
PH SMN: 7.27 [PH] (ref 7.35–7.45)
PH UR STRIP: 6 [PH]
PH UR: 6 [PH] (ref 3–11)
PLATELET # BLD AUTO: 313 X10(3)/MCL (ref 130–400)
PMV BLD AUTO: 10.4 FL (ref 7.4–10.4)
PO2 BLDA: 113 MMHG (ref 80–100)
POC BE: -19 MMOL/L
POC SATURATED O2: 98 % (ref 95–100)
POC TCO2: 9 MMOL/L (ref 23–27)
POTASSIUM SERPL-SCNC: 4.7 MMOL/L (ref 3.5–5.1)
PROT SERPL-MCNC: 6.9 GM/DL (ref 6.4–8.3)
PROT UR QL STRIP: ABNORMAL
RBC # BLD AUTO: 4.64 X10(6)/MCL (ref 4.2–5.4)
RBC #/AREA URNS AUTO: ABNORMAL /HPF
SAMPLE: ABNORMAL
SODIUM SERPL-SCNC: 130 MMOL/L (ref 136–145)
SP GR UR STRIP.AUTO: >=1.03 (ref 1–1.03)
SPECIFIC GRAVITY, URINE AUTO (.000) (OHS): >=1.03 (ref 1–1.03)
SQUAMOUS #/AREA URNS AUTO: ABNORMAL /HPF
TROPONIN I SERPL-MCNC: <0.01 NG/ML (ref 0–0.04)
UROBILINOGEN UR STRIP-ACNC: 0.2
WBC # BLD AUTO: 7.38 X10(3)/MCL (ref 4.5–11.5)
WBC #/AREA URNS AUTO: ABNORMAL /HPF

## 2024-09-02 PROCEDURE — 36600 WITHDRAWAL OF ARTERIAL BLOOD: CPT

## 2024-09-02 PROCEDURE — 80307 DRUG TEST PRSMV CHEM ANLYZR: CPT | Performed by: NURSE PRACTITIONER

## 2024-09-02 PROCEDURE — 99900035 HC TECH TIME PER 15 MIN (STAT)

## 2024-09-02 PROCEDURE — 96361 HYDRATE IV INFUSION ADD-ON: CPT

## 2024-09-02 PROCEDURE — 63600175 PHARM REV CODE 636 W HCPCS: Performed by: NURSE PRACTITIONER

## 2024-09-02 PROCEDURE — 83605 ASSAY OF LACTIC ACID: CPT | Performed by: STUDENT IN AN ORGANIZED HEALTH CARE EDUCATION/TRAINING PROGRAM

## 2024-09-02 PROCEDURE — 85025 COMPLETE CBC W/AUTO DIFF WBC: CPT | Performed by: STUDENT IN AN ORGANIZED HEALTH CARE EDUCATION/TRAINING PROGRAM

## 2024-09-02 PROCEDURE — 63600175 PHARM REV CODE 636 W HCPCS: Performed by: INTERNAL MEDICINE

## 2024-09-02 PROCEDURE — 25000003 PHARM REV CODE 250: Performed by: NURSE PRACTITIONER

## 2024-09-02 PROCEDURE — 93010 ELECTROCARDIOGRAM REPORT: CPT | Mod: ,,, | Performed by: INTERNAL MEDICINE

## 2024-09-02 PROCEDURE — 96365 THER/PROPH/DIAG IV INF INIT: CPT

## 2024-09-02 PROCEDURE — 82010 KETONE BODYS QUAN: CPT | Performed by: STUDENT IN AN ORGANIZED HEALTH CARE EDUCATION/TRAINING PROGRAM

## 2024-09-02 PROCEDURE — 83735 ASSAY OF MAGNESIUM: CPT | Performed by: STUDENT IN AN ORGANIZED HEALTH CARE EDUCATION/TRAINING PROGRAM

## 2024-09-02 PROCEDURE — 63600175 PHARM REV CODE 636 W HCPCS: Performed by: STUDENT IN AN ORGANIZED HEALTH CARE EDUCATION/TRAINING PROGRAM

## 2024-09-02 PROCEDURE — 99285 EMERGENCY DEPT VISIT HI MDM: CPT | Mod: 25

## 2024-09-02 PROCEDURE — 11000001 HC ACUTE MED/SURG PRIVATE ROOM

## 2024-09-02 PROCEDURE — 93005 ELECTROCARDIOGRAM TRACING: CPT

## 2024-09-02 PROCEDURE — 83690 ASSAY OF LIPASE: CPT | Performed by: STUDENT IN AN ORGANIZED HEALTH CARE EDUCATION/TRAINING PROGRAM

## 2024-09-02 PROCEDURE — 84484 ASSAY OF TROPONIN QUANT: CPT | Performed by: STUDENT IN AN ORGANIZED HEALTH CARE EDUCATION/TRAINING PROGRAM

## 2024-09-02 PROCEDURE — 96366 THER/PROPH/DIAG IV INF ADDON: CPT

## 2024-09-02 PROCEDURE — 82077 ASSAY SPEC XCP UR&BREATH IA: CPT | Performed by: STUDENT IN AN ORGANIZED HEALTH CARE EDUCATION/TRAINING PROGRAM

## 2024-09-02 PROCEDURE — 25000003 PHARM REV CODE 250: Performed by: STUDENT IN AN ORGANIZED HEALTH CARE EDUCATION/TRAINING PROGRAM

## 2024-09-02 PROCEDURE — 81001 URINALYSIS AUTO W/SCOPE: CPT | Mod: XB | Performed by: STUDENT IN AN ORGANIZED HEALTH CARE EDUCATION/TRAINING PROGRAM

## 2024-09-02 PROCEDURE — 80053 COMPREHEN METABOLIC PANEL: CPT | Performed by: STUDENT IN AN ORGANIZED HEALTH CARE EDUCATION/TRAINING PROGRAM

## 2024-09-02 PROCEDURE — 82803 BLOOD GASES ANY COMBINATION: CPT

## 2024-09-02 PROCEDURE — 21400001 HC TELEMETRY ROOM

## 2024-09-02 RX ORDER — LEVETIRACETAM 500 MG/1
500 TABLET ORAL 2 TIMES DAILY
Status: DISCONTINUED | OUTPATIENT
Start: 2024-09-02 | End: 2024-09-04 | Stop reason: HOSPADM

## 2024-09-02 RX ORDER — DEXTROSE MONOHYDRATE 50 MG/ML
1000 INJECTION, SOLUTION INTRAVENOUS
Status: COMPLETED | OUTPATIENT
Start: 2024-09-02 | End: 2024-09-02

## 2024-09-02 RX ORDER — PROCHLORPERAZINE EDISYLATE 5 MG/ML
5 INJECTION INTRAMUSCULAR; INTRAVENOUS EVERY 6 HOURS PRN
Status: DISCONTINUED | OUTPATIENT
Start: 2024-09-02 | End: 2024-09-04 | Stop reason: HOSPADM

## 2024-09-02 RX ORDER — PANTOPRAZOLE SODIUM 40 MG/1
40 TABLET, DELAYED RELEASE ORAL DAILY
Status: DISCONTINUED | OUTPATIENT
Start: 2024-09-03 | End: 2024-09-04 | Stop reason: HOSPADM

## 2024-09-02 RX ORDER — TALC
6 POWDER (GRAM) TOPICAL NIGHTLY PRN
Status: DISCONTINUED | OUTPATIENT
Start: 2024-09-02 | End: 2024-09-02

## 2024-09-02 RX ORDER — ALPRAZOLAM 0.5 MG/1
1.5 TABLET ORAL 3 TIMES DAILY
Status: DISCONTINUED | OUTPATIENT
Start: 2024-09-02 | End: 2024-09-04 | Stop reason: HOSPADM

## 2024-09-02 RX ORDER — AMOXICILLIN 250 MG
1 CAPSULE ORAL 2 TIMES DAILY PRN
Status: DISCONTINUED | OUTPATIENT
Start: 2024-09-02 | End: 2024-09-04 | Stop reason: HOSPADM

## 2024-09-02 RX ORDER — FAMOTIDINE 10 MG/ML
40 INJECTION INTRAVENOUS
Status: COMPLETED | OUTPATIENT
Start: 2024-09-02 | End: 2024-09-02

## 2024-09-02 RX ORDER — ALUMINUM HYDROXIDE, MAGNESIUM HYDROXIDE, AND SIMETHICONE 1200; 120; 1200 MG/30ML; MG/30ML; MG/30ML
30 SUSPENSION ORAL 4 TIMES DAILY PRN
Status: DISCONTINUED | OUTPATIENT
Start: 2024-09-02 | End: 2024-09-04 | Stop reason: HOSPADM

## 2024-09-02 RX ORDER — FOLIC ACID 1 MG/1
1 TABLET ORAL DAILY
Status: DISCONTINUED | OUTPATIENT
Start: 2024-09-03 | End: 2024-09-04 | Stop reason: HOSPADM

## 2024-09-02 RX ORDER — ONDANSETRON HYDROCHLORIDE 2 MG/ML
4 INJECTION, SOLUTION INTRAVENOUS EVERY 4 HOURS PRN
Status: DISCONTINUED | OUTPATIENT
Start: 2024-09-02 | End: 2024-09-04 | Stop reason: HOSPADM

## 2024-09-02 RX ORDER — ALPRAZOLAM 1 MG/1
1.5 TABLET ORAL 3 TIMES DAILY
COMMUNITY

## 2024-09-02 RX ORDER — IBUPROFEN 200 MG
16 TABLET ORAL
Status: DISCONTINUED | OUTPATIENT
Start: 2024-09-02 | End: 2024-09-04 | Stop reason: HOSPADM

## 2024-09-02 RX ORDER — GLUCAGON 1 MG
1 KIT INJECTION
Status: DISCONTINUED | OUTPATIENT
Start: 2024-09-02 | End: 2024-09-04 | Stop reason: HOSPADM

## 2024-09-02 RX ORDER — NALOXONE HCL 0.4 MG/ML
0.02 VIAL (ML) INJECTION
Status: DISCONTINUED | OUTPATIENT
Start: 2024-09-02 | End: 2024-09-04 | Stop reason: HOSPADM

## 2024-09-02 RX ORDER — DULOXETIN HYDROCHLORIDE 30 MG/1
60 CAPSULE, DELAYED RELEASE ORAL DAILY
Status: DISCONTINUED | OUTPATIENT
Start: 2024-09-03 | End: 2024-09-04 | Stop reason: HOSPADM

## 2024-09-02 RX ORDER — IBUPROFEN 200 MG
24 TABLET ORAL
Status: DISCONTINUED | OUTPATIENT
Start: 2024-09-02 | End: 2024-09-04 | Stop reason: HOSPADM

## 2024-09-02 RX ORDER — ACETAMINOPHEN 500 MG
1000 TABLET ORAL EVERY 6 HOURS PRN
Status: DISCONTINUED | OUTPATIENT
Start: 2024-09-02 | End: 2024-09-02

## 2024-09-02 RX ORDER — ONDANSETRON HYDROCHLORIDE 2 MG/ML
4 INJECTION, SOLUTION INTRAVENOUS EVERY 6 HOURS PRN
Status: DISCONTINUED | OUTPATIENT
Start: 2024-09-02 | End: 2024-09-02

## 2024-09-02 RX ORDER — ACETAMINOPHEN 500 MG
1000 TABLET ORAL EVERY 6 HOURS PRN
Status: DISCONTINUED | OUTPATIENT
Start: 2024-09-02 | End: 2024-09-04 | Stop reason: HOSPADM

## 2024-09-02 RX ORDER — ENOXAPARIN SODIUM 100 MG/ML
40 INJECTION SUBCUTANEOUS EVERY 24 HOURS
Status: DISCONTINUED | OUTPATIENT
Start: 2024-09-03 | End: 2024-09-04 | Stop reason: HOSPADM

## 2024-09-02 RX ORDER — ALPRAZOLAM 1 MG/1
1.5 TABLET, EXTENDED RELEASE ORAL 3 TIMES DAILY
Status: ON HOLD | COMMUNITY
End: 2024-09-02 | Stop reason: CLARIF

## 2024-09-02 RX ORDER — DROPERIDOL 2.5 MG/ML
1.25 INJECTION, SOLUTION INTRAMUSCULAR; INTRAVENOUS ONCE
Status: COMPLETED | OUTPATIENT
Start: 2024-09-02 | End: 2024-09-02

## 2024-09-02 RX ORDER — THIAMINE HCL 100 MG
100 TABLET ORAL DAILY
Status: DISCONTINUED | OUTPATIENT
Start: 2024-09-03 | End: 2024-09-04 | Stop reason: HOSPADM

## 2024-09-02 RX ORDER — CHLORDIAZEPOXIDE HYDROCHLORIDE 25 MG/1
25 CAPSULE, GELATIN COATED ORAL 4 TIMES DAILY PRN
Status: DISCONTINUED | OUTPATIENT
Start: 2024-09-02 | End: 2024-09-04 | Stop reason: HOSPADM

## 2024-09-02 RX ORDER — TALC
6 POWDER (GRAM) TOPICAL NIGHTLY PRN
Status: DISCONTINUED | OUTPATIENT
Start: 2024-09-02 | End: 2024-09-04 | Stop reason: HOSPADM

## 2024-09-02 RX ORDER — BISACODYL 10 MG/1
10 SUPPOSITORY RECTAL DAILY PRN
Status: DISCONTINUED | OUTPATIENT
Start: 2024-09-02 | End: 2024-09-04 | Stop reason: HOSPADM

## 2024-09-02 RX ORDER — SUCRALFATE 1 G/1
1 TABLET ORAL 4 TIMES DAILY
COMMUNITY

## 2024-09-02 RX ORDER — MAGNESIUM SULFATE HEPTAHYDRATE 40 MG/ML
2 INJECTION, SOLUTION INTRAVENOUS
Status: COMPLETED | OUTPATIENT
Start: 2024-09-02 | End: 2024-09-02

## 2024-09-02 RX ORDER — PROMETHAZINE HYDROCHLORIDE 25 MG/ML
12.5 INJECTION, SOLUTION INTRAMUSCULAR; INTRAVENOUS
Status: COMPLETED | OUTPATIENT
Start: 2024-09-02 | End: 2024-09-02

## 2024-09-02 RX ORDER — PROMETHAZINE HYDROCHLORIDE 25 MG/ML
25 INJECTION, SOLUTION INTRAMUSCULAR; INTRAVENOUS EVERY 6 HOURS PRN
Status: ON HOLD | COMMUNITY
End: 2024-09-02 | Stop reason: CLARIF

## 2024-09-02 RX ORDER — ALPRAZOLAM 0.5 MG/1
0.5 TABLET ORAL 3 TIMES DAILY
Status: ON HOLD | COMMUNITY
End: 2024-09-02 | Stop reason: CLARIF

## 2024-09-02 RX ORDER — HYDROMORPHONE HYDROCHLORIDE 2 MG/ML
0.5 INJECTION, SOLUTION INTRAMUSCULAR; INTRAVENOUS; SUBCUTANEOUS EVERY 4 HOURS PRN
Status: DISCONTINUED | OUTPATIENT
Start: 2024-09-02 | End: 2024-09-04 | Stop reason: HOSPADM

## 2024-09-02 RX ORDER — PANTOPRAZOLE SODIUM 40 MG/1
40 TABLET, DELAYED RELEASE ORAL DAILY
COMMUNITY

## 2024-09-02 RX ORDER — SODIUM CHLORIDE 0.9 % (FLUSH) 0.9 %
10 SYRINGE (ML) INJECTION
Status: DISCONTINUED | OUTPATIENT
Start: 2024-09-02 | End: 2024-09-02

## 2024-09-02 RX ORDER — SODIUM CHLORIDE 0.9 % (FLUSH) 0.9 %
10 SYRINGE (ML) INJECTION
Status: DISCONTINUED | OUTPATIENT
Start: 2024-09-02 | End: 2024-09-04 | Stop reason: HOSPADM

## 2024-09-02 RX ORDER — SODIUM BICARBONATE 1 MEQ/ML
150 SYRINGE (ML) INTRAVENOUS
Status: COMPLETED | OUTPATIENT
Start: 2024-09-02 | End: 2024-09-02

## 2024-09-02 RX ADMIN — HYDROMORPHONE HYDROCHLORIDE 0.5 MG: 2 INJECTION INTRAMUSCULAR; INTRAVENOUS; SUBCUTANEOUS at 11:09

## 2024-09-02 RX ADMIN — FOLIC ACID: 5 INJECTION, SOLUTION INTRAMUSCULAR; INTRAVENOUS; SUBCUTANEOUS at 09:09

## 2024-09-02 RX ADMIN — SODIUM BICARBONATE: 84 INJECTION, SOLUTION INTRAVENOUS at 07:09

## 2024-09-02 RX ADMIN — DROPERIDOL 1.25 MG: 2.5 INJECTION, SOLUTION INTRAMUSCULAR; INTRAVENOUS at 02:09

## 2024-09-02 RX ADMIN — ALPRAZOLAM 1.5 MG: 0.5 TABLET ORAL at 09:09

## 2024-09-02 RX ADMIN — MAGNESIUM SULFATE HEPTAHYDRATE 2 G: 40 INJECTION, SOLUTION INTRAVENOUS at 02:09

## 2024-09-02 RX ADMIN — LEVETIRACETAM 500 MG: 500 TABLET, FILM COATED ORAL at 09:09

## 2024-09-02 RX ADMIN — FAMOTIDINE 40 MG: 10 INJECTION, SOLUTION INTRAVENOUS at 01:09

## 2024-09-02 RX ADMIN — DEXTROSE MONOHYDRATE 1000 ML: 50 INJECTION, SOLUTION INTRAVENOUS at 03:09

## 2024-09-02 RX ADMIN — PROMETHAZINE HYDROCHLORIDE 12.5 MG: 25 INJECTION INTRAMUSCULAR; INTRAVENOUS at 05:09

## 2024-09-02 RX ADMIN — ONDANSETRON 4 MG: 2 INJECTION INTRAMUSCULAR; INTRAVENOUS at 07:09

## 2024-09-02 RX ADMIN — SODIUM CHLORIDE, POTASSIUM CHLORIDE, SODIUM LACTATE AND CALCIUM CHLORIDE 1000 ML: 600; 310; 30; 20 INJECTION, SOLUTION INTRAVENOUS at 01:09

## 2024-09-02 RX ADMIN — SODIUM BICARBONATE 150 MEQ: 84 INJECTION INTRAVENOUS at 03:09

## 2024-09-02 NOTE — Clinical Note
Diagnosis: Metabolic acidosis, normal anion gap (NAG) [940509]   Future Attending Provider: DEMI FRITZ [[id number]]   Admit to which facility:: [facility] [[id number]]   Reason for IP Medical Treatment  (Clinical interventions that can only be accomplished in the IP setting? ) :: ivf, nephrology   Plans for Post-Acute care--if anticipated (pick the single best option):: A. No post acute care anticipated at this time
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
## 2024-09-03 ENCOUNTER — PATIENT OUTREACH (OUTPATIENT)
Dept: ADMINISTRATIVE | Facility: CLINIC | Age: 39
End: 2024-09-03
Payer: COMMERCIAL

## 2024-09-03 LAB
ALBUMIN SERPL-MCNC: 2.9 G/DL (ref 3.5–5)
ALBUMIN/GLOB SERPL: 1 RATIO (ref 1.1–2)
ALP SERPL-CCNC: 63 UNIT/L (ref 40–150)
ALT SERPL-CCNC: 8 UNIT/L (ref 0–55)
ANION GAP SERPL CALC-SCNC: 9 MEQ/L
AST SERPL-CCNC: 18 UNIT/L (ref 5–34)
BASOPHILS # BLD AUTO: 0.03 X10(3)/MCL
BASOPHILS NFR BLD AUTO: 0.7 %
BILIRUB SERPL-MCNC: 1 MG/DL
BUN SERPL-MCNC: <3 MG/DL (ref 7–18.7)
CALCIUM SERPL-MCNC: 8.2 MG/DL (ref 8.4–10.2)
CHLORIDE SERPL-SCNC: 101 MMOL/L (ref 98–107)
CO2 SERPL-SCNC: 23 MMOL/L (ref 22–29)
CREAT SERPL-MCNC: 0.55 MG/DL (ref 0.55–1.02)
CREAT UR-MCNC: 102.7 MG/DL (ref 45–106)
CREAT/UREA NIT SERPL: <5
EOSINOPHIL # BLD AUTO: 0.1 X10(3)/MCL (ref 0–0.9)
EOSINOPHIL NFR BLD AUTO: 2.5 %
ERYTHROCYTE [DISTWIDTH] IN BLOOD BY AUTOMATED COUNT: 18.2 % (ref 11.5–17)
GFR SERPLBLD CREATININE-BSD FMLA CKD-EPI: >60 ML/MIN/1.73/M2
GLOBULIN SER-MCNC: 2.8 GM/DL (ref 2.4–3.5)
GLUCOSE SERPL-MCNC: 111 MG/DL (ref 74–100)
HCT VFR BLD AUTO: 29.6 % (ref 37–47)
HGB BLD-MCNC: 9.3 G/DL (ref 12–16)
IMM GRANULOCYTES # BLD AUTO: 0.03 X10(3)/MCL (ref 0–0.04)
IMM GRANULOCYTES NFR BLD AUTO: 0.7 %
LYMPHOCYTES # BLD AUTO: 0.97 X10(3)/MCL (ref 0.6–4.6)
LYMPHOCYTES NFR BLD AUTO: 24.1 %
MAGNESIUM SERPL-MCNC: 1.4 MG/DL (ref 1.6–2.6)
MCH RBC QN AUTO: 23.2 PG (ref 27–31)
MCHC RBC AUTO-ENTMCNC: 31.4 G/DL (ref 33–36)
MCV RBC AUTO: 73.8 FL (ref 80–94)
MONOCYTES # BLD AUTO: 0.66 X10(3)/MCL (ref 0.1–1.3)
MONOCYTES NFR BLD AUTO: 16.4 %
NEUTROPHILS # BLD AUTO: 2.24 X10(3)/MCL (ref 2.1–9.2)
NEUTROPHILS NFR BLD AUTO: 55.6 %
NRBC BLD AUTO-RTO: 0 %
OHS QRS DURATION: 88 MS
OHS QTC CALCULATION: 437 MS
PHOSPHATE SERPL-MCNC: 1.6 MG/DL (ref 2.3–4.7)
PLATELET # BLD AUTO: 264 X10(3)/MCL (ref 130–400)
PMV BLD AUTO: 10.6 FL (ref 7.4–10.4)
POTASSIUM SERPL-SCNC: 2.7 MMOL/L (ref 3.5–5.1)
PROT SERPL-MCNC: 5.7 GM/DL (ref 6.4–8.3)
RBC # BLD AUTO: 4.01 X10(6)/MCL (ref 4.2–5.4)
SODIUM SERPL-SCNC: 133 MMOL/L (ref 136–145)
SODIUM UR-SCNC: 172 MMOL/L
WBC # BLD AUTO: 4.03 X10(3)/MCL (ref 4.5–11.5)

## 2024-09-03 PROCEDURE — 25000003 PHARM REV CODE 250: Performed by: NURSE PRACTITIONER

## 2024-09-03 PROCEDURE — 85025 COMPLETE CBC W/AUTO DIFF WBC: CPT | Performed by: STUDENT IN AN ORGANIZED HEALTH CARE EDUCATION/TRAINING PROGRAM

## 2024-09-03 PROCEDURE — 21400001 HC TELEMETRY ROOM

## 2024-09-03 PROCEDURE — 36415 COLL VENOUS BLD VENIPUNCTURE: CPT | Performed by: STUDENT IN AN ORGANIZED HEALTH CARE EDUCATION/TRAINING PROGRAM

## 2024-09-03 PROCEDURE — 63600175 PHARM REV CODE 636 W HCPCS: Performed by: NURSE PRACTITIONER

## 2024-09-03 PROCEDURE — 25000003 PHARM REV CODE 250: Performed by: INTERNAL MEDICINE

## 2024-09-03 PROCEDURE — 25000003 PHARM REV CODE 250: Performed by: STUDENT IN AN ORGANIZED HEALTH CARE EDUCATION/TRAINING PROGRAM

## 2024-09-03 PROCEDURE — 63600175 PHARM REV CODE 636 W HCPCS: Performed by: INTERNAL MEDICINE

## 2024-09-03 PROCEDURE — 82570 ASSAY OF URINE CREATININE: CPT | Performed by: NURSE PRACTITIONER

## 2024-09-03 PROCEDURE — 83735 ASSAY OF MAGNESIUM: CPT | Performed by: INTERNAL MEDICINE

## 2024-09-03 PROCEDURE — 84300 ASSAY OF URINE SODIUM: CPT | Performed by: NURSE PRACTITIONER

## 2024-09-03 PROCEDURE — 80053 COMPREHEN METABOLIC PANEL: CPT | Performed by: STUDENT IN AN ORGANIZED HEALTH CARE EDUCATION/TRAINING PROGRAM

## 2024-09-03 PROCEDURE — 84100 ASSAY OF PHOSPHORUS: CPT | Performed by: STUDENT IN AN ORGANIZED HEALTH CARE EDUCATION/TRAINING PROGRAM

## 2024-09-03 RX ORDER — MAGNESIUM SULFATE HEPTAHYDRATE 40 MG/ML
2 INJECTION, SOLUTION INTRAVENOUS ONCE
Status: COMPLETED | OUTPATIENT
Start: 2024-09-03 | End: 2024-09-03

## 2024-09-03 RX ORDER — SODIUM CHLORIDE, SODIUM LACTATE, POTASSIUM CHLORIDE, CALCIUM CHLORIDE 600; 310; 30; 20 MG/100ML; MG/100ML; MG/100ML; MG/100ML
INJECTION, SOLUTION INTRAVENOUS CONTINUOUS
Status: DISCONTINUED | OUTPATIENT
Start: 2024-09-03 | End: 2024-09-04 | Stop reason: HOSPADM

## 2024-09-03 RX ORDER — METOPROLOL TARTRATE 25 MG/1
25 TABLET, FILM COATED ORAL 2 TIMES DAILY
Status: DISCONTINUED | OUTPATIENT
Start: 2024-09-03 | End: 2024-09-04 | Stop reason: HOSPADM

## 2024-09-03 RX ORDER — POTASSIUM CHLORIDE 20 MEQ/1
40 TABLET, EXTENDED RELEASE ORAL ONCE
Status: COMPLETED | OUTPATIENT
Start: 2024-09-03 | End: 2024-09-03

## 2024-09-03 RX ORDER — AMLODIPINE BESYLATE 5 MG/1
5 TABLET ORAL DAILY
Status: DISCONTINUED | OUTPATIENT
Start: 2024-09-03 | End: 2024-09-04 | Stop reason: HOSPADM

## 2024-09-03 RX ORDER — HYDRALAZINE HYDROCHLORIDE 20 MG/ML
10 INJECTION INTRAMUSCULAR; INTRAVENOUS EVERY 6 HOURS PRN
Status: DISCONTINUED | OUTPATIENT
Start: 2024-09-03 | End: 2024-09-04 | Stop reason: HOSPADM

## 2024-09-03 RX ORDER — PROMETHAZINE HYDROCHLORIDE 25 MG/1
25 TABLET ORAL EVERY 6 HOURS PRN
Status: DISCONTINUED | OUTPATIENT
Start: 2024-09-03 | End: 2024-09-04 | Stop reason: HOSPADM

## 2024-09-03 RX ADMIN — PROMETHAZINE HYDROCHLORIDE 25 MG: 25 TABLET ORAL at 10:09

## 2024-09-03 RX ADMIN — ONDANSETRON 4 MG: 2 INJECTION INTRAMUSCULAR; INTRAVENOUS at 08:09

## 2024-09-03 RX ADMIN — ALPRAZOLAM 1.5 MG: 0.5 TABLET ORAL at 10:09

## 2024-09-03 RX ADMIN — POTASSIUM BICARBONATE 25 MEQ: 977.5 TABLET, EFFERVESCENT ORAL at 11:09

## 2024-09-03 RX ADMIN — DULOXETINE HYDROCHLORIDE 60 MG: 30 CAPSULE, DELAYED RELEASE ORAL at 08:09

## 2024-09-03 RX ADMIN — ONDANSETRON 4 MG: 2 INJECTION INTRAMUSCULAR; INTRAVENOUS at 02:09

## 2024-09-03 RX ADMIN — PROMETHAZINE HYDROCHLORIDE 25 MG: 25 TABLET ORAL at 12:09

## 2024-09-03 RX ADMIN — HYDROMORPHONE HYDROCHLORIDE 0.5 MG: 2 INJECTION INTRAMUSCULAR; INTRAVENOUS; SUBCUTANEOUS at 04:09

## 2024-09-03 RX ADMIN — LEVETIRACETAM 500 MG: 500 TABLET, FILM COATED ORAL at 08:09

## 2024-09-03 RX ADMIN — ALPRAZOLAM 1.5 MG: 0.5 TABLET ORAL at 08:09

## 2024-09-03 RX ADMIN — ONDANSETRON 4 MG: 2 INJECTION INTRAMUSCULAR; INTRAVENOUS at 01:09

## 2024-09-03 RX ADMIN — THERA TABS 1 TABLET: TAB at 08:09

## 2024-09-03 RX ADMIN — HYDROMORPHONE HYDROCHLORIDE 0.5 MG: 2 INJECTION INTRAMUSCULAR; INTRAVENOUS; SUBCUTANEOUS at 07:09

## 2024-09-03 RX ADMIN — HYDROMORPHONE HYDROCHLORIDE 0.5 MG: 2 INJECTION INTRAMUSCULAR; INTRAVENOUS; SUBCUTANEOUS at 11:09

## 2024-09-03 RX ADMIN — ALPRAZOLAM 1.5 MG: 0.5 TABLET ORAL at 02:09

## 2024-09-03 RX ADMIN — ENOXAPARIN SODIUM 40 MG: 40 INJECTION SUBCUTANEOUS at 04:09

## 2024-09-03 RX ADMIN — SODIUM BICARBONATE: 84 INJECTION, SOLUTION INTRAVENOUS at 10:09

## 2024-09-03 RX ADMIN — MAGNESIUM SULFATE HEPTAHYDRATE 2 G: 40 INJECTION, SOLUTION INTRAVENOUS at 12:09

## 2024-09-03 RX ADMIN — ONDANSETRON 4 MG: 2 INJECTION INTRAMUSCULAR; INTRAVENOUS at 05:09

## 2024-09-03 RX ADMIN — ACETAMINOPHEN 1000 MG: 500 TABLET ORAL at 10:09

## 2024-09-03 RX ADMIN — THIAMINE HCL TAB 100 MG 100 MG: 100 TAB at 08:09

## 2024-09-03 RX ADMIN — SODIUM CHLORIDE, POTASSIUM CHLORIDE, SODIUM LACTATE AND CALCIUM CHLORIDE: 600; 310; 30; 20 INJECTION, SOLUTION INTRAVENOUS at 11:09

## 2024-09-03 RX ADMIN — POTASSIUM CHLORIDE 40 MEQ: 1500 TABLET, EXTENDED RELEASE ORAL at 11:09

## 2024-09-03 RX ADMIN — AMLODIPINE BESYLATE 5 MG: 5 TABLET ORAL at 11:09

## 2024-09-03 RX ADMIN — HYDRALAZINE HYDROCHLORIDE 10 MG: 20 INJECTION INTRAMUSCULAR; INTRAVENOUS at 10:09

## 2024-09-03 RX ADMIN — FOLIC ACID 1 MG: 1 TABLET ORAL at 08:09

## 2024-09-03 RX ADMIN — SODIUM BICARBONATE: 84 INJECTION, SOLUTION INTRAVENOUS at 02:09

## 2024-09-03 RX ADMIN — PANTOPRAZOLE SODIUM 40 MG: 40 TABLET, DELAYED RELEASE ORAL at 08:09

## 2024-09-03 RX ADMIN — METOPROLOL TARTRATE 25 MG: 25 TABLET, FILM COATED ORAL at 10:09

## 2024-09-03 RX ADMIN — POTASSIUM CHLORIDE 40 MEQ: 1500 TABLET, EXTENDED RELEASE ORAL at 12:09

## 2024-09-03 RX ADMIN — HYDROMORPHONE HYDROCHLORIDE 0.5 MG: 2 INJECTION INTRAMUSCULAR; INTRAVENOUS; SUBCUTANEOUS at 10:09

## 2024-09-03 RX ADMIN — LEVETIRACETAM 500 MG: 500 TABLET, FILM COATED ORAL at 10:09

## 2024-09-03 NOTE — PLAN OF CARE
Problem: Adult Inpatient Plan of Care  Goal: Plan of Care Review  Outcome: Progressing  Flowsheets (Taken 9/2/2024 1953)  Plan of Care Reviewed With: patient  Goal: Patient-Specific Goal (Individualized)  Outcome: Progressing  Goal: Absence of Hospital-Acquired Illness or Injury  Outcome: Progressing  Intervention: Identify and Manage Fall Risk  Flowsheets (Taken 9/2/2024 1953)  Safety Promotion/Fall Prevention:   assistive device/personal item within reach   medications reviewed   nonskid shoes/socks when out of bed   side rails raised x 2  Intervention: Prevent Skin Injury  Flowsheets (Taken 9/2/2024 1953)  Body Position: position changed independently  Skin Protection: incontinence pads utilized  Device Skin Pressure Protection:   absorbent pad utilized/changed   tubing/devices free from skin contact  Intervention: Prevent and Manage VTE (Venous Thromboembolism) Risk  Flowsheets (Taken 9/2/2024 1953)  VTE Prevention/Management:   ambulation promoted   bleeding risk assessed   ROM (active) performed  Intervention: Prevent Infection  Flowsheets (Taken 9/2/2024 1953)  Infection Prevention:   rest/sleep promoted   single patient room provided  Goal: Optimal Comfort and Wellbeing  Outcome: Progressing  Intervention: Monitor Pain and Promote Comfort  Flowsheets (Taken 9/2/2024 1953)  Pain Management Interventions:   care clustered   medication offered   pain management plan reviewed with patient/caregiver   pillow support provided   position adjusted  Intervention: Provide Person-Centered Care  Flowsheets (Taken 9/2/2024 1953)  Trust Relationship/Rapport: care explained  Goal: Readiness for Transition of Care  Outcome: Progressing     Problem: Wound  Goal: Optimal Coping  Outcome: Progressing  Intervention: Support Patient and Family Response  Flowsheets (Taken 9/2/2024 1953)  Supportive Measures:   relaxation techniques promoted   verbalization of feelings encouraged  Goal: Optimal Functional Ability  Outcome:  Progressing  Intervention: Optimize Functional Ability  Flowsheets (Taken 9/2/2024 1953)  Activity Management: Ambulated in room - L4  Activity Assistance Provided: independent  Goal: Absence of Infection Signs and Symptoms  Outcome: Progressing  Intervention: Prevent or Manage Infection  Flowsheets (Taken 9/2/2024 1953)  Infection Management: aseptic technique maintained  Goal: Improved Oral Intake  Outcome: Progressing  Intervention: Promote and Optimize Oral Intake  Flowsheets (Taken 9/2/2024 1953)  Oral Nutrition Promotion: rest periods promoted  Goal: Optimal Pain Control and Function  Outcome: Progressing  Intervention: Prevent or Manage Pain  Flowsheets (Taken 9/2/2024 1953)  Sleep/Rest Enhancement:   regular sleep/rest pattern promoted   relaxation techniques promoted  Pain Management Interventions:   care clustered   medication offered   pain management plan reviewed with patient/caregiver   pillow support provided   position adjusted  Goal: Skin Health and Integrity  Outcome: Progressing  Intervention: Optimize Skin Protection  Flowsheets (Taken 9/2/2024 1953)  Pressure Reduction Techniques:   frequent weight shift encouraged   weight shift assistance provided  Skin Protection: incontinence pads utilized  Activity Management: Ambulated in room - L4  Head of Bed (HOB) Positioning: HOB at 30-45 degrees  Goal: Optimal Wound Healing  Outcome: Progressing  Intervention: Promote Wound Healing  Flowsheets (Taken 9/2/2024 1953)  Sleep/Rest Enhancement:   regular sleep/rest pattern promoted   relaxation techniques promoted
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
## 2024-09-03 NOTE — PLAN OF CARE
09/03/24 1445   Discharge Assessment   Assessment Type Discharge Planning Assessment   Confirmed/corrected address, phone number and insurance Yes   Confirmed Demographics Correct on Facesheet   Source of Information patient   Communicated [place] with patient/caregiver Date not available/Unable to determine   Reason For Admission N/V, metabolic acidosis   People in Home parent(s)   Do you expect to return to your current living situation? Yes   Do you have help at home or someone to help you manage your care at home? Yes   Who are your caregiver(s) and their phone number(s)? matt Vincent Rosa [phone]   Prior to hospitilization cognitive status: Alert/Oriented   Current cognitive status: Alert/Oriented   Walking or Climbing Stairs Difficulty no   Dressing/Bathing Difficulty no   Equipment Currently Used at Home walker, rolling   Patient currently being followed by outpatient case management? No   Do you currently have service(s) that help you manage your care at home? No   Do you take prescription medications? Yes   Do you have prescription coverage? Yes   Coverage BCBS   Do you have any problems affording any of your prescribed medications? No   Is the patient taking medications as prescribed? yes   Who is going to help you get home at discharge? mother   How do you get to doctors appointments? family or friend will provide   Are you on dialysis? No   Do you take coumadin? No   Discharge Plan A Home   Discharge Plan B Home   DME Needed Upon Discharge  wheelchair   Discharge Plan discussed with: Patient   Transition of Care Barriers None   OTHER   Name(s) of People in Home mother Melisa     The patient was living at home with her  an 3 children, but is currently living with her mother until she gets better.  The patient has a walker at home but states that she needs wheelchair.
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
## 2024-09-03 NOTE — NURSING
Nurses Note -- 4 Eyes      9/3/2024   7:25 AM      Skin assessed during: Q Shift Change      [x] No new Altered Skin Integrity Present    []Prevention Measures Documented      [] Yes- Altered Skin Integrity Present or Discovered   [] LDA Added if Not in Epic (Describe Wound)   [] New Altered Skin Integrity was Present on Admit and Documented in LDA   [] Wound Image Taken    Wound Care Consulted? No    Attending Nurse:  Taya DAVIS    Second RN/Staff Member:   SUSAN Garrett

## 2024-09-03 NOTE — NURSING
Nurses Note -- 4 Eyes      9/2/2024   7:36 PM      Skin assessed during: Admit      [x] No Altered Skin Integrity Present    [x]Prevention Measures Documented      [] Yes- Altered Skin Integrity Present or Discovered   [] LDA Added if Not in Epic (Describe Wound)   [] New Altered Skin Integrity was Present on Admit and Documented in LDA   [] Wound Image Taken    Wound Care Consulted? No    Attending Nurse:  Otto Mak RN     Second RN/Staff Member:  Varun Guzman RN
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
## 2024-09-04 VITALS
SYSTOLIC BLOOD PRESSURE: 119 MMHG | HEIGHT: 64 IN | HEART RATE: 75 BPM | DIASTOLIC BLOOD PRESSURE: 79 MMHG | RESPIRATION RATE: 20 BRPM | BODY MASS INDEX: 20.47 KG/M2 | TEMPERATURE: 98 F | OXYGEN SATURATION: 99 % | WEIGHT: 119.94 LBS

## 2024-09-04 LAB
ALBUMIN SERPL-MCNC: 2.5 G/DL (ref 3.5–5)
ALBUMIN/GLOB SERPL: 1.3 RATIO (ref 1.1–2)
ALP SERPL-CCNC: 60 UNIT/L (ref 40–150)
ALT SERPL-CCNC: 6 UNIT/L (ref 0–55)
ANION GAP SERPL CALC-SCNC: 6 MEQ/L
AST SERPL-CCNC: 10 UNIT/L (ref 5–34)
BASOPHILS # BLD AUTO: 0.05 X10(3)/MCL
BASOPHILS NFR BLD AUTO: 1 %
BILIRUB SERPL-MCNC: 0.5 MG/DL
BUN SERPL-MCNC: 6 MG/DL (ref 7–18.7)
CALCIUM SERPL-MCNC: 8 MG/DL (ref 8.4–10.2)
CHLORIDE SERPL-SCNC: 102 MMOL/L (ref 98–107)
CO2 SERPL-SCNC: 28 MMOL/L (ref 22–29)
CREAT SERPL-MCNC: 0.53 MG/DL (ref 0.55–1.02)
CREAT/UREA NIT SERPL: 11
EOSINOPHIL # BLD AUTO: 0.18 X10(3)/MCL (ref 0–0.9)
EOSINOPHIL NFR BLD AUTO: 3.5 %
ERYTHROCYTE [DISTWIDTH] IN BLOOD BY AUTOMATED COUNT: 18.6 % (ref 11.5–17)
FERRITIN SERPL-MCNC: 27.87 NG/ML (ref 4.63–204)
GFR SERPLBLD CREATININE-BSD FMLA CKD-EPI: >60 ML/MIN/1.73/M2
GLOBULIN SER-MCNC: 2 GM/DL (ref 2.4–3.5)
GLUCOSE SERPL-MCNC: 92 MG/DL (ref 74–100)
HCT VFR BLD AUTO: 27.7 % (ref 37–47)
HGB BLD-MCNC: 8.5 G/DL (ref 12–16)
IMM GRANULOCYTES # BLD AUTO: 0.02 X10(3)/MCL (ref 0–0.04)
IMM GRANULOCYTES NFR BLD AUTO: 0.4 %
IRON SATN MFR SERPL: 4 % (ref 20–50)
IRON SERPL-MCNC: 10 UG/DL (ref 50–170)
LYMPHOCYTES # BLD AUTO: 1.87 X10(3)/MCL (ref 0.6–4.6)
LYMPHOCYTES NFR BLD AUTO: 36 %
MAGNESIUM SERPL-MCNC: 1.7 MG/DL (ref 1.6–2.6)
MCH RBC QN AUTO: 23.2 PG (ref 27–31)
MCHC RBC AUTO-ENTMCNC: 30.7 G/DL (ref 33–36)
MCV RBC AUTO: 75.7 FL (ref 80–94)
MONOCYTES # BLD AUTO: 0.92 X10(3)/MCL (ref 0.1–1.3)
MONOCYTES NFR BLD AUTO: 17.7 %
NEUTROPHILS # BLD AUTO: 2.16 X10(3)/MCL (ref 2.1–9.2)
NEUTROPHILS NFR BLD AUTO: 41.4 %
NRBC BLD AUTO-RTO: 0 %
PHOSPHATE SERPL-MCNC: 2.8 MG/DL (ref 2.3–4.7)
PLATELET # BLD AUTO: 263 X10(3)/MCL (ref 130–400)
PMV BLD AUTO: 11.1 FL (ref 7.4–10.4)
POTASSIUM SERPL-SCNC: 3.1 MMOL/L (ref 3.5–5.1)
PROT SERPL-MCNC: 4.5 GM/DL (ref 6.4–8.3)
RBC # BLD AUTO: 3.66 X10(6)/MCL (ref 4.2–5.4)
SODIUM SERPL-SCNC: 136 MMOL/L (ref 136–145)
TIBC SERPL-MCNC: 228 UG/DL (ref 70–310)
TIBC SERPL-MCNC: 238 UG/DL (ref 250–450)
TRANSFERRIN SERPL-MCNC: 230 MG/DL (ref 180–382)
WBC # BLD AUTO: 5.2 X10(3)/MCL (ref 4.5–11.5)

## 2024-09-04 PROCEDURE — 83540 ASSAY OF IRON: CPT | Performed by: NURSE PRACTITIONER

## 2024-09-04 PROCEDURE — 83550 IRON BINDING TEST: CPT | Performed by: NURSE PRACTITIONER

## 2024-09-04 PROCEDURE — 25000003 PHARM REV CODE 250: Performed by: PHYSICIAN ASSISTANT

## 2024-09-04 PROCEDURE — 63600175 PHARM REV CODE 636 W HCPCS: Performed by: NURSE PRACTITIONER

## 2024-09-04 PROCEDURE — 36415 COLL VENOUS BLD VENIPUNCTURE: CPT | Performed by: NURSE PRACTITIONER

## 2024-09-04 PROCEDURE — 63600175 PHARM REV CODE 636 W HCPCS: Performed by: INTERNAL MEDICINE

## 2024-09-04 PROCEDURE — 80053 COMPREHEN METABOLIC PANEL: CPT | Performed by: NURSE PRACTITIONER

## 2024-09-04 PROCEDURE — 83735 ASSAY OF MAGNESIUM: CPT | Performed by: NURSE PRACTITIONER

## 2024-09-04 PROCEDURE — 85025 COMPLETE CBC W/AUTO DIFF WBC: CPT | Performed by: NURSE PRACTITIONER

## 2024-09-04 PROCEDURE — 25000003 PHARM REV CODE 250: Performed by: NURSE PRACTITIONER

## 2024-09-04 PROCEDURE — 25000003 PHARM REV CODE 250: Performed by: INTERNAL MEDICINE

## 2024-09-04 PROCEDURE — 82728 ASSAY OF FERRITIN: CPT | Performed by: NURSE PRACTITIONER

## 2024-09-04 PROCEDURE — 84100 ASSAY OF PHOSPHORUS: CPT | Performed by: NURSE PRACTITIONER

## 2024-09-04 RX ORDER — METOPROLOL TARTRATE 25 MG/1
25 TABLET, FILM COATED ORAL 2 TIMES DAILY
Qty: 180 TABLET | Refills: 3 | Status: SHIPPED | OUTPATIENT
Start: 2024-09-04 | End: 2025-09-04

## 2024-09-04 RX ORDER — POTASSIUM CHLORIDE 20 MEQ/1
40 TABLET, EXTENDED RELEASE ORAL 2 TIMES DAILY
Status: DISCONTINUED | OUTPATIENT
Start: 2024-09-04 | End: 2024-09-04 | Stop reason: HOSPADM

## 2024-09-04 RX ORDER — POTASSIUM CHLORIDE 20 MEQ/1
40 TABLET, EXTENDED RELEASE ORAL 2 TIMES DAILY
Qty: 8 TABLET | Refills: 0 | Status: SHIPPED | OUTPATIENT
Start: 2024-09-04 | End: 2024-09-06

## 2024-09-04 RX ORDER — POTASSIUM CHLORIDE 20 MEQ/1
40 TABLET, EXTENDED RELEASE ORAL ONCE
Status: COMPLETED | OUTPATIENT
Start: 2024-09-04 | End: 2024-09-04

## 2024-09-04 RX ORDER — PROMETHAZINE HYDROCHLORIDE 25 MG/1
25 TABLET ORAL EVERY 6 HOURS PRN
Qty: 10 TABLET | Refills: 0 | Status: SHIPPED | OUTPATIENT
Start: 2024-09-04 | End: 2024-09-09

## 2024-09-04 RX ADMIN — LEVETIRACETAM 500 MG: 500 TABLET, FILM COATED ORAL at 08:09

## 2024-09-04 RX ADMIN — AMLODIPINE BESYLATE 5 MG: 5 TABLET ORAL at 08:09

## 2024-09-04 RX ADMIN — THIAMINE HCL TAB 100 MG 100 MG: 100 TAB at 08:09

## 2024-09-04 RX ADMIN — ACETAMINOPHEN 1000 MG: 500 TABLET ORAL at 02:09

## 2024-09-04 RX ADMIN — HYDROMORPHONE HYDROCHLORIDE 0.5 MG: 2 INJECTION INTRAMUSCULAR; INTRAVENOUS; SUBCUTANEOUS at 04:09

## 2024-09-04 RX ADMIN — PROMETHAZINE HYDROCHLORIDE 25 MG: 25 TABLET ORAL at 04:09

## 2024-09-04 RX ADMIN — FOLIC ACID 1 MG: 1 TABLET ORAL at 08:09

## 2024-09-04 RX ADMIN — ONDANSETRON 4 MG: 2 INJECTION INTRAMUSCULAR; INTRAVENOUS at 08:09

## 2024-09-04 RX ADMIN — HYDROMORPHONE HYDROCHLORIDE 0.5 MG: 2 INJECTION INTRAMUSCULAR; INTRAVENOUS; SUBCUTANEOUS at 01:09

## 2024-09-04 RX ADMIN — SODIUM CHLORIDE, POTASSIUM CHLORIDE, SODIUM LACTATE AND CALCIUM CHLORIDE: 600; 310; 30; 20 INJECTION, SOLUTION INTRAVENOUS at 09:09

## 2024-09-04 RX ADMIN — THERA TABS 1 TABLET: TAB at 08:09

## 2024-09-04 RX ADMIN — HYDROMORPHONE HYDROCHLORIDE 0.5 MG: 2 INJECTION INTRAMUSCULAR; INTRAVENOUS; SUBCUTANEOUS at 08:09

## 2024-09-04 RX ADMIN — ALPRAZOLAM 1.5 MG: 0.5 TABLET ORAL at 03:09

## 2024-09-04 RX ADMIN — METOPROLOL TARTRATE 25 MG: 25 TABLET, FILM COATED ORAL at 08:09

## 2024-09-04 RX ADMIN — PANTOPRAZOLE SODIUM 40 MG: 40 TABLET, DELAYED RELEASE ORAL at 08:09

## 2024-09-04 RX ADMIN — ENOXAPARIN SODIUM 40 MG: 40 INJECTION SUBCUTANEOUS at 05:09

## 2024-09-04 RX ADMIN — ALPRAZOLAM 1.5 MG: 0.5 TABLET ORAL at 08:09

## 2024-09-04 RX ADMIN — POTASSIUM CHLORIDE 40 MEQ: 1500 TABLET, EXTENDED RELEASE ORAL at 11:09

## 2024-09-04 RX ADMIN — DULOXETINE HYDROCHLORIDE 60 MG: 30 CAPSULE, DELAYED RELEASE ORAL at 08:09

## 2024-09-04 NOTE — PLAN OF CARE
Problem: Adult Inpatient Plan of Care  Goal: Plan of Care Review  9/4/2024 1548 by Taya Soriano RN  Outcome: Met  9/4/2024 0653 by Taya Soriano RN  Outcome: Progressing  Goal: Patient-Specific Goal (Individualized)  9/4/2024 1548 by Taya Soriano RN  Outcome: Met  9/4/2024 0653 by Taya Soriano RN  Outcome: Progressing  Goal: Absence of Hospital-Acquired Illness or Injury  9/4/2024 1548 by Taya Soriano RN  Outcome: Met  9/4/2024 0653 by Taya Soriano RN  Outcome: Progressing  Goal: Optimal Comfort and Wellbeing  9/4/2024 1548 by Taya Soriano RN  Outcome: Met  9/4/2024 0653 by Taya Soriano RN  Outcome: Progressing  Goal: Readiness for Transition of Care  9/4/2024 1548 by Taya Soriano RN  Outcome: Met  9/4/2024 0653 by Taya Soriano RN  Outcome: Progressing     Problem: Wound  Goal: Optimal Coping  9/4/2024 1548 by Taya Soriano RN  Outcome: Met  9/4/2024 0653 by Taya Soriano RN  Outcome: Progressing  Goal: Optimal Functional Ability  9/4/2024 1548 by Taya Soriano RN  Outcome: Met  9/4/2024 0653 by Taya Soriano RN  Outcome: Progressing  Goal: Absence of Infection Signs and Symptoms  9/4/2024 1548 by Taya Soriano RN  Outcome: Met  9/4/2024 0653 by Taya Soriano RN  Outcome: Progressing  Goal: Improved Oral Intake  9/4/2024 1548 by Taya Soriano RN  Outcome: Met  9/4/2024 0653 by Taya Soriano RN  Outcome: Progressing  Goal: Optimal Pain Control and Function  9/4/2024 1548 by Taya Soriano RN  Outcome: Met  9/4/2024 0653 by Taya Soriano RN  Outcome: Progressing  Goal: Skin Health and Integrity  9/4/2024 1548 by Taya Soriano RN  Outcome: Met  9/4/2024 0653 by Taya Soriano RN  Outcome: Progressing  Goal: Optimal Wound Healing  9/4/2024 1548 by Taya Soriano RN  Outcome: Met  9/4/2024 0653 by Taya Soriano, RN  Outcome: Progressing
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
## 2024-09-04 NOTE — NURSING
Nurses Note -- 4 Eyes      9/4/2024   6:54 AM      Skin assessed during: Q Shift Change      [x] No new Altered Skin Integrity Present    []Prevention Measures Documented      [] Yes- Altered Skin Integrity Present or Discovered   [] LDA Added if Not in Epic (Describe Wound)   [] New Altered Skin Integrity was Present on Admit and Documented in LDA   [] Wound Image Taken    Wound Care Consulted? No    Attending Nurse:  Taya Birmingham RN/Staff Member:   Tami
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
## 2024-09-13 ENCOUNTER — OFFICE VISIT (OUTPATIENT)
Dept: ORTHOPEDICS | Facility: CLINIC | Age: 39
End: 2024-09-13
Payer: COMMERCIAL

## 2024-09-13 VITALS
DIASTOLIC BLOOD PRESSURE: 88 MMHG | WEIGHT: 119.94 LBS | HEIGHT: 64 IN | HEART RATE: 89 BPM | SYSTOLIC BLOOD PRESSURE: 125 MMHG | BODY MASS INDEX: 20.47 KG/M2

## 2024-09-13 DIAGNOSIS — S82.52XA: Primary | ICD-10-CM

## 2024-09-13 RX ORDER — OXYCODONE AND ACETAMINOPHEN 10; 325 MG/1; MG/1
1 TABLET ORAL EVERY 8 HOURS PRN
Qty: 21 TABLET | Refills: 0 | Status: SHIPPED | OUTPATIENT
Start: 2024-09-13 | End: 2024-09-20

## 2024-09-13 RX ORDER — ONDANSETRON 8 MG/1
8 TABLET, ORALLY DISINTEGRATING ORAL EVERY 8 HOURS
COMMUNITY
Start: 2024-09-01

## 2024-09-13 RX ORDER — TIZANIDINE 2 MG/1
2 TABLET ORAL EVERY 6 HOURS PRN
Qty: 40 TABLET | Refills: 0 | Status: SHIPPED | OUTPATIENT
Start: 2024-09-13 | End: 2024-09-23

## 2024-09-13 NOTE — PROGRESS NOTES
"[facility]      Name: Chelsie Lee  : 1985  MRN: [id number]  Date: 2024    Chief Complaint   Patient presents with    Left Ankle - Pain     2 week post-op left ankle.        Subjective:      Chief Complaint   Patient presents with    Left Ankle - Pain     2 week post-op left ankle.         HPI  Chelsie Lee is a 38 y.o. female presents to clinic for 2 week follow up status post open reduction internal fixation of left medial malleolus ankle fracture.  Reports she has 8/10 pain level.  She has been ambulating in the Cam boot without increased pain.  Reports she believes accident came because she had a seizure.  States she was placed on Ativan.  Reports she has had 1 seizure since then as well.  Was told her seizure was most likely stress induced and she is going to follow up with Neurology soon.  No other complaints at this time.      ROS:  Constitutional: Denies fever chills  MSK: Pain evident at site of injury located in HPI,   Integ: No signs of abrasions or lacerations  Neuro: No numbness or tingling  Lymphatic: No swelling outside the area of injury     Current Outpatient Medications   Medication Instructions    ALPRAZolam (XANAX) 1.5 mg, Oral, 3 times daily    DULoxetine (CYMBALTA) 60 mg, Oral, Nightly    levETIRAcetam (KEPPRA) 500 mg, Oral, 2 times daily    metoprolol tartrate (LOPRESSOR) 25 mg, Oral, 2 times daily    ondansetron (ZOFRAN-ODT) 8 mg, Oral, Every 8 hours    oxyCODONE-acetaminophen (PERCOCET)  mg per tablet 1 tablet, Oral, Every 8 hours PRN    pantoprazole (PROTONIX) 40 mg, Oral, Daily    sucralfate (CARAFATE) 1 g, Oral, 4 times daily    tiZANidine (ZANAFLEX) 2 mg, Oral, Every 6 hours PRN        Objective:     Visit Vitals  /88   Pulse 89   Ht 5' 4" (1.626 m)   Wt 54.4 kg (119 lb 14.9 oz)   BMI 20.59 kg/m²     Physical Exam    General the patient is alert and oriented x3 no acute distress nontoxic-appearing appropriate " "affect.    Constitutional: Vital signs are examined and stable.  Resp: No signs of labored breathing                     Left lower extremity:           -Skin:  Incision well healed without evidence of dehiscence or infection; no erythema or discharge.  Sutures intact and removed today.           -MSK: Hip and Knee F/E, EHL/FHL, Gastroc/Tib anterior motor intact.  No painful or prominent hardware.           -Neuro:  Sensation intact to light touch L3-S1 dermatomes           -Lymphatic:  Nonpitting edema at surgical site           -CV:  Posterior tibialis pulse palpable.  Compartments soft and compressible        Body mass index is 20.59 kg/m².  Ideal body weight: 54.7 kg (120 lb 9.5 oz)  Hgb   Date Value Ref Range Status   09/04/2024 8.5 (L) 12.0 - 16.0 g/dL Final   09/03/2024 9.3 (L) 12.0 - 16.0 g/dL Final     Hemoglobin   Date Value Ref Range Status   03/14/2024 9.0 (L) 12.0 - 16.0 gm/dL Final     Hematocrit   Date Value Ref Range Status   03/14/2024 29.3 (L) 35.0 - 46.0 % Final     Hct   Date Value Ref Range Status   09/04/2024 27.7 (L) 37.0 - 47.0 % Final   09/03/2024 29.6 (L) 37.0 - 47.0 % Final     No results found for: "[id number]"  WBC   Date Value Ref Range Status   09/04/2024 5.20 4.50 - 11.50 x10(3)/mcL Final   09/03/2024 4.03 (L) 4.50 - 11.50 x10(3)/mcL Final   03/14/2024 7.9 4.5 - 11.0 103/uL Final       Radiology:   N/a    Assessment:       ICD-10-CM ICD-9-CM   1. Traumatic closed displaced fracture of medial malleolus, left, initial encounter  S82.52XA 824.0       Plan:     1. Traumatic closed displaced fracture of medial malleolus, left, initial encounter  -     oxyCODONE-acetaminophen (PERCOCET)  mg per tablet; Take 1 tablet by mouth every 8 (eight) hours as needed for Pain.  Dispense: 21 tablet; Refill: 0  -     tiZANidine (ZANAFLEX) 2 MG tablet; Take 1 tablet (2 mg total) by mouth every 6 (six) hours as needed (muscle spasm/pain).  Dispense: 40 tablet; Refill: 0      Patient may continue " weight-bearing as tolerated in the boot.  We discussed beginning physical therapy today or when she comes back in a month and she would prefer to begin physical therapy after her next appointment.  Follow up in 1 month for repeat imaging and x-rays.  Refilled medications today.  Given patient's history of seizures, tizanidine as prescribed for muscle relaxant over other medications.  Pt verbalizes understanding and agrees with tx plan. Pt to call clinic with any questions/concerns.        Ashley Gunther, PA-C [facility]    Future Appointments   Date Time Provider Department Center   10/9/2024  8:15 AM René Peralta MD [facility] [place]   10/14/2024  2:00 PM George Cuellar MD [address] [place]       This note was created with the assistance of voice recognition software or phone dictation. There may be transcription errors as a result of using this technology however minimal. Effort has been made to assure accuracy of transcription but any obvious errors or omissions should be clarified with the author of the document.

## 2024-09-20 DIAGNOSIS — S82.52XA: ICD-10-CM

## 2024-09-20 RX ORDER — OXYCODONE AND ACETAMINOPHEN 10; 325 MG/1; MG/1
1 TABLET ORAL EVERY 8 HOURS PRN
Qty: 21 TABLET | Refills: 0 | Status: SHIPPED | OUTPATIENT
Start: 2024-09-20 | End: 2024-09-27

## (undated) DEVICE — CLOSURE SKIN STERI STRIP 1/2X4

## (undated) DEVICE — CHLORAPREP W TINT 26ML APPL

## (undated) DEVICE — GLOVE SENSICARE PI GRN 6.5

## (undated) DEVICE — BIT DRILL CANNULATED 3MM

## (undated) DEVICE — BLOCK BLOX BITE DENT RIM 54FR

## (undated) DEVICE — TIP SUCTION YANKAUER

## (undated) DEVICE — TAPE SILK 3IN

## (undated) DEVICE — COVER MAYO STAND REINFRCD 30

## (undated) DEVICE — COVER TABLE HVY DTY 60X90IN

## (undated) DEVICE — GUIDEWIRE ORTHOPEDIC 220X1.6MM
Type: IMPLANTABLE DEVICE | Site: ANKLE | Status: NON-FUNCTIONAL
Removed: 2024-08-29

## (undated) DEVICE — ADAPTER DUAL NSL LUER M-M 7FT

## (undated) DEVICE — BAG DRAIN ANTI REFLUX 2000ML

## (undated) DEVICE — KIT SURGICAL COLON .25 1.1OZ

## (undated) DEVICE — GLOVE PROTEXIS LTX MICRO  7.5

## (undated) DEVICE — GLOVE PROTEXIS HYDROGEL SZ7.5

## (undated) DEVICE — DEVICE TK TI-KNOT 5MM REPL DEV

## (undated) DEVICE — BAG LABGUARD BIOHAZARD 6X9IN

## (undated) DEVICE — SUT VICRYL PLUS 4-0 FS-2 27IN

## (undated) DEVICE — TUBING O2 FEMALE CONN 13FT

## (undated) DEVICE — GOWN SMARTSLEEVE AAMI LVL4 XL

## (undated) DEVICE — SPONGE GAUZE 4X4 12 PLY STRL

## (undated) DEVICE — SUT ETHILON 3-0 FS-1 30

## (undated) DEVICE — SCISSOR CURVED ENDOPATH 5MM

## (undated) DEVICE — UNDERPAD DISPOSABLE 30X30IN

## (undated) DEVICE — KIT CANIST SUCTION 1200CC

## (undated) DEVICE — SUT TK QUIK LOAD

## (undated) DEVICE — Device

## (undated) DEVICE — STAPLER SKIN PROXIMATE WIDE

## (undated) DEVICE — BLOCK BITE DISPOS-A-BITE

## (undated) DEVICE — APPLICATOR CHLORAPREP ORN 26ML

## (undated) DEVICE — FORCEP BIOPSY ENDO 2.8MM 240CM

## (undated) DEVICE — COVER FULLGUARD SHOE HIGH-TOP

## (undated) DEVICE — GLOVE PROTEXIS LTX MICRO 8

## (undated) DEVICE — ELECTRODE REM POLYHESIVE II

## (undated) DEVICE — CANNULA ENDOPATH XCEL 5X100MM

## (undated) DEVICE — SHEARS HARMONIC CRVD TIP 36CM

## (undated) DEVICE — BLADE SURG STAINLESS STEEL #11

## (undated) DEVICE — TROCAR KII FIOS 12MM X 100MM

## (undated) DEVICE — DRAPE STERI U-SHAPED 47X51IN

## (undated) DEVICE — DRESSING XEROFORM 5X9IN

## (undated) DEVICE — DRAPE ORTH SPLIT 77X108IN

## (undated) DEVICE — GLOVE SIGNATURE MICRO LTX 6.5

## (undated) DEVICE — SUT ETHIBOND #0 EN-3 112CM

## (undated) DEVICE — GAUZE SPONGE 4X4 12PLY

## (undated) DEVICE — TROCAR ENDOPATH XCEL 5X100MM

## (undated) DEVICE — TROCAR ENDOPATH XCEL 11MM 10CM

## (undated) DEVICE — DRESSING TELFA N ADH 3X8IN

## (undated) DEVICE — KIT GEN LAPAROSCOPY LAFAYETTE

## (undated) DEVICE — BANDAGE ESMARK 6INX3YD

## (undated) DEVICE — SET EXTENSION MICROBORE 74IN

## (undated) DEVICE — IRRIGATOR HYDRO-SURG PLUS 5MM

## (undated) DEVICE — WARMER DRAPE STERILE LF

## (undated) DEVICE — TUBE FEEDING JEJUNAL 14FR

## (undated) DEVICE — SOL IRRI STRL WATER 1000ML

## (undated) DEVICE — COLLECTION SPECIMEN NEPTUNE

## (undated) DEVICE — GLOVE 8 PROTEXIS PI ORTHO

## (undated) DEVICE — CONTAINER SPECIMEN SCREW 4OZ

## (undated) DEVICE — BOOT WALKER ROCKER MEDIUM

## (undated) DEVICE — TOURNIQUET SB QC DP 24X4IN

## (undated) DEVICE — CUFF ATS 2 PORT SNGL BLDR 24IN

## (undated) DEVICE — NDL HYPO 22GX1 1/2 SYR 10ML LL

## (undated) DEVICE — DRAPE C-ARMOR EQUIPMENT COVER

## (undated) DEVICE — SUT CTD VICRYL CT-1 27

## (undated) DEVICE — ELECTRODE PATIENT RETURN DISP

## (undated) DEVICE — KIT SURGICAL TURNOVER